# Patient Record
Sex: FEMALE | Race: WHITE | NOT HISPANIC OR LATINO | Employment: OTHER | URBAN - METROPOLITAN AREA
[De-identification: names, ages, dates, MRNs, and addresses within clinical notes are randomized per-mention and may not be internally consistent; named-entity substitution may affect disease eponyms.]

---

## 2017-01-10 ENCOUNTER — TELEPHONE (OUTPATIENT)
Dept: OTHER | Facility: MEDICAL CENTER | Age: 62
End: 2017-01-10

## 2017-01-10 NOTE — TELEPHONE ENCOUNTER
Pt calls requesting assistance in coordinating care.  Pt is to undergo a bilateral mastectomy and immediate reconstruction on 2/19/17.  Per pt no orders rcvd from Dr. Wright's office.    Call placed to Dr. Wright's office for clarification.   confirmed that orders were sent for pt to have surgery on 2/19/17.  Phoned pt with update.  Pt grateful for assistance and will schedule pre op registration appt as soon as orders are rcvd.

## 2017-01-16 DIAGNOSIS — Z01.812 PRE-OPERATIVE LABORATORY EXAMINATION: ICD-10-CM

## 2017-01-16 DIAGNOSIS — Z01.810 PRE-OPERATIVE CARDIOVASCULAR EXAMINATION: ICD-10-CM

## 2017-01-16 LAB
ANION GAP SERPL CALC-SCNC: 8 MMOL/L (ref 0–11.9)
BASOPHILS # BLD AUTO: 1 % (ref 0–1.8)
BASOPHILS # BLD: 0.05 K/UL (ref 0–0.12)
BUN SERPL-MCNC: 13 MG/DL (ref 8–22)
CALCIUM SERPL-MCNC: 9.4 MG/DL (ref 8.5–10.5)
CHLORIDE SERPL-SCNC: 106 MMOL/L (ref 96–112)
CO2 SERPL-SCNC: 23 MMOL/L (ref 20–33)
CREAT SERPL-MCNC: 0.6 MG/DL (ref 0.5–1.4)
EKG IMPRESSION: NORMAL
EOSINOPHIL # BLD AUTO: 0.09 K/UL (ref 0–0.51)
EOSINOPHIL NFR BLD: 1.8 % (ref 0–6.9)
ERYTHROCYTE [DISTWIDTH] IN BLOOD BY AUTOMATED COUNT: 55.7 FL (ref 35.9–50)
GFR SERPL CREATININE-BSD FRML MDRD: >60 ML/MIN/1.73 M 2
GLUCOSE SERPL-MCNC: 103 MG/DL (ref 65–99)
HCT VFR BLD AUTO: 37 % (ref 37–47)
HGB BLD-MCNC: 12 G/DL (ref 12–16)
IMM GRANULOCYTES # BLD AUTO: 0.01 K/UL (ref 0–0.11)
IMM GRANULOCYTES NFR BLD AUTO: 0.2 % (ref 0–0.9)
LYMPHOCYTES # BLD AUTO: 0.81 K/UL (ref 1–4.8)
LYMPHOCYTES NFR BLD: 15.9 % (ref 22–41)
MCH RBC QN AUTO: 33.9 PG (ref 27–33)
MCHC RBC AUTO-ENTMCNC: 32.4 G/DL (ref 33.6–35)
MCV RBC AUTO: 104.5 FL (ref 81.4–97.8)
MONOCYTES # BLD AUTO: 0.43 K/UL (ref 0–0.85)
MONOCYTES NFR BLD AUTO: 8.4 % (ref 0–13.4)
NEUTROPHILS # BLD AUTO: 3.7 K/UL (ref 2–7.15)
NEUTROPHILS NFR BLD: 72.7 % (ref 44–72)
NRBC # BLD AUTO: 0 K/UL
NRBC BLD AUTO-RTO: 0 /100 WBC
PLATELET # BLD AUTO: 283 K/UL (ref 164–446)
PMV BLD AUTO: 9.9 FL (ref 9–12.9)
POTASSIUM SERPL-SCNC: 4.1 MMOL/L (ref 3.6–5.5)
RBC # BLD AUTO: 3.54 M/UL (ref 4.2–5.4)
SODIUM SERPL-SCNC: 137 MMOL/L (ref 135–145)
WBC # BLD AUTO: 5.1 K/UL (ref 4.8–10.8)

## 2017-01-16 PROCEDURE — 36415 COLL VENOUS BLD VENIPUNCTURE: CPT

## 2017-01-16 PROCEDURE — 85025 COMPLETE CBC W/AUTO DIFF WBC: CPT

## 2017-01-16 PROCEDURE — 80048 BASIC METABOLIC PNL TOTAL CA: CPT

## 2017-01-19 ENCOUNTER — HOSPITAL ENCOUNTER (OUTPATIENT)
Facility: MEDICAL CENTER | Age: 62
End: 2017-01-20
Attending: PLASTIC SURGERY | Admitting: PLASTIC SURGERY
Payer: COMMERCIAL

## 2017-01-19 PROBLEM — C50.111 MALIGNANT NEOPLASM OF CENTRAL PORTION OF RIGHT FEMALE BREAST (HCC): Status: ACTIVE | Noted: 2017-01-19

## 2017-01-19 PROCEDURE — 500438 HCHG DRESSING, SPANDAGE #10 12: Performed by: PLASTIC SURGERY

## 2017-01-19 PROCEDURE — 700111 HCHG RX REV CODE 636 W/ 250 OVERRIDE (IP)

## 2017-01-19 PROCEDURE — 700102 HCHG RX REV CODE 250 W/ 637 OVERRIDE(OP): Performed by: PLASTIC SURGERY

## 2017-01-19 PROCEDURE — 500122 HCHG BOVIE, BLADE: Performed by: PLASTIC SURGERY

## 2017-01-19 PROCEDURE — 88300 SURGICAL PATH GROSS: CPT

## 2017-01-19 PROCEDURE — 96365 THER/PROPH/DIAG IV INF INIT: CPT

## 2017-01-19 PROCEDURE — A6402 STERILE GAUZE <= 16 SQ IN: HCPCS | Performed by: PLASTIC SURGERY

## 2017-01-19 PROCEDURE — 160009 HCHG ANES TIME/MIN: Performed by: PLASTIC SURGERY

## 2017-01-19 PROCEDURE — 502594 HCHG SCISSOR HANDLE, HARMONIC ACE: Performed by: PLASTIC SURGERY

## 2017-01-19 PROCEDURE — 110371 HCHG SHELL REV 272: Performed by: PLASTIC SURGERY

## 2017-01-19 PROCEDURE — A9270 NON-COVERED ITEM OR SERVICE: HCPCS | Performed by: PLASTIC SURGERY

## 2017-01-19 PROCEDURE — 88307 TISSUE EXAM BY PATHOLOGIST: CPT | Mod: 59

## 2017-01-19 PROCEDURE — 160002 HCHG RECOVERY MINUTES (STAT): Performed by: PLASTIC SURGERY

## 2017-01-19 PROCEDURE — 700101 HCHG RX REV CODE 250

## 2017-01-19 PROCEDURE — 502240 HCHG MISC OR SUPPLY RC 0272: Performed by: PLASTIC SURGERY

## 2017-01-19 PROCEDURE — 160036 HCHG PACU - EA ADDL 30 MINS PHASE I: Performed by: PLASTIC SURGERY

## 2017-01-19 PROCEDURE — L8600 IMPLANT BREAST SILICONE/EQ: HCPCS | Performed by: PLASTIC SURGERY

## 2017-01-19 PROCEDURE — 160048 HCHG OR STATISTICAL LEVEL 1-5: Performed by: PLASTIC SURGERY

## 2017-01-19 PROCEDURE — A4606 OXYGEN PROBE USED W OXIMETER: HCPCS | Performed by: PLASTIC SURGERY

## 2017-01-19 PROCEDURE — 500054 HCHG BANDAGE, ELASTIC 6: Performed by: PLASTIC SURGERY

## 2017-01-19 PROCEDURE — 500772 HCHG KIT, MAMMARY FILL FLUID TRANSFER: Performed by: PLASTIC SURGERY

## 2017-01-19 PROCEDURE — 500423 HCHG DRESSING, ABD COMBINE: Performed by: PLASTIC SURGERY

## 2017-01-19 PROCEDURE — 160029 HCHG SURGERY MINUTES - 1ST 30 MINS LEVEL 4: Performed by: PLASTIC SURGERY

## 2017-01-19 PROCEDURE — 700111 HCHG RX REV CODE 636 W/ 250 OVERRIDE (IP): Performed by: PLASTIC SURGERY

## 2017-01-19 PROCEDURE — 500389 HCHG DRAIN, RESERVOIR SUCT JP 100CC: Performed by: PLASTIC SURGERY

## 2017-01-19 PROCEDURE — 501838 HCHG SUTURE GENERAL: Performed by: PLASTIC SURGERY

## 2017-01-19 PROCEDURE — 110382 HCHG SHELL REV 271: Performed by: PLASTIC SURGERY

## 2017-01-19 PROCEDURE — 160041 HCHG SURGERY MINUTES - EA ADDL 1 MIN LEVEL 4: Performed by: PLASTIC SURGERY

## 2017-01-19 PROCEDURE — G0378 HOSPITAL OBSERVATION PER HR: HCPCS

## 2017-01-19 PROCEDURE — 500881 HCHG PACK, EXTREMITY: Performed by: PLASTIC SURGERY

## 2017-01-19 PROCEDURE — 501445 HCHG STAPLER, SKIN DISP: Performed by: PLASTIC SURGERY

## 2017-01-19 PROCEDURE — 500371 HCHG DRAIN, BLAKE 10MM: Performed by: PLASTIC SURGERY

## 2017-01-19 PROCEDURE — 700112 HCHG RX REV CODE 229: Performed by: PLASTIC SURGERY

## 2017-01-19 PROCEDURE — 88309 TISSUE EXAM BY PATHOLOGIST: CPT

## 2017-01-19 PROCEDURE — 160035 HCHG PACU - 1ST 60 MINS PHASE I: Performed by: PLASTIC SURGERY

## 2017-01-19 PROCEDURE — 500888 HCHG PACK, MINOR BASIN: Performed by: PLASTIC SURGERY

## 2017-01-19 DEVICE — EXPANDER CPX TALL HT 9300 BREAST WITH TABS 550CC: Type: IMPLANTABLE DEVICE | Site: CHEST | Status: FUNCTIONAL

## 2017-01-19 RX ORDER — AMOXICILLIN 250 MG
1 CAPSULE ORAL DAILY
Status: DISCONTINUED | OUTPATIENT
Start: 2017-01-19 | End: 2017-01-20 | Stop reason: HOSPADM

## 2017-01-19 RX ORDER — SODIUM CHLORIDE, SODIUM LACTATE, POTASSIUM CHLORIDE, CALCIUM CHLORIDE 600; 310; 30; 20 MG/100ML; MG/100ML; MG/100ML; MG/100ML
1000 INJECTION, SOLUTION INTRAVENOUS
Status: COMPLETED | OUTPATIENT
Start: 2017-01-19 | End: 2017-01-19

## 2017-01-19 RX ORDER — HYDROCODONE BITARTRATE AND ACETAMINOPHEN 5; 325 MG/1; MG/1
1-2 TABLET ORAL EVERY 6 HOURS PRN
Status: DISCONTINUED | OUTPATIENT
Start: 2017-01-19 | End: 2017-01-20 | Stop reason: HOSPADM

## 2017-01-19 RX ORDER — ONDANSETRON 2 MG/ML
4 INJECTION INTRAMUSCULAR; INTRAVENOUS EVERY 4 HOURS PRN
Status: DISCONTINUED | OUTPATIENT
Start: 2017-01-19 | End: 2017-01-20 | Stop reason: HOSPADM

## 2017-01-19 RX ORDER — DIPHENHYDRAMINE HCL 25 MG
25 TABLET ORAL NIGHTLY PRN
Status: DISCONTINUED | OUTPATIENT
Start: 2017-01-19 | End: 2017-01-20 | Stop reason: HOSPADM

## 2017-01-19 RX ORDER — MORPHINE SULFATE 4 MG/ML
2 INJECTION, SOLUTION INTRAMUSCULAR; INTRAVENOUS
Status: DISCONTINUED | OUTPATIENT
Start: 2017-01-19 | End: 2017-01-20 | Stop reason: HOSPADM

## 2017-01-19 RX ORDER — CEFAZOLIN SODIUM 1 G/3ML
INJECTION, POWDER, FOR SOLUTION INTRAMUSCULAR; INTRAVENOUS
Status: DISPENSED
Start: 2017-01-19 | End: 2017-01-19

## 2017-01-19 RX ORDER — PROMETHAZINE HYDROCHLORIDE 25 MG/1
25 SUPPOSITORY RECTAL EVERY 4 HOURS PRN
Status: DISCONTINUED | OUTPATIENT
Start: 2017-01-19 | End: 2017-01-20 | Stop reason: HOSPADM

## 2017-01-19 RX ORDER — DOCUSATE SODIUM 100 MG/1
100 CAPSULE, LIQUID FILLED ORAL 2 TIMES DAILY
Status: DISCONTINUED | OUTPATIENT
Start: 2017-01-19 | End: 2017-01-20 | Stop reason: HOSPADM

## 2017-01-19 RX ORDER — PROMETHAZINE HYDROCHLORIDE 25 MG/1
12.5 SUPPOSITORY RECTAL EVERY 4 HOURS PRN
Status: DISCONTINUED | OUTPATIENT
Start: 2017-01-19 | End: 2017-01-20 | Stop reason: HOSPADM

## 2017-01-19 RX ORDER — ROPIVACAINE HYDROCHLORIDE 5 MG/ML
INJECTION, SOLUTION EPIDURAL; INFILTRATION; PERINEURAL
Status: DISCONTINUED | OUTPATIENT
Start: 2017-01-19 | End: 2017-01-19 | Stop reason: HOSPADM

## 2017-01-19 RX ORDER — DEXTROSE, SODIUM CHLORIDE, SODIUM LACTATE, POTASSIUM CHLORIDE, AND CALCIUM CHLORIDE 5; .6; .31; .03; .02 G/100ML; G/100ML; G/100ML; G/100ML; G/100ML
INJECTION, SOLUTION INTRAVENOUS CONTINUOUS
Status: DISCONTINUED | OUTPATIENT
Start: 2017-01-19 | End: 2017-01-20 | Stop reason: HOSPADM

## 2017-01-19 RX ORDER — ZOLPIDEM TARTRATE 5 MG/1
5 TABLET ORAL NIGHTLY PRN
Status: DISCONTINUED | OUTPATIENT
Start: 2017-01-19 | End: 2017-01-20 | Stop reason: HOSPADM

## 2017-01-19 RX ORDER — GENTAMICIN SULFATE 40 MG/ML
INJECTION, SOLUTION INTRAMUSCULAR; INTRAVENOUS
Status: DISPENSED
Start: 2017-01-19 | End: 2017-01-19

## 2017-01-19 RX ORDER — ROPIVACAINE HYDROCHLORIDE 5 MG/ML
INJECTION, SOLUTION EPIDURAL; INFILTRATION; PERINEURAL
Status: DISPENSED
Start: 2017-01-19 | End: 2017-01-19

## 2017-01-19 RX ORDER — BACITRACIN 50000 [IU]/1
INJECTION, POWDER, FOR SOLUTION INTRAMUSCULAR
Status: DISPENSED
Start: 2017-01-19 | End: 2017-01-19

## 2017-01-19 RX ORDER — ACETAMINOPHEN 500 MG
1000 TABLET ORAL EVERY 6 HOURS
Status: DISCONTINUED | OUTPATIENT
Start: 2017-01-19 | End: 2017-01-20 | Stop reason: HOSPADM

## 2017-01-19 RX ADMIN — SODIUM CHLORIDE, SODIUM LACTATE, POTASSIUM CHLORIDE, CALCIUM CHLORIDE 1000 ML: 600; 310; 30; 20 INJECTION, SOLUTION INTRAVENOUS at 12:00

## 2017-01-19 RX ADMIN — DOCUSATE SODIUM 100 MG: 100 CAPSULE ORAL at 21:02

## 2017-01-19 RX ADMIN — STANDARDIZED SENNA CONCENTRATE AND DOCUSATE SODIUM 1 TABLET: 8.6; 5 TABLET, FILM COATED ORAL at 21:02

## 2017-01-19 RX ADMIN — SODIUM CHLORIDE, SODIUM LACTATE, POTASSIUM CHLORIDE, CALCIUM CHLORIDE AND DEXTROSE MONOHYDRATE: 5; 600; 310; 30; 20 INJECTION, SOLUTION INTRAVENOUS at 20:59

## 2017-01-19 RX ADMIN — ACETAMINOPHEN 1000 MG: 500 TABLET, FILM COATED ORAL at 21:02

## 2017-01-19 RX ADMIN — CEFAZOLIN SODIUM 1000 MG: 1 INJECTION, SOLUTION INTRAVENOUS at 22:36

## 2017-01-19 RX ADMIN — HYDROCODONE BITARTRATE AND ACETAMINOPHEN 1 TABLET: 5; 325 TABLET ORAL at 21:02

## 2017-01-19 ASSESSMENT — PAIN SCALES - GENERAL
PAINLEVEL_OUTOF10: 0
PAINLEVEL_OUTOF10: 1
PAINLEVEL_OUTOF10: 0

## 2017-01-19 ASSESSMENT — LIFESTYLE VARIABLES
HOW MANY TIMES IN THE PAST YEAR HAVE YOU HAD 5 OR MORE DRINKS IN A DAY: 0
TOTAL SCORE: 0
EVER FELT BAD OR GUILTY ABOUT YOUR DRINKING: NO
CONSUMPTION TOTAL: NEGATIVE
TOTAL SCORE: 0
TOTAL SCORE: 0
HAVE PEOPLE ANNOYED YOU BY CRITICIZING YOUR DRINKING: NO
ALCOHOL_USE: YES
ON A TYPICAL DAY WHEN YOU DRINK ALCOHOL HOW MANY DRINKS DO YOU HAVE: 1
AVERAGE NUMBER OF DAYS PER WEEK YOU HAVE A DRINK CONTAINING ALCOHOL: 0
HAVE YOU EVER FELT YOU SHOULD CUT DOWN ON YOUR DRINKING: NO
EVER_SMOKED: NEVER
EVER HAD A DRINK FIRST THING IN THE MORNING TO STEADY YOUR NERVES TO GET RID OF A HANGOVER: NO

## 2017-01-19 NOTE — OR SURGEON
Immediate Post-Operative Note      PreOp Diagnosis: right breast cancer    PostOp Diagnosis: same    Procedure(s) (LRB):  TISSUE EXPANDER PLACE/REMOVE (Bilateral)  FLAP GRAFT- FOR DERMAL ADIPOFASCIAL FLAPS VS. ACELLULAR DERMAL MATRIX  MASTECTOMY PROPHYLACTIC LEFT, AND RIGHT TOTAL MASTECTOMY (Left)  AXILLARY NODE DISSECTION (Right)    Surgeon(s):  ARIANNA Richardson M.D.    Anesthesiologist/Type of Anesthesia:  Anesthesiologist: Elaina Valdez M.D./* No anesthesia type entered *    Surgical Staff:  Circulator: Angela Franklin R.N.; Dayna Mo R.N.  Scrub Person: Nagi Coffey    Specimen: none    Estimated Blood Loss: minimal    Findings: see operative note    Complications: no apparent    #[id number]     1/19/2017 2:43 PM Everardo Matthews

## 2017-01-19 NOTE — IP AVS SNAPSHOT
" <p align=\"LEFT\"><IMG SRC=\"//[email or web address]/blob$/[email or web address]\" alt=\"Image\" WIDTH=\"50%\" HEIGHT=\"200\" BORDER=\"\"></p>                   Name:Carmelina Leblanc  Medical Record Number:[id number]  CSN: [id number]    YOB: 1955   Age: 61 y.o.  Sex: female  HT:1.524 m (5') WT: 84.8 kg (186 lb 15.2 oz)          Admit Date: 1/19/2017     Discharge Date:   Today's Date: 1/20/2017  Attending Doctor:  Everardo Matthews M.D.                  Allergies:  Iodine and Sulfa drugs          Follow-up Information     1. Follow up with Everardo Matthews M.D. On 1/23/2017.    Specialty:  Plastic Surgery    Contact information    [address]  [place] [address]  [phone]           Medication List      Take these Medications        Instructions    MULTI-VITAMIN GUMMIES PO    Take 2 Tabs by mouth every day.   Dose:  2 Tab         "

## 2017-01-19 NOTE — IP AVS SNAPSHOT
OneRiot Access Code: [email or web address]  Expires: 1/30/2017  8:17 AM    OneRiot  A secure, online tool to manage your health information     [employer]’s OneRiot® is a secure, online tool that connects you to your personalized health information from the privacy of your home -- day or night - making it very easy for you to manage your healthcare. Once the activation process is completed, you can even access your medical information using the OneRiot neyda, which is available for free in the Apple Neyda store or Google Play store.     OneRiot provides the following levels of access (as shown below):   My Chart Features   [facility] Primary Care Doctor [facility]  Specialists [facility]  Urgent  Care Non-[facility]  Primary Care  Doctor   Email your healthcare team securely and privately 24/7 X X X X   Manage appointments: schedule your next appointment; view details of past/upcoming appointments X      Request prescription refills. X      View recent personal medical records, including lab and immunizations X X X X   View health record, including health history, allergies, medications X X X X   Read reports about your outpatient visits, procedures, consult and ER notes X X X X   See your discharge summary, which is a recap of your hospital and/or ER visit that includes your diagnosis, lab results, and care plan. X X       How to register for OneRiot:  1. Go to  [email or web address].  2. Click on the Sign Up Now box, which takes you to the New Member Sign Up page. You will need to provide the following information:  a. Enter your OneRiot Access Code exactly as it appears at the top of this page. (You will not need to use this code after you’ve completed the sign-up process. If you do not sign up before the expiration date, you must request a new code.)   b. Enter your date of birth.   c. Enter your home email address.   d. Click Submit, and follow the next screen’s instructions.  3. Create a OneRiot ID. This will be your OneRiot  login ID and cannot be changed, so think of one that is secure and easy to remember.  4. Create a [employer] password. You can change your password at any time.  5. Enter your Password Reset Question and Answer. This can be used at a later time if you forget your password.   6. Enter your e-mail address. This allows you to receive e-mail notifications when new information is available in [employer].  7. Click Sign Up. You can now view your health information.    For assistance activating your [employer] account, call [phone]

## 2017-01-19 NOTE — IP AVS SNAPSHOT
1/20/2017          Carmelina Paredes Deana  [address] [place] [address]    Dear Carmelina:    [facility] wants to ensure your discharge home is safe and you or your loved ones have had all your questions answered regarding your care after you leave the hospital.    You may receive a telephone call within two days of your discharge.  This call is to make certain you understand your discharge instructions as well as ensure we provided you with the best care possible during your stay with us.     The call will only last approximately 3-5 minutes and will be done by a nurse.    Once again, we want to ensure your discharge home is safe and that you have a clear understanding of any next steps in your care.  If you have any questions or concerns, please do not hesitate to contact us, we are here for you.  Thank you for choosing [facility] for your healthcare needs.    Sincerely,    Ubaldo Eduardo    [facility]

## 2017-01-19 NOTE — IP AVS SNAPSHOT
After Visit Summary                                                                                                                  Name:Carmelina Leblanc  Medical Record Number:[id number]  CSN: [id number]    YOB: 1955   Age: 61 y.o.  Sex: female  HT:1.524 m (5') WT: 84.8 kg (186 lb 15.2 oz)          Admit Date: 1/19/2017     Discharge Date:   Today's Date: 1/20/2017  Attending Doctor:  Everardo Matthews M.D.                  Allergies:  Iodine and Sulfa drugs            Discharge Instructions       Discharge Instructions    Discharged to home by car with relative. Discharged via wheelchair, hospital escort: Refused.  Special equipment needed: Not Applicable    Be sure to schedule a follow-up appointment with your primary care doctor or any specialists as instructed.     Discharge Plan:   Diet Plan: Discussed  Activity Level: Discussed  Confirmed Follow up Appointment: Appointment Scheduled  Confirmed Symptoms Management: Discussed  Medication Reconciliation Updated: Yes  Influenza Vaccine Indication: Patient Refuses    I understand that a diet low in cholesterol, fat, and sodium is recommended for good health. Unless I have been given specific instructions below for another diet, I accept this instruction as my diet prescription.   Other diet: *regular  **    Special Instructions: None    · Is patient discharged on Warfarin / Coumadin?   No     · Is patient Post Blood Transfusion?  No    Depression / Suicide Risk    As you are discharged from this [facility] facility, it is important to learn how to keep safe from harming yourself.    Recognize the warning signs:  · Abrupt changes in personality, positive or negative- including increase in energy   · Giving away possessions  · Change in eating patterns- significant weight changes-  positive or negative  · Change in sleeping patterns- unable to sleep or sleeping all the time   · Unwillingness or inability to communicate  · Depression  · Unusual  sadness, discouragement and loneliness  · Talk of wanting to die  · Neglect of personal appearance   · Rebelliousness- reckless behavior  · Withdrawal from people/activities they love  · Confusion- inability to concentrate     If you or a loved one observes any of these behaviors or has concerns about self-harm, here's what you can do:  · Talk about it- your feelings and reasons for harming yourself  · Remove any means that you might use to hurt yourself (examples: pills, rope, extension cords, firearm)  · Get professional help from the community (Mental Health, Substance Abuse, psychological counseling)  · Do not be alone:Call your Safe Contact- someone whom you trust who will be there for you.  · Call your local CRISIS HOTLINE 164-1356 or 290-316-5424  · Call your local Children's Mobile Crisis Response Team [place] (444) 195-6917 or www.[employer]  · Call the toll free National Suicide Prevention Hotlines   · National Suicide Prevention Lifeline 358-044-JSRF (6303)  · [employer] Hope Line Network 800-SUICIDE (179-1654)        Follow-up Information     1. Follow up with Everardo Matthews M.D. On 1/23/2017.    Specialty:  Plastic Surgery    Contact information    [address]  [place] [id number] [phone]           Discharge Medication Instructions:    Below are the medications your physician expects you to take upon discharge:    Review all your home medications and newly ordered medications with your doctor and/or pharmacist. Follow medication instructions as directed by your doctor and/or pharmacist.    Please keep your medication list with you and share with your physician.               Medication List      CONTINUE taking these medications        Instructions    MULTI-VITAMIN GUMMIES PO    Take 2 Tabs by mouth every day.   Dose:  2 Tab               Instructions           Diet / Nutrition:    Follow any diet instructions given to you by your doctor or the dietician, including how much  salt (sodium) you are allowed each day.    If you are overweight, talk to your doctor about a weight reduction plan.    Activity:    Remain physically active following your doctor's instructions about exercise and activity.    Rest often.     Any time you become even a little tired or short of breath, SIT DOWN and rest.    Worsening Symptoms:    Report any of the following signs and symptoms to the doctor's office immediately:    *Pain of jaw, arm, or neck  *Chest pain not relieved by medication                               *Dizziness or loss of consciousness  *Difficulty breathing even when at rest   *More tired than usual                                       *Bleeding drainage or swelling of surgical site  *Swelling of feet, ankles, legs or stomach                 *Fever (>100ºF)  *Pink or blood tinged sputum  *Weight gain (3lbs/day or 5lbs /week)           *Shock from internal defibrillator (if applicable)  *Palpitations or irregular heartbeats                *Cool and/or numb extremities    Stroke Awareness    Common Risk Factors for Stroke include:    Age  Atrial Fibrillation  Carotid Artery Stenosis  Diabetes Mellitus  Excessive alcohol consumption  High blood pressure  Overweight   Physical inactivity  Smoking    Warning signs and symptoms of a stroke include:    *Sudden numbness or weakness of the face, arm or leg (especially on one side of the body).  *Sudden confusion, trouble speaking or understanding.  *Sudden trouble seeing in one or both eyes.  *Sudden trouble walking, dizziness, loss of balance or coordination.Sudden severe headache with no known cause.    It is very important to get treatment quickly when a stroke occurs. If you experience any of the above warning signs, call 911 immediately.                   Disclaimer         Quit Smoking / Tobacco Use:    I understand the use of any tobacco products increases my chance of suffering from future heart disease or stroke and could cause other  illnesses which may shorten my life. Quitting the use of tobacco products is the single most important thing I can do to improve my health. For further information on smoking / tobacco cessation call a Toll Free Quit Line at 1-393.258.1033 (*[employer] [place]) or 1-940.720.1215 (American Lung Association) or you can access the web based program at www.lungusa.org.    [place] Tobacco Users Help Line:  (229) 249-6635       Toll Free: 1-535.639.5724  Quit Tobacco Program [facility] (281)522-4415    Crisis Hotline:    [place] Crisis Hotline:  1-771-CYDOPMS or 1-500.322.6629    [place] Crisis Hotline:    1-733.771.3780 or 108-679-1624    Discharge Survey:   Thank you for choosing [facility]. We hope we did everything we could to make your hospital stay a pleasant one. You may be receiving a phone survey and we would appreciate your time and participation in answering the questions. Your input is very valuable to us in our efforts to improve our service to our patients and their families.        My signature on this form indicates that:    1. I have reviewed and understand the above information.  2. My questions regarding this information have been answered to my satisfaction.  3. I have formulated a plan with my discharge nurse to obtain my prescribed medications for home.                  Disclaimer         __________________________________                     __________       ________                       Patient Signature                                                 Date                    Time

## 2017-01-19 NOTE — OR NURSING
3373 Pt spoke with Dr. Wright regarding add-on of port removal from left side. Dr. Wright added procedure to consent and pt signed consent.

## 2017-01-20 VITALS
HEART RATE: 68 BPM | SYSTOLIC BLOOD PRESSURE: 102 MMHG | WEIGHT: 186.95 LBS | OXYGEN SATURATION: 97 % | HEIGHT: 60 IN | BODY MASS INDEX: 36.7 KG/M2 | TEMPERATURE: 97.1 F | DIASTOLIC BLOOD PRESSURE: 57 MMHG | RESPIRATION RATE: 16 BRPM

## 2017-01-20 PROCEDURE — A9270 NON-COVERED ITEM OR SERVICE: HCPCS | Performed by: PLASTIC SURGERY

## 2017-01-20 PROCEDURE — 96367 TX/PROPH/DG ADDL SEQ IV INF: CPT

## 2017-01-20 PROCEDURE — G0378 HOSPITAL OBSERVATION PER HR: HCPCS

## 2017-01-20 PROCEDURE — 700112 HCHG RX REV CODE 229: Performed by: PLASTIC SURGERY

## 2017-01-20 PROCEDURE — 700111 HCHG RX REV CODE 636 W/ 250 OVERRIDE (IP): Performed by: PLASTIC SURGERY

## 2017-01-20 PROCEDURE — 700102 HCHG RX REV CODE 250 W/ 637 OVERRIDE(OP): Performed by: PLASTIC SURGERY

## 2017-01-20 RX ADMIN — STANDARDIZED SENNA CONCENTRATE AND DOCUSATE SODIUM 1 TABLET: 8.6; 5 TABLET, FILM COATED ORAL at 08:04

## 2017-01-20 RX ADMIN — CEFAZOLIN SODIUM 1000 MG: 1 INJECTION, SOLUTION INTRAVENOUS at 08:12

## 2017-01-20 RX ADMIN — ACETAMINOPHEN 1000 MG: 500 TABLET, FILM COATED ORAL at 02:21

## 2017-01-20 RX ADMIN — ACETAMINOPHEN 1000 MG: 500 TABLET, FILM COATED ORAL at 06:19

## 2017-01-20 RX ADMIN — HYDROCODONE BITARTRATE AND ACETAMINOPHEN 1 TABLET: 5; 325 TABLET ORAL at 06:19

## 2017-01-20 RX ADMIN — DOCUSATE SODIUM 100 MG: 100 CAPSULE ORAL at 08:04

## 2017-01-20 RX ADMIN — SODIUM CHLORIDE, SODIUM LACTATE, POTASSIUM CHLORIDE, CALCIUM CHLORIDE AND DEXTROSE MONOHYDRATE: 5; 600; 310; 30; 20 INJECTION, SOLUTION INTRAVENOUS at 06:21

## 2017-01-20 ASSESSMENT — ENCOUNTER SYMPTOMS
VOMITING: 0
FEVER: 0
NAUSEA: 0
CHILLS: 0

## 2017-01-20 ASSESSMENT — LIFESTYLE VARIABLES: EVER_SMOKED: NEVER

## 2017-01-20 NOTE — PROGRESS NOTES
Assumed care of patient after report from NOC RN. Patient A & O x 4. Pleasant affect. Respirations even and non labored. Bed in lowest, locked position. Non skid socks in place. Call bell, personal items in reach. Fall precautions in place. Patient experienced nausea this morning, but it passed rapidly. Orders received to discharge patient. Patient eager to return home. Tolerated meds whole. Denies pain or discomfort. Will continue to report and observe.

## 2017-01-20 NOTE — PROGRESS NOTES
Assumed care of pt after arrival from PACU; pt orietned to room, call light, and surroundings; pt reports mild pain and discomfort, 2/10 in chest, pain meds administered per MAR; pt denies SOB, on 1L O2, sating in high 90's; pt reports N/T in finger tips and bottoms of feet from chemo treatment; pt tolerating clears at this time, denies N/V; SCDs in place; IS and education given to pt, pt pulling 1200; right arm elevated on 2 pillows; 4 RASHAUN drains in place, sanguinous drainage; no sticks or BPs on right arm; all needs met at this time; hourly rounding in place

## 2017-01-20 NOTE — DISCHARGE INSTRUCTIONS
Discharge Instructions    Discharged to home by car with relative. Discharged via wheelchair, hospital escort: Refused.  Special equipment needed: Not Applicable    Be sure to schedule a follow-up appointment with your primary care doctor or any specialists as instructed.     Discharge Plan:   Diet Plan: Discussed  Activity Level: Discussed  Confirmed Follow up Appointment: Appointment Scheduled  Confirmed Symptoms Management: Discussed  Medication Reconciliation Updated: Yes  Influenza Vaccine Indication: Patient Refuses    I understand that a diet low in cholesterol, fat, and sodium is recommended for good health. Unless I have been given specific instructions below for another diet, I accept this instruction as my diet prescription.   Other diet: *regular  **    Special Instructions: None    · Is patient discharged on Warfarin / Coumadin?   No     · Is patient Post Blood Transfusion?  No    Depression / Suicide Risk    As you are discharged from this [facility] facility, it is important to learn how to keep safe from harming yourself.    Recognize the warning signs:  · Abrupt changes in personality, positive or negative- including increase in energy   · Giving away possessions  · Change in eating patterns- significant weight changes-  positive or negative  · Change in sleeping patterns- unable to sleep or sleeping all the time   · Unwillingness or inability to communicate  · Depression  · Unusual sadness, discouragement and loneliness  · Talk of wanting to die  · Neglect of personal appearance   · Rebelliousness- reckless behavior  · Withdrawal from people/activities they love  · Confusion- inability to concentrate     If you or a loved one observes any of these behaviors or has concerns about self-harm, here's what you can do:  · Talk about it- your feelings and reasons for harming yourself  · Remove any means that you might use to hurt yourself (examples: pills, rope, extension cords, firearm)  · Get  professional help from the community (Mental Health, Substance Abuse, psychological counseling)  · Do not be alone:Call your Safe Contact- someone whom you trust who will be there for you.  · Call your local CRISIS HOTLINE 197-6458 or 418-330-6892  · Call your local Children's Mobile Crisis Response Team [place] (656) 483-9386 or www.[employer]  · Call the toll free National Suicide Prevention Hotlines   · National Suicide Prevention Lifeline 571-639-IRUN (8673)  · National Hope Line Network 800-SUICIDE (062-9030)

## 2017-01-20 NOTE — CARE PLAN
Problem: Safety  Goal: Will remain free from falls  Intervention: Assess risk factors for falls  Pt educated on importance to call for assistance, calls appropriately       Problem: Pain Management  Goal: Pain level will decrease to patient’s comfort goal  Intervention: Follow pain managment plan developed in collaboration with patient and Interdisciplinary Team  Pain meds admin per MAR with positive results

## 2017-01-20 NOTE — OP REPORT
DATE OF SERVICE:  01/19/2017    PREOPERATIVE DIAGNOSIS:  Right breast cancer.    POSTOPERATIVE DIAGNOSIS:  Right breast cancer.    PROCEDURE:  Bilateral breast reconstruction with placement of tissue expanders   and use of dermal glandular flaps 100 cm2 per breast.    SURGEON:  Everardo Matthews MD    ANESTHESIOLOGIST:  Elaina Valdez MD    ASSISTANT:  Carly Wright MD    ESTIMATED BLOOD LOSS:  Minimal.    COMPLICATIONS:  No apparent.    SPECIMENS:  None from my portion of the operation.    INDICATIONS FOR PROCEDURE:  The patient is a 61-year-old woman who was   diagnosed with a right triple negative breast cancer.  The patient did have a   large mass along with obvious axillary involvement and underwent neoadjuvant   chemotherapy.  She did have response to this.  She now presents for right   modified radical mastectomy, left prophylactic mastectomy and reconstruction.    INTRAOPERATIVE FINDINGS:  A 550 mL tissue expanders were placed bilaterally.    240 mL were placed in the left tissue expander and 180 mL in the right tissue   expander.    DESCRIPTION OF PROCEDURE:  After the operative and nonoperative options were   discussed and include was not limited to bleeding, infection, damage to   surrounding structures, need for further surgery, reaction to anesthetic   agent, scarring, breast asymmetry, contour irregularities, mastectomy, skin   flap necrosis, need for revisional surgery, wound healing difficulties, deep   venous thrombosis, pulmonary embolus, myocardial infarction, stroke,   unsatisfactory result and/or death.  Informed consent was obtained.  Patient   was identified.  In a sitting upright position, the patient's sternal notch   midline inframammary folds were marked.  The planned mastectomy incision lines   were drawn out.  A Wise pattern marking was drawn out on the breast on top of   this area.  Antibiotics given, sequential compression devices placed.    Patient brought to the operating room and  general anesthesia was induced.  Her   chest prepped and draped in the usual sterile fashion.  Please see Dr. Carly Wright's for the mastectomy portions of the operation.  I did assist with   this.  Following completion of this, starting on the left hand side, the   Bovie electrocautery was used to dissect down along the lateral border of the   pectoralis major muscle.  A plane was then developed in between the pectoralis   major and the pectoralis minor to about 2 cm off the midline superiorly up to   level of the clavicle.  Inferiorly, the muscle was then released along its   origin in the lateral medial direction.  Following this, a large dermal   adipofascial flap was created.  The patient did have a large pendulous breast.    An incision was made along the entire inframammary fold.  Curved Silverman   scissors were then used to deepithelialize the flap.  The flap was then   mobilized both medially and laterally to create a hammock effect with the   flap.  This was then sewn into the lateral chest wall with interrupted 2-0   Vicryl sutures.  The pectoralis major muscle was then sewn in interrupted   fashion to the dermal adipofascial flap with 2-0 Vicryl sutures.  The pocket   was then copiously irrigated and hemostasis was obtained.  Intercostal blocks   and the subpectoralis minor block was placed with ropivacaine.  Following   this, then two 10 flat drains were placed and secured with 3-0 nylon sutures.    Then, using a minimal touch technique and new gloves, the 550 mL tissue   expander was placed.  The dermal adipofascial flap was then approximated to   the pectoralis major muscle with interrupted 2-0 Vicryl sutures.  A 240 mL was   then placed in the expander.  The overlying skin flaps were then further   deepithelialized and were then brought together at the inverted T junction   with 3-0 Vicryl sutures.  Then, 3-0 deep dermal Monocryl sutures and running   4-0 Monocryl subcuticular stitches were used  to close the overlying skin.    We turned our attention to the contralateral side where the same procedure was   performed.  On this side, I only put 180 mL in the tissue expander.  The   patient was washed and Steri-Strips, and compressive dressings were then placed.    She was then awakened, extubated and transferred to the PACU in stable   condition.  At the end of procedure, all sponge, instrument and needle counts   were correct.       ____________________________________     MD ALEXANDRA MOLINA / RUFUS    DD:  01/19/2017 18:10:02  DT:  01/19/2017 20:22:53    D#:  [id number]  Job#:  [id number]    cc: Carly Wright MD

## 2017-01-20 NOTE — OR NURSING
1808  Received pt fromOR.  Report from Dr. Valdez.  No airway in place.  Respirations are even and unlabored.  Patient denies pain.  abd's and mesh bra in place.  No drainage noted.  Jak paws applied.     1829  Family updated in waiting room.      1850 Pt resting, denies any pain at all. Does not want a sip of water, gown changed, RASHAUN drains checked and labeled. Awaiting to call report.    1915 Pt resting,  at side, updated on POC, awaiting to give report.    1930 Report given to ALE Carroll. All questions/concerns addressed.  left for the night, contact information written on SBAR for ALE Carroll. Awaiting transport.    1947 Pt discharged, taken to floor in [facility] with transport.

## 2017-01-20 NOTE — OP REPORT
DATE OF SERVICE:  01/19/2017    PREOPERATIVE DIAGNOSES:  Right breast clinically T2 N1 Mx invasive ductal   carcinoma of the central and right retroareolar breast, status post   neoadjuvant chemotherapy.    POSTOPERATIVE DIAGNOSES:  Right breast clinically T2 N1 Mx invasive ductal   carcinoma of the central and right retroareolar breast, status post   neoadjuvant chemotherapy.    PROCEDURE:  Left prophylactic mastectomy, right total mastectomy, axillary   lymph node dissection on the right with level 1 and level 2 lymph nodes, an   immediate reconstruction was done by Dr. Matthews.    SURGEON:  Carly Wright MD    ASSISTANT:  Everardo Matthews MD and Pina Ryder [facility]    SPECIMENS:  1.  Left prophylactic mastectomy.  2.  Left total mastectomy.  3.  Axillary lymph node dissection of level 1 and 2 nodes.    ANESTHESIOLOGIST:  Elaina Valdez MD    ANESTHESIA:  General with laryngeal mask airway.    ESTIMATED BLOOD LOSS:  100 mL.     FINDINGS:  Firm mass in the right central retroareolar breast not well   circumscribed, several firm but not enlarged right axillary lymph nodes.    COMPLICATIONS:  None apparent.    INDICATIONS:  This is a 61-year-old woman who presented about 6 months ago   with a right breast mass after workup was found to be invasive ductal   carcinoma and while on imaging, this was only about 1.3 cm clinically, it was   about 5 cm and she had clinically palpable axillary lymph node.  This was also   a triple negative tumor and so she underwent neoadjuvant chemotherapy with   good response clinically.  She is now healed from her chemotherapy and we will   now proceed with left prophylactic mastectomy, right total mastectomy and   right axillary lymph node dissection.  We discussed risks, benefits, and   alternatives with risks including but not limited to bleeding, infection,   damage to surrounding structures including the thoracodorsal and long thoracic   nerves with subsequent movement  deficits, damage to the axillary vein causing   bleeding, possible residual cancer that could require further treatment such   as radiation or further chemotherapy.  The patient understood and agreed to   proceed.    DESCRIPTION OF PROCEDURE:  The patient was brought to the operating room.  She   was placed in a comfortable supine position on the operating room table with   all appropriate points padded.  General anesthesia was induced without   complication.  A timeout was held and completed confirming correct patient,   correct site, correct procedure and SCDs on and functioning, she received 2 g   of Ancef prior to incision.  Her chest wall was prepped with ChloraPrep due to   her IODINE ALLERGY and draped in the usual sterile fashion.  I then made the   incision as marked out by Dr. Matthews on the left breast to include complete   removal of the nipple areolar complex.  I then raised a skin flap superiorly   all the way up to the clavicle and in the process removed the left subclavian   port from its capsule and removed the capsule from underlying chest wall as   well.  Pressure was held here for 10 minutes after removal and there was no   evidence of bleeding.  I then continued the skin flaps medially to the lateral   border of the sternum, inferiorly to the inframammary fold, and laterally to   the latissimus dorsi and then removed the breast from the underlying   pectoralis muscle, bringing the fascia along with the specimen.  Before   complete removal, I placed a short stitch superiorly and long stitch laterally   and then completely amputated this and passed off the table to pathology.  I   irrigated the area well and confirmed excellent hemostasis.  I then turned   this breast over to Dr. Matthews for immediate reconstruction and turned my   attention to the right side.  I then similarly made an incision in the right   breast as marked by Dr. Matthews to include complete removal of the nipple   areolar complex,  I raised a flap superiorly to the clavicle, medially to the   lateral border of the sternum, inferiorly to the inframammary crease, and   laterally to the latissimus dorsi muscle and then removed the breast from the   underlying pectoralis bringing the fascia along with the specimen.  Before   complete amputation, I placed a short stitch superiorly and a long stitch   laterally and then passed off the table.  I then turned my attention to the   axilla, I entered the axillary fascia and identified the axillary contents.  I   was able to bluntly dissect these away from the chest wall medially, but due   to neoadjuvant chemotherapy, this was quite stiff and adhesed to the   surrounding tissues.  I was unable to identify the axillary vein and I   skeletonized this and removed all of the lymph tissue from this and I then   followed that down and removed the tissue from the branching vein being   careful to check for any nerve tissue each time I divided any of the fatty   tissue.  I was unable to remove this from the lateral subcutaneous tissue,   again mostly using a Harmonic scalpel each time checking for nerve response   from nerve tissue.  Once this was entirely removed from the surrounding tissue   and the axillary vein was completely skeletonized, I also removed the level 2   lymph nodes from between the pectoralis major and pectoralis minor again   using Harmonic scalpel.  At this point, all of the relevant structures were   skeletonized and I was unable to palpate any additional lymphatic tissue.  I   then irrigated the area well, confirmed excellent hemostasis and then turned   the operation over to Dr. Matthews for immediate reconstruction.  Patient   tolerated the procedure well and I was personally told that the counts were   correct after closure of the left side, but was not present for closure of the   right side.       ____________________________________     Carly Wright MD    ACMARI / RUFUS    DD:   01/19/2017 17:50:17  DT:  01/19/2017 20:12:36    D#:  [id number]  Job#:  [id number]

## 2017-01-20 NOTE — PROGRESS NOTES
Surgical Progress Note    Author: Carly Wright Date & Time created: 2017   7:07 AM     Interval Events:  Did very well overnight. Pain well controlled. Tolerating clear liquid diet.    Review of Systems   Constitutional: Negative for fever and chills.   Gastrointestinal: Negative for nausea and vomiting.     Hemodynamics:  Temp (24hrs), Av.4 °C (97.6 °F), Min:36.1 °C (96.9 °F), Max:36.9 °C (98.4 °F)  Temperature: 36.3 °C (97.4 °F)  Pulse  Av.3  Min: 78  Max: 105Heart Rate (Monitored): 88  Blood Pressure: (!) 97/51 mmHg, NIBP: 120/69 mmHg     Respiratory:    Respiration: 16, Pulse Oximetry: 93 %           Neuro:  GCS       Fluids:    Intake/Output Summary (Last 24 hours) at 17 0707  Last data filed at 17 0400   Gross per 24 hour   Intake   2660 ml   Output    925 ml   Net   1735 ml     Weight: 84.8 kg (186 lb 15.2 oz)  Current Diet Order   Procedures   • DIET ORDER     Physical Exam   Constitutional: She is oriented to person, place, and time. She appears well-developed and well-nourished. No distress.   HENT:   Head: Normocephalic and atraumatic.   Eyes: Conjunctivae are normal.   Neck: Normal range of motion.   Cardiovascular: Normal rate.    Pulmonary/Chest: Effort normal.   Abdominal: Soft.   Neurological: She is alert and oriented to person, place, and time.   Skin: Skin is warm and dry. She is not diaphoretic.   Bilateral skin flaps pink and viable. RASHAUN drains with thin dark red fluid in each.    Psychiatric: She has a normal mood and affect. Her behavior is normal.     Labs:  No results found for this or any previous visit (from the past 24 hour(s)).  Medical Decision Making, by Problem:  Active Hospital Problems    Diagnosis   • Malignant neoplasm of central portion of right female breast (CMS-HCC) [C50.111]   • Malignant neoplasm of right breast (CMS-HCC) [C50.911]     Plan:  Doing very well, encouraged to advance diet as tolerated.   Likely d/c home today.   F/u with   Cassie next Monday and me on 1/31/2017.     Quality Measures:  Labs reviewed  Rich catheter: No Rich        DVT prophylaxis - mechanical: SCDs  Ulcer prophylaxis: Yes          Discussed patient condition with RN and Patient

## 2017-01-20 NOTE — PROGRESS NOTES
Plastic Surgical Progress Note:    No issues. Patient is feeling fine. Pain controlled.    PE:  BP 97/51 mmHg  Pulse 84  Temp(Src) 36.3 °C (97.4 °F)  Resp 16  Ht 1.524 m (5')  Wt 84.8 kg (186 lb 15.2 oz)  BMI 36.51 kg/m2  SpO2 93%  Breastfeeding? No    I/O:   Intake/Output Summary (Last 24 hours) at 01/20/17 6741  Last data filed at 01/20/17 0400   Gross per 24 hour   Intake   2660 ml   Output    925 ml   Net   1735 ml     Mastectomy skin flaps are pink and viable. Incisions C/D/I. No hematoma or seroma.     Labs:  No results for input(s): WBC, RBC, HEMOGLOBIN, HEMATOCRIT, MCV, MCH, RDW, PLATELETCT, MPV, NEUTSPOLYS, LYMPHOCYTES, MONOCYTES, EOSINOPHILS, BASOPHILS, RBCMORPHOLO in the last 72 hours.  No results for input(s): SODIUM, POTASSIUM, CHLORIDE, CO2, GLUCOSE, BUN, CPKTOTAL in the last 72 hours.      A/P: POD# 1  S/P bilateral mastectomies and tissue expanders    Patient is doing well. Will discharge today. Follow-up Monday. Drain care. Restrictions and limitations reviewed.      Everardo Matthews M.D.

## 2017-01-20 NOTE — OR SURGEON
Immediate Post-Operative Note      PreOp Diagnosis: Right breast clinical T2N1 invasive ductal carcinoma of the central retroareolar breast, s/p neoadjuvant chemotherapy    PostOp Diagnosis: Right breast clinical T2N1 invasive ductal carcinoma of the central retroareolar breast, s/p neoadjuvant chemotherapy    Procedure(s) (LRB):  TISSUE EXPANDER PLACEment (Bilateral)  FLAP GRAFT- FOR DERMAL ADIPOFASCIAL FLAPS VS. ACELLULAR DERMAL MATRIX  MASTECTOMY PROPHYLACTIC LEFT, AND RIGHT TOTAL MASTECTOMY (Bilateral)  AXILLARY NODE DISSECTION (Right)    Surgeon(s):  ARIANNA Hammond M.D.    Anesthesiologist/Type of Anesthesia:  Anesthesiologist: Elaina Valdez M.D./General    Surgical Staff:  Circulator: Angela Franklin R.N.; Dayna Mo RJUDI  Relief Circulator: Lis Esquivel R.N.; Zuri Hanna R.N.  Relief Scrub: Kurt Morris  Scrub Person: Nagi Coffey    Specimen: 1. Left prophylactic mastectomy, 2. Left total mastectomy, 3. Axillary lymph node dissection of level 1 and 2 nodes    Estimated Blood Loss: 100mL    Findings: firm mass in the right central retroareolar breast, not well circumscribed; several firm but not enlarged right axillary lymph nodes    Complications: none apparent      Dictation: [id number]        1/19/2017 5:38 PM Carly Wright

## 2017-01-21 NOTE — DISCHARGE SUMMARY
REASON FOR ADMISSION:  Right breast cancer.    HOSPITAL COURSE:  Patient is a 61-year-old woman who underwent a right   modified radical mastectomy, left prophylactic mastectomy, and reconstruction   with tissue expanders.  The patient was admitted overnight for monitoring and   pain control.  The patient did well on the following day was deemed for   discharge.    DISCHARGE MEDICATIONS:  The patient can resume her home medications of   multivitamin, and then she was given prescriptions for Norco as needed for   pain control, Zofran 4 mg p.o. q. 6 hours as needed for nausea and vomiting,   and Keflex 500 mg p.o. q.i.d. while her drains are in place.    PATIENT'S ALLERGIES:  TO IODINE AND SULFA.    FOLLOWUP INSTRUCTIONS:  I will plan to see her back in clinic this coming   Monday.  Postop restrictions, limitations, and wound care were reviewed.    Signs and symptoms of infection were reviewed.  All of her questions were   answered.  She was given my contact number in case of emergencies.  Patient   will follow up with Dr. Wright as previously scheduled.    PROBLEM SUMMARY LIST:  Significant for:  1.  Right breast cancer.  2.  Obesity.       ____________________________________     MD ALEXANDRA MOLINA / RUFUS    DD:  01/20/2017 08:03:01  DT:  01/20/2017 18:39:35    D#:  [id number]  Job#:  [id number]    cc: Carly Wright MD

## 2017-02-01 NOTE — ADDENDUM NOTE
Encounter addended by: Rachael Oconnor R.N. on: 2/1/2017  2:48 PM<BR>     Documentation filed: Inpatient Document Flowsheet

## 2017-02-08 ENCOUNTER — HOSPITAL ENCOUNTER (OUTPATIENT)
Dept: LAB | Facility: MEDICAL CENTER | Age: 62
End: 2017-02-08
Attending: INTERNAL MEDICINE
Payer: COMMERCIAL

## 2017-02-08 LAB
ALBUMIN SERPL BCP-MCNC: 4 G/DL (ref 3.2–4.9)
ALBUMIN/GLOB SERPL: 1.5 G/DL
ALP SERPL-CCNC: 81 U/L (ref 30–99)
ALT SERPL-CCNC: 11 U/L (ref 2–50)
ANION GAP SERPL CALC-SCNC: 11 MMOL/L (ref 0–11.9)
AST SERPL-CCNC: 14 U/L (ref 12–45)
BASOPHILS # BLD AUTO: 0.04 K/UL (ref 0–0.12)
BASOPHILS NFR BLD AUTO: 1.2 % (ref 0–1.8)
BILIRUB SERPL-MCNC: 0.4 MG/DL (ref 0.1–1.5)
BUN SERPL-MCNC: 7 MG/DL (ref 8–22)
CALCIUM SERPL-MCNC: 9.5 MG/DL (ref 8.5–10.5)
CHLORIDE SERPL-SCNC: 107 MMOL/L (ref 96–112)
CO2 SERPL-SCNC: 22 MMOL/L (ref 20–33)
CREAT SERPL-MCNC: 0.58 MG/DL (ref 0.5–1.4)
EOSINOPHIL # BLD: 0.08 K/UL (ref 0–0.51)
EOSINOPHIL NFR BLD AUTO: 2.4 % (ref 0–6.9)
ERYTHROCYTE [DISTWIDTH] IN BLOOD BY AUTOMATED COUNT: 52.6 FL (ref 35.9–50)
GLOBULIN SER CALC-MCNC: 2.6 G/DL (ref 1.9–3.5)
GLUCOSE SERPL-MCNC: 111 MG/DL (ref 65–99)
HCT VFR BLD AUTO: 38.7 % (ref 37–47)
HGB BLD-MCNC: 12.1 G/DL (ref 12–16)
IMM GRANULOCYTES # BLD AUTO: 0.01 K/UL (ref 0–0.11)
IMM GRANULOCYTES NFR BLD AUTO: 0.3 % (ref 0–0.9)
LYMPHOCYTES # BLD: 0.55 K/UL (ref 1–4.8)
LYMPHOCYTES NFR BLD AUTO: 16.8 % (ref 22–41)
MCH RBC QN AUTO: 31.6 PG (ref 27–33)
MCHC RBC AUTO-ENTMCNC: 31.3 G/DL (ref 33.6–35)
MCV RBC AUTO: 101 FL (ref 81.4–97.8)
MONOCYTES # BLD: 0.2 K/UL (ref 0–0.85)
MONOCYTES NFR BLD AUTO: 6.1 % (ref 0–13.4)
NEUTROPHILS # BLD: 2.39 K/UL (ref 2–7.15)
NEUTROPHILS NFR BLD AUTO: 73.2 % (ref 44–72)
NRBC # BLD AUTO: 0 K/UL
NRBC BLD-RTO: 0 /100 WBC
PLATELET # BLD AUTO: 378 K/UL (ref 164–446)
PMV BLD AUTO: 9.6 FL (ref 9–12.9)
POTASSIUM SERPL-SCNC: 3.5 MMOL/L (ref 3.6–5.5)
PROT SERPL-MCNC: 6.6 G/DL (ref 6–8.2)
RBC # BLD AUTO: 3.83 M/UL (ref 4.2–5.4)
SODIUM SERPL-SCNC: 140 MMOL/L (ref 135–145)
WBC # BLD AUTO: 3.3 K/UL (ref 4.8–10.8)

## 2017-02-08 PROCEDURE — 80053 COMPREHEN METABOLIC PANEL: CPT

## 2017-02-08 PROCEDURE — 36415 COLL VENOUS BLD VENIPUNCTURE: CPT

## 2017-02-08 PROCEDURE — 85025 COMPLETE CBC W/AUTO DIFF WBC: CPT

## 2017-02-08 PROCEDURE — 86300 IMMUNOASSAY TUMOR CA 15-3: CPT

## 2017-02-09 ENCOUNTER — HOSPITAL ENCOUNTER (OUTPATIENT)
Dept: CARDIOLOGY | Facility: MEDICAL CENTER | Age: 62
End: 2017-02-09
Attending: INTERNAL MEDICINE
Payer: COMMERCIAL

## 2017-02-09 DIAGNOSIS — C50.911 MALIGNANT NEOPLASM OF RIGHT FEMALE BREAST, UNSPECIFIED SITE OF BREAST: ICD-10-CM

## 2017-02-09 LAB
LV EJECT FRACT MOD 2C 99903: 56.82
LV EJECT FRACT MOD 4C 99902: 66.04
LV EJECT FRACT MOD BP 99901: 63.68

## 2017-02-09 PROCEDURE — 93306 TTE W/DOPPLER COMPLETE: CPT | Mod: 26 | Performed by: INTERNAL MEDICINE

## 2017-02-09 PROCEDURE — 93306 TTE W/DOPPLER COMPLETE: CPT

## 2017-02-10 ENCOUNTER — HOSPITAL ENCOUNTER (OUTPATIENT)
Dept: RADIOLOGY | Facility: MEDICAL CENTER | Age: 62
End: 2017-02-10
Attending: INTERNAL MEDICINE
Payer: COMMERCIAL

## 2017-02-10 DIAGNOSIS — C50.911 MALIGNANT NEOPLASM OF RIGHT FEMALE BREAST, UNSPECIFIED SITE OF BREAST: ICD-10-CM

## 2017-02-10 LAB — CANCER AG27-29 SERPL-ACNC: 32.6 U/ML (ref 0–40)

## 2017-02-10 PROCEDURE — 700117 HCHG RX CONTRAST REV CODE 255: Performed by: INTERNAL MEDICINE

## 2017-02-10 PROCEDURE — 71260 CT THORAX DX C+: CPT

## 2017-02-10 PROCEDURE — A9503 TC99M MEDRONATE: HCPCS

## 2017-02-10 RX ADMIN — IOHEXOL 100 ML: 350 INJECTION, SOLUTION INTRAVENOUS at 13:22

## 2017-02-14 ENCOUNTER — HOSPITAL ENCOUNTER (OUTPATIENT)
Dept: RADIATION ONCOLOGY | Facility: MEDICAL CENTER | Age: 62
End: 2017-02-28
Attending: RADIOLOGY
Payer: COMMERCIAL

## 2017-02-14 PROCEDURE — 99214 OFFICE O/P EST MOD 30 MIN: CPT | Performed by: RADIOLOGY

## 2017-02-14 PROCEDURE — 99205 OFFICE O/P NEW HI 60 MIN: CPT | Performed by: RADIOLOGY

## 2017-02-14 NOTE — CONSULTS
DATE OF SERVICE:  02/14/2017    REFERRING PHYSICIAN:  Denice Adkins MD    OTHER PHYSICIANS:  Everardo Matthews MD and Carly Wright MD    Dear Denice:    I had the pleasure of seeing the patient today.  As you know, she has a   complicated history, but was recently diagnosed with a right breast cancer.    She is stage [id number] HER-2 FISH positive.  ERPR negative after neoadjuvant   therapy initially thought to be triple negative as she was HER-2 negative on   biopsy prior to chemo.    HISTORY OF PRESENT ILLNESS:  The patient originally presented with soreness in   her right breast.  She subsequently underwent a mammogram and ultrasound on   08/05.  Last mammogram had been done in 2012.  This revealed findings in the   right retroareolar region suspicious for malignancy better seen   mammographically than on ultrasound.  There was suspicious right axillary   adenopathy.  The lymph nodes were quite deep difficult for percutaneous   biopsy.  Left breast had benign findings.  Recommended 6-month followup.  She   then underwent a biopsy on 08/10.  This revealed an ER/MS negative, Ki-67 30%   positive, HER-2 IgG negative, invasive ductal carcinoma grade III with   lymphovascular invasion.  Staging workup at that time included a CT scan of   the chest, abdomen, and pelvis 8/23/2016, confirmed a soft tissue density in   the right breast with irregular margins and a large right axillary node.    There was a 6.6 mm nodule in the right lower pulmonary lobe, recommended   6-month followup.  Most likely hepatic cyst.  MRI breast 9/7/2016, revealed a   4.4x4.2x3.0 cm enhancing mass in the right retroareolar region corresponding   to known malignancy.  Right axillary adenopathy thought benign appearing   enhancing nodules in the left breast that are present on mammogram.  Recommend   6-month followup with left diagnostic mammogram.  She then underwent AC x4   and Taxol x4.  Then underwent a bilateral mastectomy and  right axillary node   dissection with bilateral reconstructions on 01/19/2017.  Left breast had   benign disease.  Right breast confirmed high grade invasive ductal carcinoma   of 3.5 cm in greatest dimension with lymphovascular invasion.  Four out of 12   nodes were positive in the right axilla, the largest 1.3 cm extranodal   extension is noted in the largest metastatic deposit.  Margins were negative.    Distance from the closest margin less than 1/10 mm from the anterior margin   around the skin on the superior slide and 2 mm from the anterior margin around   the skin on the inferior side.  All other margins were at least 5 mm in   distance.  There is no DCIS present.  She was therefore staged as a [id number]   breast cancer, ER/LA negative, HER-2 was retested and found to be positive on   FISH.  Since that time, she has undergone restaging including a repeat CT scan   confirming the presence of the lung lesion now measuring 6.3x12.4.  No other   new abnormalities seen.  Bone scan, no evidence of metastatic disease in the   bone, degenerative changes, left knee, right ankle, degenerative changes in   lumbosacral spine.  Also S-shaped curvature of the thoracolumbar spine.  She   was presented at tumor board today and her case was discussed.  Because she   has residual disease and is now found to be positive HER-2, the plan is for   her to undergo Herceptin for a year and carboplatin for 4 cycles followed by   radiation therapy to the chest wall or reconstructed breast.  She is here to   discuss radiation therapy.  Currently, she is doing well, just a little bit of   soreness in the irradiated area, but otherwise fine.    ALLERGIES:  SULFA AND IODINE.    MEDICATIONS:  Lorazepam.    PAST MEDICAL HISTORY:  She has a history of the breast cancer, tonsillectomy,   and adenoidectomy as a child, JUSTINO/BSO for prolapsed uterus.  Has had right   total hip replacement, ovarian cyst removal, left bunionectomy, dental    implant, cataract surgeries, and the bilateral mastectomies with expanders   placed.  She is , first pregnancy at age 31, menarche at 12, menopause   age 42, surgical menopause.  She was on Premarin for less than a year and had   oral contraceptives for 2 years.    SOCIAL HISTORY:  She lives with her .  She is an elementary school   teacher.  Does not drink or smoke.    FAMILY HISTORY:  Unremarkable for cancer.    REVIEW OF SYSTEMS:  Significant for nocturia, but she drinks a lot of water   during the day.  She has arthritis in the right hip.  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  The rest of the review of   systems is negative and is in the EMR.    PHYSICAL EXAMINATION:  VITAL SIGNS:  Patient has a KPS of 100, height 60, weight 177, BMI 34.568,   pulse 97, respirations 16, temperature 98, blood pressure 128/76, O2 sat 98%.  HEENT:  Normocephalic, atraumatic.  Alopecia from the chemo.  Ears, nose,   mouth, within normal limits.  Extraocular movements intact.  Sclerae are   anicteric.  No preauricular, neck, superclavicular, or axillary wesley   adenopathy.  HEART:  Regular rate.  LUNGS:  Clear.  ABDOMEN:  Soft, nontender, obese without hepatosplenomegaly.  EXTREMITIES:  Without clubbing, cyanosis, or edema.  On examination of the bilaterally reconstructed breasts:  They are well   healed.  She has no sign of any tumor recurrence.    IMPRESSION:  A 62-year-old female with _____ grade III stage 3 breast cancer,   status post neoadjuvant chemotherapy with minimal response followed by   mastectomy, found to be HER-2 positive after mastectomy.    PLAN:  For her to receive additional chemotherapy with carboplatin for 4   cycles and a year of Herceptin, followed by radiation therapy to the chest   wall.  I have discussed this in detail with the patient.  She understands that   the radiation is to  decrease the chance of local recurrence within the chest   wall and draining lymphatic area.  It may also improve her overall survival.    I have discussed the details of the radiation therapy along with the side   effects, both acute and chronic including but not exclusive to generalized   fatigue, damage to the tissues within the treatment field including local   soreness, sunburn type of reaction, scar tissue, docking is skin, thickening   of the skin, delayed healing, possible deformed implant, damage to the chest   wall, lung and possible swelling of the arm and damage to the brachial plexus.    She understands these side effects and she will be giving us a call after   she has completed her 4th cycle of chemotherapy.       ____________________________________     MD ROMAN BEJARANO / RUFUS    DD:  02/14/2017 15:17:37  DT:  02/14/2017 15:53:55    D#:  [id number]  Job#:  [id number]

## 2017-02-17 DIAGNOSIS — Z01.812 PRE-PROCEDURAL LABORATORY EXAMINATION: ICD-10-CM

## 2017-02-17 LAB
INR PPP: 0.95 (ref 0.87–1.13)
PROTHROMBIN TIME: 13 SEC (ref 12–14.6)

## 2017-02-17 PROCEDURE — 85610 PROTHROMBIN TIME: CPT

## 2017-02-17 PROCEDURE — 36415 COLL VENOUS BLD VENIPUNCTURE: CPT

## 2017-02-17 RX ORDER — LORAZEPAM 0.5 MG/1
0.5 TABLET ORAL
COMMUNITY
End: 2017-04-24

## 2017-02-21 ENCOUNTER — APPOINTMENT (OUTPATIENT)
Dept: RADIOLOGY | Facility: MEDICAL CENTER | Age: 62
End: 2017-02-21
Attending: RADIOLOGY
Payer: COMMERCIAL

## 2017-02-21 ENCOUNTER — HOSPITAL ENCOUNTER (OUTPATIENT)
Facility: MEDICAL CENTER | Age: 62
End: 2017-02-21
Attending: INTERNAL MEDICINE | Admitting: INTERNAL MEDICINE
Payer: COMMERCIAL

## 2017-02-21 ENCOUNTER — APPOINTMENT (OUTPATIENT)
Dept: RADIOLOGY | Facility: MEDICAL CENTER | Age: 62
End: 2017-02-21
Attending: INTERNAL MEDICINE
Payer: COMMERCIAL

## 2017-02-21 VITALS
WEIGHT: 176.81 LBS | TEMPERATURE: 96.4 F | HEIGHT: 60 IN | DIASTOLIC BLOOD PRESSURE: 69 MMHG | RESPIRATION RATE: 16 BRPM | HEART RATE: 84 BPM | OXYGEN SATURATION: 97 % | SYSTOLIC BLOOD PRESSURE: 120 MMHG | BODY MASS INDEX: 34.71 KG/M2

## 2017-02-21 DIAGNOSIS — C50.011 MALIGNANT NEOPLASM INVOLVING BOTH NIPPLE AND AREOLA OF RIGHT BREAST IN FEMALE (HCC): ICD-10-CM

## 2017-02-21 PROCEDURE — 71010 DX-CHEST-PORTABLE (1 VIEW): CPT

## 2017-02-21 PROCEDURE — 700111 HCHG RX REV CODE 636 W/ 250 OVERRIDE (IP)

## 2017-02-21 PROCEDURE — 160002 HCHG RECOVERY MINUTES (STAT)

## 2017-02-21 PROCEDURE — 99153 MOD SED SAME PHYS/QHP EA: CPT

## 2017-02-21 PROCEDURE — 700111 HCHG RX REV CODE 636 W/ 250 OVERRIDE (IP): Performed by: RADIOLOGY

## 2017-02-21 PROCEDURE — 88305 TISSUE EXAM BY PATHOLOGIST: CPT

## 2017-02-21 RX ORDER — OXYCODONE HYDROCHLORIDE 5 MG/1
10 TABLET ORAL
Status: DISCONTINUED | OUTPATIENT
Start: 2017-02-21 | End: 2017-02-21 | Stop reason: HOSPADM

## 2017-02-21 RX ORDER — MIDAZOLAM HYDROCHLORIDE 1 MG/ML
.5-2 INJECTION INTRAMUSCULAR; INTRAVENOUS PRN
Status: ACTIVE | OUTPATIENT
Start: 2017-02-21 | End: 2017-02-21

## 2017-02-21 RX ORDER — OXYCODONE HYDROCHLORIDE 5 MG/1
5 TABLET ORAL
Status: DISCONTINUED | OUTPATIENT
Start: 2017-02-21 | End: 2017-02-21 | Stop reason: HOSPADM

## 2017-02-21 RX ORDER — SODIUM CHLORIDE 9 MG/ML
INJECTION, SOLUTION INTRAVENOUS
Status: DISCONTINUED | OUTPATIENT
Start: 2017-02-21 | End: 2017-02-21 | Stop reason: HOSPADM

## 2017-02-21 RX ORDER — SODIUM CHLORIDE 9 MG/ML
500 INJECTION, SOLUTION INTRAVENOUS PRN
Status: DISCONTINUED | OUTPATIENT
Start: 2017-02-21 | End: 2017-02-21 | Stop reason: HOSPADM

## 2017-02-21 RX ORDER — MIDAZOLAM HYDROCHLORIDE 1 MG/ML
INJECTION INTRAMUSCULAR; INTRAVENOUS
Status: COMPLETED
Start: 2017-02-21 | End: 2017-02-21

## 2017-02-21 RX ORDER — ONDANSETRON 2 MG/ML
4 INJECTION INTRAMUSCULAR; INTRAVENOUS EVERY 8 HOURS PRN
Status: DISCONTINUED | OUTPATIENT
Start: 2017-02-21 | End: 2017-02-21 | Stop reason: HOSPADM

## 2017-02-21 RX ORDER — ONDANSETRON 2 MG/ML
4 INJECTION INTRAMUSCULAR; INTRAVENOUS PRN
Status: ACTIVE | OUTPATIENT
Start: 2017-02-21 | End: 2017-02-21

## 2017-02-21 RX ADMIN — MIDAZOLAM 1 MG: 1 INJECTION INTRAMUSCULAR; INTRAVENOUS at 08:29

## 2017-02-21 RX ADMIN — FENTANYL CITRATE 50 MCG: 50 INJECTION, SOLUTION INTRAMUSCULAR; INTRAVENOUS at 08:32

## 2017-02-21 RX ADMIN — SODIUM CHLORIDE: 9 INJECTION, SOLUTION INTRAVENOUS at 06:56

## 2017-02-21 RX ADMIN — FENTANYL CITRATE 50 MCG: 50 INJECTION, SOLUTION INTRAMUSCULAR; INTRAVENOUS at 08:30

## 2017-02-21 RX ADMIN — MIDAZOLAM HYDROCHLORIDE 1 MG: 1 INJECTION, SOLUTION INTRAMUSCULAR; INTRAVENOUS at 08:35

## 2017-02-21 RX ADMIN — FENTANYL CITRATE 50 MCG: 50 INJECTION, SOLUTION INTRAMUSCULAR; INTRAVENOUS at 08:48

## 2017-02-21 RX ADMIN — MIDAZOLAM HYDROCHLORIDE 1 MG: 1 INJECTION, SOLUTION INTRAMUSCULAR; INTRAVENOUS at 08:29

## 2017-02-21 ASSESSMENT — PAIN SCALES - GENERAL
PAINLEVEL_OUTOF10: 0

## 2017-02-21 NOTE — DISCHARGE INSTRUCTIONS
ACTIVITY: Rest and take it easy for the first 24 hours.  A responsible adult is recommended to remain with you during that time.  It is normal to feel sleepy.  We encourage you to not do anything that requires balance, judgment or coordination.    MILD FLU-LIKE SYMPTOMS ARE NORMAL. YOU MAY EXPERIENCE GENERALIZED MUSCLE ACHES, THROAT IRRITATION, HEADACHE AND/OR SOME NAUSEA.    FOR 24 HOURS DO NOT:  Drive, operate machinery or run household appliances.  Drink beer or alcoholic beverages.   Make important decisions or sign legal documents.    SPECIAL INSTRUCTIONS:   Needle Biopsy of Lung, Care After  Refer to this sheet in the next few weeks. These instructions provide you with information on caring for yourself after your procedure. Your health care provider may also give you more specific instructions. Your treatment has been planned according to current medical practices, but problems sometimes occur. Call your health care provider if you have any problems or questions after your procedure.  WHAT TO EXPECT AFTER THE PROCEDURE  · A bandage will be applied over the area where the needle was inserted. You may be asked to apply pressure to the bandage for several minutes to ensure there is minimal bleeding.  · In most cases, you can leave when your needle biopsy procedure is completed. Do not drive yourself home. Someone else should take you home.  · If you received an IV sedative or general anesthetic, you will be taken to a comfortable place to relax while the medicine wears off.  · If you have upcoming travel scheduled, talk to your health care provider about when it is safe to travel by air after the procedure.  HOME CARE INSTRUCTIONS  · Expect to take it easy for the rest of the day.  · Protect the area where you received the needle biopsy by keeping the bandage in place for as long as instructed.  · You may feel some mild pain or discomfort in the area, but this should stop in a day or two.  · Take medicines  only as directed by your health care provider.  SEEK MEDICAL CARE IF:   · You have pain at the biopsy site that worsens or is not helped by medicine.  · You have swelling or drainage at the needle biopsy site.  · You have a fever.  SEEK IMMEDIATE MEDICAL CARE IF:   · You have new or worsening shortness of breath.  · You have chest pain.  · You are coughing up blood.  · You have bleeding that does not stop with pressure or a bandage.  · You develop light-headedness or fainting.     This information is not intended to replace advice given to you by your health care provider. Make sure you discuss any questions you have with your health care provider.     Document Released: 10/14/2008 Document Revised: 01/08/2016 Document Reviewed: 05/12/2014  "[employer] Interactive Patient Education ©2016 [employer]vier Inc.      DIET: To avoid nausea, slowly advance diet as tolerated, avoiding spicy or greasy foods for the first day.  Add more substantial food to your diet according to your physician's instructions.  Babies can be fed formula or breast milk as soon as they are hungry.  INCREASE FLUIDS AND FIBER TO AVOID CONSTIPATION.    SURGICAL DRESSING/BATHING: KEEP BAND AID ON FOR 24 HOURS THEN MAY REMOVE. MAY SHOWER BUT NO IMMERSION IN WATER -TUB BATH OR SWIMMING FOR 1 WEEK.    FOLLOW-UP APPOINTMENT:  A follow-up appointment should be arranged with your doctor ( DR MENA ) in 1 WEEK; call to schedule. [phone]    You should CALL YOUR PHYSICIAN if you develop:  Fever greater than 101 degrees F.  Pain not relieved by medication, or persistent nausea or vomiting.  Excessive bleeding (blood soaking through dressing) or unexpected drainage from the wound.  Extreme redness or swelling around the incision site, drainage of pus or foul smelling drainage.  Inability to urinate or empty your bladder within 8 hours.  Problems with breathing or chest pain.    You should call 911 if you develop problems with breathing or chest pain.  If you are  unable to contact your doctor or surgical center, you should go to the nearest emergency room or urgent care center.  Physician's telephone #: [phone] - RADIOLOGIST    If any questions arise, call your doctor.  If your doctor is not available, please feel free to call the Surgical Center at [phone].  The Center is open Monday through Friday from 7AM to 7PM.  You can also call the HEALTH HOTLINE open 24 hours/day, 7 days/week and speak to a nurse at [phone], or toll free at [phone].    A registered nurse may call you a few days after your surgery to see how you are doing after your procedure.    MEDICATIONS: Resume taking daily medication.  Take prescribed pain medication with food.  If no medication is prescribed, you may take non-aspirin pain medication if needed.  PAIN MEDICATION CAN BE VERY CONSTIPATING.  Take a stool softener or laxative such as senokot, pericolace, or milk of magnesia if needed.        If your physician has prescribed pain medication that includes Acetaminophen (Tylenol), do not take additional Acetaminophen (Tylenol) while taking the prescribed medication.    Depression / Suicide Risk    As you are discharged from this [facility] facility, it is important to learn how to keep safe from harming yourself.    Recognize the warning signs:  · Abrupt changes in personality, positive or negative- including increase in energy   · Giving away possessions  · Change in eating patterns- significant weight changes-  positive or negative  · Change in sleeping patterns- unable to sleep or sleeping all the time   · Unwillingness or inability to communicate  · Depression  · Unusual sadness, discouragement and loneliness  · Talk of wanting to die  · Neglect of personal appearance   · Rebelliousness- reckless behavior  · Withdrawal from people/activities they love  · Confusion- inability to concentrate     If you or a loved one observes any of these behaviors or has concerns about  self-harm, here's what you can do:  · Talk about it- your feelings and reasons for harming yourself  · Remove any means that you might use to hurt yourself (examples: pills, rope, extension cords, firearm)  · Get professional help from the community (Mental Health, Substance Abuse, psychological counseling)  · Do not be alone:Call your Safe Contact- someone whom you trust who will be there for you.  · Call your local CRISIS HOTLINE 873-5467 or 777-324-3403  · Call your local Children's Mobile Crisis Response Team [place] (389) 577-5753 or www.[employer]  · Call the toll free National Suicide Prevention Hotlines   · National Suicide Prevention Lifeline 989-318-ZAMF (4101)  · National Hope Line Network 800-SUICIDE (859-5620)

## 2017-02-21 NOTE — OR SURGEON
Immediate Post- Operative Note        PostOp Diagnosis: RIGHT LUNG NODULE    Procedure(s):CT BX    Estimated Blood Loss: Less than 5 ml        Complications: None            2/21/2017     8:53 AM     Cristopher Diaz

## 2017-02-21 NOTE — OR NURSING
0920 Received in PPU #1, awake and alert. With band aid to posterior right lung CDI, area is soft. No complaint of pain , SOB or other discomfort.  1000 Called Radiology for the chest x-ray said she will send somebody. No changes.  1020 Called radiology again.   1034 Xray here for portable chest.  1100 Noted CXR result- negative for pneumothorax.  Given water to drink.  1205 Second chest x ray done.  1220 Result of x darwin noted - negative for pneumothorax. Prepared for discharge. Given snacks and then review discharge instructions with patient and spouse.

## 2017-02-21 NOTE — IP AVS SNAPSHOT
Home Care Instructions                                                                                                                Name:Carmelina Leblanc  Medical Record Number:[id number]  CSN: [id number]    YOB: 1955   Age: 62 y.o.  Sex: female  HT:1.524 m (5') WT: 80.2 kg (176 lb 12.9 oz)          Admit Date: 2/21/2017     Discharge Date:   Today's Date: 2/21/2017  Attending Doctor:  Denice Adkins M.D.                  Allergies:  Iodine and Sulfa drugs                Discharge Instructions         ACTIVITY: Rest and take it easy for the first 24 hours.  A responsible adult is recommended to remain with you during that time.  It is normal to feel sleepy.  We encourage you to not do anything that requires balance, judgment or coordination.    MILD FLU-LIKE SYMPTOMS ARE NORMAL. YOU MAY EXPERIENCE GENERALIZED MUSCLE ACHES, THROAT IRRITATION, HEADACHE AND/OR SOME NAUSEA.    FOR 24 HOURS DO NOT:  Drive, operate machinery or run household appliances.  Drink beer or alcoholic beverages.   Make important decisions or sign legal documents.    SPECIAL INSTRUCTIONS:   Needle Biopsy of Lung, Care After  Refer to this sheet in the next few weeks. These instructions provide you with information on caring for yourself after your procedure. Your health care provider may also give you more specific instructions. Your treatment has been planned according to current medical practices, but problems sometimes occur. Call your health care provider if you have any problems or questions after your procedure.  WHAT TO EXPECT AFTER THE PROCEDURE  · A bandage will be applied over the area where the needle was inserted. You may be asked to apply pressure to the bandage for several minutes to ensure there is minimal bleeding.  · In most cases, you can leave when your needle biopsy procedure is completed. Do not drive yourself home. Someone else should take you home.  · If you received an IV sedative or general anesthetic,  you will be taken to a comfortable place to relax while the medicine wears off.  · If you have upcoming travel scheduled, talk to your health care provider about when it is safe to travel by air after the procedure.  HOME CARE INSTRUCTIONS  · Expect to take it easy for the rest of the day.  · Protect the area where you received the needle biopsy by keeping the bandage in place for as long as instructed.  · You may feel some mild pain or discomfort in the area, but this should stop in a day or two.  · Take medicines only as directed by your health care provider.  SEEK MEDICAL CARE IF:   · You have pain at the biopsy site that worsens or is not helped by medicine.  · You have swelling or drainage at the needle biopsy site.  · You have a fever.  SEEK IMMEDIATE MEDICAL CARE IF:   · You have new or worsening shortness of breath.  · You have chest pain.  · You are coughing up blood.  · You have bleeding that does not stop with pressure or a bandage.  · You develop light-headedness or fainting.     This information is not intended to replace advice given to you by your health care provider. Make sure you discuss any questions you have with your health care provider.     Document Released: 10/14/2008 Document Revised: 01/08/2016 Document Reviewed: 05/12/2014  [employer] Interactive Patient Education ©2016 Elsevier Inc.      DIET: To avoid nausea, slowly advance diet as tolerated, avoiding spicy or greasy foods for the first day.  Add more substantial food to your diet according to your physician's instructions.  Babies can be fed formula or breast milk as soon as they are hungry.  INCREASE FLUIDS AND FIBER TO AVOID CONSTIPATION.    SURGICAL DRESSING/BATHING: KEEP BAND AID ON FOR 24 HOURS THEN MAY REMOVE. MAY SHOWER BUT NO IMMERSION IN WATER -TUB BATH OR SWIMMING FOR 1 WEEK.    FOLLOW-UP APPOINTMENT:  A follow-up appointment should be arranged with your doctor ( DR MENA ) in 1 WEEK; call to schedule. [phone]    You should  CALL YOUR PHYSICIAN if you develop:  Fever greater than 101 degrees F.  Pain not relieved by medication, or persistent nausea or vomiting.  Excessive bleeding (blood soaking through dressing) or unexpected drainage from the wound.  Extreme redness or swelling around the incision site, drainage of pus or foul smelling drainage.  Inability to urinate or empty your bladder within 8 hours.  Problems with breathing or chest pain.    You should call 911 if you develop problems with breathing or chest pain.  If you are unable to contact your doctor or surgical center, you should go to the nearest emergency room or urgent care center.  Physician's telephone #: [phone] - RADIOLOGIST    If any questions arise, call your doctor.  If your doctor is not available, please feel free to call the Surgical Center at [phone].  The Center is open Monday through Friday from 7AM to 7PM.  You can also call the [employer] HOTLINE open 24 hours/day, 7 days/week and speak to a nurse at [phone], or toll free at [phone].    A registered nurse may call you a few days after your surgery to see how you are doing after your procedure.    MEDICATIONS: Resume taking daily medication.  Take prescribed pain medication with food.  If no medication is prescribed, you may take non-aspirin pain medication if needed.  PAIN MEDICATION CAN BE VERY CONSTIPATING.  Take a stool softener or laxative such as senokot, pericolace, or milk of magnesia if needed.        If your physician has prescribed pain medication that includes Acetaminophen (Tylenol), do not take additional Acetaminophen (Tylenol) while taking the prescribed medication.    Depression / Suicide Risk    As you are discharged from this [facility] facility, it is important to learn how to keep safe from harming yourself.    Recognize the warning signs:  · Abrupt changes in personality, positive or negative- including increase in energy   · Giving away possessions  · Change in  eating patterns- significant weight changes-  positive or negative  · Change in sleeping patterns- unable to sleep or sleeping all the time   · Unwillingness or inability to communicate  · Depression  · Unusual sadness, discouragement and loneliness  · Talk of wanting to die  · Neglect of personal appearance   · Rebelliousness- reckless behavior  · Withdrawal from people/activities they love  · Confusion- inability to concentrate     If you or a loved one observes any of these behaviors or has concerns about self-harm, here's what you can do:  · Talk about it- your feelings and reasons for harming yourself  · Remove any means that you might use to hurt yourself (examples: pills, rope, extension cords, firearm)  · Get professional help from the community (Mental Health, Substance Abuse, psychological counseling)  · Do not be alone:Call your Safe Contact- someone whom you trust who will be there for you.  · Call your local CRISIS HOTLINE 260-8406 or 548-599-4543  · Call your local Children's Mobile Crisis Response Team [place] (598) 721-3452 or [employer]  · Call the toll free National Suicide Prevention Hotlines   · National Suicide Prevention Lifeline 299-646-LSPW (8961)  · National Hope Line Network 800-SUICIDE (156-0167)       Medication List      CONTINUE taking these medications        Instructions    lorazepam 0.5 MG Tabs   Commonly known as:  ATIVAN    Take 0.5 mg by mouth every 8 hours as needed for Anxiety.   Dose:  0.5 mg       MULTI-VITAMIN GUMMIES PO    Take 2 Tabs by mouth every day.   Dose:  2 Tab               Medication Information     Above and/or attached are the medications your physician expects you to take upon discharge. Review all of your home medications and newly ordered medications with your doctor and/or pharmacist. Follow medication instructions as directed by your doctor and/or pharmacist. Please keep your medication list with you and share with your physician. Update the  information when medications are discontinued, doses are changed, or new medications (including over-the-counter products) are added; and carry medication information at all times in the event of emergency situations.        Resources     Quit Smoking / Tobacco Use:    I understand the use of any tobacco products increases my chance of suffering from future heart disease or stroke and could cause other illnesses which may shorten my life. Quitting the use of tobacco products is the single most important thing I can do to improve my health. For further information on smoking / tobacco cessation call a Toll Free Quit Line at 1-935.988.8657 (*[employer]) or 1-940.800.4137 (American Lung Association) or you can access the web based program at www.lungusa.org.    [place] Tobacco Users Help Line:  (730) 198-8046       Toll Free: 1-101.899.1540  Quit Tobacco Program [facility] (091)119-1196    Crisis Hotline:    [place] Crisis Hotline:  6-673-MDWGBNA or 1-212.990.7448    [place] Crisis Hotline:    1-337.145.7007 or 980-465-4273    Discharge Survey:   Thank you for choosing [facility]. We hope we did everything we could to make your hospital stay a pleasant one. You may be receiving a survey and we would appreciate your time and participation in answering the questions. Your input is very valuable to us in our efforts to improve our service to our patients and their families.            Signatures     My signature on this form indicates that:    1. I acknowledge receipt and understanding of these Home Care Instruction.  2. My questions regarding this information have been answered to my satisfaction.  3. I have formulated a plan with my discharge nurse to obtain my prescribed medications for home.    __________________________________      __________________________________                   Patient Signature                                 Guardian/Responsible Adult  Signature      __________________________________                 __________       ________                       Nurse Signature                                               Date                 Time

## 2017-02-21 NOTE — PROGRESS NOTES
CT guided biopsy of RUL lung lesion performed by Dr Diaz via posterior approach. Patient tolerated procedure quite well and was returned to PPU in good condition. Core sample (x 4) delivered to lab in formalin.  Family updated and at bedside.

## 2017-02-21 NOTE — IP AVS SNAPSHOT
2/21/2017          Carmelina Paredes Deana  [address] [place] [address]    Dear Carmelina:    [facility] wants to ensure your discharge home is safe and you or your loved ones have had all your questions answered regarding your care after you leave the hospital.    You may receive a telephone call within two days of your discharge.  This call is to make certain you understand your discharge instructions as well as ensure we provided you with the best care possible during your stay with us.     The call will only last approximately 3-5 minutes and will be done by a nurse.    Once again, we want to ensure your discharge home is safe and that you have a clear understanding of any next steps in your care.  If you have any questions or concerns, please do not hesitate to contact us, we are here for you.  Thank you for choosing [facility] for your healthcare needs.    Sincerely,    Ubaldo Eduardo    [facility]

## 2017-02-23 ENCOUNTER — NON-PROVIDER VISIT (OUTPATIENT)
Dept: WOUND CARE | Facility: MEDICAL CENTER | Age: 62
End: 2017-02-23
Attending: SURGERY
Payer: COMMERCIAL

## 2017-02-23 PROCEDURE — 97110 THERAPEUTIC EXERCISES: CPT

## 2017-02-23 PROCEDURE — A6402 STERILE GAUZE <= 16 SQ IN: HCPCS

## 2017-02-23 PROCEDURE — 97161 PT EVAL LOW COMPLEX 20 MIN: CPT

## 2017-02-23 NOTE — CERTIFICATION
Advanced Wound Care  [facility]  [address]  [address]  [place] [address]  [phone] Fax: [phone]      Initial Evaluation  For Certification Period: 2/23/17 - 3/23/17      Referring Physician:  Dr Wright  Primary Physician:        Consulting Physicians:     Dr Nicholas,     Wound(s):  Start of Care:        Subjective:        HPI:       Pt referred to wound clinic for right UE lymphedema.  Left UE is sore to reach overhead.  Pt has lifting restrictions.   Pt expresses no concerns about lymphedema or mobility.    Surgery - January 19 2017 bilateeral mastectomy - cancer on the left 4/12 positive nodes.  Pt has expanders in place and gets her first fill 2/27/17.    Chemo prior to surgery 8 cycles aug - sept and will receive more herceptin and coboplatin through June and then radiation after chemo is completed   Pain:     o/10       Past Medical History:  Past Medical History   Diagnosis Date   • Cataract      surgery bilateral   • Dental disorder      dental implant bottom    • Heart burn    • Arthritis 2016     osteo in hips   • Cancer (CMS-HCC) 8/4/16     right breast   • Gynecological disorder    • Snoring    • Bowel habit changes      constipation     Current Medications:    Current outpatient prescriptions:   •  lorazepam (ATIVAN) 0.5 MG Tab, Take 0.5 mg by mouth every 8 hours as needed for Anxiety., Disp: , Rfl:   •  Multiple Vitamins-Minerals (MULTI-VITAMIN GUMMIES PO), Take 2 Tabs by mouth every day., Disp: , Rfl:   Allergies:   Iodine and Sulfa drugs  Past Surgical History:   Past Surgical History   Procedure Laterality Date   • Stereotactic biopsy Right 08/10/2016   • Other orthopedic surgery  6/2012     right hip arthroplasty   • Gyn surgery       hysterectomy   • Cath placement Left 9/5/2016     Procedure: CATH PLACEMENT - SUBCLAVIAN PORT - SIDE TO BE DETERMINED;  Surgeon: Carly Wright M.D.;  Location: SURGERY [facility];  Service:    • Tissue expander  place/remove Bilateral 1/19/2017     Procedure: TISSUE EXPANDER PLACEment;  Surgeon: Everardo Matthews M.D.;  Location: SURGERY SAME DAY [facility];  Service:    • Flap graft  1/19/2017     Procedure: FLAP GRAFT- FOR DERMAL ADIPOFASCIAL FLAPS ;  Surgeon: Everardo Matthews M.D.;  Location: SURGERY SAME DAY [facility];  Service:    • Mastectomy Bilateral 1/19/2017     Procedure: MASTECTOMY PROPHYLACTIC LEFT, AND RIGHT TOTAL MASTECTOMY;  Surgeon: Carly Wright M.D.;  Location: SURGERY SAME DAY [facility];  Service:    • Axillary node dissection Right 1/19/2017     Procedure: AXILLARY NODE DISSECTION;  Surgeon: Carly Wright M.D.;  Location: SURGERY SAME DAY [facility];  Service:    • Cath placement Left 1/19/2017     Procedure: Removal of left subclavian port ;  Surgeon: Carly Wright M.D.;  Location: SURGERY SAME DAY [facility];  Service:    • Other       daryn cataract     Social History:    Pt is retired teacher.  Comes with supportive .        Objective:         Wound Characteristics                                                    Location:   Initial Evaluation  Date:   skin Well hydrated, WNL for color, temp   tissue WNL   ADLs Pr able to perform but is minding restrictions to not lift                       Sensation Tingling in right axilla               Measurements: Initial Evaluation  Date: 2/23/17  R             L                39.6         39.3    24.6        24.6    23.3       23.2    16.3      15.5    17.8      17.8        Procedures:     PT eval, therex 15 for postural exercises and recommendations to maintain full ROM t/o treatment        Assessment:       etiology: breast cancer surgery     Progress:  All areas tested were WNL    Rationale for Treatment: education and therex to improve posture and reduce risks of loosing function    Patient tolerance/compliance: pt understands POC    Complicating factors: scar tissue    Need for ongoing Advanced Wound Care  services: none      Plan:      Treatment Plan and Recommendations:  Diagnosis/ICD9:     Procedures/CPT: therex, PT eval    Frequency: no further visits      Treatment Goals:     Granulation Tissue: No goals set    Decrease Necrotic Tissue to:     Wound Phase:      Decrease Size by:     Periwound:      Decrease tracts/undermining by:     Decrease Pain:          At the time of each visit a thorough assessment of the patient is completed to assure the  appropriateness of our plan of care.  The dressings or modalities may need to be adapted   from the original plan to address any significant changes in the wound environment.          Clinician Signature:_______________________________Date__________________      Physician Signature:______________________________Date:__________________

## 2017-02-27 ENCOUNTER — HOSPITAL ENCOUNTER (OUTPATIENT)
Facility: MEDICAL CENTER | Age: 62
End: 2017-02-27
Attending: INTERNAL MEDICINE
Payer: COMMERCIAL

## 2017-02-27 LAB
ALBUMIN SERPL BCP-MCNC: 4 G/DL (ref 3.2–4.9)
ALBUMIN/GLOB SERPL: 1.7 G/DL
ALP SERPL-CCNC: 80 U/L (ref 30–99)
ALT SERPL-CCNC: 12 U/L (ref 2–50)
ANION GAP SERPL CALC-SCNC: 10 MMOL/L (ref 0–11.9)
AST SERPL-CCNC: 13 U/L (ref 12–45)
BILIRUB SERPL-MCNC: 0.4 MG/DL (ref 0.1–1.5)
BUN SERPL-MCNC: 11 MG/DL (ref 8–22)
CALCIUM SERPL-MCNC: 9.7 MG/DL (ref 8.5–10.5)
CHLORIDE SERPL-SCNC: 108 MMOL/L (ref 96–112)
CO2 SERPL-SCNC: 22 MMOL/L (ref 20–33)
CREAT SERPL-MCNC: 0.66 MG/DL (ref 0.5–1.4)
GLOBULIN SER CALC-MCNC: 2.4 G/DL (ref 1.9–3.5)
GLUCOSE SERPL-MCNC: 116 MG/DL (ref 65–99)
POTASSIUM SERPL-SCNC: 4.5 MMOL/L (ref 3.6–5.5)
PROT SERPL-MCNC: 6.4 G/DL (ref 6–8.2)
SODIUM SERPL-SCNC: 140 MMOL/L (ref 135–145)

## 2017-02-27 PROCEDURE — 86300 IMMUNOASSAY TUMOR CA 15-3: CPT

## 2017-02-27 PROCEDURE — 80053 COMPREHEN METABOLIC PANEL: CPT

## 2017-03-01 LAB — CANCER AG27-29 SERPL-ACNC: 32.9 U/ML (ref 0–40)

## 2017-03-06 ENCOUNTER — TELEPHONE (OUTPATIENT)
Dept: OTHER | Facility: MEDICAL CENTER | Age: 62
End: 2017-03-06

## 2017-03-13 DIAGNOSIS — Z01.812 PRE-OPERATIVE LABORATORY EXAMINATION: ICD-10-CM

## 2017-03-13 LAB
ANION GAP SERPL CALC-SCNC: 10 MMOL/L (ref 0–11.9)
BASOPHILS # BLD AUTO: 1.5 % (ref 0–1.8)
BASOPHILS # BLD: 0.09 K/UL (ref 0–0.12)
BUN SERPL-MCNC: 12 MG/DL (ref 8–22)
CALCIUM SERPL-MCNC: 9.5 MG/DL (ref 8.5–10.5)
CHLORIDE SERPL-SCNC: 107 MMOL/L (ref 96–112)
CO2 SERPL-SCNC: 22 MMOL/L (ref 20–33)
CREAT SERPL-MCNC: 0.53 MG/DL (ref 0.5–1.4)
EOSINOPHIL # BLD AUTO: 0.08 K/UL (ref 0–0.51)
EOSINOPHIL NFR BLD: 1.4 % (ref 0–6.9)
ERYTHROCYTE [DISTWIDTH] IN BLOOD BY AUTOMATED COUNT: 50.7 FL (ref 35.9–50)
GFR SERPL CREATININE-BSD FRML MDRD: >60 ML/MIN/1.73 M 2
GLUCOSE SERPL-MCNC: 111 MG/DL (ref 65–99)
HCT VFR BLD AUTO: 36.6 % (ref 37–47)
HGB BLD-MCNC: 11.7 G/DL (ref 12–16)
IMM GRANULOCYTES # BLD AUTO: 0.03 K/UL (ref 0–0.11)
IMM GRANULOCYTES NFR BLD AUTO: 0.5 % (ref 0–0.9)
LYMPHOCYTES # BLD AUTO: 0.76 K/UL (ref 1–4.8)
LYMPHOCYTES NFR BLD: 12.9 % (ref 22–41)
MCH RBC QN AUTO: 30 PG (ref 27–33)
MCHC RBC AUTO-ENTMCNC: 32 G/DL (ref 33.6–35)
MCV RBC AUTO: 93.8 FL (ref 81.4–97.8)
MONOCYTES # BLD AUTO: 0.34 K/UL (ref 0–0.85)
MONOCYTES NFR BLD AUTO: 5.8 % (ref 0–13.4)
NEUTROPHILS # BLD AUTO: 4.6 K/UL (ref 2–7.15)
NEUTROPHILS NFR BLD: 77.9 % (ref 44–72)
NRBC # BLD AUTO: 0 K/UL
NRBC BLD AUTO-RTO: 0 /100 WBC
PLATELET # BLD AUTO: 198 K/UL (ref 164–446)
PMV BLD AUTO: 9.9 FL (ref 9–12.9)
POTASSIUM SERPL-SCNC: 3.3 MMOL/L (ref 3.6–5.5)
RBC # BLD AUTO: 3.9 M/UL (ref 4.2–5.4)
SODIUM SERPL-SCNC: 139 MMOL/L (ref 135–145)
WBC # BLD AUTO: 5.9 K/UL (ref 4.8–10.8)

## 2017-03-13 PROCEDURE — 80048 BASIC METABOLIC PNL TOTAL CA: CPT

## 2017-03-13 PROCEDURE — 85025 COMPLETE CBC W/AUTO DIFF WBC: CPT

## 2017-03-13 PROCEDURE — 36415 COLL VENOUS BLD VENIPUNCTURE: CPT

## 2017-03-17 ENCOUNTER — APPOINTMENT (OUTPATIENT)
Dept: RADIOLOGY | Facility: MEDICAL CENTER | Age: 62
End: 2017-03-17
Attending: SURGERY
Payer: COMMERCIAL

## 2017-03-17 ENCOUNTER — HOSPITAL ENCOUNTER (OUTPATIENT)
Facility: MEDICAL CENTER | Age: 62
End: 2017-03-17
Attending: SURGERY | Admitting: SURGERY
Payer: COMMERCIAL

## 2017-03-17 VITALS
TEMPERATURE: 97.2 F | HEIGHT: 60 IN | BODY MASS INDEX: 35.1 KG/M2 | HEART RATE: 76 BPM | WEIGHT: 178.79 LBS | OXYGEN SATURATION: 95 % | RESPIRATION RATE: 18 BRPM

## 2017-03-17 PROCEDURE — 160025 RECOVERY II MINUTES (STATS): Performed by: SURGERY

## 2017-03-17 PROCEDURE — 700101 HCHG RX REV CODE 250

## 2017-03-17 PROCEDURE — 501838 HCHG SUTURE GENERAL: Performed by: SURGERY

## 2017-03-17 PROCEDURE — 160009 HCHG ANES TIME/MIN: Performed by: SURGERY

## 2017-03-17 PROCEDURE — C1788 PORT, INDWELLING, IMP: HCPCS | Performed by: SURGERY

## 2017-03-17 PROCEDURE — 71010 DX-CHEST-PORTABLE (1 VIEW): CPT

## 2017-03-17 PROCEDURE — 160039 HCHG SURGERY MINUTES - EA ADDL 1 MIN LEVEL 3: Performed by: SURGERY

## 2017-03-17 PROCEDURE — 502626 HCHG SURGIFLO HEMOSTATIC MATRIX 6ML: Performed by: SURGERY

## 2017-03-17 PROCEDURE — 160048 HCHG OR STATISTICAL LEVEL 1-5: Performed by: SURGERY

## 2017-03-17 PROCEDURE — A4606 OXYGEN PROBE USED W OXIMETER: HCPCS | Performed by: SURGERY

## 2017-03-17 PROCEDURE — 110371 HCHG SHELL REV 272: Performed by: SURGERY

## 2017-03-17 PROCEDURE — 160036 HCHG PACU - EA ADDL 30 MINS PHASE I: Performed by: SURGERY

## 2017-03-17 PROCEDURE — 700111 HCHG RX REV CODE 636 W/ 250 OVERRIDE (IP)

## 2017-03-17 PROCEDURE — 160046 HCHG PACU - 1ST 60 MINS PHASE II: Performed by: SURGERY

## 2017-03-17 PROCEDURE — 500888 HCHG PACK, MINOR BASIN: Performed by: SURGERY

## 2017-03-17 PROCEDURE — 110382 HCHG SHELL REV 271: Performed by: SURGERY

## 2017-03-17 PROCEDURE — 160028 HCHG SURGERY MINUTES - 1ST 30 MINS LEVEL 3: Performed by: SURGERY

## 2017-03-17 PROCEDURE — 160047 HCHG PACU  - EA ADDL 30 MINS PHASE II: Performed by: SURGERY

## 2017-03-17 PROCEDURE — 500043 HCHG BAG-A-JET: Performed by: SURGERY

## 2017-03-17 PROCEDURE — 160002 HCHG RECOVERY MINUTES (STAT): Performed by: SURGERY

## 2017-03-17 PROCEDURE — A6402 STERILE GAUZE <= 16 SQ IN: HCPCS | Performed by: SURGERY

## 2017-03-17 PROCEDURE — 502240 HCHG MISC OR SUPPLY RC 0272: Performed by: SURGERY

## 2017-03-17 PROCEDURE — 160035 HCHG PACU - 1ST 60 MINS PHASE I: Performed by: SURGERY

## 2017-03-17 DEVICE — CATHETER POWERPORT CLEARVUE MRI 8FR ATTACHABLE CHRONOFLEX: Type: IMPLANTABLE DEVICE | Status: FUNCTIONAL

## 2017-03-17 RX ORDER — BUPIVACAINE HYDROCHLORIDE AND EPINEPHRINE 2.5; 5 MG/ML; UG/ML
INJECTION, SOLUTION EPIDURAL; INFILTRATION; INTRACAUDAL; PERINEURAL
Status: DISCONTINUED | OUTPATIENT
Start: 2017-03-17 | End: 2017-03-17 | Stop reason: HOSPADM

## 2017-03-17 RX ORDER — HYDROCODONE BITARTRATE AND ACETAMINOPHEN 5; 325 MG/1; MG/1
1-2 TABLET ORAL EVERY 4 HOURS PRN
Qty: 20 TAB | Refills: 0 | Status: SHIPPED | OUTPATIENT
Start: 2017-03-17 | End: 2017-04-24

## 2017-03-17 RX ORDER — SODIUM CHLORIDE, SODIUM LACTATE, POTASSIUM CHLORIDE, CALCIUM CHLORIDE 600; 310; 30; 20 MG/100ML; MG/100ML; MG/100ML; MG/100ML
1000 INJECTION, SOLUTION INTRAVENOUS
Status: DISCONTINUED | OUTPATIENT
Start: 2017-03-17 | End: 2017-03-17 | Stop reason: HOSPADM

## 2017-03-17 RX ORDER — LIDOCAINE HYDROCHLORIDE 10 MG/ML
INJECTION, SOLUTION INFILTRATION; PERINEURAL
Status: DISCONTINUED
Start: 2017-03-17 | End: 2017-03-17 | Stop reason: HOSPADM

## 2017-03-17 RX ORDER — ONDANSETRON 4 MG/1
4 TABLET, FILM COATED ORAL EVERY 4 HOURS PRN
Qty: 20 TAB | Refills: 1 | Status: SHIPPED | OUTPATIENT
Start: 2017-03-17 | End: 2017-03-22

## 2017-03-17 ASSESSMENT — PAIN SCALES - GENERAL
PAINLEVEL_OUTOF10: 0

## 2017-03-17 NOTE — IP AVS SNAPSHOT
3/17/2017          Carmelina Paredes Deana  [address] [place] [address]    Dear Carmelina:    [facility] wants to ensure your discharge home is safe and you or your loved ones have had all your questions answered regarding your care after you leave the hospital.    You may receive a telephone call within two days of your discharge.  This call is to make certain you understand your discharge instructions as well as ensure we provided you with the best care possible during your stay with us.     The call will only last approximately 3-5 minutes and will be done by a nurse.    Once again, we want to ensure your discharge home is safe and that you have a clear understanding of any next steps in your care.  If you have any questions or concerns, please do not hesitate to contact us, we are here for you.  Thank you for choosing [facility] for your healthcare needs.    Sincerely,    Ubaldo Eduardo    [facility]

## 2017-03-17 NOTE — IP AVS SNAPSHOT
Home Care Instructions                                                                                                                Name:Carmelina Leblanc  Medical Record Number:[id number]  CSN: [id number]    YOB: 1955   Age: 62 y.o.  Sex: female  HT:1.524 m (5') WT: 81.1 kg (178 lb 12.7 oz)          Admit Date: 3/17/2017     Discharge Date:   Today's Date: 3/17/2017  Attending Doctor:  Carly Wright M.D.                  Allergies:  Iodine and Sulfa drugs                Discharge Instructions         ACTIVITY: Rest and take it easy for the first 24 hours.  A responsible adult is recommended to remain with you during that time.  It is normal to feel sleepy.  We encourage you to not do anything that requires balance, judgment or coordination.    MILD FLU-LIKE SYMPTOMS ARE NORMAL. YOU MAY EXPERIENCE GENERALIZED MUSCLE ACHES, THROAT IRRITATION, HEADACHE AND/OR SOME NAUSEA.    FOR 24 HOURS DO NOT:  Drive, operate machinery or run household appliances.  Drink beer or alcoholic beverages.   Make important decisions or sign legal documents.    SPECIAL INSTRUCTIONS:  1. DIET: Upon discharge from the hospital you may resume your normal preoperative diet. Depending on how you are feeling and whether you have nausea or not, you may wish to stay with a bland diet for the first few days. However, you can advance this as quickly as you feel ready.     2. ACTIVITIES: After discharge from the hospital, you may resume full routine activities. However, there should be no heavy lifting (greater than 15 pounds) and no strenuous activities until after your follow-up visit. Otherwise, routine activities of daily living are acceptable.     3. DRIVING: You may drive whenever you are off pain medications and are able to perform the activities needed to drive, i.e. turning, bending, twisting, etc.     4. BATHING: You may get the wound wet at any time after leaving the hospital. You may shower, but do not submerge in a  bath for at least a week. Dressings may come off after 48 hours.     5. BOWEL FUNCTION: Constipation is common after an operation, especially with pain medications. The combination of pain medication and decreased activity level can cause constipation in otherwise normal patients. If you feel this is occurring, take a laxative (Milk of Magnesia, Ex-Lax, Senokot, etc.) until the problem has resolved.     6. PAIN MEDICATION: You will be given a prescription for pain medication at discharge. Please take these as directed. It is important to remember not to take medications on an empty stomach as this may cause nausea.     7.CALL IF YOU HAVE: (1) Fevers to more than 1010 F, (2) Unusual chest or leg pain, (3) Drainage or fluid from incision that may be foul smelling, increased tenderness or soreness at the wound or the wound edges are no longer together, redness or swelling at the incision site. Please do not hesitate to call with any other questions.     8. APPOINTMENT: Contact our office at [phone] for a follow-up appointment in 2 weeks following your procedure.     If you have any additional questions, please do not hesitate to call the office and speak to either myself or the physician on call.     Office address:   [address] [place] [address] [address]     Carly Son MD   [phone]    DIET: To avoid nausea, slowly advance diet as tolerated, avoiding spicy or greasy foods for the first day.  Add more substantial food to your diet according to your physician's instructions.  INCREASE FLUIDS AND FIBER TO AVOID CONSTIPATION.    FOLLOW-UP APPOINTMENT:  A follow-up appointment should be arranged with your doctor in 2 weeks; call to schedule.    You should CALL YOUR PHYSICIAN if you develop:  Fever greater than 101 degrees F.  Pain not relieved by medication, or persistent nausea or vomiting.  Excessive bleeding (blood soaking through dressing) or unexpected drainage from the wound.  Extreme redness or swelling around  the incision site, drainage of pus or foul smelling drainage.  Inability to urinate or empty your bladder within 8 hours.  Problems with breathing or chest pain.    You should call 911 if you develop problems with breathing or chest pain.  If you are unable to contact your doctor or surgical center, you should go to the nearest emergency room or urgent care center.  Physician's telephone #: [phone]    If any questions arise, call your doctor.  If your doctor is not available, please feel free to call the Surgical Center at [phone].  The Center is open Monday through Friday from 7AM to 7PM.  You can also call the HEALTH HOTLINE open 24 hours/day, 7 days/week and speak to a nurse at [phone], or toll free at [phone].    A registered nurse may call you a few days after your surgery to see how you are doing after your procedure.    MEDICATIONS: Resume taking daily medication.  Take prescribed pain medication with food.  If no medication is prescribed, you may take non-aspirin pain medication if needed.  PAIN MEDICATION CAN BE VERY CONSTIPATING.  Take a stool softener or laxative such as senokot, pericolace, or milk of magnesia if needed.    Prescription given for Norco and Zofran. No pain medication received in recovery room.    If your physician has prescribed pain medication that includes Acetaminophen (Tylenol), do not take additional Acetaminophen (Tylenol) while taking the prescribed medication.    Depression / Suicide Risk    As you are discharged from this [facility] facility, it is important to learn how to keep safe from harming yourself.    Recognize the warning signs:  · Abrupt changes in personality, positive or negative- including increase in energy   · Giving away possessions  · Change in eating patterns- significant weight changes-  positive or negative  · Change in sleeping patterns- unable to sleep or sleeping all the time   · Unwillingness or inability to  communicate  · Depression  · Unusual sadness, discouragement and loneliness  · Talk of wanting to die  · Neglect of personal appearance   · Rebelliousness- reckless behavior  · Withdrawal from people/activities they love  · Confusion- inability to concentrate     If you or a loved one observes any of these behaviors or has concerns about self-harm, here's what you can do:  · Talk about it- your feelings and reasons for harming yourself  · Remove any means that you might use to hurt yourself (examples: pills, rope, extension cords, firearm)  · Get professional help from the community (Mental Health, Substance Abuse, psychological counseling)  · Do not be alone:Call your Safe Contact- someone whom you trust who will be there for you.  · Call your local CRISIS HOTLINE 726-0561 or 701-246-6016  · Call your local Children's Mobile Crisis Response Team [place] (552) 144-7930 or www.[employer]  · Call the toll free National Suicide Prevention Hotlines   · National Suicide Prevention Lifeline 913-616-FXBY (9357)  · [place] Hope Line Network 800-SUICIDE (848-3387)       Medication List      START taking these medications        Instructions    hydrocodone-acetaminophen 5-325 MG Tabs per tablet   Commonly known as:  NORCO    Take 1-2 Tabs by mouth every four hours as needed.   Dose:  1-2 Tab       ondansetron 4 MG Tabs tablet   Commonly known as:  ZOFRAN    Take 1 Tab by mouth every four hours as needed for Nausea/Vomiting for up to 5 days.   Dose:  4 mg         CONTINUE taking these medications        Instructions    lorazepam 0.5 MG Tabs   Commonly known as:  ATIVAN    Take 0.5 mg by mouth 1 time daily as needed (takes during chemotherapy).   Dose:  0.5 mg               Medication Information     Above and/or attached are the medications your physician expects you to take upon discharge. Review all of your home medications and newly ordered medications with your doctor and/or pharmacist. Follow medication  instructions as directed by your doctor and/or pharmacist. Please keep your medication list with you and share with your physician. Update the information when medications are discontinued, doses are changed, or new medications (including over-the-counter products) are added; and carry medication information at all times in the event of emergency situations.        Resources     Quit Smoking / Tobacco Use:    I understand the use of any tobacco products increases my chance of suffering from future heart disease or stroke and could cause other illnesses which may shorten my life. Quitting the use of tobacco products is the single most important thing I can do to improve my health. For further information on smoking / tobacco cessation call a Toll Free Quit Line at 1-149.920.6421 (*[employer] [place]) or 1-554.451.9821 (American Lung Association) or you can access the web based program at www.lungusa.org.    [place] Tobacco Users Help Line:  (221) 720-8551       Toll Free: 1-877.328.1431  Quit Tobacco Program [facility] (884)400-2377    Crisis Hotline:    [place] Crisis Hotline:  8-724-YVZFESU or 1-517.929.6921    [place] Crisis Hotline:    1-741.844.5442 or 790-974-3795    Discharge Survey:   Thank you for choosing [facility]. We hope we did everything we could to make your hospital stay a pleasant one. You may be receiving a survey and we would appreciate your time and participation in answering the questions. Your input is very valuable to us in our efforts to improve our service to our patients and their families.            Signatures     My signature on this form indicates that:    1. I acknowledge receipt and understanding of these Home Care Instruction.  2. My questions regarding this information have been answered to my satisfaction.  3. I have formulated a plan with my discharge nurse to obtain my prescribed medications for home.    __________________________________       __________________________________                   Patient Signature                                 Guardian/Responsible Adult Signature      __________________________________                 __________       ________                       Nurse Signature                                               Date                 Time

## 2017-03-17 NOTE — DISCHARGE INSTRUCTIONS
ACTIVITY: Rest and take it easy for the first 24 hours.  A responsible adult is recommended to remain with you during that time.  It is normal to feel sleepy.  We encourage you to not do anything that requires balance, judgment or coordination.    MILD FLU-LIKE SYMPTOMS ARE NORMAL. YOU MAY EXPERIENCE GENERALIZED MUSCLE ACHES, THROAT IRRITATION, HEADACHE AND/OR SOME NAUSEA.    FOR 24 HOURS DO NOT:  Drive, operate machinery or run household appliances.  Drink beer or alcoholic beverages.   Make important decisions or sign legal documents.    SPECIAL INSTRUCTIONS:  1. DIET: Upon discharge from the hospital you may resume your normal preoperative diet. Depending on how you are feeling and whether you have nausea or not, you may wish to stay with a bland diet for the first few days. However, you can advance this as quickly as you feel ready.     2. ACTIVITIES: After discharge from the hospital, you may resume full routine activities. However, there should be no heavy lifting (greater than 15 pounds) and no strenuous activities until after your follow-up visit. Otherwise, routine activities of daily living are acceptable.     3. DRIVING: You may drive whenever you are off pain medications and are able to perform the activities needed to drive, i.e. turning, bending, twisting, etc.     4. BATHING: You may get the wound wet at any time after leaving the hospital. You may shower, but do not submerge in a bath for at least a week. Dressings may come off after 48 hours.     5. BOWEL FUNCTION: Constipation is common after an operation, especially with pain medications. The combination of pain medication and decreased activity level can cause constipation in otherwise normal patients. If you feel this is occurring, take a laxative (Milk of Magnesia, Ex-Lax, Senokot, etc.) until the problem has resolved.     6. PAIN MEDICATION: You will be given a prescription for pain medication at discharge. Please take these as directed. It  is important to remember not to take medications on an empty stomach as this may cause nausea.     7.CALL IF YOU HAVE: (1) Fevers to more than 1010 F, (2) Unusual chest or leg pain, (3) Drainage or fluid from incision that may be foul smelling, increased tenderness or soreness at the wound or the wound edges are no longer together, redness or swelling at the incision site. Please do not hesitate to call with any other questions.     8. APPOINTMENT: Contact our office at [phone] for a follow-up appointment in 2 weeks following your procedure.     If you have any additional questions, please do not hesitate to call the office and speak to either myself or the physician on call.     Office address:   [address]     Carly Son MD   [phone]    DIET: To avoid nausea, slowly advance diet as tolerated, avoiding spicy or greasy foods for the first day.  Add more substantial food to your diet according to your physician's instructions.  INCREASE FLUIDS AND FIBER TO AVOID CONSTIPATION.    FOLLOW-UP APPOINTMENT:  A follow-up appointment should be arranged with your doctor in 2 weeks; call to schedule.    You should CALL YOUR PHYSICIAN if you develop:  Fever greater than 101 degrees F.  Pain not relieved by medication, or persistent nausea or vomiting.  Excessive bleeding (blood soaking through dressing) or unexpected drainage from the wound.  Extreme redness or swelling around the incision site, drainage of pus or foul smelling drainage.  Inability to urinate or empty your bladder within 8 hours.  Problems with breathing or chest pain.    You should call 911 if you develop problems with breathing or chest pain.  If you are unable to contact your doctor or surgical center, you should go to the nearest emergency room or urgent care center.  Physician's telephone #: [phone]    If any questions arise, call your doctor.  If your doctor is not available, please feel free to call the Surgical  Center at [phone].  The Center is open Monday through Friday from 7AM to 7PM.  You can also call the HEALTH HOTLINE open 24 hours/day, 7 days/week and speak to a nurse at [phone], or toll free at [phone].    A registered nurse may call you a few days after your surgery to see how you are doing after your procedure.    MEDICATIONS: Resume taking daily medication.  Take prescribed pain medication with food.  If no medication is prescribed, you may take non-aspirin pain medication if needed.  PAIN MEDICATION CAN BE VERY CONSTIPATING.  Take a stool softener or laxative such as senokot, pericolace, or milk of magnesia if needed.    Prescription given for Norco and Zofran. No pain medication received in recovery room.    If your physician has prescribed pain medication that includes Acetaminophen (Tylenol), do not take additional Acetaminophen (Tylenol) while taking the prescribed medication.    Depression / Suicide Risk    As you are discharged from this [facility] [place] [facility] facility, it is important to learn how to keep safe from harming yourself.    Recognize the warning signs:  · Abrupt changes in personality, positive or negative- including increase in energy   · Giving away possessions  · Change in eating patterns- significant weight changes-  positive or negative  · Change in sleeping patterns- unable to sleep or sleeping all the time   · Unwillingness or inability to communicate  · Depression  · Unusual sadness, discouragement and loneliness  · Talk of wanting to die  · Neglect of personal appearance   · Rebelliousness- reckless behavior  · Withdrawal from people/activities they love  · Confusion- inability to concentrate     If you or a loved one observes any of these behaviors or has concerns about self-harm, here's what you can do:  · Talk about it- your feelings and reasons for harming yourself  · Remove any means that you might use to hurt yourself (examples: pills, rope, extension cords,  firearm)  · Get professional help from the community (Mental Health, Substance Abuse, psychological counseling)  · Do not be alone:Call your Safe Contact- someone whom you trust who will be there for you.  · Call your local CRISIS HOTLINE 498-0086 or 175-446-7322  · Call your local Children's Mobile Crisis Response Team [place] (268) 296-0997 or www."[employer]"  · Call the toll free National Suicide Prevention Hotlines   · National Suicide Prevention Lifeline 045-234-ZKFQ (8503)  · National Hope Line Network 800-SUICIDE (659-0880)

## 2017-03-17 NOTE — OR SURGEON
Immediate Post-Operative Note      PreOp Diagnosis: Right retroareolar breast cancer, right lung cancer    PostOp Diagnosis: same    Procedure(s):  CATH PLACEMENT FOR SUBCLAVIAN PORT LEFT - Wound Class: Clean    Surgeon(s):  Carly Wright M.D.    Anesthesiologist/Type of Anesthesia:  Anesthesiologist: Jairo Baird M.D./General    Surgical Staff:  Circulator: Jacky Fabian R.N.  Scrub Person: Esmer Andrade  Radiology Technician: Lena Christensen    Specimen: none    Estimated Blood Loss: 100mL    Findings: normal anatomy    Complications: none apparent    Dictation: [id number]    3/17/2017 1:29 PM Carly Wright   Post operative CXR shows good position of the catheter and no pneumothorax.

## 2017-03-18 NOTE — OP REPORT
DATE OF SERVICE:  03/17/2017    PREOPERATIVE DIAGNOSIS:  Right breast cancer, [id number], and right upper lobe   adenocarcinoma.    POSTOPERATIVE DIAGNOSIS:  Right breast cancer, [id number], and right upper lobe   adenocarcinoma.    PROCEDURE:  Left subclavian port placement.    SURGEON:  Carly Wright MD    INTEROPERATIVE CONSULT:  Jani Ibarra MD    ANESTHESIOLOGIST:  Jairo Baird MD    TYPE OF ANESTHESIA:  General with laryngeal mask airway.    INDICATIONS:  This is a 62-year-old woman who I know well from treatments of   her right breast cancer.  She initially had neoadjuvant chemotherapy which,   unfortunately, she had very little response to, and then had a bilateral   mastectomy and right axillary node dissection, at which time, her port was   removed.  Unfortunately, though her cancer was triple negative on her initial   biopsy on her mastectomy specimen, she had HER2 positivity and so will now   need Herceptin.  Both Dr. Adkins and the patient requested that her port be   replaced.  We discussed risks, benefits, and alternatives with risks   including, but not limited to bleeding, infection, damage to surrounding   structures, pneumothorax, need for thoracotomy, infection, needing to remove   and replace the port, need to place the port on the right side.  All of her   questions were answered to her satisfaction and she agreed to proceed.    DESCRIPTION OF PROCEDURE:  The patient was brought to the operating room.  She   was placed in a comfortable supine position on the operating room table with   all appropriate points padded.  General anesthesia was induced without   complication.  A timeout was held and completed confirming correct patient,   correct site, correct procedure and SCDs on and functioning.  She received 2 g   of Ancef prior to incision.  Her bilateral chest and neck were prepped with   ChloraPrep and draped in the usual sterile fashion.  Her left arm was tucked.    Her  chest wall was draped and after infiltration of 0.25% Marcaine with   epinephrine in her old port incision, I made the incision, took this down to   the muscle wall, and made a pocket large enough for the port using Bovie   electrocautery.  I then used the ultrasound to find the subclavian vein and   used the 18-gauge needle to access this.  A wire was placed and this was   confirmed to be in good position using fluoroscopy.  The needle was removed   and the dilator sheath was placed under fluoroscopic vision.  I then placed   the catheter in through the dilator sheath and used several attempts to get it   to go down into the SVC rather than cross into the other subclavian or up   into the carotid.  This was all done under fluoroscopy.  I got a position   satisfactorily and then cut the catheter to length at about 20 cm of the skin   and hooked this up to the port.  I then began to sew the port down to the   underlying chest wall muscle.  At which point, I noticed that there was some   bleeding coming from around the catheter sheath.  I then dissected down   through the muscle underneath the clavicle to find this.  I got quite deep at   this point.  I wanted to make sure the bleeding was arterial, but was quite   slow and appeared to be from a very small vessel and I saw no indication of an   injury to the subclavian artery, but at this point, I called in Dr. Ibarra  who continued the dissection through the muscle and placed a 3-0 Vicryl suture   around the muscle bundle where it appeared to be bleeding.  This did stop the   bleeding entirely, but I also placed a Surgifoam in the area of the   significant muscle dissection.  I then closed the incision in layers with the fascia and the muscle using interrupted 3-0 Vicryl.  I confirmed   that the port was anchored to the underlying muscle using 3 interrupted 2-0   Prolene.  I made sure again that the port functioned and would easily draw   back and easily flush and  flushed it with heparinized saline.  I then closed   the skin in layers using interrupted 3-0 Vicryl in the dermis and a running   subcuticular 4-0 Monocryl in the epidermis.  All counts were correct at the   end of the case x2.       ____________________________________     MD AYAD Chatterjee / RUFUS    DD:  03/17/2017 14:35:42  DT:  03/17/2017 17:23:33    D#:  [id number]  Job#:  [id number]

## 2017-03-20 ENCOUNTER — HOSPITAL ENCOUNTER (OUTPATIENT)
Facility: MEDICAL CENTER | Age: 62
End: 2017-03-20
Attending: INTERNAL MEDICINE
Payer: COMMERCIAL

## 2017-03-20 LAB
ALBUMIN SERPL BCP-MCNC: 3.8 G/DL (ref 3.2–4.9)
ALBUMIN/GLOB SERPL: 1.7 G/DL
ALP SERPL-CCNC: 91 U/L (ref 30–99)
ALT SERPL-CCNC: 13 U/L (ref 2–50)
AMBIGUOUS DTTM AMBI4: NORMAL
ANION GAP SERPL CALC-SCNC: 11 MMOL/L (ref 0–11.9)
AST SERPL-CCNC: 13 U/L (ref 12–45)
BILIRUB SERPL-MCNC: 0.5 MG/DL (ref 0.1–1.5)
BUN SERPL-MCNC: 12 MG/DL (ref 8–22)
CALCIUM SERPL-MCNC: 9 MG/DL (ref 8.5–10.5)
CHLORIDE SERPL-SCNC: 108 MMOL/L (ref 96–112)
CO2 SERPL-SCNC: 22 MMOL/L (ref 20–33)
CREAT SERPL-MCNC: 0.64 MG/DL (ref 0.5–1.4)
GLOBULIN SER CALC-MCNC: 2.3 G/DL (ref 1.9–3.5)
GLUCOSE SERPL-MCNC: 109 MG/DL (ref 65–99)
POTASSIUM SERPL-SCNC: 3.9 MMOL/L (ref 3.6–5.5)
PROT SERPL-MCNC: 6.1 G/DL (ref 6–8.2)
SODIUM SERPL-SCNC: 141 MMOL/L (ref 135–145)

## 2017-03-20 PROCEDURE — 80053 COMPREHEN METABOLIC PANEL: CPT

## 2017-04-10 ENCOUNTER — HOSPITAL ENCOUNTER (OUTPATIENT)
Facility: MEDICAL CENTER | Age: 62
End: 2017-04-10
Attending: INTERNAL MEDICINE
Payer: COMMERCIAL

## 2017-04-10 LAB
ALBUMIN SERPL BCP-MCNC: 3.7 G/DL (ref 3.2–4.9)
ALBUMIN/GLOB SERPL: 1.7 G/DL
ALP SERPL-CCNC: 94 U/L (ref 30–99)
ALT SERPL-CCNC: 17 U/L (ref 2–50)
AMBIGUOUS DTTM AMBI4: NORMAL
ANION GAP SERPL CALC-SCNC: 8 MMOL/L (ref 0–11.9)
AST SERPL-CCNC: 14 U/L (ref 12–45)
BILIRUB SERPL-MCNC: 0.4 MG/DL (ref 0.1–1.5)
BUN SERPL-MCNC: 14 MG/DL (ref 8–22)
CALCIUM SERPL-MCNC: 8.8 MG/DL (ref 8.5–10.5)
CHLORIDE SERPL-SCNC: 109 MMOL/L (ref 96–112)
CO2 SERPL-SCNC: 21 MMOL/L (ref 20–33)
CREAT SERPL-MCNC: 0.61 MG/DL (ref 0.5–1.4)
GFR SERPL CREATININE-BSD FRML MDRD: >60 ML/MIN/1.73 M 2
GLOBULIN SER CALC-MCNC: 2.2 G/DL (ref 1.9–3.5)
GLUCOSE SERPL-MCNC: 88 MG/DL (ref 65–99)
POTASSIUM SERPL-SCNC: 3.6 MMOL/L (ref 3.6–5.5)
PROT SERPL-MCNC: 5.9 G/DL (ref 6–8.2)
SODIUM SERPL-SCNC: 138 MMOL/L (ref 135–145)

## 2017-04-10 PROCEDURE — 80053 COMPREHEN METABOLIC PANEL: CPT

## 2017-04-24 DIAGNOSIS — Z01.812 PRE-OPERATIVE LABORATORY EXAMINATION: ICD-10-CM

## 2017-04-24 LAB
ABO GROUP BLD: NORMAL
ANISOCYTOSIS BLD QL SMEAR: ABNORMAL
BASOPHILS # BLD AUTO: 0.7 % (ref 0–1.8)
BASOPHILS # BLD: 0.02 K/UL (ref 0–0.12)
BLD GP AB SCN SERPL QL: NORMAL
COMMENT 1642: NORMAL
EOSINOPHIL # BLD AUTO: 0.06 K/UL (ref 0–0.51)
EOSINOPHIL NFR BLD: 2.2 % (ref 0–6.9)
ERYTHROCYTE [DISTWIDTH] IN BLOOD BY AUTOMATED COUNT: 79.3 FL (ref 35.9–50)
HCT VFR BLD AUTO: 32.4 % (ref 37–47)
HGB BLD-MCNC: 10.7 G/DL (ref 12–16)
IMM GRANULOCYTES # BLD AUTO: 0.01 K/UL (ref 0–0.11)
IMM GRANULOCYTES NFR BLD AUTO: 0.4 % (ref 0–0.9)
LYMPHOCYTES # BLD AUTO: 0.7 K/UL (ref 1–4.8)
LYMPHOCYTES NFR BLD: 25.2 % (ref 22–41)
MACROCYTES BLD QL SMEAR: ABNORMAL
MCH RBC QN AUTO: 32 PG (ref 27–33)
MCHC RBC AUTO-ENTMCNC: 33 G/DL (ref 33.6–35)
MCV RBC AUTO: 97 FL (ref 81.4–97.8)
MONOCYTES # BLD AUTO: 0.3 K/UL (ref 0–0.85)
MONOCYTES NFR BLD AUTO: 10.8 % (ref 0–13.4)
MORPHOLOGY BLD-IMP: NORMAL
NEUTROPHILS # BLD AUTO: 1.69 K/UL (ref 2–7.15)
NEUTROPHILS NFR BLD: 60.7 % (ref 44–72)
NRBC # BLD AUTO: 0 K/UL
NRBC BLD AUTO-RTO: 0 /100 WBC
OVALOCYTES BLD QL SMEAR: NORMAL
PLATELET # BLD AUTO: 270 K/UL (ref 164–446)
PLATELET BLD QL SMEAR: NORMAL
PMV BLD AUTO: 9.9 FL (ref 9–12.9)
POIKILOCYTOSIS BLD QL SMEAR: NORMAL
RBC # BLD AUTO: 3.34 M/UL (ref 4.2–5.4)
RBC BLD AUTO: PRESENT
RH BLD: NORMAL
WBC # BLD AUTO: 2.8 K/UL (ref 4.8–10.8)

## 2017-04-24 PROCEDURE — 36415 COLL VENOUS BLD VENIPUNCTURE: CPT

## 2017-04-24 PROCEDURE — 85025 COMPLETE CBC W/AUTO DIFF WBC: CPT

## 2017-04-24 PROCEDURE — 86900 BLOOD TYPING SEROLOGIC ABO: CPT

## 2017-04-24 PROCEDURE — 86901 BLOOD TYPING SEROLOGIC RH(D): CPT

## 2017-04-24 PROCEDURE — 86850 RBC ANTIBODY SCREEN: CPT

## 2017-05-01 ENCOUNTER — APPOINTMENT (OUTPATIENT)
Dept: RADIOLOGY | Facility: MEDICAL CENTER | Age: 62
DRG: 165 | End: 2017-05-01
Attending: PHYSICIAN ASSISTANT
Payer: COMMERCIAL

## 2017-05-01 ENCOUNTER — HOSPITAL ENCOUNTER (INPATIENT)
Facility: MEDICAL CENTER | Age: 62
LOS: 1 days | DRG: 165 | End: 2017-05-02
Attending: SURGERY | Admitting: SURGERY
Payer: COMMERCIAL

## 2017-05-01 PROBLEM — C34.30 MALIGNANT NEOPLASM OF LOWER LOBE, BRONCHUS, OR LUNG: Status: ACTIVE | Noted: 2017-05-01

## 2017-05-01 LAB — ABO GROUP BLD: NORMAL

## 2017-05-01 PROCEDURE — 501838 HCHG SUTURE GENERAL: Performed by: SURGERY

## 2017-05-01 PROCEDURE — 700102 HCHG RX REV CODE 250 W/ 637 OVERRIDE(OP): Performed by: PHYSICIAN ASSISTANT

## 2017-05-01 PROCEDURE — 0BBF4ZZ EXCISION OF RIGHT LOWER LUNG LOBE, PERCUTANEOUS ENDOSCOPIC APPROACH: ICD-10-PCS | Performed by: SURGERY

## 2017-05-01 PROCEDURE — A9270 NON-COVERED ITEM OR SERVICE: HCPCS | Performed by: SURGERY

## 2017-05-01 PROCEDURE — 502571 HCHG PACK, LAP CHOLE: Performed by: SURGERY

## 2017-05-01 PROCEDURE — 501570 HCHG TROCAR, SEPARATOR: Performed by: SURGERY

## 2017-05-01 PROCEDURE — 160029 HCHG SURGERY MINUTES - 1ST 30 MINS LEVEL 4: Performed by: SURGERY

## 2017-05-01 PROCEDURE — A6402 STERILE GAUZE <= 16 SQ IN: HCPCS | Performed by: SURGERY

## 2017-05-01 PROCEDURE — 700111 HCHG RX REV CODE 636 W/ 250 OVERRIDE (IP)

## 2017-05-01 PROCEDURE — 160048 HCHG OR STATISTICAL LEVEL 1-5: Performed by: SURGERY

## 2017-05-01 PROCEDURE — 770006 HCHG ROOM/CARE - MED/SURG/GYN SEMI*

## 2017-05-01 PROCEDURE — 160002 HCHG RECOVERY MINUTES (STAT): Performed by: SURGERY

## 2017-05-01 PROCEDURE — A9270 NON-COVERED ITEM OR SERVICE: HCPCS | Performed by: PHYSICIAN ASSISTANT

## 2017-05-01 PROCEDURE — 700102 HCHG RX REV CODE 250 W/ 637 OVERRIDE(OP)

## 2017-05-01 PROCEDURE — 36415 COLL VENOUS BLD VENIPUNCTURE: CPT

## 2017-05-01 PROCEDURE — A9270 NON-COVERED ITEM OR SERVICE: HCPCS

## 2017-05-01 PROCEDURE — 501583 HCHG TROCAR, THRD CAN&SEAL 5X100: Performed by: SURGERY

## 2017-05-01 PROCEDURE — 160035 HCHG PACU - 1ST 60 MINS PHASE I: Performed by: SURGERY

## 2017-05-01 PROCEDURE — 502240 HCHG MISC OR SUPPLY RC 0272: Performed by: SURGERY

## 2017-05-01 PROCEDURE — 700102 HCHG RX REV CODE 250 W/ 637 OVERRIDE(OP): Performed by: SURGERY

## 2017-05-01 PROCEDURE — 700111 HCHG RX REV CODE 636 W/ 250 OVERRIDE (IP): Performed by: PHYSICIAN ASSISTANT

## 2017-05-01 PROCEDURE — 501581 HCHG TROCAR: Performed by: SURGERY

## 2017-05-01 PROCEDURE — 500378 HCHG DRAIN, J-VAC ROUND 19FR: Performed by: SURGERY

## 2017-05-01 PROCEDURE — 160036 HCHG PACU - EA ADDL 30 MINS PHASE I: Performed by: SURGERY

## 2017-05-01 PROCEDURE — 700101 HCHG RX REV CODE 250

## 2017-05-01 PROCEDURE — 71010 DX-CHEST-PORTABLE (1 VIEW): CPT

## 2017-05-01 PROCEDURE — 88307 TISSUE EXAM BY PATHOLOGIST: CPT

## 2017-05-01 PROCEDURE — 160041 HCHG SURGERY MINUTES - EA ADDL 1 MIN LEVEL 4: Performed by: SURGERY

## 2017-05-01 PROCEDURE — 500312 HCHG CONNECTOR, BLAKE DRAIN 1-WAY: Performed by: SURGERY

## 2017-05-01 PROCEDURE — A6404 STERILE GAUZE > 48 SQ IN: HCPCS | Performed by: SURGERY

## 2017-05-01 PROCEDURE — 160009 HCHG ANES TIME/MIN: Performed by: SURGERY

## 2017-05-01 PROCEDURE — 501463 HCHG STAPLES, UNIV. ROTIC: Performed by: SURGERY

## 2017-05-01 PROCEDURE — 500385 HCHG DRAIN, PLEUROVAC ADUL: Performed by: SURGERY

## 2017-05-01 PROCEDURE — 306581 SET INFUSION,POWERLOC 20G X 1: Performed by: SURGERY

## 2017-05-01 RX ORDER — HYDRALAZINE HYDROCHLORIDE 20 MG/ML
10 INJECTION INTRAMUSCULAR; INTRAVENOUS EVERY 6 HOURS PRN
Status: DISCONTINUED | OUTPATIENT
Start: 2017-05-01 | End: 2017-05-02 | Stop reason: HOSPADM

## 2017-05-01 RX ORDER — OXYCODONE HCL 5 MG/5 ML
SOLUTION, ORAL ORAL
Status: COMPLETED
Start: 2017-05-01 | End: 2017-05-01

## 2017-05-01 RX ORDER — LORAZEPAM 2 MG/ML
.5-1 INJECTION INTRAMUSCULAR EVERY 4 HOURS PRN
Status: DISCONTINUED | OUTPATIENT
Start: 2017-05-01 | End: 2017-05-02 | Stop reason: HOSPADM

## 2017-05-01 RX ORDER — ENALAPRILAT 1.25 MG/ML
2.5 INJECTION INTRAVENOUS EVERY 6 HOURS PRN
Status: DISCONTINUED | OUTPATIENT
Start: 2017-05-01 | End: 2017-05-02 | Stop reason: HOSPADM

## 2017-05-01 RX ORDER — SODIUM CHLORIDE, SODIUM LACTATE, POTASSIUM CHLORIDE, CALCIUM CHLORIDE 600; 310; 30; 20 MG/100ML; MG/100ML; MG/100ML; MG/100ML
1000 INJECTION, SOLUTION INTRAVENOUS
Status: COMPLETED | OUTPATIENT
Start: 2017-05-01 | End: 2017-05-01

## 2017-05-01 RX ORDER — SODIUM CHLORIDE, SODIUM LACTATE, POTASSIUM CHLORIDE, CALCIUM CHLORIDE 600; 310; 30; 20 MG/100ML; MG/100ML; MG/100ML; MG/100ML
INJECTION, SOLUTION INTRAVENOUS CONTINUOUS
Status: DISCONTINUED | OUTPATIENT
Start: 2017-05-01 | End: 2017-05-02

## 2017-05-01 RX ORDER — HYDROCODONE BITARTRATE AND ACETAMINOPHEN 5; 325 MG/1; MG/1
1-2 TABLET ORAL EVERY 4 HOURS PRN
Status: DISCONTINUED | OUTPATIENT
Start: 2017-05-01 | End: 2017-05-01

## 2017-05-01 RX ORDER — BUPIVACAINE HYDROCHLORIDE AND EPINEPHRINE 5; 5 MG/ML; UG/ML
INJECTION, SOLUTION EPIDURAL; INTRACAUDAL; PERINEURAL
Status: DISCONTINUED | OUTPATIENT
Start: 2017-05-01 | End: 2017-05-01 | Stop reason: HOSPADM

## 2017-05-01 RX ORDER — OXYCODONE HYDROCHLORIDE AND ACETAMINOPHEN 5; 325 MG/1; MG/1
1-2 TABLET ORAL EVERY 4 HOURS PRN
Status: DISCONTINUED | OUTPATIENT
Start: 2017-05-01 | End: 2017-05-02 | Stop reason: HOSPADM

## 2017-05-01 RX ORDER — MORPHINE SULFATE 4 MG/ML
2 INJECTION, SOLUTION INTRAMUSCULAR; INTRAVENOUS
Status: DISCONTINUED | OUTPATIENT
Start: 2017-05-01 | End: 2017-05-02 | Stop reason: HOSPADM

## 2017-05-01 RX ORDER — MIDAZOLAM HYDROCHLORIDE 1 MG/ML
INJECTION INTRAMUSCULAR; INTRAVENOUS
Status: COMPLETED
Start: 2017-05-01 | End: 2017-05-01

## 2017-05-01 RX ORDER — FAMOTIDINE 20 MG/1
20 TABLET, FILM COATED ORAL 2 TIMES DAILY
Status: DISCONTINUED | OUTPATIENT
Start: 2017-05-01 | End: 2017-05-01

## 2017-05-01 RX ADMIN — OXYCODONE HYDROCHLORIDE 5 MG: 5 SOLUTION ORAL at 10:41

## 2017-05-01 RX ADMIN — SODIUM CHLORIDE, POTASSIUM CHLORIDE, SODIUM LACTATE AND CALCIUM CHLORIDE: 600; 310; 30; 20 INJECTION, SOLUTION INTRAVENOUS at 22:15

## 2017-05-01 RX ADMIN — MIDAZOLAM 0.5 MG: 1 INJECTION INTRAMUSCULAR; INTRAVENOUS at 10:35

## 2017-05-01 RX ADMIN — FENTANYL CITRATE 25 MCG: 50 INJECTION, SOLUTION INTRAMUSCULAR; INTRAVENOUS at 10:26

## 2017-05-01 RX ADMIN — MIDAZOLAM 0.5 MG: 1 INJECTION INTRAMUSCULAR; INTRAVENOUS at 11:06

## 2017-05-01 RX ADMIN — FENTANYL CITRATE 25 MCG: 50 INJECTION, SOLUTION INTRAMUSCULAR; INTRAVENOUS at 10:21

## 2017-05-01 RX ADMIN — SODIUM CHLORIDE, POTASSIUM CHLORIDE, SODIUM LACTATE AND CALCIUM CHLORIDE: 600; 310; 30; 20 INJECTION, SOLUTION INTRAVENOUS at 12:30

## 2017-05-01 RX ADMIN — OXYCODONE HYDROCHLORIDE 5 MG: 5 SOLUTION ORAL at 11:24

## 2017-05-01 RX ADMIN — HYDROCODONE BITARTRATE AND ACETAMINOPHEN 2 TABLET: 5; 325 TABLET ORAL at 17:05

## 2017-05-01 RX ADMIN — HYDROCODONE BITARTRATE AND ACETAMINOPHEN 1 TABLET: 5; 325 TABLET ORAL at 12:37

## 2017-05-01 RX ADMIN — MIDAZOLAM 0.5 MG: 1 INJECTION INTRAMUSCULAR; INTRAVENOUS at 10:11

## 2017-05-01 RX ADMIN — HYDROMORPHONE HYDROCHLORIDE 0.2 MG: 1 INJECTION, SOLUTION INTRAMUSCULAR; INTRAVENOUS; SUBCUTANEOUS at 11:24

## 2017-05-01 RX ADMIN — OXYCODONE HYDROCHLORIDE AND ACETAMINOPHEN 2 TABLET: 5; 325 TABLET ORAL at 19:53

## 2017-05-01 RX ADMIN — HYDROMORPHONE HYDROCHLORIDE 0.2 MG: 1 INJECTION, SOLUTION INTRAMUSCULAR; INTRAVENOUS; SUBCUTANEOUS at 10:53

## 2017-05-01 RX ADMIN — FAMOTIDINE 20 MG: 10 INJECTION INTRAVENOUS at 12:37

## 2017-05-01 RX ADMIN — SODIUM CHLORIDE, SODIUM LACTATE, POTASSIUM CHLORIDE, CALCIUM CHLORIDE 1000 ML: 600; 310; 30; 20 INJECTION, SOLUTION INTRAVENOUS at 07:55

## 2017-05-01 RX ADMIN — FENTANYL CITRATE 25 MCG: 50 INJECTION, SOLUTION INTRAMUSCULAR; INTRAVENOUS at 11:40

## 2017-05-01 RX ADMIN — FENTANYL CITRATE 25 MCG: 50 INJECTION, SOLUTION INTRAMUSCULAR; INTRAVENOUS at 10:31

## 2017-05-01 ASSESSMENT — PAIN SCALES - GENERAL
PAINLEVEL_OUTOF10: 4
PAINLEVEL_OUTOF10: 4
PAINLEVEL_OUTOF10: 7
PAINLEVEL_OUTOF10: 8
PAINLEVEL_OUTOF10: 4
PAINLEVEL_OUTOF10: 7
PAINLEVEL_OUTOF10: 0
PAINLEVEL_OUTOF10: 0
PAINLEVEL_OUTOF10: ASSUMED PAIN PRESENT
PAINLEVEL_OUTOF10: 4
PAINLEVEL_OUTOF10: 5

## 2017-05-01 ASSESSMENT — LIFESTYLE VARIABLES
ALCOHOL_USE: YES
EVER HAD A DRINK FIRST THING IN THE MORNING TO STEADY YOUR NERVES TO GET RID OF A HANGOVER: NO
EVER_SMOKED: NEVER
TOTAL SCORE: 0
CONSUMPTION TOTAL: NEGATIVE
EVER FELT BAD OR GUILTY ABOUT YOUR DRINKING: NO
AVERAGE NUMBER OF DAYS PER WEEK YOU HAVE A DRINK CONTAINING ALCOHOL: 0
EVER_SMOKED: NEVER
HOW MANY TIMES IN THE PAST YEAR HAVE YOU HAD 5 OR MORE DRINKS IN A DAY: 0
HAVE YOU EVER FELT YOU SHOULD CUT DOWN ON YOUR DRINKING: NO
ON A TYPICAL DAY WHEN YOU DRINK ALCOHOL HOW MANY DRINKS DO YOU HAVE: 0
TOTAL SCORE: 0
TOTAL SCORE: 0
HAVE PEOPLE ANNOYED YOU BY CRITICIZING YOUR DRINKING: NO

## 2017-05-01 ASSESSMENT — COPD QUESTIONNAIRES
DO YOU EVER COUGH UP ANY MUCUS OR PHLEGM?: NO/ONLY WITH OCCASIONAL COLDS OR INFECTIONS
DURING THE PAST 4 WEEKS HOW MUCH DID YOU FEEL SHORT OF BREATH: NONE/LITTLE OF THE TIME
HAVE YOU SMOKED AT LEAST 100 CIGARETTES IN YOUR ENTIRE LIFE: NO/DON'T KNOW
COPD SCREENING SCORE: 3

## 2017-05-01 NOTE — PROGRESS NOTES
Pt and family oriented to room, equipment, IS, SCDs, activity orders, diet, smoking policy, fall precautions, skin care, 5 Ps and hourly rounding. Pt and family also oriented to current plan of care. Pt verbalized understanding with no additional questions at this time. Will continue to monitor.

## 2017-05-01 NOTE — IP AVS SNAPSHOT
5/2/2017    Carmelina Lena Leblanc  [address] [facility] [address]    Dear Carmelina:    [facility] wants to ensure your discharge home is safe and you or your loved ones have had all of your questions answered regarding your care after you leave the hospital.    Below is a list of resources and contact information should you have any questions regarding your hospital stay, follow-up instructions, or active medical symptoms.    Questions or Concerns Regarding… Contact   Medical Questions Related to Your Discharge  (7 days a week, 8am-5pm) Contact a Nurse Care Coordinator   [phone]   Medical Questions Not Related to Your Discharge  (24 hours a day / 7 days a week)  Contact the Nurse Health Line   [phone]    Medications or Discharge Instructions Refer to your discharge packet   or contact your [facility] Primary Care Provider   [phone]   Follow-up Appointment(s) Schedule your appointment via [employer]   or contact Scheduling [phone]   Billing Review your statement via [employer]  or contact Billing [phone]   Medical Records Review your records via [employer]   or contact Medical Records [phone]     You may receive a telephone call within two days of discharge. This call is to make certain you understand your discharge instructions and have the opportunity to have any questions answered. You can also easily access your medical information, test results and upcoming appointments via the [employer] free online health management tool. You can learn more and sign up at [employer]. For assistance setting up your [employer] account, please call [phone].    Once again, we want to ensure your discharge home is safe and that you have a clear understanding of any next steps in your care. If you have any questions or concerns, please do not hesitate to contact us, we are here for you. Thank you for choosing [facility] for your healthcare needs.    Sincerely,    Your [facility] Healthcare Team

## 2017-05-01 NOTE — IP AVS SNAPSHOT
" <p align=\"LEFT\"><IMG SRC=\"//[email or web address]/blob$/[email or web address]\" alt=\"Image\" WIDTH=\"50%\" HEIGHT=\"200\" BORDER=\"\"></p>                   Name:Carmelina Leblanc  Medical Record Number:[id number]  CSN: [id number]    YOB: 1955   Age: 62 y.o.  Sex: female  HT:1.549 m (5' 1\") WT: 81.3 kg (179 lb 3.7 oz)          Admit Date: 5/1/2017     Discharge Date:   Today's Date: 5/2/2017  Attending Doctor:  John H Ganser, M.D.                  Allergies:  Iodine; Sulfa drugs; and Morphine          Follow-up Information     1. Follow up with John H Ganser, M.D.. Schedule an appointment as soon as possible for a visit in 2 weeks.    Specialties:  Surgery, Radiology    Contact information    [address]  [address]  [place] [address] [phone]          2. Schedule an appointment as soon as possible for a visit with REMIGIO Trevino.    Specialty:  Family Medicine    Why:  As needed    Contact information    [address]  [place] [place] [id number] [phone]           Medication List      Take these Medications        Instructions    oxycodone-acetaminophen 5-325 MG Tabs   Commonly known as:  PERCOCET    Take 1-2 Tabs by mouth every four hours as needed for Moderate Pain.   Dose:  1-2 Tab         "

## 2017-05-01 NOTE — OR NURSING
Pt has a power port that she would like to have accessed for surgery. Per Dr. Spears and Dr. Ganser, okay to access port. Port accessed per protocol by Cathi MARKHAM RN.

## 2017-05-01 NOTE — OP REPORT
DATE OF SERVICE:  05/01/2017    DATE OF PROCEDURE:  05/01/2017.    PREOPERATIVE DIAGNOSIS:  Right lower lobe lung cancer, adenocarcinoma in situ.    POSTOPERATIVE DIAGNOSIS:  Right lower lobe lung cancer, adenocarcinoma in   situ.    PROCEDURE:  Right thoracoscopy with wedge resection of right lower lobe lung   cancer.    SURGEON:  John H. Ganser, MD.    ASSISTANT:  Miguel Colindres PA-C.    ANESTHESIA:  General.    ANESTHESIOLOGIST:  Garrick Spears MD.    INDICATIONS:  Patient is a 62-year-old female being worked up for breast   cancer, was found to have a small spiculated nodule posterior in the right   lower lobe.  Needle biopsy showed adenocarcinoma in situ.  She is undergoing   chemotherapy for her breast cancer and was elected to delay treatment of the   lung as this appeared unrelated.  Risks, benefits, and alternatives to   thoracoscopic wedge resection were outlined in detail.  Questions were   answered and she wished to proceed.    DESCRIPTION OF PROCEDURE:  The patient was identified and general anesthetic   administered with a double-lumen endotracheal tube.  She was placed in the   left lateral decubitus position, and her right chest was prepped and draped   in the usual sterile fashion.  Local anesthesia of 0.5% Marcaine with   epinephrine was injected prior to making skin incision.  A small incision was   made in the mid axillary line approximately at the 8th intercostal space and a   5 mm blunt trocar and 5 mm 30-degree scope was inserted.  Anterior 5 and 12   mm trocars were placed.  After the lung was completely deflated, palpation was   carried out and a subtle soft abnormality was seen at the site of CT   abnormality.  The inferior pulmonary ligament was divided with cautery to   allow mobilization of the posterior basal segment where the lesion was   present.  Generous wedge resection was then carried out with sequential firing   of the [place] 45 powered stapler using blue loads.  This was placed  in an   endoscopic bag, withdrawn through the 12-mm trocar site, which had to be   enlarged slightly.  Palpation confirmed the mass in the specimen with grossly   clear margins.    The lung was reexpanded with no evidence of air leak.  A 19-Icelandic Guerrero drain   was passed through the mid axillary line incision, angled towards the apex   and secured to skin with silk.  The other incisions were closed with Vicryl.    Sterile dressings were applied.  The tube was attached to a pleural suction   device.  The patient returned to recovery room in stable condition.       ____________________________________     JOHN H. GANSER, MD [facility] / RUFUS    DD:  05/01/2017 10:00:38  DT:  05/01/2017 11:03:57    D#:  [id number]  Job#:  [id number]    cc: JALYN GERBER MD, RADHA ADAMS, ALFIE MENA MD

## 2017-05-01 NOTE — IP AVS SNAPSHOT
" Home Care Instructions                                                                                                                  Name:Carmelina Leblanc  Medical Record Number:[id number]  CSN: [id number]    YOB: 1955   Age: 62 y.o.  Sex: female  HT:1.549 m (5' 1\") WT: 81.3 kg (179 lb 3.7 oz)          Admit Date: 5/1/2017     Discharge Date:   Today's Date: 5/2/2017  Attending Doctor:  John H Ganser, M.D.                  Allergies:  Iodine; Sulfa drugs; and Morphine            Discharge Instructions       Chest tube discontinued, Tegaderm bandage placed  Discharge home in PM when alert, comfortable, ambulatory, and tolerating PO well  Pt counseled re: diet , activity, wound care, I.S., and home med's  May shower POD #2 over Tegaderms  Remove Tegaderms on POD #4  No baths, hot tubs, soaks for 7 days  No lifting >20 lbs for 1 wk  No driving for 4-5 days    F/U with Dr. Ganser in 2 weeks    Discharge Instructions    Discharged to home by car with relative. Discharged via wheelchair, hospital escort: Yes.  Special equipment needed: Not Applicable    Be sure to schedule a follow-up appointment with your primary care doctor or any specialists as instructed.     Discharge Plan:   Diet Plan: Discussed  Activity Level: Discussed  Confirmed Follow up Appointment: Patient to Call and Schedule Appointment  Confirmed Symptoms Management: Discussed  Medication Reconciliation Updated: Yes  Influenza Vaccine Indication: Patient Refuses    I understand that a diet low in cholesterol, fat, and sodium is recommended for good health. Unless I have been given specific instructions below for another diet, I accept this instruction as my diet prescription.   Other diet: regular diet    Special Instructions: None    · Is patient discharged on Warfarin / Coumadin?   No     · Is patient Post Blood Transfusion?  No    Depression / Suicide Risk    As you are discharged from this [facility] facility, it is important to " learn how to keep safe from harming yourself.    Recognize the warning signs:  · Abrupt changes in personality, positive or negative- including increase in energy   · Giving away possessions  · Change in eating patterns- significant weight changes-  positive or negative  · Change in sleeping patterns- unable to sleep or sleeping all the time   · Unwillingness or inability to communicate  · Depression  · Unusual sadness, discouragement and loneliness  · Talk of wanting to die  · Neglect of personal appearance   · Rebelliousness- reckless behavior  · Withdrawal from people/activities they love  · Confusion- inability to concentrate     If you or a loved one observes any of these behaviors or has concerns about self-harm, here's what you can do:  · Talk about it- your feelings and reasons for harming yourself  · Remove any means that you might use to hurt yourself (examples: pills, rope, extension cords, firearm)  · Get professional help from the community (Mental Health, Substance Abuse, psychological counseling)  · Do not be alone:Call your Safe Contact- someone whom you trust who will be there for you.  · Call your local CRISIS HOTLINE 540-7668 or 644-551-3326  · Call your local Children's Mobile Crisis Response Team [place] (299) 220-8189 or www.[employer]  · Call the toll free National Suicide Prevention Hotlines   · National Suicide Prevention Lifeline 112-494-YLWC (5323)  · National Hope Line Network 800-SUICIDE (389-3961)        Follow-up Information     1. Follow up with John H Ganser, M.D.. Schedule an appointment as soon as possible for a visit in 2 weeks.    Specialties:  Surgery, Radiology    Contact information    [address]  [address]  [place] [address] [phone]          2. Schedule an appointment as soon as possible for a visit with REMIGIO Trevino.    Specialty:  Family Medicine    Why:  As needed    Contact information    [address] [place] [address] [phone]              Discharge Medication Instructions:    Below are the medications your physician expects you to take upon discharge:    Review all your home medications and newly ordered medications with your doctor and/or pharmacist. Follow medication instructions as directed by your doctor and/or pharmacist.    Please keep your medication list with you and share with your physician.               Medication List      START taking these medications        Instructions    Morning Afternoon Evening Bedtime    oxycodone-acetaminophen 5-325 MG Tabs   Last time this was given:  2 Tabs on 5/2/2017  8:22 AM   Commonly known as:  PERCOCET        Take 1-2 Tabs by mouth every four hours as needed for Moderate Pain.   Dose:  1-2 Tab                             Where to Get Your Medications      Information about where to get these medications is not yet available     ! Ask your nurse or doctor about these medications    - oxycodone-acetaminophen 5-325 MG Tabs            Instructions           Diet / Nutrition:    Follow any diet instructions given to you by your doctor or the dietician, including how much salt (sodium) you are allowed each day.    If you are overweight, talk to your doctor about a weight reduction plan.    Activity:    Remain physically active following your doctor's instructions about exercise and activity.    Rest often.     Any time you become even a little tired or short of breath, SIT DOWN and rest.    Worsening Symptoms:    Report any of the following signs and symptoms to the doctor's office immediately:    *Pain of jaw, arm, or neck  *Chest pain not relieved by medication                               *Dizziness or loss of consciousness  *Difficulty breathing even when at rest   *More tired than usual                                       *Bleeding drainage or swelling of surgical site  *Swelling of feet, ankles, legs or stomach                 *Fever (>100ºF)  *Pink or blood tinged sputum  *Weight gain (3lbs/day  or 5lbs /week)           *Shock from internal defibrillator (if applicable)  *Palpitations or irregular heartbeats                *Cool and/or numb extremities    Stroke Awareness    Common Risk Factors for Stroke include:    Age  Atrial Fibrillation  Carotid Artery Stenosis  Diabetes Mellitus  Excessive alcohol consumption  High blood pressure  Overweight   Physical inactivity  Smoking    Warning signs and symptoms of a stroke include:    *Sudden numbness or weakness of the face, arm or leg (especially on one side of the body).  *Sudden confusion, trouble speaking or understanding.  *Sudden trouble seeing in one or both eyes.  *Sudden trouble walking, dizziness, loss of balance or coordination.Sudden severe headache with no known cause.    It is very important to get treatment quickly when a stroke occurs. If you experience any of the above warning signs, call 911 immediately.                   Disclaimer         Quit Smoking / Tobacco Use:    I understand the use of any tobacco products increases my chance of suffering from future heart disease or stroke and could cause other illnesses which may shorten my life. Quitting the use of tobacco products is the single most important thing I can do to improve my health. For further information on smoking / tobacco cessation call a Toll Free Quit Line at 1-369.200.3001 (*National Cancer [place]) or 1-970.189.1752 (American Lung Association) or you can access the web based program at www.[employer].org.    [place] Tobacco Users Help Line:  (191) 925-9825       Toll Free: 1-439.543.2048  Quit Tobacco Program [facility] (012)605-1684    Crisis Hotline:    [place] Crisis Hotline:  2-532-QGXTGHM or 1-335.993.2472    [place] Crisis Hotline:    1-898.626.8191 or 045-047-4007    Discharge Survey:   Thank you for choosing [facility]. We hope we did everything we could to make your hospital stay a pleasant one. You may be receiving a phone survey and we  would appreciate your time and participation in answering the questions. Your input is very valuable to us in our efforts to improve our service to our patients and their families.        My signature on this form indicates that:    1. I have reviewed and understand the above information.  2. My questions regarding this information have been answered to my satisfaction.  3. I have formulated a plan with my discharge nurse to obtain my prescribed medications for home.                  Disclaimer         __________________________________                     __________       ________                       Patient Signature                                                 Date                    Time

## 2017-05-01 NOTE — IP AVS SNAPSHOT
[employer] Access Code: -[email or web address]  Expires: 5/6/2017 12:01 PM    Telesocial  A secure, online tool to manage your health information     [employer] Telesocial® is a secure, online tool that connects you to your personalized health information from the privacy of your home -- day or night - making it very easy for you to manage your healthcare. Once the activation process is completed, you can even access your medical information using the Telesocial neyda, which is available for free in the Apple Neyda store or Google Play store.     Telesocial provides the following levels of access (as shown below):   My Chart Features   [facility] Primary Care Doctor [facility]  Specialists [facility]  Urgent  Care Non-[facility]  Primary Care  Doctor   Email your healthcare team securely and privately 24/7 X X X X   Manage appointments: schedule your next appointment; view details of past/upcoming appointments X      Request prescription refills. X      View recent personal medical records, including lab and immunizations X X X X   View health record, including health history, allergies, medications X X X X   Read reports about your outpatient visits, procedures, consult and ER notes X X X X   See your discharge summary, which is a recap of your hospital and/or ER visit that includes your diagnosis, lab results, and care plan. X X       How to register for Telesocial:  1. Go to  [email or web address].  2. Click on the Sign Up Now box, which takes you to the New Member Sign Up page. You will need to provide the following information:  a. Enter your Telesocial Access Code exactly as it appears at the top of this page. (You will not need to use this code after you’ve completed the sign-up process. If you do not sign up before the expiration date, you must request a new code.)   b. Enter your date of birth.   c. Enter your home email address.   d. Click Submit, and follow the next screen’s instructions.  3. Create a Telesocial ID. This will be your Telesocial  login ID and cannot be changed, so think of one that is secure and easy to remember.  4. Create a [employer] password. You can change your password at any time.  5. Enter your Password Reset Question and Answer. This can be used at a later time if you forget your password.   6. Enter your e-mail address. This allows you to receive e-mail notifications when new information is available in [employer].  7. Click Sign Up. You can now view your health information.    For assistance activating your [employer] account, call [phone]

## 2017-05-01 NOTE — OR SURGEON
Immediate Post-Operative Note      PreOp Diagnosis: Right lower lobe lung cancer, adenocarcinoma in situ    PostOp Diagnosis: Same    Procedure(s):  THORACOSCOPY, WEDGE RESECTION LOWER LOBE MASS - Wound Class: Clean with Drain    Surgeon(s):  John H Ganser, M.D.    Anesthesiologist/Type of Anesthesia:  Anesthesiologist: Garrick Spears M.D./General    Surgical Staff:  Assistant: ROMINA Farrell  Circulator: Desirae Frank R.N.  Relief Circulator: Kludeep Castorena R.N.  Scrub Person: Taylor Patten    Specimen: RLL lung cancer    Estimated Blood Loss: 0    Findings: Small soft mass, clear margin    Complications: 0        5/1/2017 9:56 AM John H Ganser

## 2017-05-02 VITALS
HEIGHT: 61 IN | HEART RATE: 82 BPM | DIASTOLIC BLOOD PRESSURE: 65 MMHG | OXYGEN SATURATION: 96 % | RESPIRATION RATE: 17 BRPM | SYSTOLIC BLOOD PRESSURE: 114 MMHG | TEMPERATURE: 97.9 F | BODY MASS INDEX: 33.84 KG/M2 | WEIGHT: 179.23 LBS

## 2017-05-02 LAB
ANION GAP SERPL CALC-SCNC: 7 MMOL/L (ref 0–11.9)
BUN SERPL-MCNC: 9 MG/DL (ref 8–22)
CALCIUM SERPL-MCNC: 9 MG/DL (ref 8.5–10.5)
CHLORIDE SERPL-SCNC: 103 MMOL/L (ref 96–112)
CO2 SERPL-SCNC: 26 MMOL/L (ref 20–33)
CREAT SERPL-MCNC: 0.62 MG/DL (ref 0.5–1.4)
ERYTHROCYTE [DISTWIDTH] IN BLOOD BY AUTOMATED COUNT: 79.5 FL (ref 35.9–50)
GFR SERPL CREATININE-BSD FRML MDRD: >60 ML/MIN/1.73 M 2
GLUCOSE SERPL-MCNC: 150 MG/DL (ref 65–99)
HCT VFR BLD AUTO: 30.1 % (ref 37–47)
HGB BLD-MCNC: 9.6 G/DL (ref 12–16)
MCH RBC QN AUTO: 31.4 PG (ref 27–33)
MCHC RBC AUTO-ENTMCNC: 31.9 G/DL (ref 33.6–35)
MCV RBC AUTO: 98.4 FL (ref 81.4–97.8)
PLATELET # BLD AUTO: 282 K/UL (ref 164–446)
PMV BLD AUTO: 9.3 FL (ref 9–12.9)
POTASSIUM SERPL-SCNC: 4.1 MMOL/L (ref 3.6–5.5)
RBC # BLD AUTO: 3.06 M/UL (ref 4.2–5.4)
SODIUM SERPL-SCNC: 136 MMOL/L (ref 135–145)
WBC # BLD AUTO: 5.9 K/UL (ref 4.8–10.8)

## 2017-05-02 PROCEDURE — 80048 BASIC METABOLIC PNL TOTAL CA: CPT

## 2017-05-02 PROCEDURE — 85027 COMPLETE CBC AUTOMATED: CPT

## 2017-05-02 PROCEDURE — 700102 HCHG RX REV CODE 250 W/ 637 OVERRIDE(OP): Performed by: SURGERY

## 2017-05-02 PROCEDURE — A9270 NON-COVERED ITEM OR SERVICE: HCPCS | Performed by: SURGERY

## 2017-05-02 PROCEDURE — 700111 HCHG RX REV CODE 636 W/ 250 OVERRIDE (IP): Performed by: PHYSICIAN ASSISTANT

## 2017-05-02 PROCEDURE — 36415 COLL VENOUS BLD VENIPUNCTURE: CPT

## 2017-05-02 RX ORDER — OXYCODONE HYDROCHLORIDE AND ACETAMINOPHEN 5; 325 MG/1; MG/1
1-2 TABLET ORAL EVERY 4 HOURS PRN
Qty: 24 TAB | Refills: 0 | Status: SHIPPED | OUTPATIENT
Start: 2017-05-02 | End: 2017-06-01

## 2017-05-02 RX ADMIN — ENOXAPARIN SODIUM 40 MG: 100 INJECTION SUBCUTANEOUS at 08:21

## 2017-05-02 RX ADMIN — OXYCODONE HYDROCHLORIDE AND ACETAMINOPHEN 2 TABLET: 5; 325 TABLET ORAL at 04:24

## 2017-05-02 RX ADMIN — OXYCODONE HYDROCHLORIDE AND ACETAMINOPHEN 2 TABLET: 5; 325 TABLET ORAL at 08:22

## 2017-05-02 RX ADMIN — OXYCODONE HYDROCHLORIDE AND ACETAMINOPHEN 2 TABLET: 5; 325 TABLET ORAL at 00:20

## 2017-05-02 ASSESSMENT — PAIN SCALES - GENERAL
PAINLEVEL_OUTOF10: 5

## 2017-05-02 ASSESSMENT — ENCOUNTER SYMPTOMS
ABDOMINAL PAIN: 0
NAUSEA: 1
SPUTUM PRODUCTION: 0
COUGH: 1
SHORTNESS OF BREATH: 0
WHEEZING: 0
FEVER: 0
CHILLS: 0
HEMOPTYSIS: 0
VOMITING: 1

## 2017-05-02 ASSESSMENT — COPD QUESTIONNAIRES
HAVE YOU SMOKED AT LEAST 100 CIGARETTES IN YOUR ENTIRE LIFE: NO/DON'T KNOW
DO YOU EVER COUGH UP ANY MUCUS OR PHLEGM?: NO/ONLY WITH OCCASIONAL COLDS OR INFECTIONS
COPD SCREENING SCORE: 3
DURING THE PAST 4 WEEKS HOW MUCH DID YOU FEEL SHORT OF BREATH: NONE/LITTLE OF THE TIME

## 2017-05-02 NOTE — CARE PLAN
"Problem: Pain Management  Goal: Pain level will decrease to patient’s comfort goal  Pt initially medicated with one norco and then two. Pt states Norco is not controlling the pain. RN attempted to medicate Pt with IV Morphine but Pt states it causes nausea and vomiting. Pt states she has taken Percocet in the past and she \"thinks\" she remembers \"it working\". Service paged.         "

## 2017-05-02 NOTE — PROGRESS NOTES
Assumed care of patient at 1900.  Bedside report completed.  A&O x4.  VSS; IVF 83 ml/hr - PowerPort in use.  Ambulating x1 assist - gait steady.  Complaints of pain - medicated per MAR.  Denies nausea/vomiting.    Chest tube to suction.  No air leak noted.  Patient denies SOB.  Encouraged use of IS and ambulation.  Last BM 5/1/17 pta; bowel sound hypoactive x4; + flatus.  Voiding without difficulty.  Call light and personal belongings within reach.  No additional needs at this time.

## 2017-05-02 NOTE — PROGRESS NOTES
Surgical Progress Note    Author: Miguel Colindres Date & Time created: 2017   8:54 AM     Interval Events:  S/p  Right thoracoscopy with wedge resection of right lower lobe lung cancer-POD#1, doing well; one episode of nausea with emesis this AM; karly po; pain controlled  Review of Systems   Constitutional: Negative for fever and chills.   Respiratory: Positive for cough. Negative for hemoptysis, sputum production, shortness of breath and wheezing.         Incisional pain   Gastrointestinal: Positive for nausea and vomiting. Negative for abdominal pain.   Skin: Negative for itching and rash.     Hemodynamics:  Temp (24hrs), Av.4 °C (97.5 °F), Min:36.1 °C (97 °F), Max:36.7 °C (98.1 °F)  Temperature: 36.2 °C (97.2 °F)  Pulse  Av.9  Min: 64  Max: 102Heart Rate (Monitored): 64  Blood Pressure: 122/70 mmHg, NIBP: 103/65 mmHg     Respiratory:    Respiration: 17, Pulse Oximetry: 95 %, O2 Daily Delivery Respiratory : Silicone Nasal Cannula  Chest Tube Group 1 (A) Right;Lateral hugo 19-Tube Status / Drainage: Patent;Small;Sanguinous, Chest Tube Group 1 (A) Right;Lateral hugo 19-Device: Suction 20 cm Water  Work Of Breathing / Effort: Moderate  RUL Breath Sounds: Diminished, RML Breath Sounds: Diminished, RLL Breath Sounds: Absent, BERE Breath Sounds: Diminished, LLL Breath Sounds: Diminished  Neuro:  GCS       Fluids:    Intake/Output Summary (Last 24 hours) at 17 0854  Last data filed at 17 0744   Gross per 24 hour   Intake   2272 ml   Output    815 ml   Net   1457 ml        Current Diet Order   Procedures   • DIET ORDER     Physical Exam   Constitutional: She is oriented to person, place, and time. No distress.   Cardiovascular: Regular rhythm.    Pulmonary/Chest: Effort normal.   Chest tube in place, ~ 100ml serosang output/24 hrs  No airleak seen  Wounds c/d/i   Abdominal: Soft.   Neurological: She is alert and oriented to person, place, and time.   Skin: Skin is warm and dry.   Psychiatric: She  has a normal mood and affect.   Nursing note and vitals reviewed.    Labs:  Recent Results (from the past 24 hour(s))   BASIC METABOLIC PANEL    Collection Time: 05/02/17  1:53 AM   Result Value Ref Range    Sodium 136 135 - 145 mmol/L    Potassium 4.1 3.6 - 5.5 mmol/L    Chloride 103 96 - 112 mmol/L    Co2 26 20 - 33 mmol/L    Glucose 150 (H) 65 - 99 mg/dL    Bun 9 8 - 22 mg/dL    Creatinine 0.62 0.50 - 1.40 mg/dL    Calcium 9.0 8.5 - 10.5 mg/dL    Anion Gap 7.0 0.0 - 11.9   CBC WITHOUT DIFFERENTIAL    Collection Time: 05/02/17  1:53 AM   Result Value Ref Range    WBC 5.9 4.8 - 10.8 K/uL    RBC 3.06 (L) 4.20 - 5.40 M/uL    Hemoglobin 9.6 (L) 12.0 - 16.0 g/dL    Hematocrit 30.1 (L) 37.0 - 47.0 %    MCV 98.4 (H) 81.4 - 97.8 fL    MCH 31.4 27.0 - 33.0 pg    MCHC 31.9 (L) 33.6 - 35.0 g/dL    RDW 79.5 (H) 35.9 - 50.0 fL    Platelet Count 282 164 - 446 K/uL    MPV 9.3 9.0 - 12.9 fL   ESTIMATED GFR    Collection Time: 05/02/17  1:53 AM   Result Value Ref Range    GFR If African American >60 >60 mL/min/1.73 m 2    GFR If Non African American >60 >60 mL/min/1.73 m 2     Medical Decision Making, by Problem:  Active Hospital Problems    Diagnosis   • Malignant neoplasm of lower lobe, bronchus, or lung [C34.30]     Plan:  Chest tube discontinued, Tegaderm bandage placed  Discharge home in PM when alert, comfortable, ambulatory, and tolerating PO well  Pt counseled re: diet , activity, wound care, I.S., and home med's  May shower POD #2 over Tegaderms  Remove Tegaderms on POD #4  No baths, hot tubs, soaks for 7 days  No lifting >20 lbs for 1 wk  No driving for 4-5 days   F/U with Dr. Ganser in 2 weeks    Quality Measures:  Medications reviewed, Labs reviewed and Radiology images reviewed  Rich catheter: No Rich      DVT Prophylaxis: Enoxaparin (Lovenox)  DVT prophylaxis - mechanical: SCDs            Discussed patient condition with Family, RN, Patient and Dr. Ganser

## 2017-05-02 NOTE — DISCHARGE INSTRUCTIONS
Chest tube discontinued, Tegaderm bandage placed  Discharge home in PM when alert, comfortable, ambulatory, and tolerating PO well  Pt counseled re: diet , activity, wound care, I.S., and home med's  May shower POD #2 over Tegaderms  Remove Tegaderms on POD #4  No baths, hot tubs, soaks for 7 days  No lifting >20 lbs for 1 wk  No driving for 4-5 days    F/U with Dr. Ganser in 2 weeks    Discharge Instructions    Discharged to home by car with relative. Discharged via wheelchair, hospital escort: Yes.  Special equipment needed: Not Applicable    Be sure to schedule a follow-up appointment with your primary care doctor or any specialists as instructed.     Discharge Plan:   Diet Plan: Discussed  Activity Level: Discussed  Confirmed Follow up Appointment: Patient to Call and Schedule Appointment  Confirmed Symptoms Management: Discussed  Medication Reconciliation Updated: Yes  Influenza Vaccine Indication: Patient Refuses    I understand that a diet low in cholesterol, fat, and sodium is recommended for good health. Unless I have been given specific instructions below for another diet, I accept this instruction as my diet prescription.   Other diet: regular diet    Special Instructions: None    · Is patient discharged on Warfarin / Coumadin?   No     · Is patient Post Blood Transfusion?  No    Depression / Suicide Risk    As you are discharged from this [facility] facility, it is important to learn how to keep safe from harming yourself.    Recognize the warning signs:  · Abrupt changes in personality, positive or negative- including increase in energy   · Giving away possessions  · Change in eating patterns- significant weight changes-  positive or negative  · Change in sleeping patterns- unable to sleep or sleeping all the time   · Unwillingness or inability to communicate  · Depression  · Unusual sadness, discouragement and loneliness  · Talk of wanting to die  · Neglect of personal appearance   · Rebelliousness-  reckless behavior  · Withdrawal from people/activities they love  · Confusion- inability to concentrate     If you or a loved one observes any of these behaviors or has concerns about self-harm, here's what you can do:  · Talk about it- your feelings and reasons for harming yourself  · Remove any means that you might use to hurt yourself (examples: pills, rope, extension cords, firearm)  · Get professional help from the community (Mental Health, Substance Abuse, psychological counseling)  · Do not be alone:Call your Safe Contact- someone whom you trust who will be there for you.  · Call your local CRISIS HOTLINE 686-0100 or 337-658-2998  · Call your local Children's Mobile Crisis Response Team [place] (459) 595-2371 or www.[employer]  · Call the toll free National Suicide Prevention Hotlines   · National Suicide Prevention Lifeline 147-095-QTTB (9307)  · National Hope Line Network 800-SUICIDE (395-2382)

## 2017-05-02 NOTE — DIETARY
Nutrition Services:    Wt Loss on Nutrition Admit Screen. Pt is currently on a regular diet and per chart pt PO 50-75% 1 meal recorded. Ht: 154.9 cm, Wt: 81.3 kg, BMI 33.87.  Per chart review pt's wt on 2/17 - 80.2 kg via stand up scale. Current wt increased from previous admit wt.     Consult RD as needed. RD will re-screen weekly.      RD available prn

## 2017-05-03 NOTE — PROGRESS NOTES
Pt's right chest tube was removed by MD. Gauze and tegaderm dressings are Clean dry and intact. Pt denies SOB. Pt weaned of oxygen and is satting at 92-99% on room air.

## 2017-05-03 NOTE — PROGRESS NOTES
Pt was complaining of 5/10 sharp pain in right chest tube site. Pt given pain medication per emar. Pt otherwise is resting in bed comfortably at this time and is being set up for breakfast. Pt denies any further needs at this time. Call light within reach.

## 2017-05-31 ENCOUNTER — HOSPITAL ENCOUNTER (OUTPATIENT)
Facility: MEDICAL CENTER | Age: 62
End: 2017-05-31
Attending: INTERNAL MEDICINE
Payer: COMMERCIAL

## 2017-05-31 LAB
25(OH)D3 SERPL-MCNC: 34 NG/ML (ref 30–100)
ALBUMIN SERPL BCP-MCNC: 4 G/DL (ref 3.2–4.9)
ALBUMIN/GLOB SERPL: 1.6 G/DL
ALP SERPL-CCNC: 101 U/L (ref 30–99)
ALT SERPL-CCNC: 14 U/L (ref 2–50)
ANION GAP SERPL CALC-SCNC: 8 MMOL/L (ref 0–11.9)
AST SERPL-CCNC: 14 U/L (ref 12–45)
BILIRUB SERPL-MCNC: 0.4 MG/DL (ref 0.1–1.5)
BUN SERPL-MCNC: 12 MG/DL (ref 8–22)
CALCIUM SERPL-MCNC: 9.6 MG/DL (ref 8.5–10.5)
CHLORIDE SERPL-SCNC: 109 MMOL/L (ref 96–112)
CO2 SERPL-SCNC: 22 MMOL/L (ref 20–33)
CREAT SERPL-MCNC: 0.7 MG/DL (ref 0.5–1.4)
GFR SERPL CREATININE-BSD FRML MDRD: >60 ML/MIN/1.73 M 2
GLOBULIN SER CALC-MCNC: 2.5 G/DL (ref 1.9–3.5)
GLUCOSE SERPL-MCNC: 91 MG/DL (ref 65–99)
POTASSIUM SERPL-SCNC: 4 MMOL/L (ref 3.6–5.5)
PROT SERPL-MCNC: 6.5 G/DL (ref 6–8.2)
SODIUM SERPL-SCNC: 139 MMOL/L (ref 135–145)

## 2017-05-31 PROCEDURE — 80053 COMPREHEN METABOLIC PANEL: CPT

## 2017-05-31 PROCEDURE — 82306 VITAMIN D 25 HYDROXY: CPT

## 2017-06-01 DIAGNOSIS — Z01.812 PRE-PROCEDURAL LABORATORY EXAMINATION: ICD-10-CM

## 2017-06-01 LAB
ERYTHROCYTE [DISTWIDTH] IN BLOOD BY AUTOMATED COUNT: 64 FL (ref 35.9–50)
HCT VFR BLD AUTO: 36.1 % (ref 37–47)
HGB BLD-MCNC: 11.6 G/DL (ref 12–16)
MCH RBC QN AUTO: 32.2 PG (ref 27–33)
MCHC RBC AUTO-ENTMCNC: 32.1 G/DL (ref 33.6–35)
MCV RBC AUTO: 100.3 FL (ref 81.4–97.8)
PLATELET # BLD AUTO: 275 K/UL (ref 164–446)
PMV BLD AUTO: 9.7 FL (ref 9–12.9)
RBC # BLD AUTO: 3.6 M/UL (ref 4.2–5.4)
WBC # BLD AUTO: 3.7 K/UL (ref 4.8–10.8)

## 2017-06-01 PROCEDURE — 85027 COMPLETE CBC AUTOMATED: CPT

## 2017-06-01 PROCEDURE — 36415 COLL VENOUS BLD VENIPUNCTURE: CPT

## 2017-06-07 ENCOUNTER — HOSPITAL ENCOUNTER (OUTPATIENT)
Dept: CARDIOLOGY | Facility: MEDICAL CENTER | Age: 62
End: 2017-06-07
Attending: INTERNAL MEDICINE
Payer: COMMERCIAL

## 2017-06-07 DIAGNOSIS — C50.911 MALIGNANT NEOPLASM OF RIGHT FEMALE BREAST, UNSPECIFIED SITE OF BREAST: ICD-10-CM

## 2017-06-07 LAB
LV EJECT FRACT  99904: 65
LV EJECT FRACT MOD 2C 99903: 64.45
LV EJECT FRACT MOD 4C 99902: 64.86
LV EJECT FRACT MOD BP 99901: 63.85

## 2017-06-07 PROCEDURE — 93306 TTE W/DOPPLER COMPLETE: CPT | Mod: 26 | Performed by: INTERNAL MEDICINE

## 2017-06-07 PROCEDURE — 93306 TTE W/DOPPLER COMPLETE: CPT

## 2017-06-08 ENCOUNTER — HOSPITAL ENCOUNTER (OUTPATIENT)
Facility: MEDICAL CENTER | Age: 62
End: 2017-06-08
Attending: PLASTIC SURGERY | Admitting: PLASTIC SURGERY
Payer: COMMERCIAL

## 2017-06-08 VITALS
RESPIRATION RATE: 16 BRPM | BODY MASS INDEX: 35.23 KG/M2 | HEIGHT: 60 IN | WEIGHT: 179.45 LBS | TEMPERATURE: 98 F | OXYGEN SATURATION: 97 % | HEART RATE: 64 BPM

## 2017-06-08 PROCEDURE — 160048 HCHG OR STATISTICAL LEVEL 1-5: Performed by: PLASTIC SURGERY

## 2017-06-08 PROCEDURE — 700111 HCHG RX REV CODE 636 W/ 250 OVERRIDE (IP)

## 2017-06-08 PROCEDURE — 160009 HCHG ANES TIME/MIN: Performed by: PLASTIC SURGERY

## 2017-06-08 PROCEDURE — 501838 HCHG SUTURE GENERAL: Performed by: PLASTIC SURGERY

## 2017-06-08 PROCEDURE — 500043 HCHG BAG-A-JET: Performed by: PLASTIC SURGERY

## 2017-06-08 PROCEDURE — 502240 HCHG MISC OR SUPPLY RC 0272: Performed by: PLASTIC SURGERY

## 2017-06-08 PROCEDURE — 160036 HCHG PACU - EA ADDL 30 MINS PHASE I: Performed by: PLASTIC SURGERY

## 2017-06-08 PROCEDURE — 160029 HCHG SURGERY MINUTES - 1ST 30 MINS LEVEL 4: Performed by: PLASTIC SURGERY

## 2017-06-08 PROCEDURE — 700102 HCHG RX REV CODE 250 W/ 637 OVERRIDE(OP)

## 2017-06-08 PROCEDURE — 160002 HCHG RECOVERY MINUTES (STAT): Performed by: PLASTIC SURGERY

## 2017-06-08 PROCEDURE — 500438 HCHG DRESSING, SPANDAGE #10 12: Performed by: PLASTIC SURGERY

## 2017-06-08 PROCEDURE — 700101 HCHG RX REV CODE 250

## 2017-06-08 PROCEDURE — A9270 NON-COVERED ITEM OR SERVICE: HCPCS

## 2017-06-08 PROCEDURE — 160035 HCHG PACU - 1ST 60 MINS PHASE I: Performed by: PLASTIC SURGERY

## 2017-06-08 PROCEDURE — 160041 HCHG SURGERY MINUTES - EA ADDL 1 MIN LEVEL 4: Performed by: PLASTIC SURGERY

## 2017-06-08 PROCEDURE — 500122 HCHG BOVIE, BLADE: Performed by: PLASTIC SURGERY

## 2017-06-08 PROCEDURE — 502000 HCHG MISC OR IMPLANTS RC 0278: Performed by: PLASTIC SURGERY

## 2017-06-08 PROCEDURE — 500423 HCHG DRESSING, ABD COMBINE: Performed by: PLASTIC SURGERY

## 2017-06-08 PROCEDURE — 501445 HCHG STAPLER, SKIN DISP: Performed by: PLASTIC SURGERY

## 2017-06-08 DEVICE — IMPLANTABLE DEVICE: Type: IMPLANTABLE DEVICE | Site: BREAST | Status: FUNCTIONAL

## 2017-06-08 RX ORDER — ONDANSETRON 2 MG/ML
4 INJECTION INTRAMUSCULAR; INTRAVENOUS EVERY 4 HOURS PRN
Status: DISCONTINUED | OUTPATIENT
Start: 2017-06-08 | End: 2017-06-08 | Stop reason: HOSPADM

## 2017-06-08 RX ORDER — SODIUM CHLORIDE, SODIUM LACTATE, POTASSIUM CHLORIDE, CALCIUM CHLORIDE 600; 310; 30; 20 MG/100ML; MG/100ML; MG/100ML; MG/100ML
1000 INJECTION, SOLUTION INTRAVENOUS
Status: COMPLETED | OUTPATIENT
Start: 2017-06-08 | End: 2017-06-08

## 2017-06-08 RX ORDER — CEFAZOLIN SODIUM 1 G/3ML
INJECTION, POWDER, FOR SOLUTION INTRAMUSCULAR; INTRAVENOUS
Status: DISCONTINUED
Start: 2017-06-08 | End: 2017-06-08 | Stop reason: HOSPADM

## 2017-06-08 RX ORDER — SODIUM CHLORIDE, SODIUM LACTATE, POTASSIUM CHLORIDE, CALCIUM CHLORIDE 600; 310; 30; 20 MG/100ML; MG/100ML; MG/100ML; MG/100ML
INJECTION, SOLUTION INTRAVENOUS
Status: DISCONTINUED | OUTPATIENT
Start: 2017-06-08 | End: 2017-06-08 | Stop reason: HOSPADM

## 2017-06-08 RX ORDER — ACETAMINOPHEN 650 MG/1
650 SUPPOSITORY RECTAL EVERY 4 HOURS PRN
Status: DISCONTINUED | OUTPATIENT
Start: 2017-06-08 | End: 2017-06-08 | Stop reason: HOSPADM

## 2017-06-08 RX ORDER — HYDROCODONE BITARTRATE AND ACETAMINOPHEN 10; 325 MG/1; MG/1
2 TABLET ORAL EVERY 4 HOURS PRN
Status: DISCONTINUED | OUTPATIENT
Start: 2017-06-08 | End: 2017-06-08 | Stop reason: HOSPADM

## 2017-06-08 RX ORDER — MORPHINE SULFATE 4 MG/ML
2.5 INJECTION, SOLUTION INTRAMUSCULAR; INTRAVENOUS
Status: DISCONTINUED | OUTPATIENT
Start: 2017-06-08 | End: 2017-06-08 | Stop reason: HOSPADM

## 2017-06-08 RX ORDER — HYDROCODONE BITARTRATE AND ACETAMINOPHEN 10; 325 MG/1; MG/1
1 TABLET ORAL EVERY 4 HOURS PRN
Status: DISCONTINUED | OUTPATIENT
Start: 2017-06-08 | End: 2017-06-08 | Stop reason: HOSPADM

## 2017-06-08 RX ORDER — LIDOCAINE HYDROCHLORIDE 10 MG/ML
INJECTION, SOLUTION INFILTRATION; PERINEURAL
Status: DISCONTINUED
Start: 2017-06-08 | End: 2017-06-08 | Stop reason: HOSPADM

## 2017-06-08 RX ORDER — CEPHALEXIN 500 MG/1
500 CAPSULE ORAL 4 TIMES DAILY
Qty: 28 CAP | Refills: 0 | Status: SHIPPED | OUTPATIENT
Start: 2017-06-08 | End: 2017-10-02

## 2017-06-08 RX ORDER — ACETAMINOPHEN 325 MG/1
325 TABLET ORAL EVERY 4 HOURS PRN
Status: DISCONTINUED | OUTPATIENT
Start: 2017-06-08 | End: 2017-06-08 | Stop reason: HOSPADM

## 2017-06-08 RX ORDER — LIDOCAINE HYDROCHLORIDE 10 MG/ML
INJECTION, SOLUTION EPIDURAL; INFILTRATION; INTRACAUDAL; PERINEURAL
Status: DISCONTINUED
Start: 2017-06-08 | End: 2017-06-08 | Stop reason: HOSPADM

## 2017-06-08 RX ORDER — EPINEPHRINE 1 MG/ML
INJECTION INTRAMUSCULAR; INTRAVENOUS; SUBCUTANEOUS
Status: DISCONTINUED
Start: 2017-06-08 | End: 2017-06-08 | Stop reason: HOSPADM

## 2017-06-08 RX ORDER — BACITRACIN 50000 [IU]/1
INJECTION, POWDER, FOR SOLUTION INTRAMUSCULAR
Status: DISCONTINUED
Start: 2017-06-08 | End: 2017-06-08 | Stop reason: HOSPADM

## 2017-06-08 RX ORDER — GENTAMICIN SULFATE 40 MG/ML
INJECTION, SOLUTION INTRAMUSCULAR; INTRAVENOUS
Status: DISCONTINUED
Start: 2017-06-08 | End: 2017-06-08 | Stop reason: HOSPADM

## 2017-06-08 RX ORDER — OXYCODONE HCL 5 MG/5 ML
SOLUTION, ORAL ORAL
Status: COMPLETED
Start: 2017-06-08 | End: 2017-06-08

## 2017-06-08 RX ADMIN — OXYCODONE HYDROCHLORIDE 10 MG: 5 SOLUTION ORAL at 09:25

## 2017-06-08 RX ADMIN — SODIUM CHLORIDE, SODIUM LACTATE, POTASSIUM CHLORIDE, CALCIUM CHLORIDE 1000 ML: 600; 310; 30; 20 INJECTION, SOLUTION INTRAVENOUS at 07:20

## 2017-06-08 RX ADMIN — FENTANYL CITRATE 25 MCG: 50 INJECTION, SOLUTION INTRAMUSCULAR; INTRAVENOUS at 09:25

## 2017-06-08 ASSESSMENT — PAIN SCALES - GENERAL
PAINLEVEL_OUTOF10: 3
PAINLEVEL_OUTOF10: 2
PAINLEVEL_OUTOF10: 3
PAINLEVEL_OUTOF10: 2

## 2017-06-08 NOTE — OP REPORT
DATE OF SERVICE:  06/08/2017    DATE OF PROCEDURE:  06/08/2017      PREOPERATIVE DIAGNOSES:    1.  History of right breast cancer.    2.  Asymmetry between reconstructed breast.      POSTOPERATIVE DIAGNOSES:    1.  History of right breast cancer.    2.  Asymmetry between reconstructed breast.      PROCEDURES:    1.  Bilateral tissue expander removal and gel implant placement.    2.  Bilateral medial capsulectomies with lateral capsulorrhaphies.    3.  Bilateral dermal adipofascial flaps for elevation and reconstruction of   inframammary fold.    4.  Bilateral fat grafting for reconstruction.      ATTENDING SURGEON:  Everardo Matthews MD      ANESTHESIOLOGIST:  Tony Neves MD      ASSISTANT:  Lindsay Stark, RANDY FA      SPECIMENS:  None.      ESTIMATED BLOOD LOSS:  Minimal.      COMPLICATIONS:  No apparent.      INDICATIONS FOR PROCEDURE:  The patient is a 62-year-old woman who was   previously diagnosed with right breast cancer.  The patient had undergone   bilateral mastectomies and reconstruction and placement of tissue expanders.    During this time, the patient was also found to have a lung cancer.  The   patient did undergo removal of her lung cancer and is recovered from that.    She is going to require radiation therapy.  We did discuss doing the second   stage breast reconstruction prior to starting radiation therapy.  The patient   now presents for the above operation.      INTRAOPERATIVE FINDINGS:  [place] MemoryGel breast implant Siltex round high   profile 650 mL implants were placed bilaterally, reference number [id number].    There is approximately 80 mL of fat grafted in each of the reconstructed   breast.      DESCRIPTION OF PROCEDURE:  After the operative and nonoperative options were   discussed and include was not limited to bleeding, infection, damage to   surrounding structures, need for further surgery, reaction to anesthetic   agent, scarring, breast asymmetry, contour irregularities, implant  failure,   implant rupture, need for revisional surgery, contour irregularities,   unsatisfactory result and/or death, informed consent was obtained.      Patient was identified in a sitting upright position, the patient's sternal   notch, midline and inframammary folds were marked.  The planned dermal   adipofascial flaps were marked out inferiorly on the reconstructed breast.    Areas for fat graft harvest were marked in the supra and infraumbilical   abdomen.  Antibiotics given, sequential compression devices placed.  Patient   brought to the operating suite where general anesthesia was induced.  Her   chest was prepped and draped in usual sterile fashion.  Starting on the right   hand side, a 15-blade was used to excise down along the old mastectomy scar.    Cautery was then used to dissect down to the underlying capsule, which was   opened up.  The tissue expander was then deflated and removed.  Pocket was   irrigated with triple antibiotic irrigation.  A partial capsulectomy was then   performed on the inner aspect of the pectoralis major muscle.  This capsule   was then discarded.  The pectoralis major muscle was then elevated medially in   order to further medialize the pocket.  Laterally, an extensive   capsulorrhaphy was performed with first horizontal mattress 2-0 Vicryl sutures   and then a running locking 2-0 PDS suture.  In this process, different sizes   were tried out and ultimately I felt that a 650 mL implant would be most   appropriate.  With the sizer in position, an incision was made along the   inframammary fold.  A 15 blade was then used to de-epithelialize and create a   dermal adipofascial flap.  Cautery was then used to elevate and mobilize the   flap.  Once the flap was then created, a 2-0 Vicryl suture was then used to   plicate down and to create a well-defined crisp inframammary fold.  Once this   was then completed, the sizer was removed.  The standing cutaneous deformities   were  excised.  The pocket was then copiously irrigated with triple antibiotic   irrigation.  Then, using Betadine on the skin, new gloves and minimal touch   technique, the implant was placed.  The deep tissue was closed with 2-0 Vicryl   sutures.  Then, all of the cutaneous incisions were closed with 3-0 deep   dermal Monocryl sutures and a running 4-0 Monocryl subcuticular stitch.      We turned our attention to the contralateral side where the same procedure was   performed where the same procedure is performed.  The same size implant was   placed.      Poke incisions were made in the bilateral lower quadrant.  Tumescent solution   was then placed in the supra and infraumbilical abdomen.  Adequate time was   allowed for hemostatic effects of epinephrine to take effect.  Fat was then   harvested from these locations.  The fat was then irrigated and the   supernatant and infranatant was removed.  Poke incisions were made medially   and superiorly on the breasts bilaterally.  Fat was then grafted using a   fanning technique.  Care was taken to ensure that equal amounts of fat were   then placed in each pass of the cannula.  The poke incision was closed with   interrupted 5-0 fast absorbing sutures.  The patient was washed.  Steri-Strips   and compressive dressings were then placed.  Patient was then awakened,   extubated and transferred to the PACU in stable condition.  At the end of   procedure, all sponge, instrument and needle counts were correct.       ____________________________________     MD ALEXANDRA MOLINA / RUFUS    DD:  06/08/2017 08:35:45  DT:  06/08/2017 09:02:56    D#:  [id number]  Job#:  [id number]

## 2017-06-08 NOTE — OR SURGEON
Immediate Post-Operative Note      PreOp Diagnosis: history of breast cancer    PostOp Diagnosis: same    Procedure(s):  TISSUE EXPANDER PLACE/REMOVE  BREAST IMPLANT  CAPSULOTOMY FOR PARTIAL CAPSULECTOMIES  BREAST RECONSTRUCTION USING LOCAL TISSUE & FAT GRAFTING    Surgeon(s):  Everardo Matthews M.D.    Anesthesiologist/Type of Anesthesia:  Anesthesiologist: Tony Neves M.D./* No anesthesia type entered *    Surgical Staff:  Circulator: Rika Pringle R.N.  Scrub Person: Layla Santillan  Private Scrub: Lindsay Stark    Specimen: none    Estimated Blood Loss: minimal    Findings: see operative note    Complications: no apparent    #[id number]    6/8/2017 7:24 AM Everardo Matthews

## 2017-06-08 NOTE — IP AVS SNAPSHOT
6/8/2017    Carmelina Lena Leblanc  [address] [address]    Dear Carmelina:    [facility] wants to ensure your discharge home is safe and you or your loved ones have had all of your questions answered regarding your care after you leave the hospital.    Below is a list of resources and contact information should you have any questions regarding your hospital stay, follow-up instructions, or active medical symptoms.    Questions or Concerns Regarding… Contact   Medical Questions Related to Your Discharge  (7 days a week, 8am-5pm) Contact a Nurse Care Coordinator   [phone]   Medical Questions Not Related to Your Discharge  (24 hours a day / 7 days a week)  Contact the Nurse Health Line   [phone]    Medications or Discharge Instructions Refer to your discharge packet   or contact your [facility] Primary Care Provider   [phone]   Follow-up Appointment(s) Schedule your appointment via [employer]   or contact Scheduling [phone]   Billing Review your statement via [employer]  or contact Billing [phone]   Medical Records Review your records via [employer]   or contact Medical Records [phone]     You may receive a telephone call within two days of discharge. This call is to make certain you understand your discharge instructions and have the opportunity to have any questions answered. You can also easily access your medical information, test results and upcoming appointments via the [employer]u free online health management tool. You can learn more and sign up at [employer]/Meizu. For assistance setting up your [employer]u account, please call [phone].    Once again, we want to ensure your discharge home is safe and that you have a clear understanding of any next steps in your care. If you have any questions or concerns, please do not hesitate to contact us, we are here for you. Thank you for choosing [facility] for your healthcare needs.    Sincerely,    Your [facility] Healthcare Team

## 2017-06-08 NOTE — DISCHARGE INSTRUCTIONS
ACTIVITY: Rest and take it easy for the first 24 hours.  A responsible adult is recommended to remain with you during that time.  It is normal to feel sleepy.  We encourage you to not do anything that requires balance, judgment or coordination.    MILD FLU-LIKE SYMPTOMS ARE NORMAL. YOU MAY EXPERIENCE GENERALIZED MUSCLE ACHES, THROAT IRRITATION, HEADACHE AND/OR SOME NAUSEA.    FOR 24 HOURS DO NOT:  Drive, operate machinery or run household appliances.  Drink beer or alcoholic beverages.   Make important decisions or sign legal documents.    SPECIAL INSTRUCTIONS: *Ice packs to chest as needed.**    DIET: To avoid nausea, slowly advance diet as tolerated, avoiding spicy or greasy foods for the first day.  Add more substantial food to your diet according to your physician's instructions.  Babies can be fed formula or breast milk as soon as they are hungry.  INCREASE FLUIDS AND FIBER TO AVOID CONSTIPATION.    SURGICAL DRESSING/BATHING: *Keep dressing dry and clean for 3 days then okay to shower**    FOLLOW-UP APPOINTMENT:  A follow-up appointment should be arranged with your doctor in *FOLLOW UP WITH DR. DOUGHERTY**; call to schedule.    You should CALL YOUR PHYSICIAN if you develop:  Fever greater than 101 degrees F.  Pain not relieved by medication, or persistent nausea or vomiting.  Excessive bleeding (blood soaking through dressing) or unexpected drainage from the wound.  Extreme redness or swelling around the incision site, drainage of pus or foul smelling drainage.  Inability to urinate or empty your bladder within 8 hours.  Problems with breathing or chest pain.    You should call 911 if you develop problems with breathing or chest pain.  If you are unable to contact your doctor or surgical center, you should go to the nearest emergency room or urgent care center.  Physician's telephone #: *DR. DOUGHERTY [phone]**    If any questions arise, call your doctor.  If your doctor is not available, please feel free to call  the Surgical Center at [phone].  The Center is open Monday through Friday from 7AM to 7PM.  You can also call the HEALTH HOTLINE open 24 hours/day, 7 days/week and speak to a nurse at [phone], or toll free at [phone].    A registered nurse may call you a few days after your surgery to see how you are doing after your procedure.    MEDICATIONS: Resume taking daily medication.  Take prescribed pain medication with food.  If no medication is prescribed, you may take non-aspirin pain medication if needed.  PAIN MEDICATION CAN BE VERY CONSTIPATING.  Take a stool softener or laxative such as senokot, pericolace, or milk of magnesia if needed.    Prescription given for *Patient has zofran and pain pills already  PRESCRIPTION FOR KEFLEX GIVEN**.  Last pain medication given at *____9:25 AM________________**.    If your physician has prescribed pain medication that includes Acetaminophen (Tylenol), do not take additional Acetaminophen (Tylenol) while taking the prescribed medication.    Depression / Suicide Risk    As you are discharged from this [facility] facility, it is important to learn how to keep safe from harming yourself.    Recognize the warning signs:  · Abrupt changes in personality, positive or negative- including increase in energy   · Giving away possessions  · Change in eating patterns- significant weight changes-  positive or negative  · Change in sleeping patterns- unable to sleep or sleeping all the time   · Unwillingness or inability to communicate  · Depression  · Unusual sadness, discouragement and loneliness  · Talk of wanting to die  · Neglect of personal appearance   · Rebelliousness- reckless behavior  · Withdrawal from people/activities they love  · Confusion- inability to concentrate     If you or a loved one observes any of these behaviors or has concerns about self-harm, here's what you can do:  · Talk about it- your feelings and reasons for harming yourself  · Remove any  means that you might use to hurt yourself (examples: pills, rope, extension cords, firearm)  · Get professional help from the community (Mental Health, Substance Abuse, psychological counseling)  · Do not be alone:Call your Safe Contact- someone whom you trust who will be there for you.  · Call your local CRISIS HOTLINE 995-6538 or 616-652-0049  · Call your local Children's Mobile Crisis Response Team [place] (169) 586-1550 or www.[employer]  · Call the toll free National Suicide Prevention Hotlines   · National Suicide Prevention Lifeline 500-006-CUGE (9770)  · National Hope Line Network 800-SUICIDE (165-9030)

## 2017-06-08 NOTE — OR NURSING
0840  RECEIVED PATIENT FROM OR.  REPORT FROM DR. GUEVARA.  LMA IN PLACE.  RESPIRATIONS ARE EVEN AND UNLABORED.  ABDS TO RIGHT AND LEFT BREAST ARE CDI.  MESH BRA IN PLACE, CDI.  STERI  STRIP TO LLQ IS CDI.    0849  LMA DC'D WITHOUT DIFFICULTY.    0856  PATIENT AWAKE.  DENIES PAIN.  ICE PACK TO CHEST.    0908   LIBBY TO BEDSIDE.      0925  MEDICATED WITH IV AND PO PAIN MEDICATION FOR C/O PAIN 3-4.    1102  DRINKING GINGER ALE.  REPORTS PAIN 2.      1151  UP TO THE BATHROOM.    1218  DISCHARGED.  DISCHARGE INSTRUCTIONS GIVEN TO PATIENT AND HER .  A VERBAL UNDERSTANDING OF ALL INSTRUCTIONS WAS STATED.  PATIENT TAKING PO, VOIDING AND AMBULATING WITHOUT DIFFICULTY.  DRESSINGS TO CHEST AND LLQ REMAIN CDI.  PATIENT STATES SHE IS READY TO GO HOME.

## 2017-06-21 ENCOUNTER — HOSPITAL ENCOUNTER (OUTPATIENT)
Dept: RADIATION ONCOLOGY | Facility: MEDICAL CENTER | Age: 62
End: 2017-06-30
Attending: RADIOLOGY
Payer: COMMERCIAL

## 2017-06-21 ENCOUNTER — HOSPITAL ENCOUNTER (OUTPATIENT)
Dept: RADIATION ONCOLOGY | Facility: MEDICAL CENTER | Age: 62
End: 2017-06-21

## 2017-06-21 ENCOUNTER — HOSPITAL ENCOUNTER (OUTPATIENT)
Facility: MEDICAL CENTER | Age: 62
End: 2017-06-21
Attending: INTERNAL MEDICINE
Payer: COMMERCIAL

## 2017-06-21 LAB
ALBUMIN SERPL BCP-MCNC: 3.3 G/DL (ref 3.2–4.9)
ALBUMIN/GLOB SERPL: 1.3 G/DL
ALP SERPL-CCNC: 88 U/L (ref 30–99)
ALT SERPL-CCNC: 13 U/L (ref 2–50)
ANION GAP SERPL CALC-SCNC: 8 MMOL/L (ref 0–11.9)
AST SERPL-CCNC: 13 U/L (ref 12–45)
BILIRUB SERPL-MCNC: 0.4 MG/DL (ref 0.1–1.5)
BUN SERPL-MCNC: 11 MG/DL (ref 8–22)
CALCIUM SERPL-MCNC: 8.9 MG/DL (ref 8.5–10.5)
CHLORIDE SERPL-SCNC: 111 MMOL/L (ref 96–112)
CO2 SERPL-SCNC: 21 MMOL/L (ref 20–33)
CREAT SERPL-MCNC: 0.68 MG/DL (ref 0.5–1.4)
GFR SERPL CREATININE-BSD FRML MDRD: >60 ML/MIN/1.73 M 2
GLOBULIN SER CALC-MCNC: 2.6 G/DL (ref 1.9–3.5)
GLUCOSE SERPL-MCNC: 102 MG/DL (ref 65–99)
POTASSIUM SERPL-SCNC: 3.9 MMOL/L (ref 3.6–5.5)
PROT SERPL-MCNC: 5.9 G/DL (ref 6–8.2)
SODIUM SERPL-SCNC: 140 MMOL/L (ref 135–145)

## 2017-06-21 PROCEDURE — 77334 RADIATION TREATMENT AID(S): CPT | Mod: 26 | Performed by: RADIOLOGY

## 2017-06-21 PROCEDURE — 80053 COMPREHEN METABOLIC PANEL: CPT

## 2017-06-21 PROCEDURE — 77334 RADIATION TREATMENT AID(S): CPT | Performed by: RADIOLOGY

## 2017-06-21 PROCEDURE — 77263 THER RADIOLOGY TX PLNG CPLX: CPT | Performed by: RADIOLOGY

## 2017-06-21 PROCEDURE — 77290 THER RAD SIMULAJ FIELD CPLX: CPT | Mod: 26 | Performed by: RADIOLOGY

## 2017-06-21 PROCEDURE — 77290 THER RAD SIMULAJ FIELD CPLX: CPT | Performed by: RADIOLOGY

## 2017-06-26 PROCEDURE — 77295 3-D RADIOTHERAPY PLAN: CPT | Mod: 26 | Performed by: RADIOLOGY

## 2017-06-26 PROCEDURE — 77300 RADIATION THERAPY DOSE PLAN: CPT | Mod: 26 | Performed by: RADIOLOGY

## 2017-06-26 PROCEDURE — 77300 RADIATION THERAPY DOSE PLAN: CPT | Performed by: RADIOLOGY

## 2017-06-26 PROCEDURE — 77334 RADIATION TREATMENT AID(S): CPT | Mod: 26 | Performed by: RADIOLOGY

## 2017-06-26 PROCEDURE — 77334 RADIATION TREATMENT AID(S): CPT | Performed by: RADIOLOGY

## 2017-06-26 PROCEDURE — 77295 3-D RADIOTHERAPY PLAN: CPT | Performed by: RADIOLOGY

## 2017-06-28 ENCOUNTER — HOSPITAL ENCOUNTER (OUTPATIENT)
Dept: RADIATION ONCOLOGY | Facility: MEDICAL CENTER | Age: 62
End: 2017-06-28

## 2017-06-28 PROCEDURE — 77280 THER RAD SIMULAJ FIELD SMPL: CPT | Performed by: RADIOLOGY

## 2017-06-28 PROCEDURE — 77412 RADIATION TX DELIVERY LVL 3: CPT | Performed by: RADIOLOGY

## 2017-06-28 PROCEDURE — 77280 THER RAD SIMULAJ FIELD SMPL: CPT | Mod: 26 | Performed by: RADIOLOGY

## 2017-06-29 PROCEDURE — 77014 PR CT GUIDANCE PLACEMENT RAD THERAPY FIELDS: CPT | Mod: 26 | Performed by: RADIOLOGY

## 2017-06-29 PROCEDURE — 77412 RADIATION TX DELIVERY LVL 3: CPT | Performed by: RADIOLOGY

## 2017-06-29 PROCEDURE — 77387 GUIDANCE FOR RADJ TX DLVR: CPT | Performed by: RADIOLOGY

## 2017-06-29 PROCEDURE — 77417 THER RADIOLOGY PORT IMAGE(S): CPT | Performed by: RADIOLOGY

## 2017-06-30 PROCEDURE — 77014 PR CT GUIDANCE PLACEMENT RAD THERAPY FIELDS: CPT | Mod: 26 | Performed by: RADIOLOGY

## 2017-06-30 PROCEDURE — 77412 RADIATION TX DELIVERY LVL 3: CPT | Performed by: RADIOLOGY

## 2017-06-30 PROCEDURE — 77336 RADIATION PHYSICS CONSULT: CPT | Performed by: RADIOLOGY

## 2017-06-30 PROCEDURE — 77387 GUIDANCE FOR RADJ TX DLVR: CPT | Performed by: RADIOLOGY

## 2017-07-03 ENCOUNTER — HOSPITAL ENCOUNTER (OUTPATIENT)
Dept: RADIATION ONCOLOGY | Facility: MEDICAL CENTER | Age: 62
End: 2017-07-31
Attending: RADIOLOGY
Payer: COMMERCIAL

## 2017-07-03 PROCEDURE — 77412 RADIATION TX DELIVERY LVL 3: CPT | Performed by: RADIOLOGY

## 2017-07-05 PROCEDURE — 77427 RADIATION TX MANAGEMENT X5: CPT | Performed by: RADIOLOGY

## 2017-07-05 PROCEDURE — 77412 RADIATION TX DELIVERY LVL 3: CPT | Performed by: RADIOLOGY

## 2017-07-06 PROCEDURE — 77412 RADIATION TX DELIVERY LVL 3: CPT | Performed by: RADIOLOGY

## 2017-07-07 PROCEDURE — 77412 RADIATION TX DELIVERY LVL 3: CPT | Performed by: RADIOLOGY

## 2017-07-10 PROCEDURE — 77336 RADIATION PHYSICS CONSULT: CPT | Performed by: RADIOLOGY

## 2017-07-10 PROCEDURE — 77412 RADIATION TX DELIVERY LVL 3: CPT | Performed by: RADIOLOGY

## 2017-07-10 PROCEDURE — 77417 THER RADIOLOGY PORT IMAGE(S): CPT | Performed by: RADIOLOGY

## 2017-07-10 PROCEDURE — 77014 PR CT GUIDANCE PLACEMENT RAD THERAPY FIELDS: CPT | Mod: 26 | Performed by: RADIOLOGY

## 2017-07-11 PROCEDURE — 77412 RADIATION TX DELIVERY LVL 3: CPT | Performed by: RADIOLOGY

## 2017-07-12 PROCEDURE — 77427 RADIATION TX MANAGEMENT X5: CPT | Performed by: RADIOLOGY

## 2017-07-12 PROCEDURE — 77412 RADIATION TX DELIVERY LVL 3: CPT | Performed by: RADIOLOGY

## 2017-07-13 PROCEDURE — 77412 RADIATION TX DELIVERY LVL 3: CPT | Performed by: RADIOLOGY

## 2017-07-14 PROCEDURE — 77412 RADIATION TX DELIVERY LVL 3: CPT | Performed by: RADIOLOGY

## 2017-07-17 PROCEDURE — 77412 RADIATION TX DELIVERY LVL 3: CPT | Performed by: RADIOLOGY

## 2017-07-17 PROCEDURE — 77336 RADIATION PHYSICS CONSULT: CPT | Performed by: RADIOLOGY

## 2017-07-17 PROCEDURE — 77417 THER RADIOLOGY PORT IMAGE(S): CPT | Performed by: RADIOLOGY

## 2017-07-18 PROCEDURE — 77412 RADIATION TX DELIVERY LVL 3: CPT | Performed by: RADIOLOGY

## 2017-07-19 PROCEDURE — 77427 RADIATION TX MANAGEMENT X5: CPT | Performed by: RADIOLOGY

## 2017-07-19 PROCEDURE — 77412 RADIATION TX DELIVERY LVL 3: CPT | Performed by: RADIOLOGY

## 2017-07-20 PROCEDURE — 77412 RADIATION TX DELIVERY LVL 3: CPT | Performed by: RADIOLOGY

## 2017-07-21 PROCEDURE — 77412 RADIATION TX DELIVERY LVL 3: CPT | Performed by: RADIOLOGY

## 2017-07-24 PROCEDURE — 77412 RADIATION TX DELIVERY LVL 3: CPT | Performed by: RADIOLOGY

## 2017-07-24 PROCEDURE — 77336 RADIATION PHYSICS CONSULT: CPT | Performed by: RADIOLOGY

## 2017-07-24 PROCEDURE — 77417 THER RADIOLOGY PORT IMAGE(S): CPT | Performed by: RADIOLOGY

## 2017-07-25 PROCEDURE — 77412 RADIATION TX DELIVERY LVL 3: CPT | Performed by: RADIOLOGY

## 2017-07-26 PROCEDURE — 77427 RADIATION TX MANAGEMENT X5: CPT | Performed by: RADIOLOGY

## 2017-07-26 PROCEDURE — 77412 RADIATION TX DELIVERY LVL 3: CPT | Performed by: RADIOLOGY

## 2017-07-27 PROCEDURE — 77412 RADIATION TX DELIVERY LVL 3: CPT | Performed by: RADIOLOGY

## 2017-07-28 PROCEDURE — 77412 RADIATION TX DELIVERY LVL 3: CPT | Performed by: RADIOLOGY

## 2017-07-31 PROCEDURE — 77336 RADIATION PHYSICS CONSULT: CPT | Performed by: RADIOLOGY

## 2017-07-31 PROCEDURE — 77412 RADIATION TX DELIVERY LVL 3: CPT | Performed by: RADIOLOGY

## 2017-07-31 PROCEDURE — 77417 THER RADIOLOGY PORT IMAGE(S): CPT | Performed by: RADIOLOGY

## 2017-08-01 ENCOUNTER — HOSPITAL ENCOUNTER (OUTPATIENT)
Dept: RADIATION ONCOLOGY | Facility: MEDICAL CENTER | Age: 62
End: 2017-08-31
Attending: RADIOLOGY
Payer: COMMERCIAL

## 2017-08-01 PROCEDURE — 77412 RADIATION TX DELIVERY LVL 3: CPT | Performed by: RADIOLOGY

## 2017-08-02 ENCOUNTER — HOSPITAL ENCOUNTER (OUTPATIENT)
Facility: MEDICAL CENTER | Age: 62
End: 2017-08-02
Attending: INTERNAL MEDICINE
Payer: COMMERCIAL

## 2017-08-02 LAB
ALBUMIN SERPL BCP-MCNC: 3.7 G/DL (ref 3.2–4.9)
ALBUMIN/GLOB SERPL: 1.5 G/DL
ALP SERPL-CCNC: 90 U/L (ref 30–99)
ALT SERPL-CCNC: 13 U/L (ref 2–50)
ANION GAP SERPL CALC-SCNC: 9 MMOL/L (ref 0–11.9)
AST SERPL-CCNC: 13 U/L (ref 12–45)
BILIRUB SERPL-MCNC: 0.4 MG/DL (ref 0.1–1.5)
BUN SERPL-MCNC: 17 MG/DL (ref 8–22)
CALCIUM SERPL-MCNC: 9 MG/DL (ref 8.5–10.5)
CHLORIDE SERPL-SCNC: 111 MMOL/L (ref 96–112)
CO2 SERPL-SCNC: 22 MMOL/L (ref 20–33)
CREAT SERPL-MCNC: 0.61 MG/DL (ref 0.5–1.4)
GFR SERPL CREATININE-BSD FRML MDRD: >60 ML/MIN/1.73 M 2
GLOBULIN SER CALC-MCNC: 2.5 G/DL (ref 1.9–3.5)
GLUCOSE SERPL-MCNC: 95 MG/DL (ref 65–99)
POTASSIUM SERPL-SCNC: 3.8 MMOL/L (ref 3.6–5.5)
PROT SERPL-MCNC: 6.2 G/DL (ref 6–8.2)
SODIUM SERPL-SCNC: 142 MMOL/L (ref 135–145)

## 2017-08-02 PROCEDURE — 77427 RADIATION TX MANAGEMENT X5: CPT | Performed by: RADIOLOGY

## 2017-08-02 PROCEDURE — 80053 COMPREHEN METABOLIC PANEL: CPT

## 2017-08-02 PROCEDURE — 77307 TELETHX ISODOSE PLAN CPLX: CPT | Performed by: RADIOLOGY

## 2017-08-02 PROCEDURE — 77307 TELETHX ISODOSE PLAN CPLX: CPT | Mod: 26 | Performed by: RADIOLOGY

## 2017-08-02 PROCEDURE — 77334 RADIATION TREATMENT AID(S): CPT | Performed by: RADIOLOGY

## 2017-08-02 PROCEDURE — 77334 RADIATION TREATMENT AID(S): CPT | Mod: 26 | Performed by: RADIOLOGY

## 2017-08-02 PROCEDURE — 77412 RADIATION TX DELIVERY LVL 3: CPT | Performed by: RADIOLOGY

## 2017-08-03 PROCEDURE — 77412 RADIATION TX DELIVERY LVL 3: CPT | Performed by: RADIOLOGY

## 2017-08-04 PROCEDURE — 77412 RADIATION TX DELIVERY LVL 3: CPT | Performed by: RADIOLOGY

## 2017-08-07 PROCEDURE — 77412 RADIATION TX DELIVERY LVL 3: CPT | Performed by: RADIOLOGY

## 2017-08-07 PROCEDURE — 77336 RADIATION PHYSICS CONSULT: CPT | Performed by: RADIOLOGY

## 2017-08-08 ENCOUNTER — HOSPITAL ENCOUNTER (OUTPATIENT)
Dept: RADIATION ONCOLOGY | Facility: MEDICAL CENTER | Age: 62
End: 2017-08-08

## 2017-08-08 PROCEDURE — 77280 THER RAD SIMULAJ FIELD SMPL: CPT | Performed by: RADIOLOGY

## 2017-08-08 PROCEDURE — 77331 SPECIAL RADIATION DOSIMETRY: CPT | Performed by: RADIOLOGY

## 2017-08-08 PROCEDURE — 77280 THER RAD SIMULAJ FIELD SMPL: CPT | Mod: 26 | Performed by: RADIOLOGY

## 2017-08-08 PROCEDURE — 77331 SPECIAL RADIATION DOSIMETRY: CPT | Mod: 26 | Performed by: RADIOLOGY

## 2017-08-08 PROCEDURE — 77412 RADIATION TX DELIVERY LVL 3: CPT | Performed by: RADIOLOGY

## 2017-08-09 PROCEDURE — 77427 RADIATION TX MANAGEMENT X5: CPT | Performed by: RADIOLOGY

## 2017-08-09 PROCEDURE — 77014 PR CT GUIDANCE PLACEMENT RAD THERAPY FIELDS: CPT | Mod: 26 | Performed by: RADIOLOGY

## 2017-08-09 PROCEDURE — 77412 RADIATION TX DELIVERY LVL 3: CPT | Performed by: RADIOLOGY

## 2017-08-09 PROCEDURE — 77387 GUIDANCE FOR RADJ TX DLVR: CPT | Performed by: RADIOLOGY

## 2017-08-10 PROCEDURE — 77387 GUIDANCE FOR RADJ TX DLVR: CPT | Performed by: RADIOLOGY

## 2017-08-10 PROCEDURE — 77412 RADIATION TX DELIVERY LVL 3: CPT | Performed by: RADIOLOGY

## 2017-08-10 PROCEDURE — 77014 PR CT GUIDANCE PLACEMENT RAD THERAPY FIELDS: CPT | Mod: 26 | Performed by: RADIOLOGY

## 2017-08-11 PROCEDURE — 77387 GUIDANCE FOR RADJ TX DLVR: CPT | Performed by: RADIOLOGY

## 2017-08-11 PROCEDURE — 77014 PR CT GUIDANCE PLACEMENT RAD THERAPY FIELDS: CPT | Mod: 26 | Performed by: RADIOLOGY

## 2017-08-11 PROCEDURE — 77412 RADIATION TX DELIVERY LVL 3: CPT | Performed by: RADIOLOGY

## 2017-08-14 PROCEDURE — 77014 PR CT GUIDANCE PLACEMENT RAD THERAPY FIELDS: CPT | Mod: 26 | Performed by: RADIOLOGY

## 2017-08-14 PROCEDURE — 77427 RADIATION TX MANAGEMENT X5: CPT | Performed by: RADIOLOGY

## 2017-08-14 PROCEDURE — 77336 RADIATION PHYSICS CONSULT: CPT | Performed by: RADIOLOGY

## 2017-08-14 PROCEDURE — 77387 GUIDANCE FOR RADJ TX DLVR: CPT | Performed by: RADIOLOGY

## 2017-08-14 PROCEDURE — 77412 RADIATION TX DELIVERY LVL 3: CPT | Performed by: RADIOLOGY

## 2017-08-21 ENCOUNTER — HOSPITAL ENCOUNTER (OUTPATIENT)
Dept: RADIOLOGY | Facility: MEDICAL CENTER | Age: 62
End: 2017-08-21
Attending: INTERNAL MEDICINE
Payer: COMMERCIAL

## 2017-08-21 DIAGNOSIS — C50.911 MALIGNANT NEOPLASM OF RIGHT FEMALE BREAST, UNSPECIFIED SITE OF BREAST: ICD-10-CM

## 2017-08-21 PROCEDURE — 700117 HCHG RX CONTRAST REV CODE 255: Performed by: INTERNAL MEDICINE

## 2017-08-21 PROCEDURE — 71260 CT THORAX DX C+: CPT

## 2017-08-21 PROCEDURE — A9503 TC99M MEDRONATE: HCPCS

## 2017-08-21 RX ADMIN — IOHEXOL 100 ML: 350 INJECTION, SOLUTION INTRAVENOUS at 09:29

## 2017-08-21 RX ADMIN — IOHEXOL 50 ML: 240 INJECTION, SOLUTION INTRATHECAL; INTRAVASCULAR; INTRAVENOUS; ORAL at 09:29

## 2017-08-21 NOTE — PROGRESS NOTES
Patient had CT scan and bone scan.  Port was accessed in CT area.  Port flushed  In nuclear medicine department with 0.9 normal saline 20 ml (2- 10 ml syringes) followed by Heparin 5 ml 100 units/ml.  Port then de-accessed. Patient tolerated well.

## 2017-08-30 ENCOUNTER — HOSPITAL ENCOUNTER (OUTPATIENT)
Dept: CARDIOLOGY | Facility: MEDICAL CENTER | Age: 62
End: 2017-08-30
Attending: INTERNAL MEDICINE
Payer: COMMERCIAL

## 2017-08-30 DIAGNOSIS — C50.911 MALIGNANT NEOPLASM OF RIGHT FEMALE BREAST, UNSPECIFIED SITE OF BREAST: ICD-10-CM

## 2017-08-30 LAB
LV EJECT FRACT  99904: 65
LV EJECT FRACT MOD 2C 99903: 77.71
LV EJECT FRACT MOD 4C 99902: 71.59
LV EJECT FRACT MOD BP 99901: 71.57

## 2017-08-30 PROCEDURE — 93306 TTE W/DOPPLER COMPLETE: CPT | Mod: 26 | Performed by: INTERNAL MEDICINE

## 2017-08-30 PROCEDURE — 93306 TTE W/DOPPLER COMPLETE: CPT

## 2017-09-13 ENCOUNTER — HOSPITAL ENCOUNTER (OUTPATIENT)
Facility: MEDICAL CENTER | Age: 62
End: 2017-09-13
Attending: INTERNAL MEDICINE
Payer: COMMERCIAL

## 2017-09-13 LAB
ALBUMIN SERPL BCP-MCNC: 3.6 G/DL (ref 3.2–4.9)
ALBUMIN/GLOB SERPL: 1.5 G/DL
ALP SERPL-CCNC: 83 U/L (ref 30–99)
ALT SERPL-CCNC: 17 U/L (ref 2–50)
ANION GAP SERPL CALC-SCNC: 10 MMOL/L (ref 0–11.9)
AST SERPL-CCNC: 17 U/L (ref 12–45)
BILIRUB SERPL-MCNC: 0.5 MG/DL (ref 0.1–1.5)
BUN SERPL-MCNC: 16 MG/DL (ref 8–22)
CALCIUM SERPL-MCNC: 9 MG/DL (ref 8.5–10.5)
CHLORIDE SERPL-SCNC: 110 MMOL/L (ref 96–112)
CO2 SERPL-SCNC: 21 MMOL/L (ref 20–33)
CREAT SERPL-MCNC: 0.67 MG/DL (ref 0.5–1.4)
GFR SERPL CREATININE-BSD FRML MDRD: >60 ML/MIN/1.73 M 2
GLOBULIN SER CALC-MCNC: 2.4 G/DL (ref 1.9–3.5)
GLUCOSE SERPL-MCNC: 94 MG/DL (ref 65–99)
POTASSIUM SERPL-SCNC: 3.9 MMOL/L (ref 3.6–5.5)
PROT SERPL-MCNC: 6 G/DL (ref 6–8.2)
SODIUM SERPL-SCNC: 141 MMOL/L (ref 135–145)

## 2017-09-13 PROCEDURE — 80053 COMPREHEN METABOLIC PANEL: CPT

## 2017-10-02 ENCOUNTER — PATIENT OUTREACH (OUTPATIENT)
Dept: OTHER | Facility: MEDICAL CENTER | Age: 62
End: 2017-10-02

## 2017-10-02 ENCOUNTER — HOSPITAL ENCOUNTER (OUTPATIENT)
Dept: RADIATION ONCOLOGY | Facility: MEDICAL CENTER | Age: 62
End: 2017-10-31
Attending: RADIOLOGY
Payer: COMMERCIAL

## 2017-10-02 VITALS
WEIGHT: 187 LBS | DIASTOLIC BLOOD PRESSURE: 64 MMHG | RESPIRATION RATE: 18 BRPM | TEMPERATURE: 97 F | HEART RATE: 83 BPM | BODY MASS INDEX: 36.52 KG/M2 | SYSTOLIC BLOOD PRESSURE: 112 MMHG

## 2017-10-02 PROCEDURE — 99212 OFFICE O/P EST SF 10 MIN: CPT | Performed by: RADIOLOGY

## 2017-10-02 RX ORDER — OMEPRAZOLE 20 MG/1
20 CAPSULE, DELAYED RELEASE ORAL DAILY
COMMUNITY
End: 2020-04-19

## 2017-10-02 ASSESSMENT — PAIN SCALES - GENERAL: PAINLEVEL: NO PAIN

## 2017-10-02 NOTE — PROGRESS NOTES
Met with patient to review breast cancer and lung cancer treatment summary and survivorship care plan.  Pt diagnosed with ER/TX - HER2 + breast cancer in August 2016.  She underwent neoadjuvant chemotherapy, bilateral mastectomy followed by radiation.  She is due to complete Herceptin in February of 2018.  In February of 2017 she was diagnosed with non-small cell lung cancer stage 0.  She underwent a right lower lobe wedge resection with Dr. Ganser in May 2017.  She will be followed for surveillance by Dr. Adkins and Dr. Wright.     Late or long term effects noted at this visit:   NCCN Survivorship Assessment:     Cardiac: ECHO as indicated.     Anxiety/depression: Pt completes Herceptin in February and expresses fear of recurrence once treatment ends.  Discussed resources such as support group and guided imagery to help her manage some of her stress.  Changes in memory or concentration: Reports some impairment in memory.  She states she started doing puzzles which has both helped her focus and her fine motor skills.  Fatigue: Pt states fatigue is improving but still notes that fatigue does increase with extended activity.  Pain: Reports pain in her L shoulder.  She states her peripheral neuropathy is improving.  Physical function: Pt recently released for exercise.  She will resume her previous swimming routine.  She walks daily.  She does have slight limitations in her ROM in her L arm for which she will be referred to a specialist for examination.  She states her shoulder 'pops' with movement.  Her numbness in her feet has greatly improved.  During tx she was unable to drive due to numbness in her feet.  She does still shuffle her feet but denies any falls.    Sexual function: denies  Fertility: n/a  Sleep disorder: denies  Financial barriers: Questions re: OOP expenses as she has not received many bills for her tx. (FRA referral placed)  Weight changes: Reports some weight gain.  Plans to increase her  activity level.  Eats primarilly fresh fruits and vegetables.    General ASCO breast cancer and lung cancer follow up guidelines reviewed with patient. Patient referred to treating physician for individualized follow up schedule and recommendations / questions.      Patient referred to treating physician for clarification / questions regarding the Survivorship Care Plan.    Copy faxed to PCP / Scanned to [employer].

## 2017-10-02 NOTE — NON-PROVIDER
Patient was seen today in clinic with Dr. Nicholas for follow up.  Vitals signs and weight were obtained and pain assessment was completed.  Allergies and medications were reviewed with the patient.  Toxicities of treatment assessed.     Vitals/Pain:  Vitals:    10/02/17 0956   BP: 112/64   Pulse: 83   Resp: 18   Temp: 36.1 °C (97 °F)   Weight: 84.8 kg (187 lb)   Pain Score: No pain        Allergies:   Iodine; Sulfa drugs; and Morphine    Current Medications:    Current Outpatient Prescriptions:   •  omeprazole (PRILOSEC) 20 MG delayed-release capsule, Take 20 mg by mouth every day., Disp: , Rfl:   •  Multiple Vitamins-Minerals (MULTIVITAMIN GUMMIES ADULT PO), Take  by mouth., Disp: , Rfl:   •  Trastuzumab (HERCEPTIN IV), by Intravenous route., Disp: , Rfl:       PCP:  Hakeem Gutierrez R.N.   10/2/2017  10:05 AM

## 2017-10-02 NOTE — PROGRESS NOTES
RADIATION ONCOLOGY FOLLOW-UP    DATE OF SERVICE: 10/2/2017    IDENTIFICATION:   A 62 y.o. female with triple negative T2 N1 right breast cancer status post A/C Taxol, bilateral mastectomies and lymph node dissection on the right 1/19/2017 with residual 3.5 cm of invasive ductal carcinoma grade 3, note positive disease with the largest metastatic deposit being 1.2 cm with extranodal extension present, lymph vascular invasion present and extensive. Status post comprehensive radiation therapy to the right chest wall reconstructed breast and draining lymphatics complete 8/14/2017. Prior to receiving the radiation therapy she also had adjuvant carboplatinum along with Herceptin which she will be complete with Herceptin in February..      HISTORY OF PRESENT ILLNESS:   Patient is doing well she just has completed her Herceptin she had scans on 8/21/2017. The bone scan was negative the CT scan showed scarring where the right lower pulmonary lobe nodule was resected no new pulmonary nodules. There is a right adrenal gland nodule that is unchanged and cystic-appearing lesions in the liver again are unchanged. Her energies pretty good she hasn't started golfing her swimming yet just wanted to check with me. She denies any pain fever chills skin changes or lymphedema. She denies cough or shortness of breath.    CURRENT MEDICATIONS:  Current Outpatient Prescriptions   Medication Sig Dispense Refill   • omeprazole (PRILOSEC) 20 MG delayed-release capsule Take 20 mg by mouth every day.     • Multiple Vitamins-Minerals (MULTIVITAMIN GUMMIES ADULT PO) Take  by mouth.     • Trastuzumab (HERCEPTIN IV) by Intravenous route.       No current facility-administered medications for this encounter.        ALLERGIES:  Iodine; Sulfa drugs; and Morphine    FAMILY HISTORY:    No family history on file.[unfilled]        SOCIAL HISTORY:     reports that she has never smoked. She has never used smokeless tobacco. She reports that she does not  drink alcohol or use drugs.    REVIEW OF SYSTEMS:   Review of systems: Please see that mentioned in the HPI      PHYSICAL EXAM:    Vitals:    10/02/17 0956   BP: 112/64   Pulse: 83   Resp: 18   Temp: 36.1 °C (97 °F)   Weight: 84.8 kg (187 lb)   Pain Score: No pain        GENERAL:Well-appearing alert and oriented ×3 in no apparent distress  Breasts: The reconstructed breasts are symmetrical the right breast has more dry skin and skin thickness consistent with radiation change. There is slightly also more retraction. There is also more hyperpigmentation. No nodularity or masses appreciated in either chest wall reconstruction or the axillas.  HEENT:  Pupils are equal, round, and reactive to light.  Extraocular muscles   are intact. Sclerae nonicteric.  Conjunctivae pink.  Oral cavity, tongue   protrudes midline.   NECK:  Supple without evidence of thyromegaly.  NODES:  No peripheral adenopathy of the neck, supraclavicular fossa or axillae   bilaterally.  LUNGS:  Clear to ascultation  HEART:  Regular rate and rhythm.  No murmur appreciated  ABDOMEN:  Soft. No evidence of hepatosplenomegaly.    EXTREMITIES:  Without Edema.  NEUROLOGIC:  Cranial nerves II through XII were intact. Normal stance and gait. Motor and sensory grossly within normal limits.    ECOG PERFORMANCE STATUS:  0= Fully active, able to carry on all pre-disease performance without restriction.    LABORATORY DATA:   Lab Results   Component Value Date/Time    SODIUM 141 09/13/2017 08:45 AM    POTASSIUM 3.9 09/13/2017 08:45 AM    CHLORIDE 110 09/13/2017 08:45 AM    CO2 21 09/13/2017 08:45 AM    GLUCOSE 94 09/13/2017 08:45 AM    BUN 16 09/13/2017 08:45 AM    CREATININE 0.67 09/13/2017 08:45 AM     Lab Results   Component Value Date/Time    ALKPHOSPHAT 83 09/13/2017 08:45 AM    ASTSGOT 17 09/13/2017 08:45 AM    ALTSGPT 17 09/13/2017 08:45 AM    TBILIRUBIN 0.5 09/13/2017 08:45 AM      Lab Results   Component Value Date/Time    WBC 3.7 (L) 06/01/2017 02:40 PM     RBC 3.60 (L) 06/01/2017 02:40 PM    HEMOGLOBIN 11.6 (L) 06/01/2017 02:40 PM    HEMATOCRIT 36.1 (L) 06/01/2017 02:40 PM    .3 (H) 06/01/2017 02:40 PM    MCH 32.2 06/01/2017 02:40 PM    MCHC 32.1 (L) 06/01/2017 02:40 PM    MPV 9.7 06/01/2017 02:40 PM    NEUTSPOLYS 60.70 04/24/2017 12:18 PM    LYMPHOCYTES 25.20 04/24/2017 12:18 PM    MONOCYTES 10.80 04/24/2017 12:18 PM    EOSINOPHILS 2.20 04/24/2017 12:18 PM    BASOPHILS 0.70 04/24/2017 12:18 PM    ANISOCYTOSIS 1+ 04/24/2017 12:18 PM          IMPRESSION:    A 61 yo. withT2 N1 grade 3 triple negative breast cancer status post neoadjuvant chemotherapy, bilateral mastectomies with extensive residual disease in the right breast and axilla with extranodal extension and lymph vascular invasion. Then further chemotherapy followed by comprehensive radiation therapy to the right chest wall complete 8/14/2017.    RECOMMENDATIONS:   Patient is follow closely by Dr. Adkins and continuing with Herceptin until February because of this woman have her continue her follow-up with Dr. Adkins and I will see her as an as-needed basis    30 minutes was spent face-to-face with patient in the office and more than half of that time was spent counseling patient or coordinating care as described above.      Thank you for the opportunity to participate in her care.  If any questions or comments, please do not hesitate in calling.      Please note that this dictation was created using voice recognition software. I have made every reasonable attempt to correct obvious errors, but I expect that there are errors of grammar and possibly content that I did not discover before finalizing the note.

## 2017-10-18 ENCOUNTER — PATIENT OUTREACH (OUTPATIENT)
Dept: OTHER | Facility: MEDICAL CENTER | Age: 62
End: 2017-10-18

## 2017-10-18 NOTE — PROGRESS NOTES
On October 18, 2017,  Sivan Watkins attempted telephone contact with patient for follow up.  JAROCHO Watkins left voicemail message asking pt. to contact JAROCHO Watkins back.

## 2017-11-06 ENCOUNTER — HOSPITAL ENCOUNTER (OUTPATIENT)
Dept: CARDIOLOGY | Facility: MEDICAL CENTER | Age: 62
End: 2017-11-06
Attending: INTERNAL MEDICINE
Payer: COMMERCIAL

## 2017-11-06 DIAGNOSIS — C50.911 MALIGNANT NEOPLASM OF RIGHT FEMALE BREAST, UNSPECIFIED ESTROGEN RECEPTOR STATUS, UNSPECIFIED SITE OF BREAST (HCC): ICD-10-CM

## 2017-11-06 LAB
LV EJECT FRACT  99904: 65
LV EJECT FRACT MOD 2C 99903: 59.58
LV EJECT FRACT MOD 4C 99902: 68.08
LV EJECT FRACT MOD BP 99901: 64.96

## 2017-11-06 PROCEDURE — 93306 TTE W/DOPPLER COMPLETE: CPT | Mod: 26 | Performed by: INTERNAL MEDICINE

## 2017-11-06 PROCEDURE — 93306 TTE W/DOPPLER COMPLETE: CPT

## 2017-11-15 ENCOUNTER — HOSPITAL ENCOUNTER (OUTPATIENT)
Facility: MEDICAL CENTER | Age: 62
End: 2017-11-15
Attending: INTERNAL MEDICINE
Payer: COMMERCIAL

## 2017-11-15 LAB
ALBUMIN SERPL BCP-MCNC: 3.1 G/DL (ref 3.2–4.9)
ALBUMIN/GLOB SERPL: 1.1 G/DL
ALP SERPL-CCNC: 78 U/L (ref 30–99)
ALT SERPL-CCNC: 16 U/L (ref 2–50)
AMBIGUOUS DTTM AMBI4: NORMAL
ANION GAP SERPL CALC-SCNC: 7 MMOL/L (ref 0–11.9)
AST SERPL-CCNC: 15 U/L (ref 12–45)
BILIRUB SERPL-MCNC: 0.3 MG/DL (ref 0.1–1.5)
BUN SERPL-MCNC: 15 MG/DL (ref 8–22)
CALCIUM SERPL-MCNC: 9.1 MG/DL (ref 8.5–10.5)
CHLORIDE SERPL-SCNC: 109 MMOL/L (ref 96–112)
CO2 SERPL-SCNC: 22 MMOL/L (ref 20–33)
CREAT SERPL-MCNC: 0.64 MG/DL (ref 0.5–1.4)
GFR SERPL CREATININE-BSD FRML MDRD: >60 ML/MIN/1.73 M 2
GLOBULIN SER CALC-MCNC: 2.9 G/DL (ref 1.9–3.5)
GLUCOSE SERPL-MCNC: 90 MG/DL (ref 65–99)
POTASSIUM SERPL-SCNC: 4.2 MMOL/L (ref 3.6–5.5)
PROT SERPL-MCNC: 6 G/DL (ref 6–8.2)
SODIUM SERPL-SCNC: 138 MMOL/L (ref 135–145)

## 2017-11-15 PROCEDURE — 80053 COMPREHEN METABOLIC PANEL: CPT

## 2018-01-19 ENCOUNTER — HOSPITAL ENCOUNTER (OUTPATIENT)
Facility: MEDICAL CENTER | Age: 63
End: 2018-01-19
Attending: NURSE PRACTITIONER
Payer: COMMERCIAL

## 2018-01-19 LAB
ALBUMIN SERPL BCP-MCNC: 3.4 G/DL (ref 3.2–4.9)
ALBUMIN/GLOB SERPL: 1.2 G/DL
ALP SERPL-CCNC: 84 U/L (ref 30–99)
ALT SERPL-CCNC: 15 U/L (ref 2–50)
ANION GAP SERPL CALC-SCNC: 9 MMOL/L (ref 0–11.9)
AST SERPL-CCNC: 17 U/L (ref 12–45)
BILIRUB SERPL-MCNC: 0.4 MG/DL (ref 0.1–1.5)
BUN SERPL-MCNC: 18 MG/DL (ref 8–22)
CALCIUM SERPL-MCNC: 8.9 MG/DL (ref 8.5–10.5)
CHLORIDE SERPL-SCNC: 110 MMOL/L (ref 96–112)
CO2 SERPL-SCNC: 22 MMOL/L (ref 20–33)
CREAT SERPL-MCNC: 0.81 MG/DL (ref 0.5–1.4)
GLOBULIN SER CALC-MCNC: 2.8 G/DL (ref 1.9–3.5)
GLUCOSE SERPL-MCNC: 97 MG/DL (ref 65–99)
POTASSIUM SERPL-SCNC: 4 MMOL/L (ref 3.6–5.5)
PROT SERPL-MCNC: 6.2 G/DL (ref 6–8.2)
SODIUM SERPL-SCNC: 141 MMOL/L (ref 135–145)
T4 FREE SERPL-MCNC: 1.12 NG/DL (ref 0.53–1.43)
TSH SERPL DL<=0.005 MIU/L-ACNC: 1.62 UIU/ML (ref 0.38–5.33)

## 2018-01-19 PROCEDURE — 80053 COMPREHEN METABOLIC PANEL: CPT

## 2018-01-19 PROCEDURE — 84443 ASSAY THYROID STIM HORMONE: CPT

## 2018-01-19 PROCEDURE — 84439 ASSAY OF FREE THYROXINE: CPT

## 2018-02-06 ENCOUNTER — OFFICE VISIT (OUTPATIENT)
Dept: URGENT CARE | Facility: CLINIC | Age: 63
End: 2018-02-06
Payer: COMMERCIAL

## 2018-02-06 VITALS
SYSTOLIC BLOOD PRESSURE: 116 MMHG | WEIGHT: 192 LBS | BODY MASS INDEX: 36.25 KG/M2 | OXYGEN SATURATION: 98 % | HEIGHT: 61 IN | HEART RATE: 112 BPM | DIASTOLIC BLOOD PRESSURE: 70 MMHG | TEMPERATURE: 98.6 F | RESPIRATION RATE: 16 BRPM

## 2018-02-06 DIAGNOSIS — J02.0 STREP PHARYNGITIS: ICD-10-CM

## 2018-02-06 LAB
INT CON NEG: NEGATIVE
INT CON POS: POSITIVE
S PYO AG THROAT QL: NORMAL

## 2018-02-06 PROCEDURE — 99214 OFFICE O/P EST MOD 30 MIN: CPT | Performed by: PHYSICIAN ASSISTANT

## 2018-02-06 PROCEDURE — 87880 STREP A ASSAY W/OPTIC: CPT | Performed by: PHYSICIAN ASSISTANT

## 2018-02-06 RX ORDER — AMOXICILLIN 500 MG/1
500 CAPSULE ORAL 2 TIMES DAILY
Qty: 20 CAP | Refills: 0 | Status: SHIPPED | OUTPATIENT
Start: 2018-02-06 | End: 2018-11-28

## 2018-02-06 ASSESSMENT — ENCOUNTER SYMPTOMS
SHORTNESS OF BREATH: 0
FEVER: 1
COUGH: 0
PALPITATIONS: 0
SENSORY CHANGE: 0
VOMITING: 0
TROUBLE SWALLOWING: 0
SORE THROAT: 1
HEADACHES: 0
MYALGIAS: 1
WHEEZING: 0
NECK PAIN: 0
FOCAL WEAKNESS: 0
SWOLLEN GLANDS: 1
ABDOMINAL PAIN: 0
DIARRHEA: 0
NAUSEA: 0
CHILLS: 1
TINGLING: 0
SPUTUM PRODUCTION: 0

## 2018-02-06 NOTE — PROGRESS NOTES
"Subjective:      Carmelina Leblanc is a 62 y.o. female who presents with Pharyngitis (swollen glands, daughter in law had strep, fever)            Pharyngitis    This is a new problem. The current episode started yesterday. The problem has been unchanged. Neither side of throat is experiencing more pain than the other. The maximum temperature recorded prior to her arrival was 102 - 102.9 F. The fever has been present for 1 to 2 days. Associated symptoms include swollen glands. Pertinent negatives include no abdominal pain, congestion, coughing, diarrhea, ear discharge, ear pain, headaches, neck pain, shortness of breath, trouble swallowing or vomiting. She has had exposure to strep. She has tried gargles for the symptoms. The treatment provided mild relief.     Past Medical History:   Diagnosis Date   • Anemia 6-1-17    \"D/T Chemo\"   • Anesthesia 6-1-17    PONV   • Cancer (CMS-[facility]) 8/4/16    Right Breast Treated With Chemo   • Cancer (CMS-HCC) 2016    Lung CA   • Cataract     IOL OU   • Dental disorder     dental implant bottom    • Osteoarthritis 6-1-17    \"Right Ankle, Left Ankle & Lower Back\"   • Port catheter in place 6-1-17    Left Side   • Snoring        Past Surgical History:   Procedure Laterality Date   • TISSUE EXPANDER PLACE/REMOVE Bilateral 6/8/2017    Procedure: TISSUE EXPANDER PLACE/REMOVE;  Surgeon: Everardo Matthews M.D.;  Location: SURGERY SAME DAY [facility];  Service:    • BREAST IMPLANT REVISION Bilateral 6/8/2017    Procedure: BREAST IMPLANT;  Surgeon: Everardo Matthews M.D.;  Location: SURGERY SAME DAY [facility];  Service:    • CAPSULOTOMY Bilateral 6/8/2017    Procedure: CAPSULOTOMY FOR PARTIAL CAPSULECTOMIES;  Surgeon: Everardo Matthews M.D.;  Location: SURGERY SAME DAY [facility];  Service:    • BREAST RECONSTRUCTION Bilateral 6/8/2017    Procedure: BREAST RECONSTRUCTION USING LOCAL TISSUE & FAT GRAFTING;  Surgeon: Everardo Matthews M.D.;  Location: SURGERY SAME DAY [facility];  " Service:    • THORACOSCOPY Right 5/1/2017    Procedure: THORACOSCOPY, WEDGE RESECTION LOWER LOBE MASS;  Surgeon: John H Ganser, M.D.;  Location: SURGERY [facility];  Service:    • CATH PLACEMENT Left 3/17/2017    Procedure: CATH PLACEMENT FOR SUBCLAVIAN PORT LEFT;  Surgeon: Carly Wright M.D.;  Location: SURGERY [facility];  Service:    • LUNG NEEDLE BIOPSY  02-   • TISSUE EXPANDER PLACE/REMOVE Bilateral 1/19/2017    Procedure: TISSUE EXPANDER PLACEment;  Surgeon: Everardo Matthews M.D.;  Location: SURGERY SAME DAY [facility];  Service:    • FLAP GRAFT  1/19/2017    Procedure: FLAP GRAFT- FOR DERMAL ADIPOFASCIAL FLAPS ;  Surgeon: Everardo Matthews M.D.;  Location: SURGERY SAME DAY [facility];  Service:    • MASTECTOMY Bilateral 1/19/2017    Procedure: MASTECTOMY PROPHYLACTIC LEFT, AND RIGHT TOTAL MASTECTOMY;  Surgeon: Carly Wright M.D.;  Location: SURGERY SAME DAY [facility];  Service:    • AXILLARY NODE DISSECTION Right 1/19/2017    Procedure: AXILLARY NODE DISSECTION;  Surgeon: Carly Wright M.D.;  Location: SURGERY SAME DAY [facility];  Service:    • CATH PLACEMENT Left 1/19/2017    Procedure: Removal of left subclavian port ;  Surgeon: Carly Wright M.D.;  Location: SURGERY SAME DAY [facility];  Service:    • CATH PLACEMENT Left 9/5/2016    Procedure: CATH PLACEMENT - SUBCLAVIAN PORT - SIDE TO BE DETERMINED;  Surgeon: Carly Wright M.D.;  Location: SURGERY [facility];  Service:    • STEREOTACTIC BIOPSY Right 08/10/2016   • HIP ARTHROPLASTY TOTAL Right 2014   • OTHER ORTHOPEDIC SURGERY  6/2012    right hip arthroplasty   • CATARACT PHACO WITH IOL Bilateral    • GYN SURGERY  Approx 2013    Hysterectomy       History reviewed. No pertinent family history.    Allergies   Allergen Reactions   • Iodine Diarrhea and Nausea     RXN=8/2016 Possible allergy Diarrhea after CT scan   • Sulfa Drugs Unspecified      RXN=As a child unsure of reaction   •  "Morphine      Nausea and vomiting         Medications, Allergies, and current problem list reviewed today in Epic      Review of Systems   Constitutional: Positive for chills, fever and malaise/fatigue.   HENT: Positive for sore throat. Negative for congestion, ear discharge, ear pain and trouble swallowing.    Respiratory: Negative for cough, sputum production, shortness of breath and wheezing.    Cardiovascular: Negative for chest pain, palpitations and leg swelling.   Gastrointestinal: Negative for abdominal pain, diarrhea, nausea and vomiting.   Musculoskeletal: Positive for myalgias. Negative for neck pain.   Neurological: Negative for tingling, sensory change, focal weakness and headaches.     All other systems reviewed and are negative.        Objective:     /70   Pulse (!) 112   Temp 37 °C (98.6 °F)   Resp 16   Ht 1.549 m (5' 1\")   Wt 87.1 kg (192 lb)   SpO2 98%   Breastfeeding? No   BMI 36.28 kg/m²      Physical Exam   Constitutional: She is oriented to person, place, and time. She appears well-developed and well-nourished. No distress.   HENT:   Head: Normocephalic and atraumatic.   Right Ear: Tympanic membrane, external ear and ear canal normal.   Left Ear: Tympanic membrane, external ear and ear canal normal.   Nose: Nose normal.   Mouth/Throat: Uvula is midline and mucous membranes are normal. Posterior oropharyngeal erythema present. No oropharyngeal exudate. No tonsillar exudate.   Neck: Neck supple.   Cardiovascular: Normal rate, regular rhythm and normal heart sounds.  Exam reveals no gallop and no friction rub.    No murmur heard.  Pulmonary/Chest: Effort normal and breath sounds normal. No respiratory distress. She has no decreased breath sounds. She has no wheezes. She has no rhonchi. She has no rales.   Lymphadenopathy:     She has cervical adenopathy (mild anterior cervical adenopthy bilaterally).   Neurological: She is alert and oriented to person, place, and time. No cranial " nerve deficit.   Skin: Skin is warm and dry. No rash noted.   Psychiatric: She has a normal mood and affect. Her behavior is normal. Judgment and thought content normal.               Assessment/Plan:     1. Strep pharyngitis  POCT Rapid Strep A- positive     amoxicillin (AMOXIL) 500 MG Cap         Current Outpatient Prescriptions:   •  amoxicillin (AMOXIL) 500 MG Cap, Take 1 Cap by mouth 2 times a day., Disp: 20 Cap, Rfl: 0   Differential diagnoses, Supportive care, and indications for immediate follow-up discussed with patient.   Instructed to return to clinic or nearest emergency department for any change in condition, further concerns, or worsening of symptoms.    The patient demonstrated a good understanding and agreed with the treatment plan.    Moni Doe P.A.-C.    - POCT Rapid Strep A

## 2018-02-08 ENCOUNTER — HOSPITAL ENCOUNTER (OUTPATIENT)
Dept: CARDIOLOGY | Facility: MEDICAL CENTER | Age: 63
End: 2018-02-08
Attending: INTERNAL MEDICINE
Payer: COMMERCIAL

## 2018-02-08 DIAGNOSIS — C50.011 MALIGNANT NEOPLASM OF NIPPLE OF RIGHT BREAST IN FEMALE, UNSPECIFIED ESTROGEN RECEPTOR STATUS (HCC): ICD-10-CM

## 2018-02-08 LAB
LV EJECT FRACT  99904: 55
LV EJECT FRACT MOD 2C 99903: 53.11
LV EJECT FRACT MOD 4C 99902: 59.12
LV EJECT FRACT MOD BP 99901: 54.55

## 2018-02-08 PROCEDURE — 93306 TTE W/DOPPLER COMPLETE: CPT

## 2018-02-08 PROCEDURE — 93306 TTE W/DOPPLER COMPLETE: CPT | Mod: 26 | Performed by: INTERNAL MEDICINE

## 2018-03-13 ENCOUNTER — HOSPITAL ENCOUNTER (OUTPATIENT)
Dept: RADIOLOGY | Facility: MEDICAL CENTER | Age: 63
End: 2018-03-13
Attending: INTERNAL MEDICINE
Payer: COMMERCIAL

## 2018-03-13 VITALS — WEIGHT: 192 LBS | HEIGHT: 61 IN | BODY MASS INDEX: 36.25 KG/M2

## 2018-03-13 DIAGNOSIS — C50.511 MALIGNANT NEOPLASM OF LOWER-OUTER QUADRANT OF RIGHT FEMALE BREAST, UNSPECIFIED ESTROGEN RECEPTOR STATUS (HCC): ICD-10-CM

## 2018-03-13 PROCEDURE — 700117 HCHG RX CONTRAST REV CODE 255: Performed by: INTERNAL MEDICINE

## 2018-03-13 PROCEDURE — 700111 HCHG RX REV CODE 636 W/ 250 OVERRIDE (IP): Performed by: RADIOLOGY

## 2018-03-13 PROCEDURE — 71260 CT THORAX DX C+: CPT

## 2018-03-13 PROCEDURE — A9503 TC99M MEDRONATE: HCPCS

## 2018-03-13 RX ADMIN — HEPARIN 500 UNITS: 100 SYRINGE at 11:24

## 2018-03-13 RX ADMIN — IOHEXOL 100 ML: 350 INJECTION, SOLUTION INTRAVENOUS at 11:17

## 2018-03-13 RX ADMIN — IOHEXOL 50 ML: 240 INJECTION, SOLUTION INTRATHECAL; INTRAVASCULAR; INTRAVENOUS; ORAL at 11:18

## 2018-03-13 NOTE — PROGRESS NOTES
Port located left chest. Site clean, dry, non-tender. Port accessed prior to arrival in CT. Dressing intact, flushes easily with good blood return. After CT-Scan, port flushed with N.S. and heparin and de-accessed. No bleeding noted, band-aid applied

## 2018-03-23 ENCOUNTER — TELEPHONE (OUTPATIENT)
Dept: RADIOLOGY | Facility: MEDICAL CENTER | Age: 63
End: 2018-03-23

## 2018-03-26 ENCOUNTER — HOSPITAL ENCOUNTER (OUTPATIENT)
Dept: RADIOLOGY | Facility: MEDICAL CENTER | Age: 63
End: 2018-03-26
Attending: INTERNAL MEDICINE
Payer: COMMERCIAL

## 2018-03-26 DIAGNOSIS — C50.911 MALIGNANT NEOPLASM OF RIGHT FEMALE BREAST, UNSPECIFIED ESTROGEN RECEPTOR STATUS, UNSPECIFIED SITE OF BREAST (HCC): ICD-10-CM

## 2018-03-26 PROCEDURE — 76642 ULTRASOUND BREAST LIMITED: CPT | Mod: LT

## 2018-10-17 ENCOUNTER — HOSPITAL ENCOUNTER (OUTPATIENT)
Dept: LAB | Facility: MEDICAL CENTER | Age: 63
End: 2018-10-17
Attending: FAMILY MEDICINE
Payer: COMMERCIAL

## 2018-10-17 ENCOUNTER — APPOINTMENT (OUTPATIENT)
Dept: LAB | Facility: MEDICAL CENTER | Age: 63
End: 2018-10-17

## 2018-10-17 ENCOUNTER — HOSPITAL ENCOUNTER (OUTPATIENT)
Dept: LAB | Facility: MEDICAL CENTER | Age: 63
End: 2018-10-17
Payer: COMMERCIAL

## 2018-10-17 LAB
25(OH)D3 SERPL-MCNC: 37 NG/ML (ref 30–100)
ALBUMIN SERPL BCP-MCNC: 3.9 G/DL (ref 3.2–4.9)
ALBUMIN/GLOB SERPL: 1.3 G/DL
ALP SERPL-CCNC: 80 U/L (ref 30–99)
ALT SERPL-CCNC: 15 U/L (ref 2–50)
ANION GAP SERPL CALC-SCNC: 10 MMOL/L (ref 0–11.9)
APPEARANCE UR: CLEAR
AST SERPL-CCNC: 15 U/L (ref 12–45)
BACTERIA #/AREA URNS HPF: NEGATIVE /HPF
BASOPHILS # BLD AUTO: 1.4 % (ref 0–1.8)
BASOPHILS # BLD: 0.05 K/UL (ref 0–0.12)
BDY FAT % MEASURED: 46.9 %
BILIRUB SERPL-MCNC: 0.6 MG/DL (ref 0.1–1.5)
BILIRUB UR QL STRIP.AUTO: NEGATIVE
BP DIAS: 76 MMHG
BP SYS: 124 MMHG
BUN SERPL-MCNC: 12 MG/DL (ref 8–22)
CALCIUM SERPL-MCNC: 9.1 MG/DL (ref 8.5–10.5)
CHLORIDE SERPL-SCNC: 107 MMOL/L (ref 96–112)
CHOLEST SERPL-MCNC: 190 MG/DL (ref 100–199)
CHOLEST SERPL-MCNC: 194 MG/DL (ref 100–199)
CO2 SERPL-SCNC: 24 MMOL/L (ref 20–33)
COLOR UR: YELLOW
CREAT SERPL-MCNC: 0.76 MG/DL (ref 0.5–1.4)
DIABETES HTDIA: NO
EOSINOPHIL # BLD AUTO: 0.07 K/UL (ref 0–0.51)
EOSINOPHIL NFR BLD: 2 % (ref 0–6.9)
EPI CELLS #/AREA URNS HPF: NORMAL /HPF
ERYTHROCYTE [DISTWIDTH] IN BLOOD BY AUTOMATED COUNT: 54.6 FL (ref 35.9–50)
EST. AVERAGE GLUCOSE BLD GHB EST-MCNC: 131 MG/DL
EVENT NAME HTEVT: NORMAL
FASTING STATUS PATIENT QL REPORTED: NORMAL
GLOBULIN SER CALC-MCNC: 2.9 G/DL (ref 1.9–3.5)
GLUCOSE SERPL-MCNC: 96 MG/DL (ref 65–99)
GLUCOSE SERPL-MCNC: 98 MG/DL (ref 65–99)
GLUCOSE UR STRIP.AUTO-MCNC: NEGATIVE MG/DL
HBA1C MFR BLD: 6.2 % (ref 0–5.6)
HCT VFR BLD AUTO: 41.8 % (ref 37–47)
HDLC SERPL-MCNC: 62 MG/DL
HDLC SERPL-MCNC: 63 MG/DL
HGB BLD-MCNC: 13.5 G/DL (ref 12–16)
HYPERTENSION HTHYP: NO
IMM GRANULOCYTES # BLD AUTO: 0 K/UL (ref 0–0.11)
IMM GRANULOCYTES NFR BLD AUTO: 0 % (ref 0–0.9)
KETONES UR STRIP.AUTO-MCNC: NEGATIVE MG/DL
LDLC SERPL CALC-MCNC: 108 MG/DL
LDLC SERPL CALC-MCNC: 110 MG/DL
LEUKOCYTE ESTERASE UR QL STRIP.AUTO: ABNORMAL
LYMPHOCYTES # BLD AUTO: 0.76 K/UL (ref 1–4.8)
LYMPHOCYTES NFR BLD: 22 % (ref 22–41)
MCH RBC QN AUTO: 31.8 PG (ref 27–33)
MCHC RBC AUTO-ENTMCNC: 32.3 G/DL (ref 33.6–35)
MCV RBC AUTO: 98.6 FL (ref 81.4–97.8)
MICRO URNS: ABNORMAL
MONOCYTES # BLD AUTO: 0.38 K/UL (ref 0–0.85)
MONOCYTES NFR BLD AUTO: 11 % (ref 0–13.4)
NEUTROPHILS # BLD AUTO: 2.2 K/UL (ref 2–7.15)
NEUTROPHILS NFR BLD: 63.6 % (ref 44–72)
NITRITE UR QL STRIP.AUTO: NEGATIVE
NRBC # BLD AUTO: 0 K/UL
NRBC BLD-RTO: 0 /100 WBC
PH UR STRIP.AUTO: 6.5 [PH]
PLATELET # BLD AUTO: 287 K/UL (ref 164–446)
PMV BLD AUTO: 9.5 FL (ref 9–12.9)
POTASSIUM SERPL-SCNC: 4.9 MMOL/L (ref 3.6–5.5)
PROT SERPL-MCNC: 6.8 G/DL (ref 6–8.2)
PROT UR QL STRIP: NEGATIVE MG/DL
RBC # BLD AUTO: 4.24 M/UL (ref 4.2–5.4)
RBC # URNS HPF: NORMAL /HPF
RBC UR QL AUTO: NEGATIVE
SCREENING LOC CITY HTCIT: NORMAL
SCREENING LOC STATE HTSTA: NORMAL
SCREENING LOCATION HTLOC: NORMAL
SODIUM SERPL-SCNC: 141 MMOL/L (ref 135–145)
SP GR UR STRIP.AUTO: 1.02
SUBSCRIBER ID HTSID: NORMAL
TRIGL SERPL-MCNC: 102 MG/DL (ref 0–149)
TRIGL SERPL-MCNC: 104 MG/DL (ref 0–149)
TSH SERPL DL<=0.005 MIU/L-ACNC: 1.96 UIU/ML (ref 0.38–5.33)
URATE SERPL-MCNC: 5.9 MG/DL (ref 1.9–8.2)
UROBILINOGEN UR STRIP.AUTO-MCNC: 0.2 MG/DL
WBC # BLD AUTO: 3.5 K/UL (ref 4.8–10.8)
WBC #/AREA URNS HPF: NORMAL /HPF

## 2018-10-17 PROCEDURE — 83036 HEMOGLOBIN GLYCOSYLATED A1C: CPT

## 2018-10-17 PROCEDURE — 85025 COMPLETE CBC W/AUTO DIFF WBC: CPT

## 2018-10-17 PROCEDURE — 36415 COLL VENOUS BLD VENIPUNCTURE: CPT

## 2018-10-17 PROCEDURE — 82306 VITAMIN D 25 HYDROXY: CPT

## 2018-10-17 PROCEDURE — 84443 ASSAY THYROID STIM HORMONE: CPT

## 2018-10-17 PROCEDURE — 81001 URINALYSIS AUTO W/SCOPE: CPT

## 2018-10-17 PROCEDURE — 80061 LIPID PANEL: CPT

## 2018-10-17 PROCEDURE — 84550 ASSAY OF BLOOD/URIC ACID: CPT

## 2018-10-17 PROCEDURE — 80053 COMPREHEN METABOLIC PANEL: CPT

## 2018-10-29 ENCOUNTER — IMMUNIZATION (OUTPATIENT)
Dept: SOCIAL WORK | Facility: CLINIC | Age: 63
End: 2018-10-29
Payer: COMMERCIAL

## 2018-10-29 DIAGNOSIS — Z23 NEED FOR VACCINATION: ICD-10-CM

## 2018-10-29 PROCEDURE — 90686 IIV4 VACC NO PRSV 0.5 ML IM: CPT | Performed by: REGISTERED NURSE

## 2018-10-29 PROCEDURE — 90471 IMMUNIZATION ADMIN: CPT | Performed by: REGISTERED NURSE

## 2018-11-28 ENCOUNTER — OFFICE VISIT (OUTPATIENT)
Dept: URGENT CARE | Facility: CLINIC | Age: 63
End: 2018-11-28
Payer: COMMERCIAL

## 2018-11-28 VITALS
TEMPERATURE: 97.6 F | BODY MASS INDEX: 37.38 KG/M2 | WEIGHT: 198 LBS | DIASTOLIC BLOOD PRESSURE: 80 MMHG | SYSTOLIC BLOOD PRESSURE: 122 MMHG | HEIGHT: 61 IN | OXYGEN SATURATION: 95 % | HEART RATE: 100 BPM

## 2018-11-28 DIAGNOSIS — R05.9 COUGH: ICD-10-CM

## 2018-11-28 DIAGNOSIS — J01.10 ACUTE NON-RECURRENT FRONTAL SINUSITIS: Primary | ICD-10-CM

## 2018-11-28 PROCEDURE — 99214 OFFICE O/P EST MOD 30 MIN: CPT | Performed by: PHYSICIAN ASSISTANT

## 2018-11-28 RX ORDER — BENZONATATE 100 MG/1
100 CAPSULE ORAL 3 TIMES DAILY PRN
Qty: 15 CAP | Refills: 0 | Status: SHIPPED | OUTPATIENT
Start: 2018-11-28 | End: 2018-12-03

## 2018-11-28 RX ORDER — AMOXICILLIN AND CLAVULANATE POTASSIUM 875; 125 MG/1; MG/1
1 TABLET, FILM COATED ORAL 2 TIMES DAILY
Qty: 10 TAB | Refills: 0 | Status: SHIPPED | OUTPATIENT
Start: 2018-11-28 | End: 2018-12-03

## 2018-11-28 RX ORDER — FLUTICASONE PROPIONATE 50 MCG
2 SPRAY, SUSPENSION (ML) NASAL DAILY
Qty: 1 BOTTLE | Refills: 0 | Status: SHIPPED | OUTPATIENT
Start: 2018-11-28 | End: 2018-12-05

## 2018-11-28 RX ORDER — METFORMIN HYDROCHLORIDE 500 MG/1
500 TABLET, EXTENDED RELEASE ORAL DAILY
COMMUNITY
End: 2020-04-19

## 2018-11-28 ASSESSMENT — ENCOUNTER SYMPTOMS
SINUS PAIN: 1
WHEEZING: 0
SPUTUM PRODUCTION: 0
ABDOMINAL PAIN: 0
SORE THROAT: 1
MYALGIAS: 1
COUGH: 1
CHILLS: 0
NAUSEA: 0
DIARRHEA: 0
RHINORRHEA: 1
SHORTNESS OF BREATH: 1
FEVER: 0
VOMITING: 0
CONSTIPATION: 0

## 2018-11-28 ASSESSMENT — PATIENT HEALTH QUESTIONNAIRE - PHQ9: CLINICAL INTERPRETATION OF PHQ2 SCORE: 0

## 2018-11-28 NOTE — PATIENT INSTRUCTIONS
Flonase every morning x 7 days  Take full course of augmentin x 7 days  Increase fluids     Probiotic or yogurt       Sinusitis, Adult  Sinusitis is soreness and inflammation of your sinuses. Sinuses are hollow spaces in the bones around your face. Your sinuses are located:  · Around your eyes.  · In the middle of your forehead.  · Behind your nose.  · In your cheekbones.  Your sinuses and nasal passages are lined with a stringy fluid (mucus). Mucus normally drains out of your sinuses. When your nasal tissues become inflamed or swollen, the mucus can become trapped or blocked so air cannot flow through your sinuses. This allows bacteria, viruses, and funguses to grow, which leads to infection.  Sinusitis can develop quickly and last for 7?10 days (acute) or for more than 12 weeks (chronic). Sinusitis often develops after a cold.  What are the causes?  This condition is caused by anything that creates swelling in the sinuses or stops mucus from draining, including:  · Allergies.  · Asthma.  · Bacterial or viral infection.  · Abnormally shaped bones between the nasal passages.  · Nasal growths that contain mucus (nasal polyps).  · Narrow sinus openings.  · Pollutants, such as chemicals or irritants in the air.  · A foreign object stuck in the nose.  · A fungal infection. This is rare.  What increases the risk?  The following factors may make you more likely to develop this condition:  · Having allergies or asthma.  · Having had a recent cold or respiratory tract infection.  · Having structural deformities or blockages in your nose or sinuses.  · Having a weak immune system.  · Doing a lot of swimming or diving.  · Overusing nasal sprays.  · Smoking.  What are the signs or symptoms?  The main symptoms of this condition are pain and a feeling of pressure around the affected sinuses. Other symptoms include:  · Upper toothache.  · Earache.  · Headache.  · Bad breath.  · Decreased sense of smell and taste.  · A cough that  may get worse at night.  · Fatigue.  · Fever.  · Thick drainage from your nose. The drainage is often green and it may contain pus (purulent).  · Stuffy nose or congestion.  · Postnasal drip. This is when extra mucus collects in the throat or back of the nose.  · Swelling and warmth over the affected sinuses.  · Sore throat.  · Sensitivity to light.  How is this diagnosed?  This condition is diagnosed based on symptoms, a medical history, and a physical exam. To find out if your condition is acute or chronic, your health care provider may:  · Look in your nose for signs of nasal polyps.  · Tap over the affected sinus to check for signs of infection.  · View the inside of your sinuses using an imaging device that has a light attached (endoscope).  If your health care provider suspects that you have chronic sinusitis, you may also:  · Be tested for allergies.  · Have a sample of mucus taken from your nose (nasal culture) and checked for bacteria.  · Have a mucus sample examined to see if your sinusitis is related to an allergy.  If your sinusitis does not respond to treatment and it lasts longer than 8 weeks, you may have an MRI or CT scan to check your sinuses. These scans also help to determine how severe your infection is.  In rare cases, a bone biopsy may be done to rule out more serious types of fungal sinus disease.  How is this treated?  Treatment for sinusitis depends on the cause and whether your condition is chronic or acute. If a virus is causing your sinusitis, your symptoms will go away on their own within 10 days. You may be given medicines to relieve your symptoms, including:  · Topical nasal decongestants. They shrink swollen nasal passages and let mucus drain from your sinuses.  · Antihistamines. These drugs block inflammation that is triggered by allergies. This can help to ease swelling in your nose and sinuses.  · Topical nasal corticosteroids. These are nasal sprays that ease inflammation and  swelling in your nose and sinuses.  · Nasal saline washes. These rinses can help to get rid of thick mucus in your nose.  If your condition is caused by bacteria, you will be given an antibiotic medicine. If your condition is caused by a fungus, you will be given an antifungal medicine.  Surgery may be needed to correct underlying conditions, such as narrow nasal passages. Surgery may also be needed to remove polyps.  Follow these instructions at home:  Medicines  · Take, use, or apply over-the-counter and prescription medicines only as told by your health care provider. These may include nasal sprays.  · If you were prescribed an antibiotic medicine, take it as told by your health care provider. Do not stop taking the antibiotic even if you start to feel better.  Hydrate and Humidify  · Drink enough water to keep your urine clear or pale yellow. Staying hydrated will help to thin your mucus.  · Use a cool mist humidifier to keep the humidity level in your home above 50%.  · Inhale steam for 10-15 minutes, 3-4 times a day or as told by your health care provider. You can do this in the bathroom while a hot shower is running.  · Limit your exposure to cool or dry air.  Rest  · Rest as much as possible.  · Sleep with your head raised (elevated).  · Make sure to get enough sleep each night.  General instructions  · Apply a warm, moist washcloth to your face 3-4 times a day or as told by your health care provider. This will help with discomfort.  · Wash your hands often with soap and water to reduce your exposure to viruses and other germs. If soap and water are not available, use hand .  · Do not smoke. Avoid being around people who are smoking (secondhand smoke).  · Keep all follow-up visits as told by your health care provider. This is important.  Contact a health care provider if:  · You have a fever.  · Your symptoms get worse.  · Your symptoms do not improve within 10 days.  Get help right away if:  · You  have a severe headache.  · You have persistent vomiting.  · You have pain or swelling around your face or eyes.  · You have vision problems.  · You develop confusion.  · Your neck is stiff.  · You have trouble breathing.  This information is not intended to replace advice given to you by your health care provider. Make sure you discuss any questions you have with your health care provider.  Document Released: 12/18/2006 Document Revised: 08/13/2017 Document Reviewed: 10/12/2016  [employer] Interactive Patient Education © 2017 [employer]

## 2018-11-28 NOTE — PROGRESS NOTES
Subjective:   Carmelina Leblanc is a 63 y.o. female who presents for Sinus Problem and Cough        Cough   This is a new problem. Episode onset: 4 days. The problem has been gradually worsening. The problem occurs constantly. The cough is non-productive. Associated symptoms include ear congestion, ear pain, myalgias, nasal congestion, rhinorrhea, a sore throat and shortness of breath. Pertinent negatives include no chills, fever or wheezing. Treatments tried: OTC cold med. Her past medical history is significant for bronchitis. There is no history of asthma, environmental allergies or pneumonia. partical pneumonectomy lung ca     Decrease WBC after CA treatment - WBC 3.5 on 10/17     Review of Systems   Constitutional: Negative for chills, fever and malaise/fatigue.   HENT: Positive for congestion, ear pain, rhinorrhea, sinus pain and sore throat. Negative for ear discharge.    Respiratory: Positive for cough and shortness of breath. Negative for sputum production and wheezing.    Gastrointestinal: Negative for abdominal pain, constipation, diarrhea, nausea and vomiting.   Musculoskeletal: Positive for myalgias.   Endo/Heme/Allergies: Negative for environmental allergies.   All other systems reviewed and are negative.      PMH:  has a past medical history of Anemia (6-1-17); Anesthesia (6-1-17); Cancer ([facility]) (8/4/16); Cancer ([facility]) (2016); Cataract; Dental disorder; Osteoarthritis (6-1-17); Port catheter in place (6-1-17); and Snoring.  MEDS:   Current Outpatient Prescriptions:   •  metFORMIN ER (GLUCOPHAGE XR) 500 MG TABLET SR 24 HR, Take 500 mg by mouth every day., Disp: , Rfl:   •  amoxicillin-clavulanate (AUGMENTIN) 875-125 MG Tab, Take 1 Tab by mouth 2 times a day for 5 days., Disp: 10 Tab, Rfl: 0  •  fluticasone (FLONASE) 50 MCG/ACT nasal spray, Spray 2 Sprays in nose every day for 7 days., Disp: 1 Bottle, Rfl: 0  •  benzonatate (TESSALON) 100 MG Cap, Take 1 Cap by mouth 3 times a day as needed for Cough  for up to 5 days., Disp: 15 Cap, Rfl: 0  •  omeprazole (PRILOSEC) 20 MG delayed-release capsule, Take 20 mg by mouth every day., Disp: , Rfl:   •  Multiple Vitamins-Minerals (MULTIVITAMIN GUMMIES ADULT PO), Take  by mouth., Disp: , Rfl:   •  Trastuzumab (HERCEPTIN IV), by Intravenous route., Disp: , Rfl:   ALLERGIES:   Allergies   Allergen Reactions   • Sulfa Drugs Unspecified      RXN=As a child unsure of reaction   • Morphine      Nausea and vomiting   • Iodine Diarrhea and Nausea     Pt states reaction as a child to iodine not injected iodine     SURGHX:   Past Surgical History:   Procedure Laterality Date   • TISSUE EXPANDER PLACE/REMOVE Bilateral 6/8/2017    Procedure: TISSUE EXPANDER PLACE/REMOVE;  Surgeon: Everardo Matthews M.D.;  Location: SURGERY SAME DAY [facility];  Service:    • BREAST IMPLANT REVISION Bilateral 6/8/2017    Procedure: BREAST IMPLANT;  Surgeon: Everardo Matthews M.D.;  Location: SURGERY SAME DAY [facility];  Service:    • CAPSULOTOMY Bilateral 6/8/2017    Procedure: CAPSULOTOMY FOR PARTIAL CAPSULECTOMIES;  Surgeon: Everardo Matthews M.D.;  Location: SURGERY SAME DAY [facility];  Service:    • BREAST RECONSTRUCTION Bilateral 6/8/2017    Procedure: BREAST RECONSTRUCTION USING LOCAL TISSUE & FAT GRAFTING;  Surgeon: Everardo Matthews M.D.;  Location: SURGERY SAME DAY [facility];  Service:    • THORACOSCOPY Right 5/1/2017    Procedure: THORACOSCOPY, WEDGE RESECTION LOWER LOBE MASS;  Surgeon: John H Ganser, M.D.;  Location: SURGERY [facility];  Service:    • CATH PLACEMENT Left 3/17/2017    Procedure: CATH PLACEMENT FOR SUBCLAVIAN PORT LEFT;  Surgeon: Carly Wright M.D.;  Location: SURGERY [facility];  Service:    • LUNG NEEDLE BIOPSY  02-   • TISSUE EXPANDER PLACE/REMOVE Bilateral 1/19/2017    Procedure: TISSUE EXPANDER PLACEment;  Surgeon: Everardo Matthews M.D.;  Location: SURGERY SAME DAY [facility];  Service:    • FLAP GRAFT  1/19/2017    Procedure: FLAP  "GRAFT- FOR DERMAL ADIPOFASCIAL FLAPS ;  Surgeon: Everardo Mtathews M.D.;  Location: SURGERY SAME DAY [facility];  Service:    • MASTECTOMY Bilateral 1/19/2017    Procedure: MASTECTOMY PROPHYLACTIC LEFT, AND RIGHT TOTAL MASTECTOMY;  Surgeon: Carly Wright M.D.;  Location: SURGERY SAME DAY [facility];  Service:    • AXILLARY NODE DISSECTION Right 1/19/2017    Procedure: AXILLARY NODE DISSECTION;  Surgeon: Carly Wright M.D.;  Location: SURGERY SAME DAY [facility];  Service:    • CATH PLACEMENT Left 1/19/2017    Procedure: Removal of left subclavian port ;  Surgeon: Carly Wright M.D.;  Location: SURGERY SAME DAY [facility];  Service:    • CATH PLACEMENT Left 9/5/2016    Procedure: CATH PLACEMENT - SUBCLAVIAN PORT - SIDE TO BE DETERMINED;  Surgeon: Carly Wright M.D.;  Location: SURGERY [facility];  Service:    • STEREOTACTIC BIOPSY Right 08/10/2016   • HIP ARTHROPLASTY TOTAL Right 2014   • OTHER ORTHOPEDIC SURGERY  6/2012    right hip arthroplasty   • CATARACT PHACO WITH IOL Bilateral    • GYN SURGERY  Approx 2013    Hysterectomy     SOCHX:  reports that she has never smoked. She has never used smokeless tobacco. She reports that she does not drink alcohol or use drugs.  History reviewed. No pertinent family history.     Objective:   /80 (BP Location: Left arm, Patient Position: Sitting, BP Cuff Size: Adult)   Pulse 100   Temp 36.4 °C (97.6 °F) (Temporal)   Ht 1.549 m (5' 1\")   Wt 89.8 kg (198 lb)   SpO2 95%   BMI 37.41 kg/m²     Physical Exam   Constitutional: She is oriented to person, place, and time. She appears well-developed and well-nourished. No distress.   HENT:   Head: Normocephalic and atraumatic.   Nose: Mucosal edema, rhinorrhea and sinus tenderness present. Right sinus exhibits maxillary sinus tenderness. Left sinus exhibits maxillary sinus tenderness.   Mouth/Throat: Posterior oropharyngeal edema and posterior oropharyngeal erythema present.   Eyes: " Pupils are equal, round, and reactive to light. Conjunctivae are normal.   Neck: Normal range of motion. Neck supple.   Cardiovascular: Normal rate and regular rhythm.    Pulmonary/Chest: Effort normal and breath sounds normal. No respiratory distress. She has no wheezes. She has no rales.   Lymphadenopathy:     She has cervical adenopathy.   Neurological: She is alert and oriented to person, place, and time.   Skin: Skin is warm and dry.   Psychiatric: She has a normal mood and affect. Her behavior is normal.   Vitals reviewed.        Assessment/Plan:     1. Acute non-recurrent frontal sinusitis  amoxicillin-clavulanate (AUGMENTIN) 875-125 MG Tab    fluticasone (FLONASE) 50 MCG/ACT nasal spray   2. Cough  benzonatate (TESSALON) 100 MG Cap     Patient directed to take full course of abx. Supportive care reviewed including: warm salt water gargles, increase fluids, probiotic/yogurt. Sinusitis educational handout given to patient. Infection control and and hand hygiene reviewed. If sx worsen or persist return to clinic for reevaluation.    Follow up as schedule with pcp in 2 weeks. Red flags and STRICT emergency room precautions discussed.  Patient appears understanding of information.

## 2018-12-15 ENCOUNTER — OFFICE VISIT (OUTPATIENT)
Dept: URGENT CARE | Facility: CLINIC | Age: 63
End: 2018-12-15
Payer: COMMERCIAL

## 2018-12-15 ENCOUNTER — HOSPITAL ENCOUNTER (OUTPATIENT)
Facility: MEDICAL CENTER | Age: 63
End: 2018-12-15
Attending: PHYSICIAN ASSISTANT
Payer: COMMERCIAL

## 2018-12-15 VITALS
WEIGHT: 198 LBS | SYSTOLIC BLOOD PRESSURE: 122 MMHG | HEART RATE: 106 BPM | DIASTOLIC BLOOD PRESSURE: 88 MMHG | TEMPERATURE: 97.2 F | OXYGEN SATURATION: 97 % | HEIGHT: 61 IN | BODY MASS INDEX: 37.38 KG/M2

## 2018-12-15 DIAGNOSIS — N30.00 ACUTE CYSTITIS WITHOUT HEMATURIA: ICD-10-CM

## 2018-12-15 LAB
APPEARANCE UR: NORMAL
BILIRUB UR STRIP-MCNC: NORMAL MG/DL
COLOR UR AUTO: NORMAL
GLUCOSE UR STRIP.AUTO-MCNC: 250 MG/DL
KETONES UR STRIP.AUTO-MCNC: 15 MG/DL
LEUKOCYTE ESTERASE UR QL STRIP.AUTO: NORMAL
NITRITE UR QL STRIP.AUTO: NORMAL
PH UR STRIP.AUTO: 5 [PH] (ref 5–8)
PROT UR QL STRIP: 300 MG/DL
RBC UR QL AUTO: NORMAL
SP GR UR STRIP.AUTO: 1
UROBILINOGEN UR STRIP-MCNC: 8 MG/DL

## 2018-12-15 PROCEDURE — 87077 CULTURE AEROBIC IDENTIFY: CPT

## 2018-12-15 PROCEDURE — 99214 OFFICE O/P EST MOD 30 MIN: CPT | Performed by: PHYSICIAN ASSISTANT

## 2018-12-15 PROCEDURE — 81002 URINALYSIS NONAUTO W/O SCOPE: CPT | Performed by: PHYSICIAN ASSISTANT

## 2018-12-15 PROCEDURE — 87086 URINE CULTURE/COLONY COUNT: CPT

## 2018-12-15 PROCEDURE — 87186 SC STD MICRODIL/AGAR DIL: CPT

## 2018-12-15 RX ORDER — CIPROFLOXACIN 500 MG/1
500 TABLET, FILM COATED ORAL EVERY 12 HOURS
Qty: 14 TAB | Refills: 0 | Status: SHIPPED | OUTPATIENT
Start: 2018-12-15 | End: 2018-12-22

## 2018-12-15 ASSESSMENT — ENCOUNTER SYMPTOMS
CHILLS: 0
FLANK PAIN: 1
BACK PAIN: 0
PALPITATIONS: 0
COUGH: 0
SHORTNESS OF BREATH: 0
FEVER: 0

## 2018-12-15 NOTE — PROGRESS NOTES
Subjective:      Carmelina Leblanc is a 63 y.o. female who presents with Urinary Pain (x12/12 )            Dysuria    This is a new problem. The current episode started in the past 7 days. The problem has been gradually worsening. The pain is moderate. Associated symptoms include flank pain, frequency, hesitancy and urgency. Pertinent negatives include no chills or hematuria. She has tried nothing for the symptoms. The treatment provided no relief.       Review of Systems   Constitutional: Negative for chills and fever.   Respiratory: Negative for cough and shortness of breath.    Cardiovascular: Negative for chest pain and palpitations.   Genitourinary: Positive for dysuria, flank pain, frequency, hesitancy and urgency. Negative for hematuria.   Musculoskeletal: Negative for back pain.   All other systems reviewed and are negative.    PMH:  has a past medical history of Anemia (6-1-17); Anesthesia (6-1-17); Cancer (HCC) (8/4/16); Cancer (HCC) (2016); Cataract; Dental disorder; Osteoarthritis (6-1-17); Port catheter in place (6-1-17); and Snoring.  MEDS:   Current Outpatient Prescriptions:   •  ciprofloxacin (CIPRO) 500 MG Tab, Take 1 Tab by mouth every 12 hours for 7 days., Disp: 14 Tab, Rfl: 0  •  metFORMIN ER (GLUCOPHAGE XR) 500 MG TABLET SR 24 HR, Take 500 mg by mouth every day., Disp: , Rfl:   •  omeprazole (PRILOSEC) 20 MG delayed-release capsule, Take 20 mg by mouth every day., Disp: , Rfl:   •  Multiple Vitamins-Minerals (MULTIVITAMIN GUMMIES ADULT PO), Take  by mouth., Disp: , Rfl:   •  Trastuzumab (HERCEPTIN IV), by Intravenous route., Disp: , Rfl:   ALLERGIES:   Allergies   Allergen Reactions   • Sulfa Drugs Unspecified      RXN=As a child unsure of reaction   • Morphine      Nausea and vomiting   • Iodine Diarrhea and Nausea     Pt states reaction as a child to iodine not injected iodine     SURGHX:   Past Surgical History:   Procedure Laterality Date   • TISSUE EXPANDER PLACE/REMOVE Bilateral  6/8/2017    Procedure: TISSUE EXPANDER PLACE/REMOVE;  Surgeon: Everardo Matthews M.D.;  Location: SURGERY SAME DAY [facility];  Service:    • BREAST IMPLANT REVISION Bilateral 6/8/2017    Procedure: BREAST IMPLANT;  Surgeon: Everardo Matthews M.D.;  Location: SURGERY SAME DAY [facility];  Service:    • CAPSULOTOMY Bilateral 6/8/2017    Procedure: CAPSULOTOMY FOR PARTIAL CAPSULECTOMIES;  Surgeon: Everardo Matthews M.D.;  Location: SURGERY SAME DAY [facility];  Service:    • BREAST RECONSTRUCTION Bilateral 6/8/2017    Procedure: BREAST RECONSTRUCTION USING LOCAL TISSUE & FAT GRAFTING;  Surgeon: Everardo Matthews M.D.;  Location: SURGERY SAME DAY [facility];  Service:    • THORACOSCOPY Right 5/1/2017    Procedure: THORACOSCOPY, WEDGE RESECTION LOWER LOBE MASS;  Surgeon: John H Ganser, M.D.;  Location: [facility];  Service:    • CATH PLACEMENT Left 3/17/2017    Procedure: CATH PLACEMENT FOR SUBCLAVIAN PORT LEFT;  Surgeon: Carly Wright M.D.;  Location: [facility];  Service:    • LUNG NEEDLE BIOPSY  02-   • TISSUE EXPANDER PLACE/REMOVE Bilateral 1/19/2017    Procedure: TISSUE EXPANDER PLACEment;  Surgeon: Everardo Matthews M.D.;  Location: SURGERY SAME DAY [facility];  Service:    • FLAP GRAFT  1/19/2017    Procedure: FLAP GRAFT- FOR DERMAL ADIPOFASCIAL FLAPS ;  Surgeon: Everardo Matthews M.D.;  Location: SURGERY SAME DAY [facility];  Service:    • MASTECTOMY Bilateral 1/19/2017    Procedure: MASTECTOMY PROPHYLACTIC LEFT, AND RIGHT TOTAL MASTECTOMY;  Surgeon: Carly Wright M.D.;  Location: SURGERY SAME DAY [facility];  Service:    • AXILLARY NODE DISSECTION Right 1/19/2017    Procedure: AXILLARY NODE DISSECTION;  Surgeon: Carly Wright M.D.;  Location: SURGERY SAME DAY [facility];  Service:    • CATH PLACEMENT Left 1/19/2017    Procedure: Removal of left subclavian port ;  Surgeon: Carly Wright M.D.;  Location: SURGERY SAME DAY [facility];   "Service:    • CATH PLACEMENT Left 9/5/2016    Procedure: CATH PLACEMENT - SUBCLAVIAN PORT - SIDE TO BE DETERMINED;  Surgeon: Carly Wright M.D.;  Location: SURGERY [facility];  Service:    • STEREOTACTIC BIOPSY Right 08/10/2016   • HIP ARTHROPLASTY TOTAL Right 2014   • OTHER ORTHOPEDIC SURGERY  6/2012    right hip arthroplasty   • CATARACT PHACO WITH IOL Bilateral    • GYN SURGERY  Approx 2013    Hysterectomy     SOCHX:  reports that she has never smoked. She has never used smokeless tobacco. She reports that she does not drink alcohol or use drugs.  FH: Family history was reviewed, no pertinent findings to report  Medications, Allergies, and current problem list reviewed today in Epic     Objective:     /88   Pulse (!) 106   Temp 36.2 °C (97.2 °F)   Ht 1.549 m (5' 1\")   Wt 89.8 kg (198 lb)   SpO2 97%   BMI 37.41 kg/m²      Physical Exam   Constitutional: She is oriented to person, place, and time. She appears well-developed and well-nourished.   HENT:   Head: Normocephalic and atraumatic.   Right Ear: External ear normal.   Left Ear: External ear normal.   Nose: Nose normal.   Mouth/Throat: Oropharynx is clear and moist.   Neck: Normal range of motion. Neck supple.   Cardiovascular: Normal rate, regular rhythm and normal heart sounds.    Pulmonary/Chest: Effort normal and breath sounds normal.   Abdominal: Soft.   Musculoskeletal: She exhibits no tenderness.   No CVA tenderness present.   Neurological: She is alert and oriented to person, place, and time.   Skin: Skin is warm and dry.   Psychiatric: She has a normal mood and affect. Her behavior is normal. Judgment and thought content normal.   Vitals reviewed.              Assessment/Plan:     1. Acute cystitis without hematuria    - POCT Urinalysis  - Urine Culture; Future  - ciprofloxacin (CIPRO) 500 MG Tab; Take 1 Tab by mouth every 12 hours for 7 days.  Dispense: 14 Tab; Refill: 0    Differential diagnosis, natural history, supportive " care discussed. Follow-up with primary care provider within 7-10 days, emergency room precautions discussed.  Patient and/or family appears understanding of information.  Handout and review of patients diagnosis and treatment was discussed extensively.

## 2018-12-17 LAB
BACTERIA UR CULT: ABNORMAL
BACTERIA UR CULT: ABNORMAL
SIGNIFICANT IND 70042: ABNORMAL
SITE SITE: ABNORMAL
SOURCE SOURCE: ABNORMAL

## 2019-03-26 ENCOUNTER — OFFICE VISIT (OUTPATIENT)
Dept: URGENT CARE | Facility: CLINIC | Age: 64
End: 2019-03-26
Payer: COMMERCIAL

## 2019-03-26 VITALS
TEMPERATURE: 97.8 F | WEIGHT: 190 LBS | RESPIRATION RATE: 16 BRPM | HEART RATE: 85 BPM | DIASTOLIC BLOOD PRESSURE: 68 MMHG | BODY MASS INDEX: 35.87 KG/M2 | OXYGEN SATURATION: 98 % | SYSTOLIC BLOOD PRESSURE: 122 MMHG | HEIGHT: 61 IN

## 2019-03-26 DIAGNOSIS — J01.00 ACUTE NON-RECURRENT MAXILLARY SINUSITIS: ICD-10-CM

## 2019-03-26 DIAGNOSIS — H10.33 ACUTE BACTERIAL CONJUNCTIVITIS OF BOTH EYES: ICD-10-CM

## 2019-03-26 PROCEDURE — 99214 OFFICE O/P EST MOD 30 MIN: CPT | Performed by: PHYSICIAN ASSISTANT

## 2019-03-26 RX ORDER — AMOXICILLIN AND CLAVULANATE POTASSIUM 875; 125 MG/1; MG/1
1 TABLET, FILM COATED ORAL 2 TIMES DAILY
Qty: 14 TAB | Refills: 0 | Status: SHIPPED | OUTPATIENT
Start: 2019-03-26 | End: 2019-04-02

## 2019-03-26 RX ORDER — MOXIFLOXACIN 5 MG/ML
1 SOLUTION/ DROPS OPHTHALMIC 3 TIMES DAILY
Qty: 1 BOTTLE | Refills: 0 | Status: SHIPPED | OUTPATIENT
Start: 2019-03-26 | End: 2019-04-02

## 2019-03-26 ASSESSMENT — ENCOUNTER SYMPTOMS
HEADACHES: 0
DOUBLE VISION: 0
EYE PAIN: 1
EYE REDNESS: 1
CHILLS: 0
EYE DISCHARGE: 1
SHORTNESS OF BREATH: 0
BLURRED VISION: 0
FEVER: 0
PALPITATIONS: 0
SORE THROAT: 0
SINUS PAIN: 1
COUGH: 0
PHOTOPHOBIA: 0

## 2019-03-26 NOTE — PROGRESS NOTES
Subjective:      Carmelina Leblanc is a 64 y.o. female who presents with Redness Or Discharge From Eye (sinus problem, itchy, hurting and red eyes)            Eye Drainage   This is a new problem. The current episode started in the past 7 days. The problem has been unchanged. Associated symptoms include congestion. Pertinent negatives include no chest pain, chills, coughing, fever, headaches, rash or sore throat. Associated symptoms comments: Sinus pain. Nothing aggravates the symptoms. She has tried nothing for the symptoms.       Review of Systems   Constitutional: Negative for chills, fever and malaise/fatigue.   HENT: Positive for congestion and sinus pain. Negative for ear pain and sore throat.    Eyes: Positive for pain, discharge and redness. Negative for blurred vision, double vision and photophobia.   Respiratory: Negative for cough and shortness of breath.    Cardiovascular: Negative for chest pain and palpitations.   Skin: Negative for rash.   Neurological: Negative for headaches.   All other systems reviewed and are negative.    PMH:  has a past medical history of Anemia (6-1-17); Anesthesia (6-1-17); Cancer ([facility]) (8/4/16); Cancer (HCC) (2016); Cataract; Dental disorder; Osteoarthritis (6-1-17); Port catheter in place (6-1-17); and Snoring.  MEDS:   Current Outpatient Prescriptions:   •  moxifloxacin (VIGAMOX) 0.5 % Solution, Place 1 Drop in both eyes 3 times a day for 7 days., Disp: 1 Bottle, Rfl: 0  •  amoxicillin-clavulanate (AUGMENTIN) 875-125 MG Tab, Take 1 Tab by mouth 2 times a day for 7 days., Disp: 14 Tab, Rfl: 0  •  metFORMIN ER (GLUCOPHAGE XR) 500 MG TABLET SR 24 HR, Take 500 mg by mouth every day., Disp: , Rfl:   •  omeprazole (PRILOSEC) 20 MG delayed-release capsule, Take 20 mg by mouth every day., Disp: , Rfl:   •  Multiple Vitamins-Minerals (MULTIVITAMIN GUMMIES ADULT PO), Take  by mouth., Disp: , Rfl:   •  Trastuzumab (HERCEPTIN IV), by Intravenous route., Disp: , Rfl:   ALLERGIES:    Allergies   Allergen Reactions   • Sulfa Drugs Unspecified      RXN=As a child unsure of reaction   • Morphine      Nausea and vomiting   • Iodine Diarrhea and Nausea     Pt states reaction as a child to iodine not injected iodine     SURGHX:   Past Surgical History:   Procedure Laterality Date   • TISSUE EXPANDER PLACE/REMOVE Bilateral 6/8/2017    Procedure: TISSUE EXPANDER PLACE/REMOVE;  Surgeon: Everardo Matthews M.D.;  Location: SURGERY SAME DAY [facility];  Service:    • BREAST IMPLANT REVISION Bilateral 6/8/2017    Procedure: BREAST IMPLANT;  Surgeon: Everardo Matthews M.D.;  Location: SURGERY SAME DAY [facility];  Service:    • CAPSULOTOMY Bilateral 6/8/2017    Procedure: CAPSULOTOMY FOR PARTIAL CAPSULECTOMIES;  Surgeon: Everardo Matthews M.D.;  Location: SURGERY SAME DAY [facility];  Service:    • BREAST RECONSTRUCTION Bilateral 6/8/2017    Procedure: BREAST RECONSTRUCTION USING LOCAL TISSUE & FAT GRAFTING;  Surgeon: Everardo Matthews M.D.;  Location: SURGERY SAME DAY [facility];  Service:    • THORACOSCOPY Right 5/1/2017    Procedure: THORACOSCOPY, WEDGE RESECTION LOWER LOBE MASS;  Surgeon: John H Ganser, M.D.;  Location: SURGERY [facility];  Service:    • CATH PLACEMENT Left 3/17/2017    Procedure: CATH PLACEMENT FOR SUBCLAVIAN PORT LEFT;  Surgeon: Carly Wright M.D.;  Location: [facility];  Service:    • LUNG NEEDLE BIOPSY  02-   • TISSUE EXPANDER PLACE/REMOVE Bilateral 1/19/2017    Procedure: TISSUE EXPANDER PLACEment;  Surgeon: Everardo Matthews M.D.;  Location: SURGERY SAME DAY [facility];  Service:    • FLAP GRAFT  1/19/2017    Procedure: FLAP GRAFT- FOR DERMAL ADIPOFASCIAL FLAPS ;  Surgeon: Everardo Matthews M.D.;  Location: SURGERY SAME DAY [facility];  Service:    • MASTECTOMY Bilateral 1/19/2017    Procedure: MASTECTOMY PROPHYLACTIC LEFT, AND RIGHT TOTAL MASTECTOMY;  Surgeon: Carly Wright M.D.;  Location: SURGERY SAME DAY [facility];   "Service:    • AXILLARY NODE DISSECTION Right 1/19/2017    Procedure: AXILLARY NODE DISSECTION;  Surgeon: Carly Wright M.D.;  Location: SURGERY SAME DAY [facility];  Service:    • CATH PLACEMENT Left 1/19/2017    Procedure: Removal of left subclavian port ;  Surgeon: Carly Wright M.D.;  Location: SURGERY SAME DAY [facility];  Service:    • CATH PLACEMENT Left 9/5/2016    Procedure: CATH PLACEMENT - SUBCLAVIAN PORT - SIDE TO BE DETERMINED;  Surgeon: Carly Wright M.D.;  Location: SURGERY [facility];  Service:    • STEREOTACTIC BIOPSY Right 08/10/2016   • HIP ARTHROPLASTY TOTAL Right 2014   • OTHER ORTHOPEDIC SURGERY  6/2012    right hip arthroplasty   • CATARACT PHACO WITH IOL Bilateral    • GYN SURGERY  Approx 2013    Hysterectomy     SOCHX:  reports that she has never smoked. She has never used smokeless tobacco. She reports that she does not drink alcohol or use drugs.  FH: Family history was reviewed, no pertinent findings to report  Medications, Allergies, and current problem list reviewed today in Epic       Objective:     /68 (BP Location: Left arm, Patient Position: Sitting, BP Cuff Size: Child)   Pulse 85   Temp 36.6 °C (97.8 °F) (Temporal)   Resp 16   Ht 1.549 m (5' 1\")   Wt 86.2 kg (190 lb)   SpO2 98%   BMI 35.90 kg/m²      Physical Exam   Constitutional: She is oriented to person, place, and time. She appears well-developed and well-nourished.  Non-toxic appearance. She does not have a sickly appearance. She does not appear ill. No distress.   HENT:   Head: Normocephalic and atraumatic.   Right Ear: Hearing, tympanic membrane, external ear and ear canal normal.   Left Ear: Hearing, tympanic membrane, external ear and ear canal normal.   Nose: Mucosal edema and rhinorrhea present. No sinus tenderness. No epistaxis. Right sinus exhibits maxillary sinus tenderness. Left sinus exhibits maxillary sinus tenderness.   Mouth/Throat: Uvula is midline, oropharynx is clear " and moist and mucous membranes are normal.   Eyes: EOM are normal. Right conjunctiva is injected. Left conjunctiva is injected.   Neck: Normal range of motion. Neck supple.   Cardiovascular: Normal rate, regular rhythm, normal heart sounds and intact distal pulses.    Pulmonary/Chest: Effort normal and breath sounds normal.   Neurological: She is alert and oriented to person, place, and time.   Skin: Skin is warm and dry.   Psychiatric: She has a normal mood and affect. Her behavior is normal. Judgment and thought content normal.   Vitals reviewed.              Assessment/Plan:     1. Acute bacterial conjunctivitis of both eyes    - moxifloxacin (VIGAMOX) 0.5 % Solution; Place 1 Drop in both eyes 3 times a day for 7 days.  Dispense: 1 Bottle; Refill: 0    2. Acute non-recurrent maxillary sinusitis    - amoxicillin-clavulanate (AUGMENTIN) 875-125 MG Tab; Take 1 Tab by mouth 2 times a day for 7 days.  Dispense: 14 Tab; Refill: 0    Differential diagnosis, natural history, supportive care discussed. Follow-up with primary care provider within 7-10 days, emergency room precautions discussed.  Patient and/or family appears understanding of information.  Handout and review of patients diagnosis and treatment was discussed extensively.

## 2019-04-29 ENCOUNTER — HOSPITAL ENCOUNTER (OUTPATIENT)
Dept: LAB | Facility: MEDICAL CENTER | Age: 64
End: 2019-04-29
Attending: NURSE PRACTITIONER
Payer: COMMERCIAL

## 2019-04-29 PROCEDURE — 36415 COLL VENOUS BLD VENIPUNCTURE: CPT

## 2019-04-29 PROCEDURE — 80061 LIPID PANEL: CPT

## 2019-04-29 PROCEDURE — 80053 COMPREHEN METABOLIC PANEL: CPT

## 2019-04-29 PROCEDURE — 83036 HEMOGLOBIN GLYCOSYLATED A1C: CPT

## 2019-04-30 LAB
ALBUMIN SERPL BCP-MCNC: 3.8 G/DL (ref 3.2–4.9)
ALBUMIN/GLOB SERPL: 1.5 G/DL
ALP SERPL-CCNC: 78 U/L (ref 30–99)
ALT SERPL-CCNC: 16 U/L (ref 2–50)
ANION GAP SERPL CALC-SCNC: 8 MMOL/L (ref 0–11.9)
AST SERPL-CCNC: 14 U/L (ref 12–45)
BILIRUB SERPL-MCNC: 0.6 MG/DL (ref 0.1–1.5)
BUN SERPL-MCNC: 16 MG/DL (ref 8–22)
CALCIUM SERPL-MCNC: 9.5 MG/DL (ref 8.5–10.5)
CHLORIDE SERPL-SCNC: 107 MMOL/L (ref 96–112)
CHOLEST SERPL-MCNC: 195 MG/DL (ref 100–199)
CO2 SERPL-SCNC: 25 MMOL/L (ref 20–33)
CREAT SERPL-MCNC: 0.71 MG/DL (ref 0.5–1.4)
EST. AVERAGE GLUCOSE BLD GHB EST-MCNC: 128 MG/DL
FASTING STATUS PATIENT QL REPORTED: NORMAL
GLOBULIN SER CALC-MCNC: 2.5 G/DL (ref 1.9–3.5)
GLUCOSE SERPL-MCNC: 88 MG/DL (ref 65–99)
HBA1C MFR BLD: 6.1 % (ref 0–5.6)
HDLC SERPL-MCNC: 56 MG/DL
LDLC SERPL CALC-MCNC: 118 MG/DL
POTASSIUM SERPL-SCNC: 4.8 MMOL/L (ref 3.6–5.5)
PROT SERPL-MCNC: 6.3 G/DL (ref 6–8.2)
SODIUM SERPL-SCNC: 140 MMOL/L (ref 135–145)
TRIGL SERPL-MCNC: 103 MG/DL (ref 0–149)

## 2019-05-20 ENCOUNTER — HOSPITAL ENCOUNTER (OUTPATIENT)
Dept: LAB | Facility: MEDICAL CENTER | Age: 64
End: 2019-05-20
Attending: NURSE PRACTITIONER
Payer: COMMERCIAL

## 2019-05-20 PROCEDURE — 36415 COLL VENOUS BLD VENIPUNCTURE: CPT

## 2019-05-20 PROCEDURE — 85025 COMPLETE CBC W/AUTO DIFF WBC: CPT

## 2019-05-21 LAB
BASOPHILS # BLD AUTO: 1 % (ref 0–1.8)
BASOPHILS # BLD: 0.04 K/UL (ref 0–0.12)
EOSINOPHIL # BLD AUTO: 0.11 K/UL (ref 0–0.51)
EOSINOPHIL NFR BLD: 2.7 % (ref 0–6.9)
ERYTHROCYTE [DISTWIDTH] IN BLOOD BY AUTOMATED COUNT: 53.7 FL (ref 35.9–50)
HCT VFR BLD AUTO: 42.7 % (ref 37–47)
HGB BLD-MCNC: 14 G/DL (ref 12–16)
IMM GRANULOCYTES # BLD AUTO: 0.01 K/UL (ref 0–0.11)
IMM GRANULOCYTES NFR BLD AUTO: 0.2 % (ref 0–0.9)
LYMPHOCYTES # BLD AUTO: 0.94 K/UL (ref 1–4.8)
LYMPHOCYTES NFR BLD: 22.9 % (ref 22–41)
MCH RBC QN AUTO: 32.3 PG (ref 27–33)
MCHC RBC AUTO-ENTMCNC: 32.8 G/DL (ref 33.6–35)
MCV RBC AUTO: 98.4 FL (ref 81.4–97.8)
MONOCYTES # BLD AUTO: 0.58 K/UL (ref 0–0.85)
MONOCYTES NFR BLD AUTO: 14.1 % (ref 0–13.4)
NEUTROPHILS # BLD AUTO: 2.42 K/UL (ref 2–7.15)
NEUTROPHILS NFR BLD: 59.1 % (ref 44–72)
NRBC # BLD AUTO: 0 K/UL
NRBC BLD-RTO: 0 /100 WBC
PLATELET # BLD AUTO: 271 K/UL (ref 164–446)
PMV BLD AUTO: 10.4 FL (ref 9–12.9)
RBC # BLD AUTO: 4.34 M/UL (ref 4.2–5.4)
WBC # BLD AUTO: 4.1 K/UL (ref 4.8–10.8)

## 2019-06-25 ENCOUNTER — HOSPITAL ENCOUNTER (OUTPATIENT)
Dept: RADIOLOGY | Facility: MEDICAL CENTER | Age: 64
End: 2019-06-25
Attending: INTERNAL MEDICINE
Payer: COMMERCIAL

## 2019-06-25 DIAGNOSIS — C50.911 MALIGNANT NEOPLASM OF RIGHT FEMALE BREAST, UNSPECIFIED ESTROGEN RECEPTOR STATUS, UNSPECIFIED SITE OF BREAST (HCC): ICD-10-CM

## 2019-06-25 PROCEDURE — 700117 HCHG RX CONTRAST REV CODE 255: Performed by: INTERNAL MEDICINE

## 2019-06-25 PROCEDURE — 71260 CT THORAX DX C+: CPT

## 2019-06-25 RX ADMIN — IOHEXOL 75 ML: 350 INJECTION, SOLUTION INTRAVENOUS at 15:24

## 2019-10-21 ENCOUNTER — HOSPITAL ENCOUNTER (OUTPATIENT)
Dept: LAB | Facility: MEDICAL CENTER | Age: 64
End: 2019-10-21
Attending: NURSE PRACTITIONER
Payer: COMMERCIAL

## 2019-10-21 LAB
25(OH)D3 SERPL-MCNC: 38 NG/ML (ref 30–100)
ALBUMIN SERPL BCP-MCNC: 4 G/DL (ref 3.2–4.9)
ALBUMIN/GLOB SERPL: 1.5 G/DL
ALP SERPL-CCNC: 72 U/L (ref 30–99)
ALT SERPL-CCNC: 15 U/L (ref 2–50)
ANION GAP SERPL CALC-SCNC: 8 MMOL/L (ref 0–11.9)
AST SERPL-CCNC: 13 U/L (ref 12–45)
BASOPHILS # BLD AUTO: 1.3 % (ref 0–1.8)
BASOPHILS # BLD: 0.04 K/UL (ref 0–0.12)
BILIRUB SERPL-MCNC: 0.6 MG/DL (ref 0.1–1.5)
BUN SERPL-MCNC: 12 MG/DL (ref 8–22)
CALCIUM SERPL-MCNC: 9.1 MG/DL (ref 8.5–10.5)
CHLORIDE SERPL-SCNC: 109 MMOL/L (ref 96–112)
CHOLEST SERPL-MCNC: 166 MG/DL (ref 100–199)
CO2 SERPL-SCNC: 24 MMOL/L (ref 20–33)
CREAT SERPL-MCNC: 0.66 MG/DL (ref 0.5–1.4)
EOSINOPHIL # BLD AUTO: 0.13 K/UL (ref 0–0.51)
EOSINOPHIL NFR BLD: 4.1 % (ref 0–6.9)
ERYTHROCYTE [DISTWIDTH] IN BLOOD BY AUTOMATED COUNT: 52.7 FL (ref 35.9–50)
EST. AVERAGE GLUCOSE BLD GHB EST-MCNC: 114 MG/DL
GLOBULIN SER CALC-MCNC: 2.6 G/DL (ref 1.9–3.5)
GLUCOSE SERPL-MCNC: 88 MG/DL (ref 65–99)
HBA1C MFR BLD: 5.6 % (ref 0–5.6)
HCT VFR BLD AUTO: 40.7 % (ref 37–47)
HDLC SERPL-MCNC: 53 MG/DL
HGB BLD-MCNC: 13.4 G/DL (ref 12–16)
IMM GRANULOCYTES # BLD AUTO: 0.01 K/UL (ref 0–0.11)
IMM GRANULOCYTES NFR BLD AUTO: 0.3 % (ref 0–0.9)
LDLC SERPL CALC-MCNC: 92 MG/DL
LYMPHOCYTES # BLD AUTO: 0.81 K/UL (ref 1–4.8)
LYMPHOCYTES NFR BLD: 25.6 % (ref 22–41)
MCH RBC QN AUTO: 31.9 PG (ref 27–33)
MCHC RBC AUTO-ENTMCNC: 32.9 G/DL (ref 33.6–35)
MCV RBC AUTO: 96.9 FL (ref 81.4–97.8)
MONOCYTES # BLD AUTO: 0.38 K/UL (ref 0–0.85)
MONOCYTES NFR BLD AUTO: 12 % (ref 0–13.4)
NEUTROPHILS # BLD AUTO: 1.8 K/UL (ref 2–7.15)
NEUTROPHILS NFR BLD: 56.7 % (ref 44–72)
NRBC # BLD AUTO: 0 K/UL
NRBC BLD-RTO: 0 /100 WBC
PLATELET # BLD AUTO: 253 K/UL (ref 164–446)
PMV BLD AUTO: 9.7 FL (ref 9–12.9)
POTASSIUM SERPL-SCNC: 4.5 MMOL/L (ref 3.6–5.5)
PROT SERPL-MCNC: 6.6 G/DL (ref 6–8.2)
RBC # BLD AUTO: 4.2 M/UL (ref 4.2–5.4)
SODIUM SERPL-SCNC: 141 MMOL/L (ref 135–145)
TRIGL SERPL-MCNC: 107 MG/DL (ref 0–149)
TSH SERPL DL<=0.005 MIU/L-ACNC: 2 UIU/ML (ref 0.38–5.33)
WBC # BLD AUTO: 3.2 K/UL (ref 4.8–10.8)

## 2019-10-21 PROCEDURE — 36415 COLL VENOUS BLD VENIPUNCTURE: CPT

## 2019-10-21 PROCEDURE — 80061 LIPID PANEL: CPT

## 2019-10-21 PROCEDURE — 85025 COMPLETE CBC W/AUTO DIFF WBC: CPT

## 2019-10-21 PROCEDURE — 80053 COMPREHEN METABOLIC PANEL: CPT

## 2019-10-21 PROCEDURE — 82306 VITAMIN D 25 HYDROXY: CPT

## 2019-10-21 PROCEDURE — 83036 HEMOGLOBIN GLYCOSYLATED A1C: CPT

## 2019-10-21 PROCEDURE — 84443 ASSAY THYROID STIM HORMONE: CPT

## 2019-11-24 ENCOUNTER — OFFICE VISIT (OUTPATIENT)
Dept: URGENT CARE | Facility: MEDICAL CENTER | Age: 64
End: 2019-11-24
Payer: COMMERCIAL

## 2019-11-24 VITALS
DIASTOLIC BLOOD PRESSURE: 78 MMHG | SYSTOLIC BLOOD PRESSURE: 120 MMHG | TEMPERATURE: 97.7 F | BODY MASS INDEX: 38.28 KG/M2 | HEART RATE: 95 BPM | OXYGEN SATURATION: 97 % | WEIGHT: 195 LBS | HEIGHT: 60 IN | RESPIRATION RATE: 15 BRPM

## 2019-11-24 DIAGNOSIS — J22 LOWER RESPIRATORY INFECTION (E.G., BRONCHITIS, PNEUMONIA, PNEUMONITIS, PULMONITIS): ICD-10-CM

## 2019-11-24 DIAGNOSIS — R05.9 COUGH: ICD-10-CM

## 2019-11-24 PROCEDURE — 99214 OFFICE O/P EST MOD 30 MIN: CPT | Performed by: PHYSICIAN ASSISTANT

## 2019-11-24 RX ORDER — DOXYCYCLINE HYCLATE 100 MG
100 TABLET ORAL 2 TIMES DAILY
Qty: 14 TAB | Refills: 0 | Status: SHIPPED | OUTPATIENT
Start: 2019-11-24 | End: 2019-12-01

## 2019-11-24 RX ORDER — ALBUTEROL SULFATE 90 UG/1
2 AEROSOL, METERED RESPIRATORY (INHALATION) EVERY 6 HOURS PRN
Qty: 8.5 G | Refills: 0 | Status: SHIPPED | OUTPATIENT
Start: 2019-11-24 | End: 2020-04-19

## 2019-11-24 RX ORDER — BENZONATATE 100 MG/1
200 CAPSULE ORAL 3 TIMES DAILY PRN
Qty: 30 CAP | Refills: 0 | Status: SHIPPED | OUTPATIENT
Start: 2019-11-24 | End: 2020-04-19

## 2019-11-24 NOTE — PROGRESS NOTES
Subjective:      Carmelina Leblanc is a 64 y.o. female who presents with URI (cough, chest congestion, sob, x5days)            Patient is a pleasant 64-year-old female who presents to urgent care with cough, congestion and wheezing for the last 5 to 6 days.  She does report a history of lower respiratory infection and bronchitis of which she is concerned about upcoming holidays.  She is been taking over-the-counter medications with minimal relief his symptoms.  She also is requesting Tessalon as this is provide great relief in the past with cough.    URI    This is a new problem. The current episode started in the past 7 days. The problem has been gradually worsening. There has been no fever. Associated symptoms include congestion, coughing, neck pain, a plugged ear sensation, rhinorrhea, sinus pain, a sore throat and wheezing. Pertinent negatives include no diarrhea, dysuria, ear pain, headaches, rash, swollen glands or vomiting. Treatments tried: Over-the-counter medications. The treatment provided mild relief.     Also of note patient reports that her white blood cell count remains low.  This was checked 1 month ago which was 3.2.  She does have history of breast cancer along with left cancer, Of which she is closely monitored.    Review of Systems   Constitutional: Positive for malaise/fatigue. Negative for chills and fever.   HENT: Positive for congestion, rhinorrhea, sinus pain and sore throat. Negative for ear discharge and ear pain.    Eyes: Negative for discharge and redness.   Respiratory: Positive for cough, sputum production and wheezing. Negative for shortness of breath.    Cardiovascular: Negative for leg swelling.   Gastrointestinal: Negative for diarrhea and vomiting.   Genitourinary: Negative for dysuria.   Musculoskeletal: Positive for neck pain. Negative for myalgias.   Skin: Negative for itching and rash.   Neurological: Negative for dizziness and headaches.   All other systems reviewed and  are negative.         Objective:     /78   Pulse 95   Temp 36.5 °C (97.7 °F) (Temporal)   Resp 15   Ht 1.524 m (5')   Wt 88.5 kg (195 lb)   SpO2 97%   BMI 38.08 kg/m²    PMH:  has a past medical history of Anemia (6-1-17), Anesthesia (6-1-17), Cancer (HCC) (8/4/16), Cancer (HCC) (2016), Cataract, Dental disorder, Osteoarthritis (6-1-17), Port catheter in place (6-1-17), and Snoring.  MEDS: Reviewed .   ALLERGIES:   Allergies   Allergen Reactions   • Sulfa Drugs Unspecified      RXN=As a child unsure of reaction   • Morphine      Nausea and vomiting   • Iodine Diarrhea and Nausea     Pt states reaction as a child to iodine not injected iodine     SURGHX:   Past Surgical History:   Procedure Laterality Date   • TISSUE EXPANDER PLACE/REMOVE Bilateral 6/8/2017    Procedure: TISSUE EXPANDER PLACE/REMOVE;  Surgeon: Everardo Matthews M.D.;  Location: SURGERY SAME DAY [facility];  Service:    • BREAST IMPLANT REVISION Bilateral 6/8/2017    Procedure: BREAST IMPLANT;  Surgeon: Everardo Matthews M.D.;  Location: SURGERY SAME DAY [facility];  Service:    • CAPSULOTOMY Bilateral 6/8/2017    Procedure: CAPSULOTOMY FOR PARTIAL CAPSULECTOMIES;  Surgeon: Everardo Matthews M.D.;  Location: SURGERY SAME DAY [facility];  Service:    • BREAST RECONSTRUCTION Bilateral 6/8/2017    Procedure: BREAST RECONSTRUCTION USING LOCAL TISSUE & FAT GRAFTING;  Surgeon: Everardo Matthews M.D.;  Location: SURGERY SAME DAY [facility];  Service:    • THORACOSCOPY Right 5/1/2017    Procedure: THORACOSCOPY, WEDGE RESECTION LOWER LOBE MASS;  Surgeon: John H Ganser, M.D.;  Location: SURGERY [facility];  Service:    • CATH PLACEMENT Left 3/17/2017    Procedure: CATH PLACEMENT FOR SUBCLAVIAN PORT LEFT;  Surgeon: Carly Wright M.D.;  Location: SURGERY [facility];  Service:    • LUNG NEEDLE BIOPSY  02-   • TISSUE EXPANDER PLACE/REMOVE Bilateral 1/19/2017    Procedure: TISSUE EXPANDER PLACEment;  Surgeon: Everardo BARKER  ARIANNA Matthews;  Location: SURGERY SAME DAY [facility];  Service:    • FLAP GRAFT  1/19/2017    Procedure: FLAP GRAFT- FOR DERMAL ADIPOFASCIAL FLAPS ;  Surgeon: Everardo Matthews M.D.;  Location: SURGERY SAME DAY [facility];  Service:    • MASTECTOMY Bilateral 1/19/2017    Procedure: MASTECTOMY PROPHYLACTIC LEFT, AND RIGHT TOTAL MASTECTOMY;  Surgeon: Calry Wright M.D.;  Location: SURGERY SAME DAY [facility];  Service:    • AXILLARY NODE DISSECTION Right 1/19/2017    Procedure: AXILLARY NODE DISSECTION;  Surgeon: Carly Wright M.D.;  Location: SURGERY SAME DAY [facility];  Service:    • CATH PLACEMENT Left 1/19/2017    Procedure: Removal of left subclavian port ;  Surgeon: Carly Wright M.D.;  Location: SURGERY SAME DAY [facility];  Service:    • CATH PLACEMENT Left 9/5/2016    Procedure: CATH PLACEMENT - SUBCLAVIAN PORT - SIDE TO BE DETERMINED;  Surgeon: Carly Wright M.D.;  Location: SURGERY [facility];  Service:    • STEREOTACTIC BIOPSY Right 08/10/2016   • HIP ARTHROPLASTY TOTAL Right 2014   • OTHER ORTHOPEDIC SURGERY  6/2012    right hip arthroplasty   • CATARACT PHACO WITH IOL Bilateral    • GYN SURGERY  Approx 2013    Hysterectomy     SOCHX:  reports that she has never smoked. She has never used smokeless tobacco. She reports that she does not drink alcohol or use drugs.  FH: Family history was reviewed, no pertinent findings to report      Physical Exam  Vitals signs reviewed.   Constitutional:       Appearance: Normal appearance. She is well-developed.   HENT:      Head: Normocephalic and atraumatic.      Ears:      Comments: Bilateral clear middle ear effusions.     Nose:      Comments: Rhinorrhea noted.     Mouth/Throat:      Comments: Posterior oropharynx is erythematous, positive postnasal drainage.  No evidence of exudate.  Eyes:      Conjunctiva/sclera: Conjunctivae normal.      Pupils: Pupils are equal, round, and reactive to light.   Neck:       Musculoskeletal: Normal range of motion and neck supple.   Cardiovascular:      Rate and Rhythm: Normal rate and regular rhythm.      Heart sounds: No murmur.   Pulmonary:      Effort: Pulmonary effort is normal. No respiratory distress.      Breath sounds: Wheezing present.      Comments: Diminished right lower lung field.     Musculoskeletal: Normal range of motion.      Right lower leg: No edema.      Left lower leg: No edema.   Lymphadenopathy:      Cervical: No cervical adenopathy.   Skin:     General: Skin is warm.      Findings: No rash.   Neurological:      Mental Status: She is alert and oriented to person, place, and time.   Psychiatric:         Mood and Affect: Mood normal.         Behavior: Behavior normal.         Thought Content: Thought content normal.                 Assessment/Plan:       1. Lower respiratory infection (e.g., bronchitis, pneumonia, pneumonitis, pulmonitis)  - doxycycline (VIBRAMYCIN) 100 MG Tab; Take 1 Tab by mouth 2 times a day for 7 days.  Dispense: 14 Tab; Refill: 0  - albuterol 108 (90 Base) MCG/ACT Aero Soln inhalation aerosol; Inhale 2 Puffs by mouth every 6 hours as needed for Shortness of Breath.  Dispense: 8.5 g; Refill: 0  - benzonatate (TESSALON) 100 MG Cap; Take 2 Caps by mouth 3 times a day as needed for Cough.  Dispense: 30 Cap; Refill: 0    2. Cough   Encouraged OTC supportive meds PRN. Humidification, increase fluids, avoid night time dairy.   Of note patient also has history of partial lobectomy on the right.  Finally due to patient's history of immunocompromise will cover the patient with the above.  Discussed side effects of medication.    Patient given precautionary s/sx that mandate immediate follow up and evaluation in the ED. Advised of risks of not doing so.    DDX, Supportive care, and indications for immediate follow-up discussed with patient.    Instructed to return to clinic or nearest emergency department if we are not available for any change in  condition, further concerns, or worsening of symptoms.    The patient demonstrated a good understanding and agreed with the treatment plan.  Please note that this dictation was created using voice recognition software. I have made every reasonable attempt to correct obvious errors, but I expect that there are errors of grammar and possibly content that I did not discover before finalizing the note.

## 2019-11-25 ENCOUNTER — HOSPITAL ENCOUNTER (OUTPATIENT)
Dept: RADIOLOGY | Facility: MEDICAL CENTER | Age: 64
End: 2019-11-25
Attending: NURSE PRACTITIONER
Payer: COMMERCIAL

## 2019-11-25 DIAGNOSIS — E04.1 NONTOXIC SINGLE THYROID NODULE: ICD-10-CM

## 2019-11-25 PROCEDURE — 76536 US EXAM OF HEAD AND NECK: CPT

## 2019-11-27 ASSESSMENT — ENCOUNTER SYMPTOMS
SORE THROAT: 1
WHEEZING: 1
DIZZINESS: 0
DIARRHEA: 0
EYE DISCHARGE: 0
VOMITING: 0
SPUTUM PRODUCTION: 1
RHINORRHEA: 1
MYALGIAS: 0
EYE REDNESS: 0
HEADACHES: 0
NECK PAIN: 1
COUGH: 1
SWOLLEN GLANDS: 0
SHORTNESS OF BREATH: 0
SINUS PAIN: 1
FEVER: 0
CHILLS: 0

## 2020-04-19 ENCOUNTER — APPOINTMENT (OUTPATIENT)
Dept: RADIOLOGY | Facility: MEDICAL CENTER | Age: 65
DRG: 482 | End: 2020-04-19
Attending: EMERGENCY MEDICINE
Payer: COMMERCIAL

## 2020-04-19 ENCOUNTER — HOSPITAL ENCOUNTER (INPATIENT)
Facility: MEDICAL CENTER | Age: 65
LOS: 5 days | DRG: 482 | End: 2020-04-24
Attending: EMERGENCY MEDICINE | Admitting: INTERNAL MEDICINE
Payer: COMMERCIAL

## 2020-04-19 ENCOUNTER — APPOINTMENT (OUTPATIENT)
Dept: RADIOLOGY | Facility: MEDICAL CENTER | Age: 65
DRG: 482 | End: 2020-04-19
Attending: INTERNAL MEDICINE
Payer: COMMERCIAL

## 2020-04-19 DIAGNOSIS — S72.8X1A OTHER CLOSED FRACTURE OF RIGHT FEMUR, UNSPECIFIED PORTION OF FEMUR, INITIAL ENCOUNTER (HCC): ICD-10-CM

## 2020-04-19 DIAGNOSIS — S72.8X1D OTHER CLOSED FRACTURE OF RIGHT FEMUR WITH ROUTINE HEALING, UNSPECIFIED PORTION OF FEMUR, SUBSEQUENT ENCOUNTER: ICD-10-CM

## 2020-04-19 LAB
ANION GAP SERPL CALC-SCNC: 13 MMOL/L (ref 7–16)
APTT PPP: 21.1 SEC (ref 24.7–36)
BASOPHILS # BLD AUTO: 0.5 % (ref 0–1.8)
BASOPHILS # BLD: 0.04 K/UL (ref 0–0.12)
BUN SERPL-MCNC: 16 MG/DL (ref 8–22)
CALCIUM SERPL-MCNC: 9 MG/DL (ref 8.5–10.5)
CHLORIDE SERPL-SCNC: 105 MMOL/L (ref 96–112)
CO2 SERPL-SCNC: 21 MMOL/L (ref 20–33)
CREAT SERPL-MCNC: 0.7 MG/DL (ref 0.5–1.4)
EKG IMPRESSION: NORMAL
EOSINOPHIL # BLD AUTO: 0.13 K/UL (ref 0–0.51)
EOSINOPHIL NFR BLD: 1.7 % (ref 0–6.9)
ERYTHROCYTE [DISTWIDTH] IN BLOOD BY AUTOMATED COUNT: 50.5 FL (ref 35.9–50)
GLUCOSE SERPL-MCNC: 154 MG/DL (ref 65–99)
HCT VFR BLD AUTO: 38.7 % (ref 37–47)
HGB BLD-MCNC: 12.9 G/DL (ref 12–16)
IMM GRANULOCYTES # BLD AUTO: 0.03 K/UL (ref 0–0.11)
IMM GRANULOCYTES NFR BLD AUTO: 0.4 % (ref 0–0.9)
INR PPP: 0.96 (ref 0.87–1.13)
LYMPHOCYTES # BLD AUTO: 1.03 K/UL (ref 1–4.8)
LYMPHOCYTES NFR BLD: 13.5 % (ref 22–41)
MCH RBC QN AUTO: 31.7 PG (ref 27–33)
MCHC RBC AUTO-ENTMCNC: 33.3 G/DL (ref 33.6–35)
MCV RBC AUTO: 95.1 FL (ref 81.4–97.8)
MONOCYTES # BLD AUTO: 0.56 K/UL (ref 0–0.85)
MONOCYTES NFR BLD AUTO: 7.3 % (ref 0–13.4)
NEUTROPHILS # BLD AUTO: 5.83 K/UL (ref 2–7.15)
NEUTROPHILS NFR BLD: 76.6 % (ref 44–72)
NRBC # BLD AUTO: 0 K/UL
NRBC BLD-RTO: 0 /100 WBC
PLATELET # BLD AUTO: 262 K/UL (ref 164–446)
PMV BLD AUTO: 9.5 FL (ref 9–12.9)
POTASSIUM SERPL-SCNC: 3.7 MMOL/L (ref 3.6–5.5)
PROTHROMBIN TIME: 13 SEC (ref 12–14.6)
RBC # BLD AUTO: 4.07 M/UL (ref 4.2–5.4)
SODIUM SERPL-SCNC: 139 MMOL/L (ref 135–145)
WBC # BLD AUTO: 7.6 K/UL (ref 4.8–10.8)

## 2020-04-19 PROCEDURE — 700111 HCHG RX REV CODE 636 W/ 250 OVERRIDE (IP): Performed by: EMERGENCY MEDICINE

## 2020-04-19 PROCEDURE — 85730 THROMBOPLASTIN TIME PARTIAL: CPT

## 2020-04-19 PROCEDURE — 770006 HCHG ROOM/CARE - MED/SURG/GYN SEMI*

## 2020-04-19 PROCEDURE — 93005 ELECTROCARDIOGRAM TRACING: CPT | Performed by: EMERGENCY MEDICINE

## 2020-04-19 PROCEDURE — 80048 BASIC METABOLIC PNL TOTAL CA: CPT

## 2020-04-19 PROCEDURE — 85610 PROTHROMBIN TIME: CPT

## 2020-04-19 PROCEDURE — 71045 X-RAY EXAM CHEST 1 VIEW: CPT

## 2020-04-19 PROCEDURE — 96374 THER/PROPH/DIAG INJ IV PUSH: CPT

## 2020-04-19 PROCEDURE — 73700 CT LOWER EXTREMITY W/O DYE: CPT | Mod: RT

## 2020-04-19 PROCEDURE — 73501 X-RAY EXAM HIP UNI 1 VIEW: CPT | Mod: RT

## 2020-04-19 PROCEDURE — 99285 EMERGENCY DEPT VISIT HI MDM: CPT

## 2020-04-19 PROCEDURE — 85025 COMPLETE CBC W/AUTO DIFF WBC: CPT

## 2020-04-19 PROCEDURE — 700102 HCHG RX REV CODE 250 W/ 637 OVERRIDE(OP): Performed by: INTERNAL MEDICINE

## 2020-04-19 PROCEDURE — 99223 1ST HOSP IP/OBS HIGH 75: CPT | Performed by: INTERNAL MEDICINE

## 2020-04-19 PROCEDURE — A9270 NON-COVERED ITEM OR SERVICE: HCPCS | Performed by: INTERNAL MEDICINE

## 2020-04-19 PROCEDURE — 27510 TREATMENT OF THIGH FRACTURE: CPT

## 2020-04-19 PROCEDURE — 82962 GLUCOSE BLOOD TEST: CPT

## 2020-04-19 PROCEDURE — 73560 X-RAY EXAM OF KNEE 1 OR 2: CPT | Mod: RT

## 2020-04-19 PROCEDURE — 306637 HCHG MISC ORTHO ITEM RC 0274

## 2020-04-19 PROCEDURE — 96375 TX/PRO/DX INJ NEW DRUG ADDON: CPT

## 2020-04-19 PROCEDURE — 700101 HCHG RX REV CODE 250: Performed by: INTERNAL MEDICINE

## 2020-04-19 RX ORDER — HYDROMORPHONE HYDROCHLORIDE 1 MG/ML
0.5 INJECTION, SOLUTION INTRAMUSCULAR; INTRAVENOUS; SUBCUTANEOUS
Status: DISCONTINUED | OUTPATIENT
Start: 2020-04-19 | End: 2020-04-24

## 2020-04-19 RX ORDER — OMEPRAZOLE 20 MG/1
20 CAPSULE, DELAYED RELEASE ORAL DAILY
Status: DISCONTINUED | OUTPATIENT
Start: 2020-04-20 | End: 2020-04-19

## 2020-04-19 RX ORDER — SODIUM CHLORIDE AND POTASSIUM CHLORIDE 150; 900 MG/100ML; MG/100ML
INJECTION, SOLUTION INTRAVENOUS CONTINUOUS
Status: DISCONTINUED | OUTPATIENT
Start: 2020-04-19 | End: 2020-04-24

## 2020-04-19 RX ORDER — PROPOFOL 10 MG/ML
INJECTION, EMULSION INTRAVENOUS
Status: COMPLETED | OUTPATIENT
Start: 2020-04-19 | End: 2020-04-19

## 2020-04-19 RX ORDER — AMOXICILLIN 250 MG
2 CAPSULE ORAL 2 TIMES DAILY
Status: DISCONTINUED | OUTPATIENT
Start: 2020-04-19 | End: 2020-04-24 | Stop reason: HOSPADM

## 2020-04-19 RX ORDER — POLYETHYLENE GLYCOL 3350 17 G/17G
1 POWDER, FOR SOLUTION ORAL
Status: DISCONTINUED | OUTPATIENT
Start: 2020-04-19 | End: 2020-04-24 | Stop reason: HOSPADM

## 2020-04-19 RX ORDER — ONDANSETRON 4 MG/1
4 TABLET, ORALLY DISINTEGRATING ORAL EVERY 4 HOURS PRN
Status: DISCONTINUED | OUTPATIENT
Start: 2020-04-19 | End: 2020-04-24 | Stop reason: HOSPADM

## 2020-04-19 RX ORDER — OXYCODONE HYDROCHLORIDE 10 MG/1
10 TABLET ORAL
Status: DISCONTINUED | OUTPATIENT
Start: 2020-04-19 | End: 2020-04-24

## 2020-04-19 RX ORDER — PROMETHAZINE HYDROCHLORIDE 25 MG/1
12.5-25 SUPPOSITORY RECTAL EVERY 4 HOURS PRN
Status: DISCONTINUED | OUTPATIENT
Start: 2020-04-19 | End: 2020-04-24 | Stop reason: HOSPADM

## 2020-04-19 RX ORDER — ENALAPRILAT 1.25 MG/ML
1.25 INJECTION INTRAVENOUS EVERY 6 HOURS PRN
Status: DISCONTINUED | OUTPATIENT
Start: 2020-04-19 | End: 2020-04-24

## 2020-04-19 RX ORDER — DEXTROSE MONOHYDRATE 25 G/50ML
50 INJECTION, SOLUTION INTRAVENOUS
Status: DISCONTINUED | OUTPATIENT
Start: 2020-04-19 | End: 2020-04-24 | Stop reason: HOSPADM

## 2020-04-19 RX ORDER — OXYCODONE HYDROCHLORIDE 5 MG/1
5 TABLET ORAL
Status: DISCONTINUED | OUTPATIENT
Start: 2020-04-19 | End: 2020-04-24 | Stop reason: HOSPADM

## 2020-04-19 RX ORDER — PROMETHAZINE HYDROCHLORIDE 25 MG/1
12.5-25 TABLET ORAL EVERY 4 HOURS PRN
Status: DISCONTINUED | OUTPATIENT
Start: 2020-04-19 | End: 2020-04-24 | Stop reason: HOSPADM

## 2020-04-19 RX ORDER — ACETAMINOPHEN 325 MG/1
650 TABLET ORAL EVERY 6 HOURS PRN
Status: DISCONTINUED | OUTPATIENT
Start: 2020-04-19 | End: 2020-04-24 | Stop reason: HOSPADM

## 2020-04-19 RX ORDER — ONDANSETRON 2 MG/ML
4 INJECTION INTRAMUSCULAR; INTRAVENOUS EVERY 4 HOURS PRN
Status: DISCONTINUED | OUTPATIENT
Start: 2020-04-19 | End: 2020-04-24 | Stop reason: HOSPADM

## 2020-04-19 RX ORDER — BISACODYL 10 MG
10 SUPPOSITORY, RECTAL RECTAL
Status: DISCONTINUED | OUTPATIENT
Start: 2020-04-19 | End: 2020-04-24 | Stop reason: HOSPADM

## 2020-04-19 RX ORDER — PROCHLORPERAZINE EDISYLATE 5 MG/ML
5-10 INJECTION INTRAMUSCULAR; INTRAVENOUS EVERY 4 HOURS PRN
Status: DISCONTINUED | OUTPATIENT
Start: 2020-04-19 | End: 2020-04-24 | Stop reason: HOSPADM

## 2020-04-19 RX ADMIN — POTASSIUM CHLORIDE AND SODIUM CHLORIDE: 900; 150 INJECTION, SOLUTION INTRAVENOUS at 23:41

## 2020-04-19 RX ADMIN — PROPOFOL 80 MG: 10 INJECTION, EMULSION INTRAVENOUS at 21:59

## 2020-04-19 RX ADMIN — OXYCODONE HYDROCHLORIDE 10 MG: 10 TABLET ORAL at 23:40

## 2020-04-19 RX ADMIN — FENTANYL CITRATE 100 MCG: 0.05 INJECTION, SOLUTION INTRAMUSCULAR; INTRAVENOUS at 20:01

## 2020-04-19 ASSESSMENT — ENCOUNTER SYMPTOMS
CHILLS: 0
FEVER: 0
FALLS: 1
CONSTIPATION: 0
SHORTNESS OF BREATH: 0
LOSS OF CONSCIOUSNESS: 0
COUGH: 0
MYALGIAS: 0
HEADACHES: 0
VOMITING: 0
PALPITATIONS: 0
DIZZINESS: 0
SPUTUM PRODUCTION: 0
DIARRHEA: 0
DEPRESSION: 0
TINGLING: 0
STRIDOR: 0
ABDOMINAL PAIN: 0
NAUSEA: 0
WEAKNESS: 0

## 2020-04-19 ASSESSMENT — FIBROSIS 4 INDEX: FIB4 SCORE: 0.85

## 2020-04-19 ASSESSMENT — LIFESTYLE VARIABLES: EVER_SMOKED: NEVER

## 2020-04-20 ENCOUNTER — APPOINTMENT (OUTPATIENT)
Dept: RADIOLOGY | Facility: MEDICAL CENTER | Age: 65
DRG: 482 | End: 2020-04-20
Attending: ORTHOPAEDIC SURGERY
Payer: COMMERCIAL

## 2020-04-20 ENCOUNTER — ANESTHESIA EVENT (OUTPATIENT)
Dept: SURGERY | Facility: MEDICAL CENTER | Age: 65
DRG: 482 | End: 2020-04-20
Payer: COMMERCIAL

## 2020-04-20 ENCOUNTER — ANESTHESIA (OUTPATIENT)
Dept: SURGERY | Facility: MEDICAL CENTER | Age: 65
DRG: 482 | End: 2020-04-20
Payer: COMMERCIAL

## 2020-04-20 ENCOUNTER — APPOINTMENT (OUTPATIENT)
Dept: RADIOLOGY | Facility: MEDICAL CENTER | Age: 65
DRG: 482 | End: 2020-04-20
Attending: INTERNAL MEDICINE
Payer: COMMERCIAL
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
## 2020-04-20 PROBLEM — S72.91XA FEMUR FRACTURE, RIGHT (HCC): Status: ACTIVE | Noted: 2020-04-20

## 2020-04-20 PROBLEM — Z85.118 HISTORY OF LUNG CANCER: Status: ACTIVE | Noted: 2020-04-20

## 2020-04-20 PROBLEM — R73.9 HYPERGLYCEMIA: Status: ACTIVE | Noted: 2020-04-20

## 2020-04-20 LAB
ANION GAP SERPL CALC-SCNC: 9 MMOL/L (ref 7–16)
BUN SERPL-MCNC: 15 MG/DL (ref 8–22)
CALCIUM SERPL-MCNC: 8.6 MG/DL (ref 8.5–10.5)
CHLORIDE SERPL-SCNC: 108 MMOL/L (ref 96–112)
CO2 SERPL-SCNC: 24 MMOL/L (ref 20–33)
CREAT SERPL-MCNC: 0.62 MG/DL (ref 0.5–1.4)
ERYTHROCYTE [DISTWIDTH] IN BLOOD BY AUTOMATED COUNT: 53.1 FL (ref 35.9–50)
EST. AVERAGE GLUCOSE BLD GHB EST-MCNC: 123 MG/DL
GLUCOSE BLD-MCNC: 120 MG/DL (ref 65–99)
GLUCOSE BLD-MCNC: 147 MG/DL (ref 65–99)
GLUCOSE BLD-MCNC: 177 MG/DL (ref 65–99)
GLUCOSE BLD-MCNC: 97 MG/DL (ref 65–99)
GLUCOSE SERPL-MCNC: 168 MG/DL (ref 65–99)
HBA1C MFR BLD: 5.9 % (ref 0–5.6)
HCT VFR BLD AUTO: 35.8 % (ref 37–47)
HGB BLD-MCNC: 11.5 G/DL (ref 12–16)
MCH RBC QN AUTO: 31.2 PG (ref 27–33)
MCHC RBC AUTO-ENTMCNC: 32.1 G/DL (ref 33.6–35)
MCV RBC AUTO: 97 FL (ref 81.4–97.8)
PLATELET # BLD AUTO: 233 K/UL (ref 164–446)
PMV BLD AUTO: 9.4 FL (ref 9–12.9)
POTASSIUM SERPL-SCNC: 4.8 MMOL/L (ref 3.6–5.5)
RBC # BLD AUTO: 3.69 M/UL (ref 4.2–5.4)
SODIUM SERPL-SCNC: 141 MMOL/L (ref 135–145)
WBC # BLD AUTO: 6.8 K/UL (ref 4.8–10.8)

## 2020-04-20 PROCEDURE — 700111 HCHG RX REV CODE 636 W/ 250 OVERRIDE (IP): Performed by: ANESTHESIOLOGY

## 2020-04-20 PROCEDURE — 500424 HCHG DRESSING, AIRSTRIP: Performed by: ORTHOPAEDIC SURGERY

## 2020-04-20 PROCEDURE — 700105 HCHG RX REV CODE 258: Performed by: ANESTHESIOLOGY

## 2020-04-20 PROCEDURE — 500891 HCHG PACK, ORTHO MAJOR: Performed by: ORTHOPAEDIC SURGERY

## 2020-04-20 PROCEDURE — 160036 HCHG PACU - EA ADDL 30 MINS PHASE I: Performed by: ORTHOPAEDIC SURGERY

## 2020-04-20 PROCEDURE — 501838 HCHG SUTURE GENERAL: Performed by: ORTHOPAEDIC SURGERY

## 2020-04-20 PROCEDURE — 160009 HCHG ANES TIME/MIN: Performed by: ORTHOPAEDIC SURGERY

## 2020-04-20 PROCEDURE — 160035 HCHG PACU - 1ST 60 MINS PHASE I: Performed by: ORTHOPAEDIC SURGERY

## 2020-04-20 PROCEDURE — C1713 ANCHOR/SCREW BN/BN,TIS/BN: HCPCS | Performed by: ORTHOPAEDIC SURGERY

## 2020-04-20 PROCEDURE — 80048 BASIC METABOLIC PNL TOTAL CA: CPT

## 2020-04-20 PROCEDURE — 160002 HCHG RECOVERY MINUTES (STAT): Performed by: ORTHOPAEDIC SURGERY

## 2020-04-20 PROCEDURE — 83036 HEMOGLOBIN GLYCOSYLATED A1C: CPT

## 2020-04-20 PROCEDURE — 36415 COLL VENOUS BLD VENIPUNCTURE: CPT

## 2020-04-20 PROCEDURE — 700102 HCHG RX REV CODE 250 W/ 637 OVERRIDE(OP): Performed by: ANESTHESIOLOGY

## 2020-04-20 PROCEDURE — 99232 SBSQ HOSP IP/OBS MODERATE 35: CPT | Performed by: INTERNAL MEDICINE

## 2020-04-20 PROCEDURE — 500054 HCHG BANDAGE, ELASTIC 6: Performed by: ORTHOPAEDIC SURGERY

## 2020-04-20 PROCEDURE — 501445 HCHG STAPLER, SKIN DISP: Performed by: ORTHOPAEDIC SURGERY

## 2020-04-20 PROCEDURE — 770006 HCHG ROOM/CARE - MED/SURG/GYN SEMI*

## 2020-04-20 PROCEDURE — 0QSB04Z REPOSITION RIGHT LOWER FEMUR WITH INTERNAL FIXATION DEVICE, OPEN APPROACH: ICD-10-PCS | Performed by: ORTHOPAEDIC SURGERY

## 2020-04-20 PROCEDURE — 160048 HCHG OR STATISTICAL LEVEL 1-5: Performed by: ORTHOPAEDIC SURGERY

## 2020-04-20 PROCEDURE — 160028 HCHG SURGERY MINUTES - 1ST 30 MINS LEVEL 3: Performed by: ORTHOPAEDIC SURGERY

## 2020-04-20 PROCEDURE — 700102 HCHG RX REV CODE 250 W/ 637 OVERRIDE(OP): Performed by: INTERNAL MEDICINE

## 2020-04-20 PROCEDURE — A6454 SELF-ADHER BAND W>=3" <5"/YD: HCPCS | Performed by: ORTHOPAEDIC SURGERY

## 2020-04-20 PROCEDURE — 160039 HCHG SURGERY MINUTES - EA ADDL 1 MIN LEVEL 3: Performed by: ORTHOPAEDIC SURGERY

## 2020-04-20 PROCEDURE — 85027 COMPLETE CBC AUTOMATED: CPT

## 2020-04-20 PROCEDURE — 73560 X-RAY EXAM OF KNEE 1 OR 2: CPT | Mod: RT

## 2020-04-20 PROCEDURE — 51798 US URINE CAPACITY MEASURE: CPT

## 2020-04-20 PROCEDURE — A9270 NON-COVERED ITEM OR SERVICE: HCPCS | Performed by: INTERNAL MEDICINE

## 2020-04-20 PROCEDURE — A9270 NON-COVERED ITEM OR SERVICE: HCPCS | Performed by: ANESTHESIOLOGY

## 2020-04-20 PROCEDURE — 501721 HCHG WIRE, GUIDE 2.5 DRILL TIP: Performed by: ORTHOPAEDIC SURGERY

## 2020-04-20 PROCEDURE — 501631 HCHG TUBE, NG FEEDING 8FR 15: Performed by: ORTHOPAEDIC SURGERY

## 2020-04-20 PROCEDURE — 700101 HCHG RX REV CODE 250: Performed by: ANESTHESIOLOGY

## 2020-04-20 PROCEDURE — 700101 HCHG RX REV CODE 250: Performed by: ORTHOPAEDIC SURGERY

## 2020-04-20 PROCEDURE — 82962 GLUCOSE BLOOD TEST: CPT

## 2020-04-20 DEVICE — IMPLANTABLE DEVICE: Type: IMPLANTABLE DEVICE | Site: FEMUR | Status: FUNCTIONAL

## 2020-04-20 DEVICE — SCREW SYN CORTEX 4.5X28 ST (2TX6+2TX12+3TX3=45): Type: IMPLANTABLE DEVICE | Site: FEMUR | Status: FUNCTIONAL

## 2020-04-20 DEVICE — SCREW VARIABLE ANGLE LOCKING SELF TAPPING STARDRIVE 5.0MM 75MM (1TX3=3): Type: IMPLANTABLE DEVICE | Site: FEMUR | Status: FUNCTIONAL

## 2020-04-20 DEVICE — WIRE K 1.6 X 150 292.16 (10EA/PK) (11TX10+2TX6+1TX20=142)(CYC=30): Type: IMPLANTABLE DEVICE | Site: FEMUR | Status: FUNCTIONAL

## 2020-04-20 DEVICE — SCREW VARIABLE ANGLE LOCKING SELF TAPPING STARDRIVE 5.0MM 80MM (1TX3=3): Type: IMPLANTABLE DEVICE | Site: FEMUR | Status: FUNCTIONAL

## 2020-04-20 DEVICE — SCREW SYN CORTEX 4.5X30 ST (2TX6+2TX12+3TX3=45): Type: IMPLANTABLE DEVICE | Site: FEMUR | Status: FUNCTIONAL

## 2020-04-20 DEVICE — SCREW CORT PELV 3.5X60 ST - (PELV3+LPPELV4=7): Type: IMPLANTABLE DEVICE | Site: FEMUR | Status: FUNCTIONAL

## 2020-04-20 RX ORDER — CEFAZOLIN SODIUM 2 G/100ML
2 INJECTION, SOLUTION INTRAVENOUS EVERY 8 HOURS
Status: COMPLETED | OUTPATIENT
Start: 2020-04-21 | End: 2020-04-21

## 2020-04-20 RX ORDER — LIDOCAINE HYDROCHLORIDE 20 MG/ML
INJECTION, SOLUTION EPIDURAL; INFILTRATION; INTRACAUDAL; PERINEURAL PRN
Status: DISCONTINUED | OUTPATIENT
Start: 2020-04-20 | End: 2020-04-20 | Stop reason: SURG

## 2020-04-20 RX ORDER — SODIUM CHLORIDE 9 MG/ML
INJECTION, SOLUTION INTRAVENOUS
Status: COMPLETED
Start: 2020-04-20 | End: 2020-04-21

## 2020-04-20 RX ORDER — HALOPERIDOL 5 MG/ML
1 INJECTION INTRAMUSCULAR
Status: DISCONTINUED | OUTPATIENT
Start: 2020-04-20 | End: 2020-04-20 | Stop reason: HOSPADM

## 2020-04-20 RX ORDER — HYDROMORPHONE HYDROCHLORIDE 2 MG/ML
INJECTION, SOLUTION INTRAMUSCULAR; INTRAVENOUS; SUBCUTANEOUS PRN
Status: DISCONTINUED | OUTPATIENT
Start: 2020-04-20 | End: 2020-04-20 | Stop reason: SURG

## 2020-04-20 RX ORDER — OXYCODONE HCL 5 MG/5 ML
5 SOLUTION, ORAL ORAL
Status: COMPLETED | OUTPATIENT
Start: 2020-04-20 | End: 2020-04-20

## 2020-04-20 RX ORDER — HYDROMORPHONE HYDROCHLORIDE 1 MG/ML
0.2 INJECTION, SOLUTION INTRAMUSCULAR; INTRAVENOUS; SUBCUTANEOUS
Status: DISCONTINUED | OUTPATIENT
Start: 2020-04-20 | End: 2020-04-20 | Stop reason: HOSPADM

## 2020-04-20 RX ORDER — DIPHENHYDRAMINE HYDROCHLORIDE 50 MG/ML
12.5 INJECTION INTRAMUSCULAR; INTRAVENOUS
Status: DISCONTINUED | OUTPATIENT
Start: 2020-04-20 | End: 2020-04-20 | Stop reason: HOSPADM

## 2020-04-20 RX ORDER — SODIUM CHLORIDE, SODIUM LACTATE, POTASSIUM CHLORIDE, CALCIUM CHLORIDE 600; 310; 30; 20 MG/100ML; MG/100ML; MG/100ML; MG/100ML
INJECTION, SOLUTION INTRAVENOUS
Status: DISCONTINUED | OUTPATIENT
Start: 2020-04-20 | End: 2020-04-20 | Stop reason: SURG

## 2020-04-20 RX ORDER — HYDROMORPHONE HYDROCHLORIDE 1 MG/ML
0.4 INJECTION, SOLUTION INTRAMUSCULAR; INTRAVENOUS; SUBCUTANEOUS
Status: DISCONTINUED | OUTPATIENT
Start: 2020-04-20 | End: 2020-04-20 | Stop reason: HOSPADM

## 2020-04-20 RX ORDER — ONDANSETRON 2 MG/ML
4 INJECTION INTRAMUSCULAR; INTRAVENOUS
Status: DISCONTINUED | OUTPATIENT
Start: 2020-04-20 | End: 2020-04-20 | Stop reason: HOSPADM

## 2020-04-20 RX ORDER — MAGNESIUM HYDROXIDE 1200 MG/15ML
LIQUID ORAL
Status: COMPLETED | OUTPATIENT
Start: 2020-04-20 | End: 2020-04-20

## 2020-04-20 RX ORDER — CEFAZOLIN SODIUM 1 G/3ML
INJECTION, POWDER, FOR SOLUTION INTRAMUSCULAR; INTRAVENOUS PRN
Status: DISCONTINUED | OUTPATIENT
Start: 2020-04-20 | End: 2020-04-20 | Stop reason: SURG

## 2020-04-20 RX ORDER — HYDROMORPHONE HYDROCHLORIDE 1 MG/ML
0.1 INJECTION, SOLUTION INTRAMUSCULAR; INTRAVENOUS; SUBCUTANEOUS
Status: DISCONTINUED | OUTPATIENT
Start: 2020-04-20 | End: 2020-04-20 | Stop reason: HOSPADM

## 2020-04-20 RX ORDER — MEPERIDINE HYDROCHLORIDE 25 MG/ML
6.25 INJECTION INTRAMUSCULAR; INTRAVENOUS; SUBCUTANEOUS
Status: DISCONTINUED | OUTPATIENT
Start: 2020-04-20 | End: 2020-04-20 | Stop reason: HOSPADM

## 2020-04-20 RX ORDER — SODIUM CHLORIDE, SODIUM LACTATE, POTASSIUM CHLORIDE, CALCIUM CHLORIDE 600; 310; 30; 20 MG/100ML; MG/100ML; MG/100ML; MG/100ML
INJECTION, SOLUTION INTRAVENOUS CONTINUOUS
Status: DISCONTINUED | OUTPATIENT
Start: 2020-04-20 | End: 2020-04-20 | Stop reason: HOSPADM

## 2020-04-20 RX ORDER — OXYCODONE HCL 5 MG/5 ML
10 SOLUTION, ORAL ORAL
Status: COMPLETED | OUTPATIENT
Start: 2020-04-20 | End: 2020-04-20

## 2020-04-20 RX ADMIN — OXYCODONE HYDROCHLORIDE 5 MG: 5 SOLUTION ORAL at 20:21

## 2020-04-20 RX ADMIN — FENTANYL CITRATE 25 MCG: 0.05 INJECTION, SOLUTION INTRAMUSCULAR; INTRAVENOUS at 20:23

## 2020-04-20 RX ADMIN — CEFAZOLIN 2 G: 330 INJECTION, POWDER, FOR SOLUTION INTRAMUSCULAR; INTRAVENOUS at 17:30

## 2020-04-20 RX ADMIN — SODIUM CHLORIDE, POTASSIUM CHLORIDE, SODIUM LACTATE AND CALCIUM CHLORIDE: 600; 310; 30; 20 INJECTION, SOLUTION INTRAVENOUS at 17:05

## 2020-04-20 RX ADMIN — ACETAMINOPHEN 650 MG: 325 TABLET, FILM COATED ORAL at 08:15

## 2020-04-20 RX ADMIN — HYDROMORPHONE HYDROCHLORIDE 2 MG: 2 INJECTION, SOLUTION INTRAMUSCULAR; INTRAVENOUS; SUBCUTANEOUS at 17:46

## 2020-04-20 RX ADMIN — PROPOFOL 200 MG: 10 INJECTION, EMULSION INTRAVENOUS at 17:41

## 2020-04-20 RX ADMIN — MEPERIDINE HYDROCHLORIDE 6.25 MG: 25 INJECTION INTRAMUSCULAR; INTRAVENOUS; SUBCUTANEOUS at 20:37

## 2020-04-20 RX ADMIN — MIDAZOLAM HYDROCHLORIDE 2 MG: 1 INJECTION, SOLUTION INTRAMUSCULAR; INTRAVENOUS at 17:05

## 2020-04-20 RX ADMIN — SODIUM CHLORIDE, POTASSIUM CHLORIDE, SODIUM LACTATE AND CALCIUM CHLORIDE: 600; 310; 30; 20 INJECTION, SOLUTION INTRAVENOUS at 20:27

## 2020-04-20 RX ADMIN — LIDOCAINE HYDROCHLORIDE 100 MG: 20 INJECTION, SOLUTION EPIDURAL; INFILTRATION; INTRACAUDAL at 17:41

## 2020-04-20 ASSESSMENT — ENCOUNTER SYMPTOMS
FEVER: 0
BLURRED VISION: 0
NAUSEA: 0
CONSTIPATION: 0
PHOTOPHOBIA: 0
COUGH: 0
BACK PAIN: 0
TINGLING: 0
PALPITATIONS: 0
ORTHOPNEA: 0
SENSORY CHANGE: 0
FOCAL WEAKNESS: 0
SPEECH CHANGE: 0
VOMITING: 0
NECK PAIN: 0
TREMORS: 0
HALLUCINATIONS: 0
SPUTUM PRODUCTION: 0
DIZZINESS: 0
ABDOMINAL PAIN: 0
EYE PAIN: 0
HEADACHES: 0
MYALGIAS: 1
CHILLS: 0
DOUBLE VISION: 0
SHORTNESS OF BREATH: 0
DIARRHEA: 0
WEIGHT LOSS: 0

## 2020-04-20 ASSESSMENT — COPD QUESTIONNAIRES
COPD SCREENING SCORE: 2
HAVE YOU SMOKED AT LEAST 100 CIGARETTES IN YOUR ENTIRE LIFE: NO/DON'T KNOW
DURING THE PAST 4 WEEKS HOW MUCH DID YOU FEEL SHORT OF BREATH: NONE/LITTLE OF THE TIME
IN THE PAST 12 MONTHS DO YOU DO LESS THAN YOU USED TO BECAUSE OF YOUR BREATHING PROBLEMS: DISAGREE/UNSURE
DO YOU EVER COUGH UP ANY MUCUS OR PHLEGM?: NO/ONLY WITH OCCASIONAL COLDS OR INFECTIONS

## 2020-04-20 ASSESSMENT — COGNITIVE AND FUNCTIONAL STATUS - GENERAL
STANDING UP FROM CHAIR USING ARMS: TOTAL
SUGGESTED CMS G CODE MODIFIER DAILY ACTIVITY: CK
TOILETING: A LOT
DRESSING REGULAR LOWER BODY CLOTHING: TOTAL
MOBILITY SCORE: 8
CLIMB 3 TO 5 STEPS WITH RAILING: TOTAL
WALKING IN HOSPITAL ROOM: TOTAL
DAILY ACTIVITIY SCORE: 17
TURNING FROM BACK TO SIDE WHILE IN FLAT BAD: A LITTLE
MOVING FROM LYING ON BACK TO SITTING ON SIDE OF FLAT BED: UNABLE
MOVING TO AND FROM BED TO CHAIR: UNABLE
HELP NEEDED FOR BATHING: A LOT
SUGGESTED CMS G CODE MODIFIER MOBILITY: CM

## 2020-04-20 ASSESSMENT — LIFESTYLE VARIABLES
CONSUMPTION TOTAL: NEGATIVE
SUBSTANCE_ABUSE: 0
ALCOHOL_USE: NO
ON A TYPICAL DAY WHEN YOU DRINK ALCOHOL HOW MANY DRINKS DO YOU HAVE: 0
EVER FELT BAD OR GUILTY ABOUT YOUR DRINKING: NO
HOW MANY TIMES IN THE PAST YEAR HAVE YOU HAD 5 OR MORE DRINKS IN A DAY: 0
EVER HAD A DRINK FIRST THING IN THE MORNING TO STEADY YOUR NERVES TO GET RID OF A HANGOVER: NO
TOTAL SCORE: 0
TOTAL SCORE: 0
HAVE YOU EVER FELT YOU SHOULD CUT DOWN ON YOUR DRINKING: NO
AVERAGE NUMBER OF DAYS PER WEEK YOU HAVE A DRINK CONTAINING ALCOHOL: 0
TOTAL SCORE: 0
HAVE PEOPLE ANNOYED YOU BY CRITICIZING YOUR DRINKING: NO
ALCOHOL_USE: NO

## 2020-04-20 ASSESSMENT — PATIENT HEALTH QUESTIONNAIRE - PHQ9
1. LITTLE INTEREST OR PLEASURE IN DOING THINGS: NOT AT ALL
SUM OF ALL RESPONSES TO PHQ9 QUESTIONS 1 AND 2: 0
2. FEELING DOWN, DEPRESSED, IRRITABLE, OR HOPELESS: NOT AT ALL

## 2020-04-20 NOTE — THERAPY
OT consult received and acknowledged. Will hold OT eval as pt planned for OR today. Will resume eval post-op as pt is able/appropriate.

## 2020-04-20 NOTE — ASSESSMENT & PLAN NOTE
In remission for the past 2 years.  Patient follows with oncology as an outpatient.  She does not need supplemental oxygen.

## 2020-04-20 NOTE — ASSESSMENT & PLAN NOTE
Fall was initially secondary to mechanical cause.  The patient did not lose consciousness or have cardiac issues.  At this point physical therapy and occupational therapy have worked with her and she will need home therapy 5 times per week to continue with her rehabilitation.

## 2020-04-20 NOTE — CARE PLAN
Patient's room is located near nurses station. Patient is compliant with use of call light and waits for staff assistance. Frequent rounding.

## 2020-04-20 NOTE — ED NOTES
Med rec updated and complete. Allergies reviewed. Called and spoke to family ( ).  stated that pt takes no medications.        Home pharmacy [facility] [address]

## 2020-04-20 NOTE — ASSESSMENT & PLAN NOTE
Patient now is postoperative day #3 after ORIF of distal right femoral fracture.  Patient's pain is down to 5 out of 10 and is mostly in her mid thigh.  Patient at this point has decided she would like to go home with her .  Assistive devices at this point are being set up for her including a wheelchair.  The wheelchair duration will need to be about 6 weeks at which point in time she is toe-touch weightbearing only.  Pain management at this point will need to be optimized.  And she will need some pain medications to go home with as well.

## 2020-04-20 NOTE — PROGRESS NOTES
Patient had breast cancer on the right side with lymph node removal. There was no breast cancer or lymph nodes removed on the left but the left breast was removed along with the right breast and reconstructive surgery was done.  An IV was placed via ultrasound in the emergency room on the right. Stopped fluids. Leaving IV in in case of emergency, but patient will need an ultrasound guided IV placed today.

## 2020-04-20 NOTE — ASSESSMENT & PLAN NOTE
Initial blood sugar was elevated secondary to stress response this has now resolved.  Hemoglobin A1c is only 5.9.

## 2020-04-20 NOTE — PROGRESS NOTES
65yoF with right supracondylar femur fracture with intercondylar extension.    S: NPO awaiting surgery    O:  Vitals:    04/20/20 1631   BP: 129/75   Pulse: 91   Resp: 16   Temp: 37.8 °C (100 °F)   SpO2: 99%     Exam:  General-NAD, alert and following commands  RLE-knee immobilizer in place, +EHL/FHL/TA/GS motor, diminished sensation in foot, palp pt pulse    Hct: 35.8    A: 65yoF with right supracondylar femur fracture with intercondylar extension.    Recs:  --I discussed recommendations for surgery including potential risks such as DVT/PE, infection, nonunion/malunion, implant prominence possibly requiring removal, knee stiffness, arthritis, neurovascular injury, bleeding and general risks of anesthesia including MI/CVA/death as well as potential risk of exposure to COVID-19 during perioperative period.  She expressed understanding and wishes to proceed with surgery  --NPO planning surgery today

## 2020-04-20 NOTE — ED TRIAGE NOTES
Carmelina Lena Leblanc   65 y.o. female   Chief Complaint   Patient presents with   • Knee Pain     Right knee pain from a mechanical fall on a dog toy      The patient presents to the ED via EMS for evaluation of right knee and leg pain. The patient sustained a mechanical fall on a dog toy and twisted her right knee and fell on a hard surface.The patient denies LOC or use of blood thinning medications.     Pt is alert and oriented, speaking in full sentences, follows commands and responds appropriately to questions. NAD. Resp are even and unlabored.

## 2020-04-20 NOTE — THERAPY
Physical Therapy Contact Note    PT orders received and acknowledged. Will hold PT eval as pt going to OR today. Will resume eval post-operatively.    Cailin Tobar PT  Pager [phone]

## 2020-04-20 NOTE — PROGRESS NOTES
2 RN skin check complete with Deborah.   Devices in place immobilizer just applied in ED.  Confirmed pressure ulcers found none.  New potential pressure ulcers noted none.  The following interventions in place pillows in use for support, and patient repositioned every two hours.    The patient's skin is intact except for slight redness under the right breast.

## 2020-04-20 NOTE — CONSULTS
"Orthopaedic Surgery Consult Note:    Nathan Morales M.D.  Date & Time note created:    4/19/2020   10:24 PM     Referring MD:  Dr. Ghotra    Patient ID:   Name:             Carmelina Leblanc   YOB: 1955  Age:                 65 y.o.  female   MRN:               [id number]                                                             Reason for Consult:      Right distal femur fracture    History of Present Illness:    Mrs. Leblanc is a pleasant lady who tripped over a dog toy at home and fell onto her right knee.  She sustained a right supracondylar femur fracture and was brought to [facility] for evaluation.  Her right hip was replaced seven years ago and she denies pain in the hip.  She does report peripheral neuropathy in BLE.    Review of Systems:      Constitutional: Denies fevers, Denies weight changes  Eyes: Denies changes in vision, no eye pain  Ears/Nose/Throat/Mouth: Denies nasal congestion or sore throat   Cardiovascular: Denies chest pain   Respiratory: Denies shortness of breath , Denies cough  Gastrointestinal/Hepatic: Denies abdominal pain, nausea, vomiting, diarrhea, constipation or GI bleeding   Genitourinary: Denies dysuria or frequency  Musculoskeletal/Rheum: Right knee pain  Skin: Denies rash  Neurological: Reports peripheral neuropathy in BLE.  Denies headache, confusion, memory loss  Psychiatric: denies mood disorder   Endocrine: Felicita thyroid problems  Heme/Oncology/Lymph Nodes: Denies enlarged lymph nodes, denies brusing or known bleeding disorder  All other systems were reviewed and are negative (AMA/CMS criteria)                Past Medical History:   Past Medical History:   Diagnosis Date   • Anemia 6-1-17    \"D/T Chemo\"   • Anesthesia 6-1-17    PONV   • Cancer (HCC) 8/4/16    Right Breast Treated With Chemo   • Cancer (HCC) 2016    Lung CA   • Cataract     IOL OU   • Dental disorder     dental implant bottom    • Osteoarthritis 6-1-17    \"Right Ankle, Left Ankle & " "Lower Back\"   • Port catheter in place 6-1-17    Left Side   • Snoring      There are no active hospital problems to display for this patient.      Past Surgical History:  Past Surgical History:   Procedure Laterality Date   • TISSUE EXPANDER PLACE/REMOVE Bilateral 6/8/2017    Procedure: TISSUE EXPANDER PLACE/REMOVE;  Surgeon: Everardo Matthews M.D.;  Location: SURGERY SAME DAY [facility];  Service:    • BREAST IMPLANT REVISION Bilateral 6/8/2017    Procedure: BREAST IMPLANT;  Surgeon: Everardo Matthews M.D.;  Location: SURGERY SAME DAY [facility];  Service:    • CAPSULOTOMY Bilateral 6/8/2017    Procedure: CAPSULOTOMY FOR PARTIAL CAPSULECTOMIES;  Surgeon: Everardo Matthews M.D.;  Location: SURGERY SAME DAY [facility];  Service:    • BREAST RECONSTRUCTION Bilateral 6/8/2017    Procedure: BREAST RECONSTRUCTION USING LOCAL TISSUE & FAT GRAFTING;  Surgeon: Everardo Matthews M.D.;  Location: SURGERY SAME DAY [facility];  Service:    • THORACOSCOPY Right 5/1/2017    Procedure: THORACOSCOPY, WEDGE RESECTION LOWER LOBE MASS;  Surgeon: John H Ganser, M.D.;  Location: SURGERY [facility];  Service:    • CATH PLACEMENT Left 3/17/2017    Procedure: CATH PLACEMENT FOR SUBCLAVIAN PORT LEFT;  Surgeon: Carly Wright M.D.;  Location: [facility];  Service:    • LUNG NEEDLE BIOPSY  02-   • TISSUE EXPANDER PLACE/REMOVE Bilateral 1/19/2017    Procedure: TISSUE EXPANDER PLACEment;  Surgeon: Everardo Matthews M.D.;  Location: SURGERY SAME DAY [facility];  Service:    • FLAP GRAFT  1/19/2017    Procedure: FLAP GRAFT- FOR DERMAL ADIPOFASCIAL FLAPS ;  Surgeon: Everardo Matthews M.D.;  Location: SURGERY SAME DAY [facility];  Service:    • MASTECTOMY Bilateral 1/19/2017    Procedure: MASTECTOMY PROPHYLACTIC LEFT, AND RIGHT TOTAL MASTECTOMY;  Surgeon: Carly Wright M.D.;  Location: SURGERY SAME DAY [facility];  Service:    • AXILLARY NODE DISSECTION Right 1/19/2017    Procedure: AXILLARY NODE " DISSECTION;  Surgeon: Carly Wright M.D.;  Location: SURGERY SAME DAY [facility];  Service:    • CATH PLACEMENT Left 1/19/2017    Procedure: Removal of left subclavian port ;  Surgeon: Carly Wright M.D.;  Location: SURGERY SAME DAY [facility];  Service:    • CATH PLACEMENT Left 9/5/2016    Procedure: CATH PLACEMENT - SUBCLAVIAN PORT - SIDE TO BE DETERMINED;  Surgeon: Carly Wright M.D.;  Location: SURGERY [facility];  Service:    • STEREOTACTIC BIOPSY Right 08/10/2016   • HIP ARTHROPLASTY TOTAL Right 2014   • OTHER ORTHOPEDIC SURGERY  6/2012    right hip arthroplasty   • CATARACT PHACO WITH IOL Bilateral    • GYN SURGERY  Approx 2013    Hysterectomy       Hospital Medications:    Current Facility-Administered Medications:   •  senna-docusate (PERICOLACE or SENOKOT S) 8.6-50 MG per tablet 2 Tab, 2 Tab, Oral, BID **AND** polyethylene glycol/lytes (MIRALAX) PACKET 1 Packet, 1 Packet, Oral, QDAY PRN **AND** magnesium hydroxide (MILK OF MAGNESIA) suspension 30 mL, 30 mL, Oral, QDAY PRN **AND** bisacodyl (DULCOLAX) suppository 10 mg, 10 mg, Rectal, QDAY PRN, Abhya Jamison D.O.  •  0.9 % NaCl with KCl 20 mEq infusion, , Intravenous, Continuous, Abhay Jamison D.O.  •  acetaminophen (TYLENOL) tablet 650 mg, 650 mg, Oral, Q6HRS PRN, Abhay Jamison D.O.  •  Notify provider if pain remains uncontrolled, , , CONTINUOUS **AND** Use the numeric rating scale (NRS-11) on regular floors and Critical-Care Pain Observation Tool (CPOT) on ICUs/Trauma to assess pain, , , CONTINUOUS **AND** Pulse Ox (Oximetry), , , CONTINUOUS **AND** Pharmacy Consult Request ...Pain Management Review 1 Each, 1 Each, Other, PHARMACY TO DOSE **AND** If patient difficult to arouse and/or has respiratory depression, stop any opiates that are currently infusing and call a Rapid Response., , , CONTINUOUS **AND** oxyCODONE immediate-release (ROXICODONE) tablet 5 mg, 5 mg, Oral, Q3HRS PRN **AND** oxyCODONE immediate-release  (ROXICODONE) tablet 10 mg, 10 mg, Oral, Q3HRS PRN **AND** HYDROmorphone pf (DILAUDID) injection 0.5 mg, 0.5 mg, Intravenous, Q3HRS PRN, JHONY Plaza.O.  •  enalaprilat (VASOTEC) injection 1.25 mg, 1.25 mg, Intravenous, Q6HRS PRN, JHONY Plaza.O.  •  ondansetron (ZOFRAN) syringe/vial injection 4 mg, 4 mg, Intravenous, Q4HRS PRN, JHONY Plaza.O.  •  ondansetron (ZOFRAN ODT) dispertab 4 mg, 4 mg, Oral, Q4HRS PRN, JHONY Plaza.O.  •  promethazine (PHENERGAN) tablet 12.5-25 mg, 12.5-25 mg, Oral, Q4HRS PRN, JHONY Plaza.O.  •  promethazine (PHENERGAN) suppository 12.5-25 mg, 12.5-25 mg, Rectal, Q4HRS PRN, JHONY Plaza.O.  •  prochlorperazine (COMPAZINE) injection 5-10 mg, 5-10 mg, Intravenous, Q4HRS PRN, JHONY Plaza.O.  •  [START ON 4/20/2020] insulin regular (HUMULIN R) injection 1-6 Units, 1-6 Units, Subcutaneous, Q6HRS **AND** POC Blood Glucose, , , Q6H **AND** NOTIFY MD and PharmD, , , Once **AND** glucose 4 g chewable tablet 16 g, 16 g, Oral, Q15 MIN PRN **AND** dextrose 50% (D50W) injection 50 mL, 50 mL, Intravenous, Q15 MIN PRN, JHONY Plaza.O.    Current Outpatient Medications:   •  albuterol 108 (90 Base) MCG/ACT Aero Soln inhalation aerosol, Inhale 2 Puffs by mouth every 6 hours as needed for Shortness of Breath., Disp: 8.5 g, Rfl: 0  •  benzonatate (TESSALON) 100 MG Cap, Take 2 Caps by mouth 3 times a day as needed for Cough., Disp: 30 Cap, Rfl: 0  •  metFORMIN ER (GLUCOPHAGE XR) 500 MG TABLET SR 24 HR, Take 500 mg by mouth every day., Disp: , Rfl:   •  Multiple Vitamins-Minerals (MULTIVITAMIN GUMMIES ADULT PO), Take  by mouth., Disp: , Rfl:   •  Trastuzumab (HERCEPTIN IV), by Intravenous route., Disp: , Rfl:     Current Outpatient Medications:  (Not in a hospital admission)      Medication Allergy:  Allergies   Allergen Reactions   • Sulfa Drugs Unspecified      RXN=As a child unsure of reaction   • Morphine      Nausea and vomiting   • Iodine Diarrhea and  "Nausea     Pt states reaction as a child to iodine not injected iodine       Family History:  History reviewed. No pertinent family history.    Social History:  Social History     Socioeconomic History   • Marital status:      Spouse name: Not on file   • Number of children: Not on file   • Years of education: Not on file   • Highest education level: Not on file   Occupational History   • Not on file   Social Needs   • Financial resource strain: Not on file   • Food insecurity     Worry: Not on file     Inability: Not on file   • Transportation needs     Medical: Not on file     Non-medical: Not on file   Tobacco Use   • Smoking status: Never Smoker   • Smokeless tobacco: Never Used   Substance and Sexual Activity   • Alcohol use: No     Alcohol/week: 0.0 oz   • Drug use: No   • Sexual activity: Not on file   Lifestyle   • Physical activity     Days per week: Not on file     Minutes per session: Not on file   • Stress: Not on file   Relationships   • Social connections     Talks on phone: Not on file     Gets together: Not on file     Attends Jainism service: Not on file     Active member of club or organization: Not on file     Attends meetings of clubs or organizations: Not on file     Relationship status: Not on file   • Intimate partner violence     Fear of current or ex partner: Not on file     Emotionally abused: Not on file     Physically abused: Not on file     Forced sexual activity: Not on file   Other Topics Concern   • Not on file   Social History Narrative   • Not on file         Physical Exam:  Vitals/ General Appearance:   Weight/BMI: Body mass index is 35.9 kg/m².  /60   Pulse 79   Temp 36.1 °C (97 °F) (Tympanic)   Resp 12   Ht 1.549 m (5' 1\")   Wt 86.2 kg (190 lb)   SpO2 99%   Vitals:    04/19/20 2204 04/19/20 2204 04/19/20 2205 04/19/20 2206   BP: 139/64 132/60 132/60    Pulse: 83 85 81 79   Resp: 18 17 19 12   Temp:       TempSrc:       SpO2: 99% 100% 99% 99%   Weight:     "   Height:           Constitutional:   Well developed, Well nourished, No acute distress  HENMT:  Normocephalic, Atraumatic, Oropharynx moist mucous membranes, No oral exudates, Nose normal.  No thyromegaly.  Eyes:  EOMI, Conjunctiva normal, No discharge.  Neck:  Normal range of motion, No cervical tenderness,  no JVD.  Cardiovascular:  Regular rate and rhythm  Lungs:  Normal breathing  Abdomen: Soft, non-tender, non-distended.  Skin: Warm, Dry, No erythema, No rash, no induration.  Neurologic: Alert & oriented x 3, No focal deficits noted, cranial nerves II through X are grossly intact.  Psychiatric: Affect normal, Judgment normal, Mood normal.  Musculoskeletal: Exam of the RLE reveals:  Mild angulation deformity at the knee  There is external rotation at the knee  No open wounds  TTP about the knee  Deep/sup peroneal and tibial nerves are intact to what she calls baseline with respect to the neuropathy  DP pulse 1+    Lab Data Review:  Recent Results (from the past 24 hour(s))   CBC w/ Differential    Collection Time: 04/19/20  8:06 PM   Result Value Ref Range    WBC 7.6 4.8 - 10.8 K/uL    RBC 4.07 (L) 4.20 - 5.40 M/uL    Hemoglobin 12.9 12.0 - 16.0 g/dL    Hematocrit 38.7 37.0 - 47.0 %    MCV 95.1 81.4 - 97.8 fL    MCH 31.7 27.0 - 33.0 pg    MCHC 33.3 (L) 33.6 - 35.0 g/dL    RDW 50.5 (H) 35.9 - 50.0 fL    Platelet Count 262 164 - 446 K/uL    MPV 9.5 9.0 - 12.9 fL    Neutrophils-Polys 76.60 (H) 44.00 - 72.00 %    Lymphocytes 13.50 (L) 22.00 - 41.00 %    Monocytes 7.30 0.00 - 13.40 %    Eosinophils 1.70 0.00 - 6.90 %    Basophils 0.50 0.00 - 1.80 %    Immature Granulocytes 0.40 0.00 - 0.90 %    Nucleated RBC 0.00 /100 WBC    Neutrophils (Absolute) 5.83 2.00 - 7.15 K/uL    Lymphs (Absolute) 1.03 1.00 - 4.80 K/uL    Monos (Absolute) 0.56 0.00 - 0.85 K/uL    Eos (Absolute) 0.13 0.00 - 0.51 K/uL    Baso (Absolute) 0.04 0.00 - 0.12 K/uL    Immature Granulocytes (abs) 0.03 0.00 - 0.11 K/uL    NRBC (Absolute) 0.00 K/uL    Basic Metabolic Panel (BMP)    Collection Time: 20  8:06 PM   Result Value Ref Range    Sodium 139 135 - 145 mmol/L    Potassium 3.7 3.6 - 5.5 mmol/L    Chloride 105 96 - 112 mmol/L    Co2 21 20 - 33 mmol/L    Glucose 154 (H) 65 - 99 mg/dL    Bun 16 8 - 22 mg/dL    Creatinine 0.70 0.50 - 1.40 mg/dL    Calcium 9.0 8.5 - 10.5 mg/dL    Anion Gap 13.0 7.0 - 16.0   APTT    Collection Time: 20  8:06 PM   Result Value Ref Range    APTT 21.1 (L) 24.7 - 36.0 sec   Prothrombin (PT/INR)    Collection Time: 20  8:06 PM   Result Value Ref Range    PT 13.0 12.0 - 14.6 sec    INR 0.96 0.87 - 1.13   ESTIMATED GFR    Collection Time: 20  8:06 PM   Result Value Ref Range    GFR If African American >60 >60 mL/min/1.73 m 2    GFR If Non African American >60 >60 mL/min/1.73 m 2   EKG    Collection Time: 20  9:09 PM   Result Value Ref Range    Report       [facility]    Test Date:  2020  Pt Name:    PERI ROBLES             Department: ER  MRN:        [id number]                      Room:       [facility]  Gender:     Female                       Technician: [id number]  :        1955                   Requested By:HOLLIS MARIN  Order #:    [id number]                    Reading MD: HOLLIS MARIN MD    Measurements  Intervals                                Axis  Rate:       87                           P:          68  TN:         172                          QRS:        -21  QRSD:       88                           T:          14  QT:         368  QTc:        443    Interpretive Statements  Twelve-lead EKG shows a normal sinus rhythm with a ventricular rate 87, QRS  has  poor R wave progression, no ST segment elevation or depression, T waves flat  inferiorly.  Electronically Signed On 2020 22:16:04 PDT by HOLLIS MARIN MD         Imaging: Right femur imaging reveals supracondylar femur fracture that is comminuted and displaced    Assessment: Right  supracondylar femur fracture    Plan: Closed reduction right distal femur    Procedure plan:  After conscious sedation was performed by the ER staff gentle traction and rotation was applied to bring the knee into a more anatomic alignment.  There was palpable crepitus with reduction.  A knee immobilizer was placed.  DP pulse was 1+ post reduction and capillary refill < 2 sec.    Plan: CT scan right distal femur for preop planning  ORIF right distal femur by Dr. Ag tomorrow  SARAN KAT

## 2020-04-20 NOTE — H&P
Hospital Medicine History & Physical Note    Date of Service  4/19/2020    Primary Care Physician  HERMILO Rios    Code Status  Full Code    Chief Complaint  Chief Complaint   Patient presents with   • Knee Pain     Right knee pain from a mechanical fall on a dog toy       History of Presenting Illness  65 y.o. female who presented 4/19/2020 with right knee and hip pain.  Patient states she was in her usual state of health, she stumbled and lost her balance landing on her right side.  She had severe right hip and knee pain, she was unable to get up.  Pain did start instantly after falling.  Upon arrival, imaging did show a distal femur fracture.  She states she frequently has poor balance and a history of neuropathy.  She describes the pain is more of a pressure, worse with any movement at all.  I did discuss the case including labs and imaging with the ER physician.    Review of Systems  Review of Systems   Constitutional: Negative for chills, fever and malaise/fatigue.   HENT: Negative for congestion.    Respiratory: Negative for cough, sputum production, shortness of breath and stridor.    Cardiovascular: Negative for chest pain, palpitations and leg swelling.   Gastrointestinal: Negative for abdominal pain, constipation, diarrhea, nausea and vomiting.   Genitourinary: Negative for dysuria and urgency.   Musculoskeletal: Positive for falls and joint pain (right hip and knee). Negative for myalgias.   Neurological: Negative for dizziness, tingling, loss of consciousness, weakness and headaches.   Psychiatric/Behavioral: Negative for depression and suicidal ideas.   All other systems reviewed and are negative.      Past Medical History   has a past medical history of Anemia (6-1-17), Anesthesia (6-1-17), Cancer ([facility]) (8/4/16), Cancer ([facility]) (2016), Cataract, Dental disorder, Osteoarthritis (6-1-17), Port catheter in place (6-1-17), and Snoring.    Surgical History   has a past surgical history that  includes stereotactic biopsy (Right, 08/10/2016); cath placement (Left, 9/5/2016); tissue expander place/remove (Bilateral, 1/19/2017); flap graft (1/19/2017); mastectomy (Bilateral, 1/19/2017); axillary node dissection (Right, 1/19/2017); cath placement (Left, 1/19/2017); lung needle biopsy (02-); hip arthroplasty total (Right, 2014); cath placement (Left, 3/17/2017); thoracoscopy (Right, 5/1/2017); cataract phaco with iol (Bilateral); other orthopedic surgery (6/2012); gyn surgery (Approx 2013); tissue expander place/remove (Bilateral, 6/8/2017); breast implant revision (Bilateral, 6/8/2017); capsulotomy (Bilateral, 6/8/2017); and breast reconstruction (Bilateral, 6/8/2017).     Family History  Hypertension    Social History   reports that she has never smoked. She has never used smokeless tobacco. She reports that she does not drink alcohol or use drugs.    Allergies  Allergies   Allergen Reactions   • Sulfa Drugs Unspecified      RXN=As a child unsure of reaction   • Morphine      Nausea and vomiting   • Iodine Diarrhea and Nausea     Pt states reaction as a child to iodine not injected iodine       Medications  Prior to Admission Medications   Prescriptions Last Dose Informant Patient Reported? Taking?   Multiple Vitamins-Minerals (MULTIVITAMIN GUMMIES ADULT PO)   Yes No   Sig: Take  by mouth.   Trastuzumab (HERCEPTIN IV)   Yes No   Sig: by Intravenous route.   albuterol 108 (90 Base) MCG/ACT Aero Soln inhalation aerosol   No No   Sig: Inhale 2 Puffs by mouth every 6 hours as needed for Shortness of Breath.   benzonatate (TESSALON) 100 MG Cap   No No   Sig: Take 2 Caps by mouth 3 times a day as needed for Cough.   metFORMIN ER (GLUCOPHAGE XR) 500 MG TABLET SR 24 HR   Yes No   Sig: Take 500 mg by mouth every day.   omeprazole (PRILOSEC) 20 MG delayed-release capsule   Yes No   Sig: Take 20 mg by mouth every day.      Facility-Administered Medications: None       Physical Exam  Temp:  [36.1 °C (97 °F)]  36.1 °C (97 °F)  Pulse:  [75] 75  Resp:  [15] 15  BP: (121)/(68) 121/68  SpO2:  [98 %] 98 %    Physical Exam  Vitals signs and nursing note reviewed.   Constitutional:       General: She is not in acute distress.     Appearance: She is well-developed. She is not diaphoretic.   HENT:      Head: Normocephalic and atraumatic.      Right Ear: External ear normal.      Left Ear: External ear normal.      Nose: Nose normal. No congestion or rhinorrhea.      Mouth/Throat:      Mouth: Mucous membranes are dry.      Pharynx: No oropharyngeal exudate.   Eyes:      General: No scleral icterus.        Right eye: No discharge.         Left eye: No discharge.      Extraocular Movements: Extraocular movements intact.   Neck:      Musculoskeletal: Normal range of motion and neck supple.      Trachea: No tracheal deviation.   Cardiovascular:      Rate and Rhythm: Normal rate and regular rhythm.      Heart sounds: No murmur. No friction rub. No gallop.    Pulmonary:      Effort: Pulmonary effort is normal. No respiratory distress.      Breath sounds: Normal breath sounds. No stridor. No wheezing or rales.   Chest:      Chest wall: No tenderness.   Abdominal:      General: Bowel sounds are normal. There is no distension.      Palpations: Abdomen is soft.      Tenderness: There is no abdominal tenderness.   Musculoskeletal:      Right hip: She exhibits decreased range of motion and tenderness.      Right knee: She exhibits decreased range of motion. Tenderness found.      Right lower leg: No edema.      Left lower leg: No edema.   Lymphadenopathy:      Cervical: No cervical adenopathy.   Skin:     General: Skin is warm and dry.      Coloration: Skin is not jaundiced.      Findings: No erythema or rash.   Neurological:      General: No focal deficit present.      Mental Status: She is alert and oriented to person, place, and time.      Cranial Nerves: No cranial nerve deficit.   Psychiatric:         Mood and Affect: Mood normal.          Behavior: Behavior normal.         Thought Content: Thought content normal.         Judgment: Judgment normal.         Laboratory:  Recent Labs     04/19/20 2006   WBC 7.6   RBC 4.07*   HEMOGLOBIN 12.9   HEMATOCRIT 38.7   MCV 95.1   MCH 31.7   MCHC 33.3*   RDW 50.5*   PLATELETCT 262   MPV 9.5     Recent Labs     04/19/20 2006   SODIUM 139   POTASSIUM 3.7   CHLORIDE 105   CO2 21   GLUCOSE 154*   BUN 16   CREATININE 0.70   CALCIUM 9.0     Recent Labs     04/19/20 2006   GLUCOSE 154*     Recent Labs     04/19/20 2006   APTT 21.1*   INR 0.96     No results for input(s): NTPROBNP in the last 72 hours.      No results for input(s): TROPONINT in the last 72 hours.    Imaging:  DX-HIP-UNILATERAL-WITH PELVIS-1 VIEW RIGHT   Final Result         No proximal femur fracture.      DX-KNEE 2- RIGHT   Final Result      Acute comminuted fracture of the distal femur with questionable extension to the knee joint      DX-CHEST-LIMITED (1 VIEW)    (Results Pending)         Assessment/Plan:    * Femur fracture, right (HCC)- (present on admission)  Assessment & Plan  -I did personally review her knee x-ray, did note a significant distal right femur fracture  -The patient n.p.o. and await Ortho recommendations  -She is cleared for surgery  -Symptomatic care with narcotics    Fall- (present on admission)  Assessment & Plan  -Due to poor balance   -Denies any syncopal episode  -Did not hit her head  -Pain PT/OT when cleared by Ortho    History of lung cancer- (present on admission)  Assessment & Plan  -I did obtain a chest x-ray for preop clearance, no sign of recurrent cancer    Hyperglycemia- (present on admission)  Assessment & Plan  -No history of diabetes  -Start insulin sliding scale  -Adjust as needed  -Pain hemoglobin A1c

## 2020-04-20 NOTE — PROGRESS NOTES
[facility] Medicine Daily Progress Note    Date of Service  4/20/2020    Chief Complaint  65 y.o. female admitted 4/19/2020 with right knee and hip pain    Hospital Course    *65-year-old female with past medical history of lung cancer presented to the hospital on April 19, 2020 with complaint of right knee and hip pain.  On presentation patient reported that she lost her balance and landing on her right side.  She underwent x-ray and it showed acute commuted fracture of the distal femur with questionable extension to the knee joint.  Orthopedic surgery was consulted and they recommended close reduction right distal femur.*      Interval Problem Update    Today I evaluated and examined her at the bedside.  She is going to have close reduction of right distal femur pain  I discussed plan of care with her.  Continue provided pain control and monitor for adverse effect of pain medications.  I discussed plan of care with bedside RN.  Currently she is n.p.o.  Continue IV fluid for hydration while she is n.p.o.  Consultants/Specialty  Orthopedic surgery    Code Status  Full code    Disposition  To be decided    Review of Systems  Review of Systems   Constitutional: Negative for chills, fever and weight loss.   HENT: Negative for hearing loss and tinnitus.    Eyes: Negative for blurred vision, double vision, photophobia and pain.   Respiratory: Negative for cough, sputum production and shortness of breath.    Cardiovascular: Negative for chest pain, palpitations, orthopnea and leg swelling.   Gastrointestinal: Negative for abdominal pain, constipation, diarrhea, nausea and vomiting.   Genitourinary: Negative for dysuria, frequency and urgency.   Musculoskeletal: Positive for joint pain and myalgias. Negative for back pain and neck pain.   Skin: Negative for rash.   Neurological: Negative for dizziness, tingling, tremors, sensory change, speech change, focal weakness and headaches.   Psychiatric/Behavioral: Negative for  hallucinations and substance abuse.   All other systems reviewed and are negative.       Physical Exam  Temp:  [36.1 °C (97 °F)-36.8 °C (98.2 °F)] 36.8 °C (98.2 °F)  Pulse:  [] 85  Resp:  [11-42] 17  BP: (105-142)/(56-71) 106/60  SpO2:  [97 %-100 %] 99 %    Physical Exam  Vitals signs reviewed.   Constitutional:       General: She is not in acute distress.     Appearance: Normal appearance. She is not ill-appearing.   HENT:      Head: Normocephalic and atraumatic.      Nose: No congestion.   Eyes:      General:         Right eye: No discharge.         Left eye: No discharge.      Pupils: Pupils are equal, round, and reactive to light.   Neck:      Musculoskeletal: Normal range of motion. No neck rigidity.   Cardiovascular:      Rate and Rhythm: Normal rate and regular rhythm.      Pulses: Normal pulses.      Heart sounds: Normal heart sounds. No murmur.   Pulmonary:      Effort: Pulmonary effort is normal. No respiratory distress.      Breath sounds: Normal breath sounds. No stridor.   Abdominal:      General: Bowel sounds are normal. There is no distension.      Palpations: Abdomen is soft.      Tenderness: There is no abdominal tenderness.   Musculoskeletal:         General: Tenderness present. No swelling.      Comments: Decreased range of motion   Skin:     General: Skin is warm.      Capillary Refill: Capillary refill takes less than 2 seconds.      Coloration: Skin is not jaundiced or pale.      Findings: No bruising.   Neurological:      General: No focal deficit present.      Mental Status: She is alert and oriented to person, place, and time.      Cranial Nerves: No cranial nerve deficit.   Psychiatric:         Mood and Affect: Mood normal.         Behavior: Behavior normal.         Fluids    Intake/Output Summary (Last 24 hours) at 4/20/2020 0861  Last data filed at 4/19/2020 4081  Gross per 24 hour   Intake 0 ml   Output --   Net 0 ml       Laboratory  Recent Labs     04/19/20 2006 04/20/20  0578    WBC 7.6 6.8   RBC 4.07* 3.69*   HEMOGLOBIN 12.9 11.5*   HEMATOCRIT 38.7 35.8*   MCV 95.1 97.0   MCH 31.7 31.2   MCHC 33.3* 32.1*   RDW 50.5* 53.1*   PLATELETCT 262 233   MPV 9.5 9.4     Recent Labs     04/19/20 2006 04/20/20  0456   SODIUM 139 141   POTASSIUM 3.7 4.8   CHLORIDE 105 108   CO2 21 24   GLUCOSE 154* 168*   BUN 16 15   CREATININE 0.70 0.62   CALCIUM 9.0 8.6     Recent Labs     04/19/20 2006   APTT 21.1*   INR 0.96               Imaging  CT-KNEE W/O PLUS RECONS RIGHT   Final Result      Acute comminuted fracture of the distal femur with extension to the lateral femoral condyle with a linear fracture line extending to the intercondylar notch      DX-CHEST-LIMITED (1 VIEW)   Final Result         No acute cardiopulmonary abnormalities are identified.      DX-HIP-UNILATERAL-WITH PELVIS-1 VIEW RIGHT   Final Result         No proximal femur fracture.      DX-KNEE 2- RIGHT   Final Result      Acute comminuted fracture of the distal femur with questionable extension to the knee joint           Assessment/Plan  * Femur fracture, right (HCC)- (present on admission)  Assessment & Plan  On x-ray she found to have acute comminuted fracture of the distal femur and orthopedic surgery was consulted and she is going to have surgical repair for fracture.  Currently she is n.p.o. and on IV fluids for hydration.  Continue provided pain control and monitor for adverse effect of pain medications.  I discussed plan of care with her.       Fall- (present on admission)  Assessment & Plan  Mechanical fall.  On presentation she reported that she did not hit her head.  PT/OT evaluation.      History of lung cancer- (present on admission)  Assessment & Plan  Chest x-ray did not show any acute abnormalities.  She will need continued outpatient follow-up.      Hyperglycemia- (present on admission)  Assessment & Plan  She does not have history of diabetes per  Continue insulin sliding scale with hypoglycemia protocol.  This morning  HbA1c is in process.       I discussed plan of care during multidisciplinary rounds.    VTE prophylaxis: RYANs as she is going for surgery

## 2020-04-20 NOTE — PROGRESS NOTES
Patient did not want to use a bedpan due to discomfort to her right leg. A pur wick was used however the patient had difficulty urinating. She was bladder scanned for 751. She decided to try the bed pan. She was finally able to urinate. She soaked the blue abner which was in the bedpan and she soaked her green pad, so a measurement of urine was not able to be taken.

## 2020-04-20 NOTE — ED PROVIDER NOTES
"ED Provider Note    CHIEF COMPLAINT  Chief Complaint   Patient presents with   • Knee Pain     Right knee pain from a mechanical fall on a dog toy       [facility]  Carmelina Leblanc is a 65 y.o. female who presents with right knee pain.  The patient states she tripped over a dog toy falling on her right knee and right hip.  She presents with severe right knee pain.  She states she cannot ambulate due to the pain.  She also has pain in the right hip.  She is unaware of any other injuries.  She did not strike her head.  She does not have any neck or back pain.  She has normal sensation and movement at the right foot.  The pain is severe in intensity.    REVIEW OF SYSTEMS  See HPI for further details. All other systems are negative.     PAST MEDICAL HISTORY  Past Medical History:   Diagnosis Date   • Anemia 6-1-17    \"D/T Chemo\"   • Osteoarthritis 6-1-17    \"Right Ankle, Left Ankle & Lower Back\"   • Port catheter in place 6-1-17    Left Side   • Anesthesia 6-1-17    PONV   • Cancer (HCC) 8/4/16    Right Breast Treated With Chemo   • Cancer (HCC) 2016    Lung CA   • Cataract     IOL OU   • Dental disorder     dental implant bottom    • Snoring        FAMILY HISTORY  [unfilled]    SOCIAL HISTORY  Social History     Socioeconomic History   • Marital status:      Spouse name: Not on file   • Number of children: Not on file   • Years of education: Not on file   • Highest education level: Not on file   Occupational History   • Not on file   Social Needs   • Financial resource strain: Not on file   • Food insecurity     Worry: Not on file     Inability: Not on file   • Transportation needs     Medical: Not on file     Non-medical: Not on file   Tobacco Use   • Smoking status: Never Smoker   • Smokeless tobacco: Never Used   Substance and Sexual Activity   • Alcohol use: No     Alcohol/week: 0.0 oz   • Drug use: No   • Sexual activity: Not on file   Lifestyle   • Physical activity     Days per week: Not on file     Minutes " per session: Not on file   • Stress: Not on file   Relationships   • Social connections     Talks on phone: Not on file     Gets together: Not on file     Attends Shinto service: Not on file     Active member of club or organization: Not on file     Attends meetings of clubs or organizations: Not on file     Relationship status: Not on file   • Intimate partner violence     Fear of current or ex partner: Not on file     Emotionally abused: Not on file     Physically abused: Not on file     Forced sexual activity: Not on file   Other Topics Concern   • Not on file   Social History Narrative   • Not on file       SURGICAL HISTORY  Past Surgical History:   Procedure Laterality Date   • TISSUE EXPANDER PLACE/REMOVE Bilateral 6/8/2017    Procedure: TISSUE EXPANDER PLACE/REMOVE;  Surgeon: Everardo Matthews M.D.;  Location: SURGERY SAME DAY [facility];  Service:    • BREAST IMPLANT REVISION Bilateral 6/8/2017    Procedure: BREAST IMPLANT;  Surgeon: Everardo Matthews M.D.;  Location: SURGERY SAME DAY [facility];  Service:    • CAPSULOTOMY Bilateral 6/8/2017    Procedure: CAPSULOTOMY FOR PARTIAL CAPSULECTOMIES;  Surgeon: Everardo Matthews M.D.;  Location: SURGERY SAME DAY [facility];  Service:    • BREAST RECONSTRUCTION Bilateral 6/8/2017    Procedure: BREAST RECONSTRUCTION USING LOCAL TISSUE & FAT GRAFTING;  Surgeon: Everardo Matthews M.D.;  Location: SURGERY SAME DAY [facility];  Service:    • THORACOSCOPY Right 5/1/2017    Procedure: THORACOSCOPY, WEDGE RESECTION LOWER LOBE MASS;  Surgeon: John H Ganser, M.D.;  Location: [facility];  Service:    • CATH PLACEMENT Left 3/17/2017    Procedure: CATH PLACEMENT FOR SUBCLAVIAN PORT LEFT;  Surgeon: Carly Wright M.D.;  Location: SURGERY [facility];  Service:    • LUNG NEEDLE BIOPSY  02-   • TISSUE EXPANDER PLACE/REMOVE Bilateral 1/19/2017    Procedure: TISSUE EXPANDER PLACEment;  Surgeon: Everardo Matthews M.D.;  Location: SURGERY [facility]  "DAY [facility];  Service:    • FLAP GRAFT  1/19/2017    Procedure: FLAP GRAFT- FOR DERMAL ADIPOFASCIAL FLAPS ;  Surgeon: Everardo Matthews M.D.;  Location: SURGERY SAME DAY [facility];  Service:    • MASTECTOMY Bilateral 1/19/2017    Procedure: MASTECTOMY PROPHYLACTIC LEFT, AND RIGHT TOTAL MASTECTOMY;  Surgeon: Carly Wright M.D.;  Location: SURGERY SAME DAY [facility];  Service:    • AXILLARY NODE DISSECTION Right 1/19/2017    Procedure: AXILLARY NODE DISSECTION;  Surgeon: Carly Wright M.D.;  Location: SURGERY SAME DAY [facility];  Service:    • CATH PLACEMENT Left 1/19/2017    Procedure: Removal of left subclavian port ;  Surgeon: Carly Wright M.D.;  Location: SURGERY SAME DAY [facility];  Service:    • CATH PLACEMENT Left 9/5/2016    Procedure: CATH PLACEMENT - SUBCLAVIAN PORT - SIDE TO BE DETERMINED;  Surgeon: Carly Wright M.D.;  Location: SURGERY [facility];  Service:    • STEREOTACTIC BIOPSY Right 08/10/2016   • HIP ARTHROPLASTY TOTAL Right 2014   • OTHER ORTHOPEDIC SURGERY  6/2012    right hip arthroplasty   • CATARACT PHACO WITH IOL Bilateral    • GYN SURGERY  Approx 2013    Hysterectomy       CURRENT MEDICATIONS  Home Medications    **Home medications have not yet been reviewed for this encounter**         ALLERGIES  Allergies   Allergen Reactions   • Sulfa Drugs Unspecified      RXN=As a child unsure of reaction   • Morphine      Nausea and vomiting   • Iodine Diarrhea and Nausea     Pt states reaction as a child to iodine not injected iodine       PHYSICAL EXAM  VITAL SIGNS: /68   Pulse 75   Temp 36.1 °C (97 °F) (Tympanic)   Resp 15   Ht 1.549 m (5' 1\")   Wt 86.2 kg (190 lb)   SpO2 98%   BMI 35.90 kg/m²       Constitutional: in acute distress, Non-toxic appearance.   HENT: Normocephalic, Atraumatic, Bilateral external ears normal, Oropharynx moist, No oral exudates, Nose normal.   Eyes: PERRLA, EOMI, Conjunctiva normal, No discharge.   Neck: " Normal range of motion, No tenderness, Supple, No stridor.   Lymphatic: No lymphadenopathy noted.   Cardiovascular: Normal heart rate, Normal rhythm, No murmurs, No rubs, No gallops.   Thorax & Lungs: Normal breath sounds, No respiratory distress, No wheezing, No chest tenderness.   Abdomen: Bowel sounds normal, Soft, No tenderness, No masses, No pulsatile masses.   Skin: Warm, Dry, No erythema, No rash.   Back: No tenderness, No CVA tenderness.   Extremities: Right lower extremity is shortened and externally rotated.  She has diffuse tenderness throughout the right knee the patient does have some pain in the right hip however she has so much pain in the right knee is difficult to determine if the pain is really in the knee or the hip.  Extremities otherwise intact distal pulses, No edema, No tenderness, No cyanosis, No clubbing.   Neurologic: GCS of 15  Psychiatric: Affect normal, Judgment normal, Mood normal.     Results for orders placed or performed during the hospital encounter of 04/19/20   CBC w/ Differential   Result Value Ref Range    WBC 7.6 4.8 - 10.8 K/uL    RBC 4.07 (L) 4.20 - 5.40 M/uL    Hemoglobin 12.9 12.0 - 16.0 g/dL    Hematocrit 38.7 37.0 - 47.0 %    MCV 95.1 81.4 - 97.8 fL    MCH 31.7 27.0 - 33.0 pg    MCHC 33.3 (L) 33.6 - 35.0 g/dL    RDW 50.5 (H) 35.9 - 50.0 fL    Platelet Count 262 164 - 446 K/uL    MPV 9.5 9.0 - 12.9 fL    Neutrophils-Polys 76.60 (H) 44.00 - 72.00 %    Lymphocytes 13.50 (L) 22.00 - 41.00 %    Monocytes 7.30 0.00 - 13.40 %    Eosinophils 1.70 0.00 - 6.90 %    Basophils 0.50 0.00 - 1.80 %    Immature Granulocytes 0.40 0.00 - 0.90 %    Nucleated RBC 0.00 /100 WBC    Neutrophils (Absolute) 5.83 2.00 - 7.15 K/uL    Lymphs (Absolute) 1.03 1.00 - 4.80 K/uL    Monos (Absolute) 0.56 0.00 - 0.85 K/uL    Eos (Absolute) 0.13 0.00 - 0.51 K/uL    Baso (Absolute) 0.04 0.00 - 0.12 K/uL    Immature Granulocytes (abs) 0.03 0.00 - 0.11 K/uL    NRBC (Absolute) 0.00 K/uL   Basic Metabolic Panel  (BMP)   Result Value Ref Range    Sodium 139 135 - 145 mmol/L    Potassium 3.7 3.6 - 5.5 mmol/L    Chloride 105 96 - 112 mmol/L    Co2 21 20 - 33 mmol/L    Glucose 154 (H) 65 - 99 mg/dL    Bun 16 8 - 22 mg/dL    Creatinine 0.70 0.50 - 1.40 mg/dL    Calcium 9.0 8.5 - 10.5 mg/dL    Anion Gap 13.0 7.0 - 16.0   APTT   Result Value Ref Range    APTT 21.1 (L) 24.7 - 36.0 sec   Prothrombin (PT/INR)   Result Value Ref Range    PT 13.0 12.0 - 14.6 sec    INR 0.96 0.87 - 1.13   ESTIMATED GFR   Result Value Ref Range    GFR If African American >60 >60 mL/min/1.73 m 2    GFR If Non African American >60 >60 mL/min/1.73 m 2   EKG   Result Value Ref Range    Report       [facility] Emergency Dept.    Test Date:  2020  Pt Name:    PERI ROBLES             Department: ER  MRN:        [id number]                      Room:       [facility]  Gender:     Female                       Technician: [id number]  :        [id number]                   Requested By:HOLLIS MARIN  Order #:    [id number]                    Reading MD: HOLLIS MARIN MD    Measurements  Intervals                                Axis  Rate:       87                           P:          68  OH:         172                          QRS:        -21  QRSD:       88                           T:          14  QT:         368  QTc:        443    Interpretive Statements  Twelve-lead EKG shows a normal sinus rhythm with a ventricular rate 87, QRS  has  poor R wave progression, no ST segment elevation or depression, T waves flat  inferiorly.  Electronically Signed On 2020 22:16:04 PDT by HOLLIS MARIN MD         RADIOLOGY/PROCEDURES  DX-HIP-UNILATERAL-WITH PELVIS-1 VIEW RIGHT   Final Result         No proximal femur fracture.      DX-KNEE 2- RIGHT   Final Result      Acute comminuted fracture of the distal femur with questionable extension to the knee joint        Procedure conscious sedation  Conscious Sedation Procedure  Note    Indication: Distal femur fracture reduction per orthopedics    Consent: I have discussed with the patient and/or the patient representative the indication, alternatives, and the possible risks and/or complications of the planned procedure and the anesthesia methods. The patient and/or patient representative appear to understand and agree to proceed.    Physician Involvement: The attending physician was present and supervising this procedure.    Pre-Sedation Documentation and Exam: I have personally completed a history, physical exam & review of systems for this patient (see notes).  Airway Assessment: normal  f3  Prior History of Anesthesia Complications: none    ASA Classification: Class 1 - A normal healthy patient    Sedation/ Anesthesia Plan: intravenous sedation    Medications Used: propofol intravenously    Monitoring and Safety: The patient was placed on a cardiac monitor and vital signs, pulse oximetry and level of consciousness were continuously evaluated throughout the procedure. The patient was closely monitored until recovery from the medications was complete and the patient had returned to baseline status. Respiratory therapy was on standby at all times during the procedure.      (The following sections must be completed)  Post-Sedation Vital Signs: Pulse vital signs have been reviewed and are stable.  The patient is alert and appropriate after the sedation and she feels significantly better.  She is got good distal pulses to the right foot.            Intraservice Time: Greater than 10 minutes    Post-Sedation Exam: The patient's lungs are clear, as mentioned above she is got good distal pulses to the right foot.  Exam is otherwise stable           Complications: none    I provided both the sedation and procedure, a nurse was present at the bedside for the entire procedure.   Total sedation time including recovery approximate 30 minutes      COURSE & MEDICAL DECISION MAKING  Pertinent Labs &  Imaging studies reviewed. (See chart for details)  This is a 65-year-old female who presents after a fall.  X-ray does show a comminuted intra-articular fracture of the right distal femur.  I spoke with orthopedics and they recommended conscious sedation emergency department and reduction to pull her out to length and place her in a knee immobilizer.  This was accomplished without any difficulty and Dr. Morales did the reduction.  Preoperative work-up has been ordered.  The patient has good neurovascular supply to the right foot.  She will be admitted to the hospital in stable condition.    FINAL IMPRESSION  1.  Right comminuted intra-articular distal femur fracture  2.  Conscious sedation    Disposition  The patient will be admitted in stable condition         Electronically signed by: Kavon Ghotra M.D., 4/19/2020 7:41 PM

## 2020-04-20 NOTE — DISCHARGE PLANNING
Medical Social Work    MSW received a call from Unit Clerk that pt's  is in the Lobby and ERP would like to speak with him in the Family Support Room.  MSW could not locate pt's  in the Lobby and per staff he went home due to no visitors.  Pt's  Riki can be reached at: [phone] or [phone].  ERP updated and provided with pt's husbands numbers to call.

## 2020-04-21 PROBLEM — Z85.3 HISTORY OF BREAST CANCER: Status: ACTIVE | Noted: 2020-04-21

## 2020-04-21 LAB
ANION GAP SERPL CALC-SCNC: 13 MMOL/L (ref 7–16)
BUN SERPL-MCNC: 11 MG/DL (ref 8–22)
CALCIUM SERPL-MCNC: 8.2 MG/DL (ref 8.5–10.5)
CHLORIDE SERPL-SCNC: 104 MMOL/L (ref 96–112)
CO2 SERPL-SCNC: 23 MMOL/L (ref 20–33)
CREAT SERPL-MCNC: 0.71 MG/DL (ref 0.5–1.4)
ERYTHROCYTE [DISTWIDTH] IN BLOOD BY AUTOMATED COUNT: 53.2 FL (ref 35.9–50)
GLUCOSE BLD-MCNC: 136 MG/DL (ref 65–99)
GLUCOSE BLD-MCNC: 139 MG/DL (ref 65–99)
GLUCOSE BLD-MCNC: 162 MG/DL (ref 65–99)
GLUCOSE BLD-MCNC: 171 MG/DL (ref 65–99)
GLUCOSE SERPL-MCNC: 208 MG/DL (ref 65–99)
HCT VFR BLD AUTO: 30.2 % (ref 37–47)
HGB BLD-MCNC: 10 G/DL (ref 12–16)
MCH RBC QN AUTO: 32.1 PG (ref 27–33)
MCHC RBC AUTO-ENTMCNC: 33.1 G/DL (ref 33.6–35)
MCV RBC AUTO: 96.8 FL (ref 81.4–97.8)
PLATELET # BLD AUTO: 184 K/UL (ref 164–446)
PMV BLD AUTO: 9.3 FL (ref 9–12.9)
POTASSIUM SERPL-SCNC: 4.6 MMOL/L (ref 3.6–5.5)
RBC # BLD AUTO: 3.12 M/UL (ref 4.2–5.4)
SODIUM SERPL-SCNC: 140 MMOL/L (ref 135–145)
WBC # BLD AUTO: 8.1 K/UL (ref 4.8–10.8)

## 2020-04-21 PROCEDURE — 97166 OT EVAL MOD COMPLEX 45 MIN: CPT

## 2020-04-21 PROCEDURE — 36415 COLL VENOUS BLD VENIPUNCTURE: CPT

## 2020-04-21 PROCEDURE — 51798 US URINE CAPACITY MEASURE: CPT

## 2020-04-21 PROCEDURE — 700111 HCHG RX REV CODE 636 W/ 250 OVERRIDE (IP): Performed by: HOSPITALIST

## 2020-04-21 PROCEDURE — 85027 COMPLETE CBC AUTOMATED: CPT

## 2020-04-21 PROCEDURE — 700102 HCHG RX REV CODE 250 W/ 637 OVERRIDE(OP): Performed by: INTERNAL MEDICINE

## 2020-04-21 PROCEDURE — 700111 HCHG RX REV CODE 636 W/ 250 OVERRIDE (IP): Performed by: ORTHOPAEDIC SURGERY

## 2020-04-21 PROCEDURE — 82962 GLUCOSE BLOOD TEST: CPT | Mod: 91

## 2020-04-21 PROCEDURE — 80048 BASIC METABOLIC PNL TOTAL CA: CPT

## 2020-04-21 PROCEDURE — A9270 NON-COVERED ITEM OR SERVICE: HCPCS | Performed by: INTERNAL MEDICINE

## 2020-04-21 PROCEDURE — 700105 HCHG RX REV CODE 258

## 2020-04-21 PROCEDURE — 770006 HCHG ROOM/CARE - MED/SURG/GYN SEMI*

## 2020-04-21 PROCEDURE — 99232 SBSQ HOSP IP/OBS MODERATE 35: CPT | Performed by: HOSPITALIST

## 2020-04-21 PROCEDURE — 97162 PT EVAL MOD COMPLEX 30 MIN: CPT

## 2020-04-21 RX ORDER — SODIUM CHLORIDE 9 MG/ML
INJECTION, SOLUTION INTRAVENOUS
Status: COMPLETED
Start: 2020-04-21 | End: 2020-04-21

## 2020-04-21 RX ADMIN — CEFAZOLIN SODIUM 2 G: 2 INJECTION, SOLUTION INTRAVENOUS at 09:00

## 2020-04-21 RX ADMIN — ENOXAPARIN SODIUM 30 MG: 30 INJECTION SUBCUTANEOUS at 12:29

## 2020-04-21 RX ADMIN — POLYETHYLENE GLYCOL 3350 1 PACKET: 17 POWDER, FOR SOLUTION ORAL at 17:02

## 2020-04-21 RX ADMIN — OXYCODONE HYDROCHLORIDE 5 MG: 5 TABLET ORAL at 17:03

## 2020-04-21 RX ADMIN — SENNOSIDES AND DOCUSATE SODIUM 2 TABLET: 8.6; 5 TABLET ORAL at 17:03

## 2020-04-21 RX ADMIN — OXYCODONE HYDROCHLORIDE 10 MG: 10 TABLET ORAL at 07:37

## 2020-04-21 RX ADMIN — SODIUM CHLORIDE 500 ML: 9 INJECTION, SOLUTION INTRAVENOUS at 00:07

## 2020-04-21 RX ADMIN — CEFAZOLIN SODIUM 2 G: 2 INJECTION, SOLUTION INTRAVENOUS at 00:07

## 2020-04-21 RX ADMIN — SODIUM CHLORIDE: 9 INJECTION, SOLUTION INTRAVENOUS at 07:30

## 2020-04-21 RX ADMIN — INSULIN HUMAN 1 UNITS: 100 INJECTION, SOLUTION PARENTERAL at 12:33

## 2020-04-21 RX ADMIN — OXYCODONE HYDROCHLORIDE 10 MG: 10 TABLET ORAL at 00:06

## 2020-04-21 RX ADMIN — ACETAMINOPHEN 650 MG: 325 TABLET, FILM COATED ORAL at 04:58

## 2020-04-21 RX ADMIN — OXYCODONE HYDROCHLORIDE 5 MG: 5 TABLET ORAL at 20:28

## 2020-04-21 RX ADMIN — OXYCODONE HYDROCHLORIDE 10 MG: 10 TABLET ORAL at 12:42

## 2020-04-21 ASSESSMENT — ENCOUNTER SYMPTOMS
DIZZINESS: 0
FOCAL WEAKNESS: 0
DIARRHEA: 0
NERVOUS/ANXIOUS: 0
CONSTIPATION: 1
EYES NEGATIVE: 1
LOSS OF CONSCIOUSNESS: 0
HEMOPTYSIS: 0
DIAPHORESIS: 0
DOUBLE VISION: 0
HEADACHES: 0
RESPIRATORY NEGATIVE: 1
ABDOMINAL PAIN: 0
CARDIOVASCULAR NEGATIVE: 1
NAUSEA: 0
HEARTBURN: 0
SEIZURES: 0
PSYCHIATRIC NEGATIVE: 1
BLOOD IN STOOL: 0
FEVER: 0
VOMITING: 0
CONSTITUTIONAL NEGATIVE: 1
BRUISES/BLEEDS EASILY: 0
PALPITATIONS: 0
NEUROLOGICAL NEGATIVE: 1
CHILLS: 0
WHEEZING: 0
COUGH: 0

## 2020-04-21 ASSESSMENT — COGNITIVE AND FUNCTIONAL STATUS - GENERAL
DRESSING REGULAR LOWER BODY CLOTHING: A LOT
DAILY ACTIVITIY SCORE: 19
CLIMB 3 TO 5 STEPS WITH RAILING: TOTAL
SUGGESTED CMS G CODE MODIFIER DAILY ACTIVITY: CK
SUGGESTED CMS G CODE MODIFIER MOBILITY: CL
WALKING IN HOSPITAL ROOM: A LOT
MOVING FROM LYING ON BACK TO SITTING ON SIDE OF FLAT BED: UNABLE
MOVING TO AND FROM BED TO CHAIR: UNABLE
STANDING UP FROM CHAIR USING ARMS: A LITTLE
DRESSING REGULAR UPPER BODY CLOTHING: A LITTLE
TURNING FROM BACK TO SIDE WHILE IN FLAT BAD: A LOT
MOBILITY SCORE: 10
HELP NEEDED FOR BATHING: A LOT

## 2020-04-21 ASSESSMENT — GAIT ASSESSMENTS
DEVIATION: BRADYKINETIC
GAIT LEVEL OF ASSIST: MODERATE ASSIST
DISTANCE (FEET): 5
ASSISTIVE DEVICE: FRONT WHEEL WALKER

## 2020-04-21 ASSESSMENT — ACTIVITIES OF DAILY LIVING (ADL): TOILETING: INDEPENDENT

## 2020-04-21 NOTE — PROGRESS NOTES
[facility] Daily Progress Note    Date of Service  4/21/2020    Chief Complaint  65 y.o. female admitted 4/19/2020 with right leg pain.    Hospital Course    Patient is a 65-year-old female with previous right hip replacement.  The patient apparently had a fall event.  It was not caused by loss of consciousness or chest pain.  The patient landed on her right leg and was not able to ambulate on it.  The patient was brought to the hospital for evaluation and she is found at this point to have a distal right femoral comminuted fracture.  The patient's fracture was surgically repaired.  The patient will need at this point physical therapy occupational therapy evaluation and placement to a skilled nursing facility for rehabilitation.      Interval Problem Update  Pain management to be optimized  PT OT evaluation pending  Referred to skilled facilities for placement  Blood sugar management was evaluated and her hemoglobin A1c is only 5.9.  No further action needed at this point.    Consultants/Specialty  Orthopedics    Code Status  Full code    Disposition  Discharged to a skilled nursing facility once we have acceptance and insurance approval.    Review of Systems  Review of Systems   Constitutional: Negative.  Negative for chills, diaphoresis and fever.   HENT: Negative.    Eyes: Negative.  Negative for double vision.   Respiratory: Negative.  Negative for cough, hemoptysis and wheezing.    Cardiovascular: Negative.  Negative for chest pain, palpitations and leg swelling.   Gastrointestinal: Positive for constipation. Negative for abdominal pain, blood in stool, diarrhea, heartburn, nausea and vomiting.   Genitourinary: Negative.  Negative for frequency, hematuria and urgency.   Musculoskeletal: Positive for joint pain (right knee).   Skin: Negative.  Negative for itching and rash.   Neurological: Negative.  Negative for dizziness, focal weakness, seizures, loss of consciousness and headaches.    Endo/Heme/Allergies: Negative.  Does not bruise/bleed easily.   Psychiatric/Behavioral: Negative.  Negative for suicidal ideas. The patient is not nervous/anxious.    All other systems reviewed and are negative.       Physical Exam  Temp:  [35.8 °C (96.5 °F)-37.8 °C (100 °F)] 36.8 °C (98.2 °F)  Pulse:  [70-99] 98  Resp:  [13-20] 16  BP: (106-157)/(48-78) 106/60  SpO2:  [92 %-99 %] 97 %    Physical Exam  Vitals signs and nursing note reviewed.   Constitutional:       Appearance: Normal appearance. She is well-developed. She is obese. She is not ill-appearing or diaphoretic.   HENT:      Head: Normocephalic and atraumatic.      Right Ear: External ear normal.      Left Ear: External ear normal.      Nose: Nose normal.      Mouth/Throat:      Mouth: Mucous membranes are moist.      Pharynx: Oropharynx is clear.   Eyes:      Conjunctiva/sclera: Conjunctivae normal.      Pupils: Pupils are equal, round, and reactive to light.   Neck:      Musculoskeletal: Normal range of motion and neck supple.      Thyroid: No thyromegaly.      Vascular: No JVD.   Cardiovascular:      Rate and Rhythm: Normal rate and regular rhythm.      Pulses: Normal pulses.      Heart sounds: Normal heart sounds.   Pulmonary:      Effort: Pulmonary effort is normal.      Breath sounds: Normal breath sounds.   Chest:      Chest wall: No tenderness.   Abdominal:      General: Abdomen is flat. Bowel sounds are normal. There is no distension.      Palpations: Abdomen is soft. There is no mass.      Tenderness: There is no abdominal tenderness. There is no guarding or rebound.   Musculoskeletal:         General: Tenderness present.      Right knee: She exhibits decreased range of motion and swelling. Tenderness found.   Lymphadenopathy:      Cervical: No cervical adenopathy.   Skin:     General: Skin is warm and dry.      Capillary Refill: Capillary refill takes 2 to 3 seconds.      Coloration: Skin is not pale.      Findings: No rash.   Neurological:       General: No focal deficit present.      Mental Status: She is alert and oriented to person, place, and time. Mental status is at baseline.      Cranial Nerves: No cranial nerve deficit.      Deep Tendon Reflexes: Reflexes are normal and symmetric.   Psychiatric:         Attention and Perception: Attention and perception normal.         Mood and Affect: Affect normal. Mood is anxious.         Speech: Speech normal.         Behavior: Behavior normal. Behavior is cooperative.         Thought Content: Thought content normal.         Cognition and Memory: Cognition and memory normal.         Judgment: Judgment normal.         Fluids    Intake/Output Summary (Last 24 hours) at 4/21/2020 1132  Last data filed at 4/21/2020 0800  Gross per 24 hour   Intake 1500 ml   Output 1100 ml   Net 400 ml       Laboratory  Recent Labs     04/19/20 2006 04/20/20 0456 04/21/20  0058   WBC 7.6 6.8 8.1   RBC 4.07* 3.69* 3.12*   HEMOGLOBIN 12.9 11.5* 10.0*   HEMATOCRIT 38.7 35.8* 30.2*   MCV 95.1 97.0 96.8   MCH 31.7 31.2 32.1   MCHC 33.3* 32.1* 33.1*   RDW 50.5* 53.1* 53.2*   PLATELETCT 262 233 184   MPV 9.5 9.4 9.3     Recent Labs     04/19/20 2006 04/20/20 0456 04/21/20  0058   SODIUM 139 141 140   POTASSIUM 3.7 4.8 4.6   CHLORIDE 105 108 104   CO2 21 24 23   GLUCOSE 154* 168* 208*   BUN 16 15 11   CREATININE 0.70 0.62 0.71   CALCIUM 9.0 8.6 8.2*     Recent Labs     04/19/20 2006   APTT 21.1*   INR 0.96               Imaging  DX-PORTABLE FLUORO > 1 HOUR   Final Result      Intraoperative evaluation of right femur fracture ORIF.      DX-KNEE 2- RIGHT   Final Result      Intraoperative evaluation of right femur fracture ORIF.      IR-US GUIDED PIV   Final Result    Ultrasound-guided PERIPHERAL IV INSERTION performed by    qualified nursing staff as above.            CT-KNEE W/O PLUS RECONS RIGHT   Final Result      Acute comminuted fracture of the distal femur with extension to the lateral femoral condyle with a linear fracture  line extending to the intercondylar notch      DX-CHEST-LIMITED (1 VIEW)   Final Result         No acute cardiopulmonary abnormalities are identified.      DX-HIP-UNILATERAL-WITH PELVIS-1 VIEW RIGHT   Final Result         No proximal femur fracture.      DX-KNEE 2- RIGHT   Final Result      Acute comminuted fracture of the distal femur with questionable extension to the knee joint           Assessment/Plan  * Femur fracture, right (HCC)- (present on admission)  Assessment & Plan  Patient had a comminuted distal right femoral fracture.  She is now postoperative day #1 after surgical repair.  Patient is having pain and this is being addressed with pain management  Initiate at this point DVT prophylaxis  PT OT evaluation  Possible skilled placement      Fall- (present on admission)  Assessment & Plan  Secondary to mechanical fall and patient resulted with a fractures which is now status post orthopedic surgical repair.      Hyperglycemia- (present on admission)  Assessment & Plan  Most likely stress response as the patient's hemoglobin A1c is only 5.9.  Continue to monitor blood sugars      History of breast cancer  Assessment & Plan  Status post mastectomy.  Patient has been in remission for the past 2 years.    History of lung cancer- (present on admission)  Assessment & Plan  Patient has had surgery and radiation and she is followed as an outpatient by Dr. Adkins, patient has been in remission for the past 2 years.  Respiratory rate currently stable if necessary given oxygen support.         VTE prophylaxis: lovenox

## 2020-04-21 NOTE — PROGRESS NOTES
Patient had asked this RN if she can have her pure wick back so that she could void. Patient was educated about her weight bearing status and on the importance of ambulating post op. Patient attempted to ambulate to bathroom with help of this RN and ASHLEY Baumann. As the patient stood up with front wheel walker, she reported feeling dizziness. The patient then started to become unsteady and leaning backwards. Patient was helped back to bed by this RN and ASHLEY Baumann.

## 2020-04-21 NOTE — OR SURGEON
Immediate Post OP Note    PreOp Diagnosis: Right supracondylar femur fracture with intercondylar extension    PostOp Diagnosis: same    Procedure(s):  ORIF, FRACTURE,  DISTAL FEMUR - Wound Class: Clean    Surgeon(s):  Christian Ag M.D.    Anesthesiologist/Type of Anesthesia:  Anesthesiologist: Tony Neves M.D./General    Surgical Staff:  Circulator: Shavonne Hoover R.N.  Relief Circulator: Sol Peterson R.N.  Scrub Person: August Florez  First Assist: Abhay Mariano P.A.-C.    Specimens removed if any:  * No specimens in log *    Estimated Blood Loss: 300cc    Findings: see dictation    Complications: none known    PLAN:  --readmit medicine postop  --TTWB RLE  --okay for knee ROM, no brace needed  --ancef x 2 doses postop  --PT/OT for mobilization ASAP  --okay to start lovenox or equivalent tomorrow, continue SCDs        4/20/2020 7:38 PM Christian Ag M.D.

## 2020-04-21 NOTE — ANESTHESIA POSTPROCEDURE EVALUATION
Patient: Carmelina Leblanc    Procedure Summary     Date:  04/20/20 Room / Location:  [place] [id number] / SURGERY [facility]    Anesthesia Start:  1705 Anesthesia Stop:  2000    Procedure:  ORIF, FRACTURE,  DISTAL FEMUR (Right Thigh) Diagnosis:  (right supracondylar femur fracture with intercondylar extension.)    Surgeon:  Christian Ag M.D. Responsible Provider:  Tony Neves M.D.    Anesthesia Type:  general ASA Status:  3 - Emergent          Final Anesthesia Type: general  Last vitals  BP   Blood Pressure: 126/74, NIBP: 121/68    Temp   36.1 °C (96.9 °F)    Pulse   Pulse: 88   Resp   15    SpO2   94 %      Anesthesia Post Evaluation    Patient location during evaluation: PACU  Patient participation: complete - patient participated  Level of consciousness: awake and alert    Airway patency: patent  Anesthetic complications: no  Cardiovascular status: hemodynamically stable  Respiratory status: acceptable  Hydration status: euvolemic    PONV: none           Nurse Pain Score: 0 (NPRS)

## 2020-04-21 NOTE — PROGRESS NOTES
Patient refuses bed alarm to placed. Patient educated on the the importance of having a bed alarm. Patient still refused. Charge RN Kate notified.

## 2020-04-21 NOTE — ANESTHESIA PROCEDURE NOTES
Airway  Date/Time: 4/20/2020 5:43 PM  Performed by: Tony Neves M.D.  Authorized by: Tony Neves M.D.     Location:  OR  Urgency:  Elective  Indications for Airway Management:  Anesthesia      Spontaneous Ventilation: absent    Sedation Level:  Deep  Preoxygenated: Yes    Final Airway Type:  Supraglottic airway  Final Supraglottic Airway:  Standard LMA  SGA Size:  3  Number of Attempts at Approach:  1

## 2020-04-21 NOTE — THERAPY
"Physical Therapy Evaluation completed.   Bed Mobility:  Minimum assist   Transfers:  Minimum assist  Gait: Moderate assist 5ft  with Front-Wheel Walker       Plan of Care: Will benefit from Physical Therapy 5 times per week  Discharge Recommendations: Equipment: Front-Wheel Walker and Wheelchair. Post-acute therapy Recommend home health transitional care for continued physical therapy services.     Ms. Leblanc is a 64 y/o female who presents to acute secondary to fall that resulted in R supracondylar femur fracture with intracondylar extension s/p ORIF. She is TTWB. Reviewed precautions with patient who demonstrated understanding. Significant lower extremity weakness, gross motor coordination deficits, and dynamic balance deficits that negatively impact her ability to perform gait, transfers, bed mobility, and stairs at her prior level of function and prevent her safe discharge home. This was patient's first time out of bed which impacted her mobility. Gait was limited by hypotension (88/57mmHg). Pt strongly desires to go home. Discussed getting a portable ramp to install as she has 2 platform steps, states her spouse could do this. Anticipate if this can occur and her transfers improve to a supervision level of function her deficits can be managed by home health therapies and spouse if she is agreeable to be at a wheelchair level of function. Pt supports this plan.    See \"Rehab Therapy-Acute\" Patient Summary Report for complete documentation.     "

## 2020-04-21 NOTE — PROGRESS NOTES
2 RN skin check completed with Sage AGUILERA.   Devices in place scds.  Skin assessed under devices yes.  Confirmed pressure ulcers found on none found.  Ace wrap around right leg from surgery, otherwise skin completely intact.  The following interventions in place, pillows in use for support and comfort, patient turns self in bed frequently, silicon ear tips in place.

## 2020-04-21 NOTE — ANESTHESIA PREPROCEDURE EVALUATION
Relevant Problems   NEURO   (+) History of lung cancer       Physical Exam    Airway   Mallampati: II  TM distance: >3 FB  Neck ROM: full       Cardiovascular - normal exam  Rhythm: regular  Rate: normal  (-) murmur     Dental - normal exam         Pulmonary - normal exam  Breath sounds clear to auscultation     Abdominal    Neurological - normal exam                 Anesthesia Plan    ASA 3- EMERGENT   ASA physical status 3 criteria: morbid obesity - BMI greater than or equal to 40ASA physical status emergent criteria: displaced fracture with possible neurovascular compromise    Plan - general       Airway plan will be LMA        Induction: intravenous    Postoperative Plan: Postoperative administration of opioids is intended.    Pertinent diagnostic labs and testing reviewed    Informed Consent:    Anesthetic plan and risks discussed with patient.    Use of blood products discussed with: patient whom consented to blood products.

## 2020-04-21 NOTE — THERAPY
"Occupational Therapy Evaluation completed.   Functional Status:    Pleasant 66 y/o female admitted to hospital with GLF resulting in distal femur fx, now s/p ORIF. TTWB RLE, ok for ROM. Pt completed bed mobility with Min A, STS with Min A, does well maintaining TTWB, though requires cues and encouragement to initiate rest breaks. Pt became nauseous a couple times with mobility. She completed seated G/H supv, BSC xfer supv, pericare supv, Max A LB dressing. Patient currently limited by decreased balance and overall mobility, and decreased activity tolerance. Pt reports that she does not want to go to post-acute rehab, however is agreeable to home health. Reviewed AE and provided with equipment resource sheet. Will continue working with her in this setting.     Plan of Care: Will benefit from Occupational Therapy 5 times per week  Discharge Recommendations:  Equipment: Bedside Commode and Will Continue to Assess for Equipment Needs. Post-acute therapy Recommend home health for continued occupational therapy services.       See \"Rehab Therapy-Acute\" Patient Summary Report for complete documentation.    "

## 2020-04-21 NOTE — PROGRESS NOTES
"Patient arrived onto unit with transport on bed. Patient A&Ox4, and denies any N/V or shortness of breath. Patient currently on 2L sating at 95%. Patient reporting pain of 7/10 and reports pain as \"tolerable\" and declines pain medication at this time.  All notes, labs and orders have been reviewed. Bed locked and in lowest position. Threaded socks in place. Call light and personal belongings within reach. Patient educated to use call light prior to getting up.   "

## 2020-04-21 NOTE — ANESTHESIA TIME REPORT
Anesthesia Start and Stop Event Times     Date Time Event    4/20/2020 1705 Ready for Procedure     1705 Anesthesia Start     2006 Anesthesia Stop        Responsible Staff  04/20/20    Name Role Begin End    Tony Neves M.D. Anesth 1705 2006        Preop Diagnosis (Free Text):  Pre-op Diagnosis     right supracondylar femur fracture with intercondylar extension.        Preop Diagnosis (Codes):    Post op Diagnosis  Femur fracture (HCC)      Premium Reason  B. 1st Call    Comments:

## 2020-04-21 NOTE — DISCHARGE PLANNING
Care Transition Team Assessment    LSW spoke w/ pt via room phone and verified facesheet demographics.  Pt lives w/ her spouse in a single story home with 2-3 steps to get inside.  Pt reports that she does not currently have any DME and that her and spouse are in the process of getting a walker and commode. Pt says that she was completely independent with A(I)DLs prior to this admission.  Pt states that she has sufficient support from her spouse and friends, she's current w/ her PCP, and uses [facility] pharmacy on [address].    No needs assessed at this time.        Information Source  Orientation : Oriented x 4  Information Given By: Patient  Informant's Name: Carmelina  Who is responsible for making decisions for patient? : Patient         Elopement Risk  Legal Hold: No  Ambulatory or Self Mobile in Wheelchair: No-Not an Elopement Risk  Elopement Risk: Not at Risk for Elopement    Interdisciplinary Discharge Planning  Patient or legal guardian wants to designate a caregiver (see row info): No    Discharge Preparedness  What is your plan after discharge?: Home with help  What are your discharge supports?: Spouse  Prior Functional Level: Independent with Activities of Daily Living  Difficulity with ADLs: None  Difficulity with IADLs: None    Functional Assesment  Prior Functional Level: Independent with Activities of Daily Living    Finances  Financial Barriers to Discharge: No  Prescription Coverage: Yes    Vision / Hearing Impairment  Vision Impairment : Yes  Right Eye Vision: Impaired, Wears Glasses, Other (Comments)  Left Eye Vision: Impaired, Wears Glasses, Other (Comments)  Hearing Impairment : No              Domestic Abuse  Have you ever been the victim of abuse or violence?: No  Physical Abuse or Sexual Abuse: No  Verbal Abuse or Emotional Abuse: No  Possible Abuse Reported to:: Not Applicable    Psychological Assessment  History of Substance Abuse: None  History of Psychiatric Problems: No  Non-compliant with  Treatment: No  Newly Diagnosed Illness: No    Discharge Risks or Barriers  Discharge risks or barriers?: No    Anticipated Discharge Information  Anticipated discharge disposition: Home

## 2020-04-21 NOTE — PROGRESS NOTES
Pt informed of need for skin check under immobilizer. Pt expressed concerns that moving would be too painful and wishes to refuse. Pt educated, alternative ways to move explained. Pt still wishes to refuse, skin check to be completed when pt returns from surg, will inform NOC RN. No other areas of skin breakdown noted

## 2020-04-21 NOTE — CARE PLAN
Problem: Communication  Goal: The ability to communicate needs accurately and effectively will improve  Outcome: PROGRESSING AS EXPECTED   Patient updated on POC. Patient uses call light appropriately to verbalize needs to staff. Hourly rounding on going.     Problem: Infection  Goal: Will remain free from infection  Outcome: PROGRESSING AS EXPECTED  Standard precautions in place. Surgical mask being worn at all times while in patient's room. Foaming in an out of patients room on going. Patient has scheduled abx per MAR.

## 2020-04-21 NOTE — OR NURSING
"Pt A&OX4. VSS. Pt on 2 L of oxygen via nasal cannula - placed on mask 6 L for transport back to room. Afebrile. Post R femur fracture ORIF, dressing CDI. Pt states pain to R hip with pillows under leg for elevation so removed. Ice pack in place. 3/5 strength to RLE, able to wiggle toes. 2+ R pedal pulse with less than 3 second capillary refill. Pain and soreness \"mild\" to RLE post pain medication administration. No complaints of nausea. Report called to [facility] [place]. Pt out of PACU via bed with transport.   "

## 2020-04-21 NOTE — OP REPORT
DATE OF SERVICE:  04/20/2020    PREOPERATIVE DIAGNOSIS:  Right supracondylar femur fracture with intercondylar   extension.      POSTOPERATIVE DIAGNOSIS:  Right supracondylar femur fracture with   intercondylar extension.      PROCEDURE PERFORMED:  Open treatment with internal fixation of right   supracondylar femur fracture with intercondylar extension.      SURGEON:  Christian Ag MD    ANESTHESIOLOGIST:  Tony Neves MD    ANESTHESIA:  General.    ASSISTANT:  Jamar Mariano PA-C    ESTIMATED BLOOD LOSS:  300 mL.     IMPLANTS:  Synthes 14-hole lateral distal femoral variable angle locking   plate.      INDICATION FOR PROCEDURE:  The patient is a 65-year-old female.  She had a   mechanical fall and sustained right supracondylar femur fracture with CT   confirmation of intercondylar extension.  She was admitted to the hospital   service.  My colleague, Dr. Morales, was initially consulted and asked if I   would be available for definitive surgical management, which I was happy to   do.  I met the patient in the preop holding area.  We discussed risks,   benefits, and alternatives of recommended surgical reduction and fixation.    She signed informed consent and wished to proceed with surgery as outlined   above.      DESCRIPTION OF PROCEDURE:  The patient was met in the preop holding area and   her surgical site was signed and her consent was confirmed to be accurate.    She was taken back to the operating room and general anesthesia was induced.    Ancef was administered.  The right lower extremity was then prepped and draped   in the usual sterile fashion.  A formal time-out was performed to confirm   patient's correct name, correct surgical site, correct procedure and correct   laterality.  A lateral approach to the distal femur was performed with a   scalpel down through skin.  Dissection was carried with Bovie cautery down to   the IT band, which was split longitudinally.  An arthrotomy was made  distally   exposing the lateral femoral trochlea and condyle.  There was an intercondylar   split, which I was able to reduce with a freer elevator and a 2-point   reduction forcep and placed several 1.6 mm K-wires to stabilize the reduction   and placed two 3.5 mm lag screws to achieve interfragmentary compression   across the intercondylar split.  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  I then applied traction and slid   a submuscular lateral distal femoral locking plate in appropriate position,   fixed it distally to bone with a drill pin more proximally fixed to bone with   another drill pin.  Overall, this helped maintain the fracture alignment,   which I felt is acceptable.  We did bypass her total hip arthroplasty, femoral   stem to prevent propagation of a stress riser in that area between the stem   and the plate.  I placed a bicortical nonlocking screw proximally using   percutaneous technique and distally placed several locking screws, proximally   placed 2 more bicortical nonlocking screws using percutaneous technique and   then where the stem was bypassed by the plate.  Proximally, I placed 2   unicortical locking screws, which had good bony purchase.  Final fluoroscopic   imaging confirmed overall acceptable alignment of the fracture and acceptable   position of the implants.  The wound was thoroughly irrigated with Pulsavac   using normal saline.  I repaired the arthrotomy with 0 Vicryl, the IT band   with size #2 Stratafix sutures, the subcutaneous layer with 0 Vicryl, 2-0   Vicryl, and skin edges with staples.  The wound was thoroughly cleansed and   dried and sterile compressive dressing was applied.  She was then awoken from   anesthesia and transferred on the [facility] and taken to the postanesthesia care   unit in stable condition.      PLAN:    1.  The patient will be readmitted to the medical service  postop.    2.  She should be toe-touch  weightbearing to the right lower extremity and can   perform knee range of motion as tolerated without the need for any bracing.    3.  She will need Ancef for 2 doses postop for infection prophylaxis.    4.  She can be started on Lovenox or equivalent tomorrow if otherwise   clinically appropriate and should continue SCDs for DVT prophylaxis in the   meantime.    5.  She should work with physical and occupational therapy as soon as possible   for mobilization.       ____________________________________     MD BRITTNY Esparza / RUFUS    DD:  04/20/2020 19:45:19  DT:  04/20/2020 20:20:23    D#:  [id number]  Job#:  [id number]

## 2020-04-22 ENCOUNTER — HOME HEALTH ADMISSION (OUTPATIENT)
Dept: HOME HEALTH SERVICES | Facility: HOME HEALTHCARE | Age: 65
End: 2020-04-22
Payer: COMMERCIAL

## 2020-04-22 LAB
GLUCOSE BLD-MCNC: 108 MG/DL (ref 65–99)
GLUCOSE BLD-MCNC: 126 MG/DL (ref 65–99)
GLUCOSE BLD-MCNC: 132 MG/DL (ref 65–99)
GLUCOSE BLD-MCNC: 163 MG/DL (ref 65–99)

## 2020-04-22 PROCEDURE — 82962 GLUCOSE BLOOD TEST: CPT | Mod: 91

## 2020-04-22 PROCEDURE — 97535 SELF CARE MNGMENT TRAINING: CPT

## 2020-04-22 PROCEDURE — A9270 NON-COVERED ITEM OR SERVICE: HCPCS | Performed by: INTERNAL MEDICINE

## 2020-04-22 PROCEDURE — 770006 HCHG ROOM/CARE - MED/SURG/GYN SEMI*

## 2020-04-22 PROCEDURE — 97110 THERAPEUTIC EXERCISES: CPT

## 2020-04-22 PROCEDURE — 700102 HCHG RX REV CODE 250 W/ 637 OVERRIDE(OP): Performed by: INTERNAL MEDICINE

## 2020-04-22 PROCEDURE — 97530 THERAPEUTIC ACTIVITIES: CPT

## 2020-04-22 PROCEDURE — 700111 HCHG RX REV CODE 636 W/ 250 OVERRIDE (IP): Performed by: HOSPITALIST

## 2020-04-22 PROCEDURE — 99232 SBSQ HOSP IP/OBS MODERATE 35: CPT | Performed by: HOSPITALIST

## 2020-04-22 PROCEDURE — 97116 GAIT TRAINING THERAPY: CPT

## 2020-04-22 RX ADMIN — SENNOSIDES AND DOCUSATE SODIUM 2 TABLET: 8.6; 5 TABLET ORAL at 06:02

## 2020-04-22 RX ADMIN — INSULIN HUMAN 1 UNITS: 100 INJECTION, SOLUTION PARENTERAL at 13:51

## 2020-04-22 RX ADMIN — OXYCODONE HYDROCHLORIDE 5 MG: 5 TABLET ORAL at 00:21

## 2020-04-22 RX ADMIN — ENOXAPARIN SODIUM 30 MG: 30 INJECTION SUBCUTANEOUS at 00:21

## 2020-04-22 RX ADMIN — POLYETHYLENE GLYCOL 3350 1 PACKET: 17 POWDER, FOR SOLUTION ORAL at 17:05

## 2020-04-22 RX ADMIN — SENNOSIDES AND DOCUSATE SODIUM 2 TABLET: 8.6; 5 TABLET ORAL at 17:06

## 2020-04-22 RX ADMIN — ENOXAPARIN SODIUM 30 MG: 30 INJECTION SUBCUTANEOUS at 23:56

## 2020-04-22 RX ADMIN — ACETAMINOPHEN 650 MG: 325 TABLET, FILM COATED ORAL at 19:50

## 2020-04-22 RX ADMIN — OXYCODONE HYDROCHLORIDE 5 MG: 5 TABLET ORAL at 06:02

## 2020-04-22 RX ADMIN — ENOXAPARIN SODIUM 30 MG: 30 INJECTION SUBCUTANEOUS at 12:10

## 2020-04-22 RX ADMIN — OXYCODONE HYDROCHLORIDE 5 MG: 5 TABLET ORAL at 12:16

## 2020-04-22 ASSESSMENT — ENCOUNTER SYMPTOMS
WEIGHT LOSS: 0
TREMORS: 0
CLAUDICATION: 0
FLANK PAIN: 0
BLURRED VISION: 0
CONSTITUTIONAL NEGATIVE: 1
ORTHOPNEA: 0
EYE DISCHARGE: 0
NECK PAIN: 0
FOCAL WEAKNESS: 0
PSYCHIATRIC NEGATIVE: 1
NEUROLOGICAL NEGATIVE: 1
SHORTNESS OF BREATH: 0
POLYDIPSIA: 0
RESPIRATORY NEGATIVE: 1
NAUSEA: 0
INSOMNIA: 0
EYE PAIN: 0
CONSTIPATION: 1
BRUISES/BLEEDS EASILY: 0
DEPRESSION: 0
SEIZURES: 0
VOMITING: 0
SPUTUM PRODUCTION: 0
DIZZINESS: 0
CARDIOVASCULAR NEGATIVE: 1
LOSS OF CONSCIOUSNESS: 0
EYES NEGATIVE: 1
BLOOD IN STOOL: 0
HEARTBURN: 0
MEMORY LOSS: 0
MYALGIAS: 0
SENSORY CHANGE: 0
HEADACHES: 0

## 2020-04-22 ASSESSMENT — COGNITIVE AND FUNCTIONAL STATUS - GENERAL
TOILETING: A LITTLE
WALKING IN HOSPITAL ROOM: A LITTLE
MOBILITY SCORE: 11
SUGGESTED CMS G CODE MODIFIER MOBILITY: CL
DRESSING REGULAR LOWER BODY CLOTHING: A LITTLE
MOVING TO AND FROM BED TO CHAIR: UNABLE
MOVING FROM LYING ON BACK TO SITTING ON SIDE OF FLAT BED: UNABLE
SUGGESTED CMS G CODE MODIFIER DAILY ACTIVITY: CJ
HELP NEEDED FOR BATHING: A LITTLE
DAILY ACTIVITIY SCORE: 21
CLIMB 3 TO 5 STEPS WITH RAILING: TOTAL
TURNING FROM BACK TO SIDE WHILE IN FLAT BAD: A LOT
STANDING UP FROM CHAIR USING ARMS: A LITTLE

## 2020-04-22 ASSESSMENT — LIFESTYLE VARIABLES: SUBSTANCE_ABUSE: 0

## 2020-04-22 ASSESSMENT — GAIT ASSESSMENTS
ASSISTIVE DEVICE: FRONT WHEEL WALKER
GAIT LEVEL OF ASSIST: MINIMAL ASSIST
DEVIATION: BRADYKINETIC;STEP TO
DISTANCE (FEET): 30

## 2020-04-22 NOTE — THERAPY
"Physical Therapy Treatment completed.   Bed Mobility:  Supine to Sit: NT- in chair pre/post session  Transfers: Sit to Stand: Minimal Assist  Gait: Level Of Assist: Minimal Assist with Front-Wheel Walker  30ft x2   Plan of Care: Will benefit from Physical Therapy 5 times per week  Discharge Recommendations: Equipment: Wheelchair. Post-acute therapy: see assessment    Pt pleasant and agreeable for PT tx session. Pt motivated to walk to the bathroom and requesting to brush her teeth. Pt requiring VC to sequence STS maintaining TTWB RLE and to not pull herself up using FWW. Minimal change in SBP from sit 102, to stand 98. No complaints of dizziness when upright today. Pt able to maintain TTWB with VC throughout amb to and from the bathroom. Instructed pt on seated there ex including LAQ, heel slides, and ankle pumps to facilitate quad/HS strengthening, improve knee AROM, and reduce swelling in operative RLE. Provided pt with handout for HEP and information on care chest to obtain loaner WC. Currently, recommend post-acute placement for continued physical therapy services prior to discharge home. However, if pt is able to attain WC and ramp to enter home and  is agreeable to provide physical assistance then home with HHPT is recommended.    See \"Rehab Therapy-Acute\" Patient Summary Report for complete documentation.       "

## 2020-04-22 NOTE — FACE TO FACE
Face to Face Note  -  Durable Medical Equipment    Michael Hector M.D. - NPI: [id number]  I certify that this patient is under my care and that they had a durable medical equipment(DME)face to face encounter by myself that meets the physician DME face-to-face encounter requirements with this patient on:    Date of encounter:   Patient:                    MRN:                       YOB: 2019  Carmelina Leblanc  [id number]  1955     The encounter with the patient was in whole, or in part, for the following medical condition, which is the primary reason for durable medical equipment:  Post-Op Surgery    I certify that, based on my findings, the following durable medical equipment is medically necessary:  Wheel Chair.    HOME O2 Saturation Measurements:(Values must be present for Home Oxygen orders)         ,     ,         My Clinical findings support the need for the above equipment due to:  Abnormal Gait    Supporting Symptoms: patient with abnormal gait after femoral fracture repair

## 2020-04-22 NOTE — DISCHARGE PLANNING
Received Choice form at 6992  Agency/Facility Name: [facility]  Referral sent per Choice form @ 7052

## 2020-04-22 NOTE — PROGRESS NOTES
"   Orthopaedic Progress Note    Interval changes:  Patient doing well  Cleared by ortho for DC home pending medicine and therapy clearance  Patient adamant about DC home    ROS - Patient denies any new issues.  Pain well controlled.    /55   Pulse 71   Temp 37.3 °C (99.2 °F) (Temporal)   Resp 16   Ht 1.549 m (5' 1\")   Wt 86.2 kg (190 lb)   SpO2 95%       Patient seen and examined  No acute distress  Breathing non labored  RRR  RLE surgical dressing is clean, dry, and intact. Patient clearly fires tibialis anterior, EHL, and gastrocnemius/soleus. Sensation is intact to light touch throughout superficial peroneal, deep peroneal, tibial, saphenous, and sural nerve distributions. Strong and palpable 2+ dorsalis pedis and posterior tibial pulses with capillary refill less than 2 seconds. No lower leg tenderness or discomfort.       Recent Labs     04/19/20 2006 04/20/20  0456 04/21/20  0058   WBC 7.6 6.8 8.1   RBC 4.07* 3.69* 3.12*   HEMOGLOBIN 12.9 11.5* 10.0*   HEMATOCRIT 38.7 35.8* 30.2*   MCV 95.1 97.0 96.8   MCH 31.7 31.2 32.1   MCHC 33.3* 32.1* 33.1*   RDW 50.5* 53.1* 53.2*   PLATELETCT 262 233 184   MPV 9.5 9.4 9.3       Active Hospital Problems    Diagnosis   • Femur fracture, right (HCC) [S72.91XA]     Priority: High   • Fall [W19.XXXA]     Priority: High   • Hyperglycemia [R73.9]     Priority: Medium   • History of breast cancer [Z85.3]     Priority: Low   • History of lung cancer [Z85.118]     Priority: Low       Assessment/Plan:  Cleared by ortho for DC home pending medicine and therapy clearance  POD#2 S/P Open treatment with internal fixation of right supracondylar femur fracture with intercondylar extension  Wt bearing status - TTWB LRE ok to start ROM  Wound care/Drains - dressing left in place   Future Procedures - none planned   Lovenox: Start 4/21, Duration-until ambulatory > 150'  Sutures/Staples out- 10-14 days post operatively  PT/OT-initiated  Antibiotics: completed  DVT Prophylaxis- " TEDS/SCDs/Foot pumps  Layla-none  Case Coordination for Discharge Planning - Disposition home

## 2020-04-22 NOTE — DISCHARGE PLANNING
Received Choice form at 1400  Agency/Facility Name: [facility]  Referral sent per Choice form @ 6671

## 2020-04-22 NOTE — DISCHARGE PLANNING
Agency/Facility Name: [facility]  Spoke To: Aixa  Outcome: Referral received. Still under review.    RN CM informed

## 2020-04-22 NOTE — PROGRESS NOTES
[facility] Medicine Daily Progress Note    Date of Service  4/22/2020    Chief Complaint  65 y.o. female admitted 4/19/2020 with right leg pain.    Hospital Course    4/21/2020-patient is a 65-year-old female with previous right hip replacement.  The patient apparently had a fall event.  It was not caused by loss of consciousness or chest pain.  The patient landed on her right leg and was not able to ambulate on it.  The patient was brought to the hospital for evaluation and she is found at this point to have a distal right femoral comminuted fracture.  The patient's fracture was surgically repaired.  The patient will need at this point physical therapy occupational therapy evaluation and placement to a skilled nursing facility for rehabilitation.  4/22/2020- patient today is more mentally alert.  She is also more physically strong.  She is been able to get out of bed with assistance into a chair.  She is also been able to get up out of her chair with a walker using one person and able to get to the bathroom independently.  She is at this point with minimal pain according to her.  She is deciding between going to skilled versus going home with home health and assistive devices.      Interval Problem Update  Continue working with physical therapy and Occupational Therapy to establish a plan of going home with assistive devices versus rehab.  Continue with pain management  Patient will need continued bowel protocol  Continue with anticoagulation for DVT prophylaxis.    Consultants/Specialty  Orthopedics    Code Status  Full code    Disposition  Possibly home with home health in the next day or 2.    Review of Systems  Review of Systems   Constitutional: Negative.  Negative for malaise/fatigue and weight loss.   HENT: Negative.  Negative for ear discharge and ear pain.    Eyes: Negative.  Negative for blurred vision, pain and discharge.   Respiratory: Negative.  Negative for sputum production and shortness of breath.     Cardiovascular: Negative.  Negative for orthopnea and claudication.   Gastrointestinal: Positive for constipation. Negative for blood in stool, heartburn, nausea and vomiting.   Genitourinary: Negative.  Negative for dysuria and flank pain.   Musculoskeletal: Positive for joint pain (right knee). Negative for myalgias and neck pain.   Skin: Negative.  Negative for itching and rash.   Neurological: Negative.  Negative for dizziness, tremors, sensory change, focal weakness, seizures, loss of consciousness and headaches.   Endo/Heme/Allergies: Negative.  Negative for polydipsia. Does not bruise/bleed easily.   Psychiatric/Behavioral: Negative.  Negative for depression, memory loss, substance abuse and suicidal ideas. The patient does not have insomnia.    All other systems reviewed and are negative.       Physical Exam  Temp:  [36.3 °C (97.4 °F)-37.3 °C (99.2 °F)] 37.3 °C (99.2 °F)  Pulse:  [] 71  Resp:  [16-17] 16  BP: (104-118)/(55-70) 104/55  SpO2:  [89 %-100 %] 95 %    Physical Exam  Vitals signs and nursing note reviewed.   Constitutional:       General: She is not in acute distress.     Appearance: Normal appearance. She is well-developed. She is obese. She is not ill-appearing, toxic-appearing or diaphoretic.   HENT:      Head: Normocephalic and atraumatic.      Right Ear: External ear normal. There is no impacted cerumen.      Left Ear: External ear normal. There is no impacted cerumen.      Nose: Nose normal. No congestion.      Mouth/Throat:      Mouth: Mucous membranes are moist.      Pharynx: Oropharynx is clear. No oropharyngeal exudate or posterior oropharyngeal erythema.   Eyes:      General:         Right eye: No discharge.         Left eye: No discharge.      Conjunctiva/sclera: Conjunctivae normal.      Pupils: Pupils are equal, round, and reactive to light.   Neck:      Musculoskeletal: Normal range of motion and neck supple. No neck rigidity or muscular tenderness.      Thyroid: No  thyromegaly.      Vascular: No JVD.   Cardiovascular:      Rate and Rhythm: Normal rate and regular rhythm.      Pulses: Normal pulses.      Heart sounds: Normal heart sounds. No murmur. No friction rub.   Pulmonary:      Effort: Pulmonary effort is normal. No respiratory distress.      Breath sounds: Normal breath sounds. No stridor. No wheezing, rhonchi or rales.   Chest:      Chest wall: No tenderness.   Abdominal:      General: Abdomen is flat. Bowel sounds are normal. There is no distension.      Palpations: Abdomen is soft. There is no mass.      Tenderness: There is no abdominal tenderness. There is no guarding or rebound.      Hernia: No hernia is present.   Musculoskeletal:         General: Swelling and tenderness present.      Right hip: She exhibits decreased range of motion and decreased strength.      Right knee: She exhibits decreased range of motion and swelling. Tenderness found.   Lymphadenopathy:      Cervical: No cervical adenopathy.   Skin:     General: Skin is warm and dry.      Capillary Refill: Capillary refill takes 2 to 3 seconds.      Coloration: Skin is not pale.      Findings: No rash.   Neurological:      General: No focal deficit present.      Mental Status: She is alert and oriented to person, place, and time. Mental status is at baseline.      Cranial Nerves: No cranial nerve deficit.      Deep Tendon Reflexes: Reflexes are normal and symmetric.   Psychiatric:         Attention and Perception: Attention and perception normal.         Mood and Affect: Affect normal. Mood is anxious.         Speech: Speech normal.         Behavior: Behavior normal. Behavior is cooperative.         Thought Content: Thought content normal.         Cognition and Memory: Cognition and memory normal.         Judgment: Judgment normal.         Fluids    Intake/Output Summary (Last 24 hours) at 4/22/2020 0950  Last data filed at 4/21/2020 1300  Gross per 24 hour   Intake --   Output 200 ml   Net -200 ml        Laboratory  Recent Labs     04/19/20 2006 04/20/20  0456 04/21/20  0058   WBC 7.6 6.8 8.1   RBC 4.07* 3.69* 3.12*   HEMOGLOBIN 12.9 11.5* 10.0*   HEMATOCRIT 38.7 35.8* 30.2*   MCV 95.1 97.0 96.8   MCH 31.7 31.2 32.1   MCHC 33.3* 32.1* 33.1*   RDW 50.5* 53.1* 53.2*   PLATELETCT 262 233 184   MPV 9.5 9.4 9.3     Recent Labs     04/19/20 2006 04/20/20 0456 04/21/20  0058   SODIUM 139 141 140   POTASSIUM 3.7 4.8 4.6   CHLORIDE 105 108 104   CO2 21 24 23   GLUCOSE 154* 168* 208*   BUN 16 15 11   CREATININE 0.70 0.62 0.71   CALCIUM 9.0 8.6 8.2*     Recent Labs     04/19/20 2006   APTT 21.1*   INR 0.96               Imaging  DX-PORTABLE FLUORO > 1 HOUR   Final Result      Intraoperative evaluation of right femur fracture ORIF.      DX-KNEE 2- RIGHT   Final Result      Intraoperative evaluation of right femur fracture ORIF.      IR-US GUIDED PIV   Final Result    Ultrasound-guided PERIPHERAL IV INSERTION performed by    qualified nursing staff as above.            CT-KNEE W/O PLUS RECONS RIGHT   Final Result      Acute comminuted fracture of the distal femur with extension to the lateral femoral condyle with a linear fracture line extending to the intercondylar notch      DX-CHEST-LIMITED (1 VIEW)   Final Result         No acute cardiopulmonary abnormalities are identified.      DX-HIP-UNILATERAL-WITH PELVIS-1 VIEW RIGHT   Final Result         No proximal femur fracture.      DX-KNEE 2- RIGHT   Final Result      Acute comminuted fracture of the distal femur with questionable extension to the knee joint           Assessment/Plan  * Femur fracture, right (HCC)- (present on admission)  Assessment & Plan  Patient suffered a distal femoral fracture and this has now been repaired with ORIF.  The patient now is postoperative day #2.  Pain is still severe but more in the thigh region at this point.  She says she is unable to bend her knees.  She has been getting out of bed now and she is contemplating going home with home  health.  She feels she has the assistive devices which she will need a wheelchair.  This will be put in for her.  Continue at this point with pain management  Continue working with physical therapy and Occupational Therapy.  I have discussed her care with them and at this point they are going to determine whether the patient is really safe to go home.        Fall- (present on admission)  Assessment & Plan  Mechanical fall without loss of consciousness or chest pain.  Patient continues at this point postoperatively with pain management.  She will need at this point home health to help her with regaining her balance and stamina.      Hyperglycemia- (present on admission)  Assessment & Plan  This was a stress response.  Hemoglobin A1c is only 5.9.      History of breast cancer  Assessment & Plan  Status post mastectomy.  Continue to follow as an outpatient as scheduled.    History of lung cancer- (present on admission)  Assessment & Plan  Patient is currently in remission for the past 2 years.  She is status post surgery and radiation.  Continue follow-up as an outpatient with .  Currently patient is on room air.         VTE prophylaxis: lovenox

## 2020-04-22 NOTE — CARE PLAN
Problem: Communication  Goal: The ability to communicate needs accurately and effectively will improve  Outcome: PROGRESSING AS EXPECTED  Intervention: Educate patient and significant other/support system about the plan of care, procedures, treatments, medications and allow for questions  Note: Patient has been updated in regards to plan of care including medications/procedures for this shift. Answered all patients questions accordingly, verbalizes understanding. Communicates needs effectively with use of call light, hourly rounding in progress.      Problem: Safety  Goal: Will remain free from falls  Outcome: PROGRESSING AS EXPECTED  Intervention: Assess risk factors for falls  Note: Provided patient education about fall risk and importance of calling for assistance when wanting to get out of bed. Patient verbalizes understanding. Fall precautions in place: bed locked and in lowest position, patient wearing threaded footwear, call light within reach, bed alarm on

## 2020-04-22 NOTE — PROGRESS NOTES
Pt is alert and oriented, able to make needs known  Participating in PT/OT today  Stand pivot transfer to bedside commode for elimination  Ace wrap in place to RLE, CSMT+  Medicated per MAR  Anticipate discharge home with home health when ready

## 2020-04-22 NOTE — DISCHARGE PLANNING
66 yo female who sustained a ground level fall. She has a right femoral comminuted fracture, s/p ORIF. Patient had a previous remote right hip surgery.     Anticipated Discharge Disposition: home, to her prior living arrangement. Patent lives with her spouse. [facility] for PT/OT evaluation and treatment.     Action: choice letter provided to patient, (she has no preference which home health agency to provide services. Referral faxed to [facility].     Barriers to Discharge: waiting for clinical improvement    Plan: patient to discharge home with her spouse when medically cleared. [facility] PT/OT to evaluate and treat patient. DME, to be delivered to bedside.     CM following for all discharge needs.     Cailin RIVERA, RN, CCM  [email or web address]  [phone]    Addendum: 4/22/20@ 1446: Patient has been accepted by [facility] for PT/OT to evaluate and treat. DME providers, [facility] and [facility] are pending insurance verification for wheelchair.     CM following, patient is anticipated to discharge in cary. Two days.     Cailin RIVERA, RN, CCM

## 2020-04-22 NOTE — THERAPY
"Occupational Therapy Treatment completed with focus on ADLs, ADL transfers and patient education.  Functional Status: Yola for LE dressing with AE, Yola for toilet transfer, spv for toileting  Plan of Care: Will benefit from Occupational Therapy 5 times per week  Discharge Recommendations:  Equipment Will Continue to Assess for Equipment Needs. Post-acute therapy Recommend home health for continued occupational therapy services.     Pt greeted seated in chair, alert, and agreeable to OT tx with encouragement. Pt able to doff socks with reacher with spv and don socks with sock aid with Yola. Pt reported she would like to obtain sock aid and already has reacher at home. Pt provided a handout for further information on obtaining AE. Pt able to stand from chair with Yola and use of FWW. Pt ambulated to bathroom, with good adherence to TTWB RLE with CGA. Pt transferred onto toilet with Yola and required verbal cues for safety/technique. Pt completed toileting with spv. Pt ambulated back to chair and sat with CGA. Pt reports  is retired and available to assist with all ADLs/IADLs as needed. Pt educated on use of AE for showering, toileting, and dressing. Pt verbalized understanding/agreement. Pt would benefit from continued acute OT tx to address shower transfers, standing ADLs, and activity tolerance. As pt is demonstrating ability to complete ADLs with Yola and  available to assist, recommending home health for continued occupational therapy services.       See \"Rehab Therapy-Acute\" Patient Summary Report for complete documentation.   "

## 2020-04-22 NOTE — FACE TO FACE
Face to Face Supporting Documentation - Home Health    The encounter with this patient was in whole or in part the primary reason for home health admission.    Date of encounter:   Patient:                    MRN:                       YOB: 2019  Carmelina Leblanc  [id number]  1955     Home health to see patient for:  Skilled Nursing care for assessment, interventions & education    Skilled need for:  Surgical Aftercare right distal femoral fracture with ORIF    Skilled nursing interventions to include:  Wound Care    Homebound status evidenced by:  Need the aid of supportive devices such as crutches, canes, wheelchairs or walkers. Leaving home requires a considerable and taxing effort. There is a normal inability to leave the home.    Community Physician to provide follow up care: HERMILO Rios     Optional Interventions? Yes, Details: wound care, pain management, ambulation and therapy management      I certify the face to face encounter for this home health care referral meets the CMS requirements and the encounter/clinical assessment with the patient was, in whole, or in part, for the medical condition(s) listed above, which is the primary reason for home health care. Based on my clinical findings: the service(s) are medically necessary, support the need for home health care, and the homebound criteria are met.  I certify that this patient has had a face to face encounter by myself.  Michael Hector M.D. - NPI: [id number]

## 2020-04-22 NOTE — DISCHARGE PLANNING
ATTN: Case Management  RE: Referral for Home Health    As of 4/22/2020, we have accepted the Home Health referral for the patient listed above.    A [facility] clinician will be out to see the patient within 48 hours. If you have any questions or concerns regarding the patient’s transition to Home Health, please do not hesitate to contact us at [phone].      We look forward to collaborating with you,  [facility] Home Health Team

## 2020-04-22 NOTE — CARE PLAN
Problem: Bowel/Gastric:  Goal: Normal bowel function is maintained or improved  Outcome: PROGRESSING SLOWER THAN EXPECTED     No BM yet, drinking prune juice  Pt does not want to talk milk of magnesia- education provided    Problem: Pain Management  Goal: Pain level will decrease to patient's comfort goal  Outcome: PROGRESSING AS EXPECTED     Pt resting comfortably in chair  Calls appropriate for pain management

## 2020-04-23 ENCOUNTER — APPOINTMENT (OUTPATIENT)
Dept: RADIOLOGY | Facility: MEDICAL CENTER | Age: 65
DRG: 482 | End: 2020-04-23
Attending: HOSPITALIST
Payer: COMMERCIAL

## 2020-04-23 LAB
GLUCOSE BLD-MCNC: 105 MG/DL (ref 65–99)
GLUCOSE BLD-MCNC: 115 MG/DL (ref 65–99)
GLUCOSE BLD-MCNC: 120 MG/DL (ref 65–99)
GLUCOSE BLD-MCNC: 124 MG/DL (ref 65–99)

## 2020-04-23 PROCEDURE — 700111 HCHG RX REV CODE 636 W/ 250 OVERRIDE (IP): Performed by: HOSPITALIST

## 2020-04-23 PROCEDURE — 700102 HCHG RX REV CODE 250 W/ 637 OVERRIDE(OP): Performed by: INTERNAL MEDICINE

## 2020-04-23 PROCEDURE — 99232 SBSQ HOSP IP/OBS MODERATE 35: CPT | Performed by: HOSPITALIST

## 2020-04-23 PROCEDURE — 82962 GLUCOSE BLOOD TEST: CPT | Mod: 91

## 2020-04-23 PROCEDURE — A9270 NON-COVERED ITEM OR SERVICE: HCPCS | Performed by: HOSPITALIST

## 2020-04-23 PROCEDURE — 97116 GAIT TRAINING THERAPY: CPT | Mod: CQ

## 2020-04-23 PROCEDURE — 97535 SELF CARE MNGMENT TRAINING: CPT

## 2020-04-23 PROCEDURE — A9270 NON-COVERED ITEM OR SERVICE: HCPCS | Performed by: INTERNAL MEDICINE

## 2020-04-23 PROCEDURE — 97530 THERAPEUTIC ACTIVITIES: CPT | Mod: CQ

## 2020-04-23 PROCEDURE — 770006 HCHG ROOM/CARE - MED/SURG/GYN SEMI*

## 2020-04-23 PROCEDURE — 700102 HCHG RX REV CODE 250 W/ 637 OVERRIDE(OP): Performed by: HOSPITALIST

## 2020-04-23 RX ORDER — BISACODYL 10 MG
10 SUPPOSITORY, RECTAL RECTAL ONCE
Status: COMPLETED | OUTPATIENT
Start: 2020-04-23 | End: 2020-04-23

## 2020-04-23 RX ADMIN — BISACODYL 10 MG: 10 SUPPOSITORY RECTAL at 10:09

## 2020-04-23 RX ADMIN — ACETAMINOPHEN 650 MG: 325 TABLET, FILM COATED ORAL at 20:39

## 2020-04-23 RX ADMIN — ENOXAPARIN SODIUM 30 MG: 30 INJECTION SUBCUTANEOUS at 12:05

## 2020-04-23 RX ADMIN — SENNOSIDES AND DOCUSATE SODIUM 2 TABLET: 8.6; 5 TABLET ORAL at 18:37

## 2020-04-23 ASSESSMENT — ENCOUNTER SYMPTOMS
POLYDIPSIA: 0
EYE PAIN: 0
FLANK PAIN: 0
CONSTIPATION: 1
DEPRESSION: 1
TINGLING: 0
NAUSEA: 1
INSOMNIA: 1
CHILLS: 0
BLURRED VISION: 0
NEUROLOGICAL NEGATIVE: 1
NERVOUS/ANXIOUS: 1
VOMITING: 0
EYE DISCHARGE: 0
ABDOMINAL PAIN: 1
WEIGHT LOSS: 0
PALPITATIONS: 0
BRUISES/BLEEDS EASILY: 0
CONSTITUTIONAL NEGATIVE: 1
DIZZINESS: 0
SINUS PAIN: 0
SPUTUM PRODUCTION: 0
HEADACHES: 0
BLOOD IN STOOL: 0
HEARTBURN: 0
RESPIRATORY NEGATIVE: 1
COUGH: 0
WEAKNESS: 0
EYES NEGATIVE: 1
SPEECH CHANGE: 0
STRIDOR: 0
CARDIOVASCULAR NEGATIVE: 1
FEVER: 0
SHORTNESS OF BREATH: 0

## 2020-04-23 ASSESSMENT — COGNITIVE AND FUNCTIONAL STATUS - GENERAL
STANDING UP FROM CHAIR USING ARMS: A LITTLE
DAILY ACTIVITIY SCORE: 21
HELP NEEDED FOR BATHING: A LITTLE
CLIMB 3 TO 5 STEPS WITH RAILING: A LOT
SUGGESTED CMS G CODE MODIFIER MOBILITY: CL
MOVING FROM LYING ON BACK TO SITTING ON SIDE OF FLAT BED: A LOT
TURNING FROM BACK TO SIDE WHILE IN FLAT BAD: A LOT
MOVING TO AND FROM BED TO CHAIR: UNABLE
TOILETING: A LITTLE
MOBILITY SCORE: 13
DRESSING REGULAR LOWER BODY CLOTHING: A LITTLE
SUGGESTED CMS G CODE MODIFIER DAILY ACTIVITY: CJ
WALKING IN HOSPITAL ROOM: A LITTLE

## 2020-04-23 ASSESSMENT — GAIT ASSESSMENTS
DEVIATION: STEP TO;BRADYKINETIC
ASSISTIVE DEVICE: FRONT WHEEL WALKER
GAIT LEVEL OF ASSIST: SUPERVISED
DISTANCE (FEET): 60

## 2020-04-23 ASSESSMENT — LIFESTYLE VARIABLES: SUBSTANCE_ABUSE: 0

## 2020-04-23 NOTE — CARE PLAN
Problem: Bowel/Gastric:  Goal: Normal bowel function is maintained or improved  Outcome: PROGRESSING SLOWER THAN EXPECTED     No BM yet, anticipate suppository today, pt is agreeable    Problem: Discharge Barriers/Planning  Goal: Patient's continuum of care needs will be met  Outcome: PROGRESSING AS EXPECTED     Discharge plan discussed with patient  Case management in communication with pt about discharge

## 2020-04-23 NOTE — DISCHARGE PLANNING
LSW was notified by bedside RN that pt's spouse had some questions, msg left for Riki ([phone]).

## 2020-04-23 NOTE — THERAPY
"Occupational Therapy Treatment completed with focus on ADLs and patient education.  Functional Status: setup for seated grooming/hygiene  Plan of Care: Will benefit from Occupational Therapy 5 times per week  Discharge Recommendations:  Equipment Will Continue to Assess for Equipment Needs. Post-acute therapy Recommend home health for continued occupational therapy services.     Pt greeted seated on BSC, alert, and worked up about not having recent BM. Pt reported she will not participate in therapy until she has BM. Pt only agreeable to education and seated ADLs. Pt sat in BSC and completed oral hygiene with setup. Pt had several questions regarding home setup and safety with functional mobility and ADLs. Pts questions answered. Pt states she has platform bed, requiring her to step up onto step to get into bed. Pt reports she would like to practice step in a future therapy session after BM. Pt also states that her  would like a call from CM and \"feels out of the loop.\" CM notified of request. Pt left seated in BSC, with personal items and call device within reach. Pt will benefit from continued acute OT tx 5x/week to address functional mobility, ADL transfers, and activity tolerance.    See \"Rehab Therapy-Acute\" Patient Summary Report for complete documentation.   "

## 2020-04-23 NOTE — FACE TO FACE
Face to Face Note  -  Durable Medical Equipment    Michael Hector M.D. - NPI: [id number]  I certify that this patient is under my care and that they had a durable medical equipment(DME)face to face encounter by myself that meets the physician DME face-to-face encounter requirements with this patient on:    Date of encounter:   Patient:                    MRN:                       YOB: 2019  Carmelina Leblanc  [id number]  1955     The encounter with the patient was in whole, or in part, for the following medical condition, which is the primary reason for durable medical equipment:  Post-Op Surgery    I certify that, based on my findings, the following durable medical equipment is medically necessary:  Wheel Chair.    HOME O2 Saturation Measurements:(Values must be present for Home Oxygen orders)         ,     ,         My Clinical findings support the need for the above equipment due to:  Abnormal Gait    Supporting Symptoms: abnormal gait and TTWB for 6 weeks

## 2020-04-23 NOTE — PROGRESS NOTES
"   Orthopaedic Progress Note    Interval changes:  Patient doing well  Cleared by ortho for DC home pending medicine and therapy clearance  DC home with home health services likely tomorrow    ROS - Patient denies any new issues.  Pain well controlled.    /57   Pulse 93   Temp 36.1 °C (97 °F) (Temporal)   Resp 15   Ht 1.549 m (5' 1\")   Wt 86.2 kg (190 lb)   SpO2 94%       Patient seen and examined  No acute distress  Breathing non labored  RRR  RLE surgical dressing is clean, dry, and intact. Patient clearly fires tibialis anterior, EHL, and gastrocnemius/soleus. Sensation is intact to light touch throughout superficial peroneal, deep peroneal, tibial, saphenous, and sural nerve distributions. Strong and palpable 2+ dorsalis pedis and posterior tibial pulses with capillary refill less than 2 seconds. No lower leg tenderness or discomfort.       Recent Labs     04/21/20  0058   WBC 8.1   RBC 3.12*   HEMOGLOBIN 10.0*   HEMATOCRIT 30.2*   MCV 96.8   MCH 32.1   MCHC 33.1*   RDW 53.2*   PLATELETCT 184   MPV 9.3       Active Hospital Problems    Diagnosis   • Femur fracture, right (HCC) [S72.91XA]     Priority: High   • Fall [W19.XXXA]     Priority: High   • Hyperglycemia [R73.9]     Priority: Medium   • History of breast cancer [Z85.3]     Priority: Low   • History of lung cancer [Z85.118]     Priority: Low       Assessment/Plan:  Cleared by ortho for DC home pending medicine and therapy clearance  POD#3 S/P Open treatment with internal fixation of right supracondylar femur fracture with intercondylar extension  Wt bearing status - TTWB LRE ok to start ROM  Wound care/Drains - dressing left in place   Future Procedures - none planned   Lovenox: Start 4/21, Duration-until ambulatory > 150'  Sutures/Staples out- 14 days post operatively  PT/OT-initiated  Antibiotics: completed  DVT Prophylaxis- TEDS/SCDs/Foot pumps  Rich-none  Case Coordination for Discharge Planning - Disposition home   "

## 2020-04-23 NOTE — PROGRESS NOTES
[facility] Medicine Daily Progress Note    Date of Service  4/23/2020    Chief Complaint  65 y.o. female admitted 4/19/2020 with right leg pain.    Hospital Course    4/21/2020-patient is a 65-year-old female with previous right hip replacement.  The patient apparently had a fall event.  It was not caused by loss of consciousness or chest pain.  The patient landed on her right leg and was not able to ambulate on it.  The patient was brought to the hospital for evaluation and she is found at this point to have a distal right femoral comminuted fracture.  The patient's fracture was surgically repaired.  The patient will need at this point physical therapy occupational therapy evaluation and placement to a skilled nursing facility for rehabilitation.  4/22/2020- patient today is more mentally alert.  She is also more physically strong.  She is been able to get out of bed with assistance into a chair.  She is also been able to get up out of her chair with a walker using one person and able to get to the bathroom independently.  She is at this point with minimal pain according to her.  She is deciding between going to skilled versus going home with home health and assistive devices  4/23/2020-patient today is sitting up on the commode.  She is extremely tearful as overnight she has had severe abdominal pain and she has not been able to have a bowel movement she says she has been passing gas.  But the pain is severe.  At this point we are going to try to give her a suppository followed by an enema.  She has decided she would like to go home once she is had a bowel movement.  We will try to get her assistive devices set up..      Interval Problem Update  Suppository with a enema.  Arrange outpatient assistive devices  Pain management as needed  PT OT as tolerated  She is more depressed and at this point will continue working with her for her depression as well.    Consultants/Specialty  Orthopedics    Code Status  Full  code    Disposition  Possibly home with home health in the next day or 2.    Review of Systems  Review of Systems   Constitutional: Negative.  Negative for chills, fever, malaise/fatigue and weight loss.   HENT: Negative.  Negative for ear discharge, ear pain, hearing loss and sinus pain.    Eyes: Negative.  Negative for blurred vision, pain and discharge.   Respiratory: Negative.  Negative for cough, sputum production, shortness of breath and stridor.    Cardiovascular: Negative.  Negative for chest pain, palpitations and leg swelling.   Gastrointestinal: Positive for abdominal pain, constipation (more severe) and nausea. Negative for blood in stool, heartburn and vomiting.   Genitourinary: Negative.  Negative for dysuria and flank pain.   Musculoskeletal: Positive for joint pain (right knee and thigh 5/10).   Skin: Negative.    Neurological: Negative.  Negative for dizziness, tingling, speech change, weakness and headaches.   Endo/Heme/Allergies: Negative.  Negative for polydipsia. Does not bruise/bleed easily.   Psychiatric/Behavioral: Positive for depression. Negative for substance abuse and suicidal ideas. The patient is nervous/anxious and has insomnia.    All other systems reviewed and are negative.       Physical Exam  Temp:  [36.1 °C (97 °F)-37.1 °C (98.7 °F)] 36.1 °C (97 °F)  Pulse:  [] 93  Resp:  [15-18] 15  BP: (100-118)/(57-73) 100/57  SpO2:  [92 %-97 %] 94 %    Physical Exam  Vitals signs and nursing note reviewed. Exam conducted with a chaperone present.   Constitutional:       General: She is awake.      Appearance: Normal appearance. She is well-developed. She is obese. She is ill-appearing.   HENT:      Head: Normocephalic and atraumatic.      Right Ear: External ear normal. There is no impacted cerumen.      Left Ear: External ear normal. There is no impacted cerumen.      Nose: Nose normal. No congestion.      Mouth/Throat:      Mouth: Mucous membranes are moist.      Pharynx: Oropharynx is  clear. No oropharyngeal exudate or posterior oropharyngeal erythema.   Eyes:      General:         Right eye: No discharge.         Left eye: No discharge.      Extraocular Movements: Extraocular movements intact.      Conjunctiva/sclera: Conjunctivae normal.      Pupils: Pupils are equal, round, and reactive to light.   Neck:      Musculoskeletal: Normal range of motion and neck supple. No neck rigidity or muscular tenderness.      Thyroid: No thyromegaly.      Vascular: No JVD.   Cardiovascular:      Rate and Rhythm: Normal rate and regular rhythm.      Pulses: Normal pulses.      Heart sounds: Normal heart sounds. No murmur. No friction rub.   Pulmonary:      Effort: Pulmonary effort is normal.      Breath sounds: Normal breath sounds.   Abdominal:      General: Abdomen is flat. Bowel sounds are normal.      Palpations: Abdomen is soft.   Musculoskeletal:         General: Swelling and tenderness present.      Right hip: She exhibits decreased range of motion and decreased strength.      Right knee: She exhibits decreased range of motion and swelling. Tenderness found.   Lymphadenopathy:      Cervical: No cervical adenopathy.   Skin:     General: Skin is warm and dry.      Capillary Refill: Capillary refill takes 2 to 3 seconds.      Coloration: Skin is not pale.      Findings: No rash.   Neurological:      General: No focal deficit present.      Mental Status: She is alert and oriented to person, place, and time. Mental status is at baseline.      Cranial Nerves: No cranial nerve deficit.      Deep Tendon Reflexes: Reflexes are normal and symmetric.   Psychiatric:         Attention and Perception: Attention and perception normal.         Mood and Affect: Mood is anxious and depressed. Affect is tearful.         Speech: Speech normal.         Behavior: Behavior normal. Behavior is cooperative.         Thought Content: Thought content normal.         Cognition and Memory: Cognition and memory normal.          Judgment: Judgment normal.         Fluids    Intake/Output Summary (Last 24 hours) at 4/23/2020 1118  Last data filed at 4/22/2020 2036  Gross per 24 hour   Intake 480 ml   Output --   Net 480 ml       Laboratory  Recent Labs     04/21/20  0058   WBC 8.1   RBC 3.12*   HEMOGLOBIN 10.0*   HEMATOCRIT 30.2*   MCV 96.8   MCH 32.1   MCHC 33.1*   RDW 53.2*   PLATELETCT 184   MPV 9.3     Recent Labs     04/21/20  0058   SODIUM 140   POTASSIUM 4.6   CHLORIDE 104   CO2 23   GLUCOSE 208*   BUN 11   CREATININE 0.71   CALCIUM 8.2*                   Imaging  DX-PORTABLE FLUORO > 1 HOUR   Final Result      Intraoperative evaluation of right femur fracture ORIF.      DX-KNEE 2- RIGHT   Final Result      Intraoperative evaluation of right femur fracture ORIF.      IR-US GUIDED PIV   Final Result    Ultrasound-guided PERIPHERAL IV INSERTION performed by    qualified nursing staff as above.            CT-KNEE W/O PLUS RECONS RIGHT   Final Result      Acute comminuted fracture of the distal femur with extension to the lateral femoral condyle with a linear fracture line extending to the intercondylar notch      DX-CHEST-LIMITED (1 VIEW)   Final Result         No acute cardiopulmonary abnormalities are identified.      DX-HIP-UNILATERAL-WITH PELVIS-1 VIEW RIGHT   Final Result         No proximal femur fracture.      DX-KNEE 2- RIGHT   Final Result      Acute comminuted fracture of the distal femur with questionable extension to the knee joint           Assessment/Plan  * Femur fracture, right (HCC)- (present on admission)  Assessment & Plan  Patient now is postoperative day #3 after ORIF of distal right femoral fracture.  Patient's pain is down to 5 out of 10 and is mostly in her mid thigh.  Patient at this point has decided she would like to go home with her .  Assistive devices at this point are being set up for her including a wheelchair.  The wheelchair duration will need to be about 6 weeks at which point in time she is  toe-touch weightbearing only.  Pain management at this point will need to be optimized.  And she will need some pain medications to go home with as well.          Fall- (present on admission)  Assessment & Plan  Fall was initially secondary to mechanical cause.  The patient did not lose consciousness or have cardiac issues.  At this point physical therapy and occupational therapy have worked with her and she will need home therapy 5 times per week to continue with her rehabilitation.      Hyperglycemia- (present on admission)  Assessment & Plan  Initial blood sugar was elevated secondary to stress response this has now resolved.  Hemoglobin A1c is only 5.9.      History of breast cancer  Assessment & Plan  Status post mastectomy and in remission for the past 2 years.    History of lung cancer- (present on admission)  Assessment & Plan  In remission for the past 2 years.  Patient follows with oncology as an outpatient.  She does not need supplemental oxygen.         VTE prophylaxis: lovenox

## 2020-04-23 NOTE — CARE PLAN
Problem: Communication  Goal: The ability to communicate needs accurately and effectively will improve  Outcome: PROGRESSING AS EXPECTED  Intervention: Educate patient and significant other/support system about the plan of care, procedures, treatments, medications and allow for questions  Note: Patient has been updated in regards to plan of care including medications/procedures for this shift. Answered all patients questions accordingly, verbalizes understanding. Communicates needs effectively with use of call light, hourly rounding in progress.      Problem: Bowel/Gastric:  Goal: Normal bowel function is maintained or improved  Outcome: PROGRESSING SLOWER THAN EXPECTED  Intervention: Educate patient and significant other/support system about diet, fluid intake, medications and activity to promote bowel function  Note: Patient has not had a proper bowel movement since the 18th, bowel protocol initiated, pt complaining of abdominal cramping but passing gas. Had a small smear this evening, pt educated in regards to continuing to take stool softeners/laxatives. Verbalizes understanding.

## 2020-04-24 ENCOUNTER — PATIENT OUTREACH (OUTPATIENT)
Dept: HEALTH INFORMATION MANAGEMENT | Facility: OTHER | Age: 65
End: 2020-04-24

## 2020-04-24 VITALS
OXYGEN SATURATION: 99 % | HEART RATE: 100 BPM | BODY MASS INDEX: 35.87 KG/M2 | WEIGHT: 190 LBS | RESPIRATION RATE: 18 BRPM | TEMPERATURE: 97.8 F | SYSTOLIC BLOOD PRESSURE: 95 MMHG | HEIGHT: 61 IN | DIASTOLIC BLOOD PRESSURE: 60 MMHG

## 2020-04-24 LAB
GLUCOSE BLD-MCNC: 108 MG/DL (ref 65–99)
GLUCOSE BLD-MCNC: 123 MG/DL (ref 65–99)
GLUCOSE BLD-MCNC: 138 MG/DL (ref 65–99)

## 2020-04-24 PROCEDURE — 82962 GLUCOSE BLOOD TEST: CPT

## 2020-04-24 PROCEDURE — 97530 THERAPEUTIC ACTIVITIES: CPT | Mod: CQ

## 2020-04-24 PROCEDURE — 700111 HCHG RX REV CODE 636 W/ 250 OVERRIDE (IP): Performed by: HOSPITALIST

## 2020-04-24 PROCEDURE — 97116 GAIT TRAINING THERAPY: CPT | Mod: CQ

## 2020-04-24 PROCEDURE — 99239 HOSP IP/OBS DSCHRG MGMT >30: CPT | Performed by: HOSPITALIST

## 2020-04-24 RX ORDER — ACETAMINOPHEN 325 MG/1
650 TABLET ORAL EVERY 6 HOURS PRN
Qty: 30 TAB | Refills: 0
Start: 2020-04-24 | End: 2021-03-11

## 2020-04-24 RX ORDER — AMOXICILLIN 250 MG
2 CAPSULE ORAL 2 TIMES DAILY
Qty: 30 TAB | Refills: 0
Start: 2020-04-24 | End: 2021-03-11

## 2020-04-24 RX ADMIN — ENOXAPARIN SODIUM 30 MG: 30 INJECTION SUBCUTANEOUS at 12:29

## 2020-04-24 RX ADMIN — ENOXAPARIN SODIUM 30 MG: 30 INJECTION SUBCUTANEOUS at 00:08

## 2020-04-24 ASSESSMENT — COGNITIVE AND FUNCTIONAL STATUS - GENERAL
CLIMB 3 TO 5 STEPS WITH RAILING: A LITTLE
WALKING IN HOSPITAL ROOM: A LITTLE
TURNING FROM BACK TO SIDE WHILE IN FLAT BAD: A LOT
SUGGESTED CMS G CODE MODIFIER MOBILITY: CK
MOVING FROM LYING ON BACK TO SITTING ON SIDE OF FLAT BED: A LITTLE
STANDING UP FROM CHAIR USING ARMS: A LITTLE
MOVING TO AND FROM BED TO CHAIR: UNABLE
MOBILITY SCORE: 15

## 2020-04-24 ASSESSMENT — GAIT ASSESSMENTS
DISTANCE (FEET): 40
GAIT LEVEL OF ASSIST: SUPERVISED
ASSISTIVE DEVICE: FRONT WHEEL WALKER

## 2020-04-24 NOTE — DISCHARGE PLANNING
Agency/Facility Name: [facility]   Spoke To: fax  Outcome: Unable to write order for longer than 6 months.  states that pt will need to buy wheelchair. Sent disregard referral.    LSW informed this [facility]

## 2020-04-24 NOTE — DISCHARGE INSTRUCTIONS
Discharge Instructions    Discharged to home by car with relative. Discharged via wheelchair, hospital escort: Yes.  Special equipment needed: Wheelchair    Be sure to schedule a follow-up appointment with your primary care doctor or any specialists as instructed.     Discharge Plan:   Diet Plan: Discussed  Activity Level: Discussed  Confirmed Follow up Appointment: Patient to Call and Schedule Appointment  Confirmed Symptoms Management: Discussed  Medication Reconciliation Updated: Yes  Influenza Vaccine Indication: Not indicated: Previously immunized this influenza season and > 8 years of age    I understand that a diet low in cholesterol, fat, and sodium is recommended for good health. Unless I have been given specific instructions below for another diet, I accept this instruction as my diet prescription.   Other diet: Regular    Special Instructions: Discharge instructions for the Orthopedic Patient    Follow up with Primary Care Physician within 2 weeks of discharge to home, regarding:  Review of medications and diagnostic testing.  Surveillance for medical complications.  Workup and treatment of osteoporosis, if appropriate.     -Is this a Hip/Knee/Shoulder Joint Replacement patient? No    -Is this patient being discharged with medication to prevent blood clots?  Yes, Aspirin Aspirin, ASA oral tablets  What is this medicine?  ASPIRIN (AS pir in) is a pain reliever. It is used to treat mild pain and fever. This medicine is also used as directed by a doctor to prevent and to treat heart attacks, to prevent strokes, and to treat arthritis or inflammation.  This medicine may be used for other purposes; ask your health care provider or pharmacist if you have questions.  COMMON BRAND NAME(S): Aspir-Low, Aspir-Yana, Aspirtab, Nathan Advanced Aspirin, Nathan Aspirin, Nathan Aspirin Extra Strength, Nathan Aspirin Plus, Nathan Extra Strength, Nathan Extra Strength Plus, Nathan Genuine Aspirin, Nathan Womens Aspirin, Bufferin,  Bufferin Extra Strength, Bufferin Low Dose  What should I tell my health care provider before I take this medicine?  They need to know if you have any of these conditions:  -anemia  -asthma  -bleeding problems  -child with chickenpox, the flu, or other viral infection  -diabetes  -gout  -if you frequently drink alcohol containing drinks  -kidney disease  -liver disease  -low level of vitamin K  -lupus  -smoke tobacco  -stomach ulcers or other problems  -an unusual or allergic reaction to aspirin, tartrazine dye, other medicines, dyes, or preservatives  -pregnant or trying to get pregnant  -breast-feeding  How should I use this medicine?  Take this medicine by mouth with a glass of water. Follow the directions on the package or prescription label. You can take this medicine with or without food. If it upsets your stomach, take it with food. Do not take your medicine more often than directed.  Talk to your pediatrician regarding the use of this medicine in children. While this drug may be prescribed for children as young as 12 years of age for selected conditions, precautions do apply. Children and teenagers should not use this medicine to treat chicken pox or flu symptoms unless directed by a doctor.  Patients over 65 years old may have a stronger reaction and need a smaller dose.  Overdosage: If you think you have taken too much of this medicine contact a poison control center or emergency room at once.  NOTE: This medicine is only for you. Do not share this medicine with others.  What if I miss a dose?  If you are taking this medicine on a regular schedule and miss a dose, take it as soon as you can. If it is almost time for your next dose, take only that dose. Do not take double or extra doses.  What may interact with this medicine?  Do not take this medicine with any of the following medications:  -cidofovir  -ketorolac  -probenecid  This medicine may also interact with the following  medications:  -alcohol  -alendronate  -bismuth subsalicylate  -flavocoxid  -herbal supplements like feverfew, garlic, penny, ginkgo biloba, horse chestnut  -medicines for diabetes or glaucoma like acetazolamide, methazolamide  -medicines for gout  -medicines that treat or prevent blood clots like enoxaparin, heparin, ticlopidine, warfarin  -other aspirin and aspirin-like medicines  -NSAIDs, medicines for pain and inflammation, like ibuprofen or naproxen  -pemetrexed  -sulfinpyrazone  -varicella live vaccine  This list may not describe all possible interactions. Give your health care provider a list of all the medicines, herbs, non-prescription drugs, or dietary supplements you use. Also tell them if you smoke, drink alcohol, or use illegal drugs. Some items may interact with your medicine.  What should I watch for while using this medicine?  If you are treating yourself for pain, tell your doctor or health care professional if the pain lasts more than 10 days, if it gets worse, or if there is a new or different kind of pain. Tell your doctor if you see redness or swelling. Also, check with your doctor if you have a fever that lasts for more than 3 days. Only take this medicine to prevent heart attacks or blood clotting if prescribed by your doctor or health care professional.  Do not take aspirin or aspirin-like medicines with this medicine. Too much aspirin can be dangerous. Always read the labels carefully.  This medicine can irritate your stomach or cause bleeding problems. Do not smoke cigarettes or drink alcohol while taking this medicine. Do not lie down for 30 minutes after taking this medicine to prevent irritation to your throat.  If you are scheduled for any medical or dental procedure, tell your healthcare provider that you are taking this medicine. You may need to stop taking this medicine before the procedure.  This medicine may be used to treat migraines. If you take migraine medicines for 10 or more  days a month, your migraines may get worse. Keep a diary of headache days and medicine use. Contact your healthcare professional if your migraine attacks occur more frequently.  What side effects may I notice from receiving this medicine?  Side effects that you should report to your doctor or health care professional as soon as possible:  -allergic reactions like skin rash, itching or hives, swelling of the face, lips, or tongue  -breathing problems  -changes in hearing, ringing in the ears  -confusion  -general ill feeling or flu-like symptoms  -pain on swallowing  -redness, blistering, peeling or loosening of the skin, including inside the mouth or nose  -signs and symptoms of bleeding such as bloody or black, tarry stools; red or dark-brown urine; spitting up blood or brown material that looks like coffee grounds; red spots on the skin; unusual bruising or bleeding from the eye, gums, or nose  -trouble passing urine or change in the amount of urine  -unusually weak or tired  -yellowing of the eyes or skin  Side effects that usually do not require medical attention (report to your doctor or health care professional if they continue or are bothersome):  -diarrhea or constipation  -headache  -nausea, vomiting  -stomach gas, heartburn  This list may not describe all possible side effects. Call your doctor for medical advice about side effects. You may report side effects to FDA at 4-41956.  Where should I keep my medicine?  Keep out of the reach of children.  Store at room temperature between 15 and 30 degrees C (59 and 86 degrees F). Protect from heat and moisture. Do not use this medicine if it has a strong vinegar smell. Throw away any unused medicine after the expiration date.  NOTE: This sheet is a summary. It may not cover all possible information. If you have questions about this medicine, talk to your doctor, pharmacist, or health care provider.  © 2018 [employer] (2014-08-19  11:30:31)      · Is patient discharged on Warfarin / Coumadin?   No Fracture Care, Generic  The 206 bones in our body are important for supporting our body (skeleton) and also for production of blood cells by the bone marrow. A fracture is a break in a tissue. A tissue is a collection of cells that performs a function or job in our body. We most commonly think of fractures in bones.  When a bone is broken, or fractured, it affects not only blood production and function. There may also be other damage when structures near the bones are injured.  There are three main types of fractures:  Open - where there is a wound leading to the fracture site or the bone is protruding from the skin.   Closed - where the bone has fractured but has no external wound.   Complicated - this may involve damage to associated vital organs and major blood vessels as a result of the fracture.   Fractures are usually managed by keeping the bones in place long enough for them to heal. This is usually done with splints or casts. Sometimes surgery is required and pins, plates and screws may be used to hold fractures in proper position. The amount of time it takes a fracture to heal depends mostly on the age and health of the patient.  Young children are prone to fractures. These fractures heal rather quickly. The common fractures suffered by children tend to be associated with the arms and wrists. As young bones do not harden for some years, children's fractures tend to 'bend and splinter', similar to a broken branch on a tree. This the reason for the name, 'greenstick fracture'.  As we grow older, there may be a loss of bone known as osteopenia or osteoporosis. These conditions make breaking a bone much easier. Sometimes a minor accident or simply over-use may produce a fracture. These fractures do not heal as fast a younger person's.  SYMPTOMS  The signs and symptoms of fractures of bones depend on how bad the injury is. If shock is present,  there may be pale, cool, clammy skin with a rapid, weak pulse. There is usually pain and tenderness in the area of the fracture. There may or may not be deformity of the bone. There may be injury to surrounding tissues.  TREATMENT  Care and treatment of fractures relies on immobilization and adequate splinting of the injury. If the fracture is complex, the wound associated with an open fracture may be difficult to handle without professional help.   If the pulse to the end part of the limb (distal pulse) cannot be restored by gentle traction, then the limb should be stabilized in its current position. Urgent ambulance transport should be obtained. Do not waste time with splinting.   Generally, fractured limbs should be made immobile and left for medical aid. In remote areas or some distance from medical aid, you may be required to treat as follows.   FRACTURED FOREARM  Check for pulse at the end part of the limb. If none - gentle traction until pulse returns   Treat any wounds   Pad bony prominences   Apply adequate splinting.   Secure above and below fracture, secure wrist.   Reassess pulse or return of color and/or warmth.   Elevate injury with arm sling.    FRACTURED UPPER ARM  Check for pulse at the end part of the limb, if none - gentle traction until pulse returns.   Treat any wounds.   Pad between arm and chest.   Apply 'collar and cuff' sling, secure above and below fracture firmly against chest with triangular bandages.   Reassess pulse or return of color and/or warmth.    FRACTURED LEG  Check for circulation and pulse at the end part of the limb (skin color and temperature). If no circulation, apply gentle traction until pulse or color returns.   Call '911' for an ambulance.   Treat any wounds.   Immobilize (keep it from moving) the limb.   Pad bony prominences.   Reassess circulation below injury.   FRACTURED PELVIS  Call '911' for an ambulance.   Check for pulses in both legs.   Bend legs at knees,  elevate lower legs slightly and support on pillows or something padded.   Support both hips with folded blankets either side.   Discourage attempts to urinate.   Care must be exercised with a suspected fractured pelvis. This injury may have serious complications. The casualty should always be transported by ambulance and not by alternative means unless absolutely necessary.   FRACTURED JAW  A common injury in certain contact sports is dislocation, or fracture of the lower jaw (mandible). The casualty will have pain in the jaw, be unable to speak properly, and may have trouble swallowing.   Call '911' for an ambulance.   Support the jaw.   Sit the injured person leaning slightly forward.   Rest the injured jaw on a pad held by the injured person.   DO NOT apply a bandage to support the jaw.   Observe the casualty carefully for signs of breathing difficulties and any indication that he or she is becoming drowsy or unconscious.   SLINGS  Slings are used to support an injured arm, or to supplement treatment for another injury such as fractured ribs. Generally, the most effective sling is made with a triangular bandage. Every first aid kit, no matter how small, should have at least one of these bandages.   Although triangular bandages are preferable, any material (tie, belt, or piece of thick twine or rope) can be used in an emergency. If no likely material is at hand, an injured arm can be adequately supported by inserting it inside the injured person's shirt or blouse. Similarly, a safety pin applied to a sleeve and secured to clothing on the chest may work well enough.   There are essentially three types of sling; the arm sling for injuries to the forearm, the elevated sling for injuries to the shoulder, and the 'collar-and-cuff' or clove hitch for injuries to the upper arm and as supplementary support to fractured ribs.   After application of any sling, always check the circulation to the limb by feeling for the  "pulse at the wrist, or by squeezing a fingernail and observing for change of color in the nail bed. All slings must be in a position that is comfortable for the injured person. Never force an arm into the 'right position'.   ARM SLING  Support the injured forearm approximately parallel to the ground with the wrist slightly higher than the elbow.   Place an opened triangular bandage between the body and the arm, with its apex towards the elbow.   Extend the upper point of the bandage over the shoulder on the uninjured side.   Bring the lower point up over the arm, across the shoulder on the injured side to join the upper point and tie firmly with a reef knot.   Ensure the elbow is secured by folding the excess bandage over the elbow and securing with a safety pin.   ELEVATED SLING  Support the injured arm with the elbow beside the body and the hand extended towards the uninjured shoulder.   Place an opened triangular bandage over the forearm and hand, with the apex towards the elbow.   Extend the upper point of the bandage over the uninjured shoulder.   Tuck the lower part of the bandage under the injured arm, bring it under the elbow and around the back and extend the lower point up to meet the upper point at the shoulder.   Tie firmly with a reef knot.   Secure the elbow by folding the excess material and applying a safety pin, then ensure that the sling is tucked under the arm giving firm support.   COLLAR-AND-CUFF (CLOVE HITCH)   Allow the elbow to hang naturally at the side and place the hand extended towards the shoulder on the uninjured side.   Form a clove hitch by forming two loops - one towards you, one away from you.   Put the loops together by sliding your hands under the loops and closing with a \"clapping\" motion. If you are experienced at forming a clove hitch, then apply a clove hitch directly on the wrist, but take care not to move the injured arm.   Slide the clove hitch over the hand and gently pull " it firmly to secure the wrist.   Extend the points of the bandage to either side of the neck and tie firmly with a reef knot.   Allow the arm to hang comfortably. For support to fractured ribs, apply triangular bandages around the body and upper arm to hold the arm firmly against the chest.   If your caregiver has given you a follow-up appointment, it is very important to keep that appointment. This includes any orthopedic referrals, physical therapy, and rehabilitation. Any delay in obtaining necessary care could result in a delay or failure of the bones to heal. Not keeping the appointment could result in a chronic or permanent injury, pain, and disability. If there is any problem keeping the appointment, you must call back to this facility for assistance.       Depression / Suicide Risk    As you are discharged from this [facility] facility, it is important to learn how to keep safe from harming yourself.    Recognize the warning signs:  · Abrupt changes in personality, positive or negative- including increase in energy   · Giving away possessions  · Change in eating patterns- significant weight changes-  positive or negative  · Change in sleeping patterns- unable to sleep or sleeping all the time   · Unwillingness or inability to communicate  · Depression  · Unusual sadness, discouragement and loneliness  · Talk of wanting to die  · Neglect of personal appearance   · Rebelliousness- reckless behavior  · Withdrawal from people/activities they love  · Confusion- inability to concentrate     If you or a loved one observes any of these behaviors or has concerns about self-harm, here's what you can do:  · Talk about it- your feelings and reasons for harming yourself  · Remove any means that you might use to hurt yourself (examples: pills, rope, extension cords, firearm)  · Get professional help from the community (Mental Health, Substance Abuse, psychological counseling)  · Do not be alone:Call your Safe Contact-  someone whom you trust who will be there for you.  · Call your local CRISIS HOTLINE 785-7719 or 831-096-1406  · Call your local Children's Mobile Crisis Response Team [place] (081) 041-4573 or www.[employer]  · Call the toll free National Suicide Prevention Hotlines   · National Suicide Prevention Lifeline 162-412-SXDF (0726)  · dscovered Line Network 800-SUICIDE (431-0484)

## 2020-04-24 NOTE — THERAPY
"Physical Therapy Treatment completed.   Bed Mobility:  Supine to Sit: (NT up in chair)  Transfers: Sit to Stand: Supervised  Gait: Level Of Assist: Supervised with Front-Wheel Walker       Plan of Care: Will benefit from Physical Therapy 5 times per week  Discharge Recommendations: Equipment: Front-Wheel Walker. Post-acute therapy Recommend home health transitional care for continued physical therapy services.     See \"Rehab Therapy-Acute\" Patient Summary Report for complete documentation.     Pt was pleasant and motivated to participate in therapy session. During transfers she completed with SPV but required reminders to kick RLE out to maintain TTWB. She performed gait with good carryover technique of TTWB today, therapist adjusted walker to help reduce strain on BUE. Provided extensive education on different techniques for single step into her house. She was able to go up step facing backward using fww versus hopping up forward due to BUE weakness. She performed with CGA strictly for safety and was able to maintain TTWB. She will benefit from home health and assist from  at home. Will continue to follow while in house.   "

## 2020-04-24 NOTE — PROGRESS NOTES
PIV discontinued. Discharge paperwork reviewed and all questions answered. Follow-up appointment discussed. Patient discharged with all personal belongings to spouse.

## 2020-04-24 NOTE — DISCHARGE PLANNING
Agency/Facility Name: [facility]  Spoke To: Aixa  Outcome: States that they still haven't received the updated orders. This CCA sent to another fax number and was told to call back an hour from now.    LSW informed

## 2020-04-24 NOTE — DISCHARGE PLANNING
Agency/Facility Name: [facility]  Spoke To: Aixa  Outcome: Received and now sending to further review. CCA will callback.    LSW informed

## 2020-04-24 NOTE — DISCHARGE PLANNING
Agency/Facility Name: [facility]  Spoke To: Felicita  Outcome: States they still haven't received updated orders.      LSW informed

## 2020-04-24 NOTE — DISCHARGE PLANNING
Agency/Facility Name: [facility]  Spoke To: Felicita  Outcome: Length of need must be longer than six months.    LSW informed

## 2020-04-24 NOTE — PROGRESS NOTES
Pt seen by MD today, new orders received  One time suppository administered for constipation yielded large BM  Pt transfers with FWW, continues TTWB status  Anticipate discharge home with HH when ready

## 2020-04-24 NOTE — CARE PLAN
Problem: Venous Thromboembolism (VTW)/Deep Vein Thrombosis (DVT) Prevention:  Goal: Patient will participate in Venous Thrombosis (VTE)/Deep Vein Thrombosis (DVT)Prevention Measures  Outcome: PROGRESSING AS EXPECTED     Problem: Mobility  Goal: Risk for activity intolerance will decrease  Outcome: PROGRESSING AS EXPECTED      Patient wears SCD's and ambulates through out the day. Will continue to monitor for decline in ambulation and educate on importance of ambulation

## 2020-04-24 NOTE — PROGRESS NOTES
"   Orthopaedic Progress Note    Interval changes:  Patient doing well  Cleared by ortho for DC home pending medicine and therapy clearance    ROS - Patient denies any new issues.  Pain well controlled.    BP (!) 95/60   Pulse 100   Temp 36.6 °C (97.8 °F) (Temporal)   Resp 18   Ht 1.549 m (5' 1\")   Wt 86.2 kg (190 lb)   SpO2 99%       Patient seen and examined  No acute distress  Breathing non labored  RRR  RLE surgical dressing falling off, dressings changed surgical incisions are well approximated and are dry and clean.  There is no erythema, induration, or signs of infection at any of the incision sites. Patient clearly fires tibialis anterior, EHL, and gastrocnemius/soleus. Sensation is intact to light touch throughout superficial peroneal, deep peroneal, tibial, saphenous, and sural nerve distributions. Strong and palpable 2+ dorsalis pedis and posterior tibial pulses with capillary refill less than 2 seconds. No lower leg tenderness or discomfort.        Active Hospital Problems    Diagnosis   • Femur fracture, right (HCC) [S72.91XA]     Priority: High   • Fall [W19.XXXA]     Priority: High   • Hyperglycemia [R73.9]     Priority: Medium   • History of breast cancer [Z85.3]     Priority: Low   • History of lung cancer [Z85.118]     Priority: Low       Assessment/Plan:  Cleared by ortho for DC home pending medicine and therapy clearance  POD#4 S/P Open treatment with internal fixation of right supracondylar femur fracture with intercondylar extension  Wt bearing status - TTWB LRE ok to start ROM  Wound care/Drains - dressing left in place   Future Procedures - none planned   Lovenox: Start 4/21, Duration-until ambulatory > 150'  Sutures/Staples out- 14 days post operatively  PT/OT-initiated  Antibiotics: completed  DVT Prophylaxis- TEDS/SCDs/Foot pumps  Rich-none  Case Coordination for Discharge Planning - Disposition home   "

## 2020-04-24 NOTE — DISCHARGE SUMMARY
Discharge Summary    CHIEF COMPLAINT ON ADMISSION  Chief Complaint   Patient presents with   • Knee Pain     Right knee pain from a mechanical fall on a dog toy       Reason for Admission  EMS     Admission Date  4/19/2020    CODE STATUS  Full Code    HPI & HOSPITAL COURSE  This is a 65 y.o. female here with right thigh pain. Was found to have right distal femoral fracture. She has previous right hip replacement.  The patient apparently had a fall event.  It was not caused by loss of consciousness or chest pain.  The patient landed on her right leg and was not able to ambulate on it.  The patient was brought to the hospital for evaluation and she is found at this point to have a distal right femoral comminuted fracture.  The patient's fracture was surgically repaired on 4/20/2020. She was evaluated by therapies and she is found to be able to continue therapy with Home Health and thus she will be discharged home with a wheelchair. Therapies and ASA for DVT prophylaxis. Pain is minimal and she is requesting no pain medications.     Therefore, she is discharged in good and stable condition to home with organized home healthcare and close outpatient follow-up.    The patient met 2-midnight criteria for an inpatient stay at the time of discharge.    Discharge Date  4/24/2020    FOLLOW UP ITEMS POST DISCHARGE  With Dr Ag as schedueld    DISCHARGE DIAGNOSES  Principal Problem:    Femur fracture, right (HCC) POA: Yes  Active Problems:    Fall POA: Yes    Hyperglycemia POA: Yes    History of lung cancer POA: Yes    History of breast cancer POA: Unknown  Resolved Problems:    * No resolved hospital problems. *      FOLLOW UP  No future appointments.  No follow-up provider specified.    MEDICATIONS ON DISCHARGE     Medication List      START taking these medications      Instructions   acetaminophen 325 MG Tabs  Commonly known as:  TYLENOL   Take 2 Tabs by mouth every 6 hours as needed (Mild Pain; (Pain scale 1-3); Temp  greater than 100.5 F).  Dose:  650 mg     aspirin EC 81 MG Tbec  Commonly known as:  ECOTRIN   Take 1 Tab by mouth every 12 hours for 30 days.  Dose:  81 mg     senna-docusate 8.6-50 MG Tabs  Commonly known as:  PERICOLACE or SENOKOT S   Take 2 Tabs by mouth 2 Times a Day.  Dose:  2 Tab            Allergies  Allergies   Allergen Reactions   • Sulfa Drugs Unspecified      RXN=As a child unsure of reaction   • Morphine      Nausea and vomiting   • Iodine Diarrhea and Nausea     Pt states reaction as a child to iodine not injected iodine       DIET  Orders Placed This Encounter   Procedures   • Diet Order Regular     Standing Status:   Standing     Number of Occurrences:   1     Order Specific Question:   Diet:     Answer:   Regular [1]       ACTIVITY  As tolerated.  Toe touch RIGHT leg for 6 weeks    CONSULTATIONS  Orthopedics Dr Ag    PROCEDURES  ORIF right distal femur 4/20/2020    LABORATORY  Lab Results   Component Value Date    SODIUM 140 04/21/2020    POTASSIUM 4.6 04/21/2020    CHLORIDE 104 04/21/2020    CO2 23 04/21/2020    GLUCOSE 208 (H) 04/21/2020    BUN 11 04/21/2020    CREATININE 0.71 04/21/2020        Lab Results   Component Value Date    WBC 8.1 04/21/2020    HEMOGLOBIN 10.0 (L) 04/21/2020    HEMATOCRIT 30.2 (L) 04/21/2020    PLATELETCT 184 04/21/2020        Total time of the discharge process exceeds 40 minutes.

## 2020-04-24 NOTE — THERAPY
"Physical Therapy Treatment completed.   Bed Mobility:  Supine to Sit: (NT up in chair)  Transfers: Sit to Stand: Supervised  Gait: Level Of Assist: Supervised with Front-Wheel Walker       Plan of Care: Will benefit from Physical Therapy 5 times per week  Discharge Recommendations: Equipment: Will Continue to Assess for Equipment Needs. Post-acute therapy Recommend home health transitional care for continued physical therapy services.     See \"Rehab Therapy-Acute\" Patient Summary Report for complete documentation.     Pt progressing well w/ therapy. Pt very upset today about not having a BM and that her  is out of the loop. Long discussion w/ pt about concerns and how we will assist her. Pt more calm as tx went on. Pt needing a lot of education on how this rehab is different from her PA because she is not TTWB. Pt did need a lot of cues to maintain but states that she will only need to ambulate certain parts of the house. Pt wanting to trial stairs tomorrow d/t being very upset today. Pt educated on how to have her son bump her up the steps in the w/c. Pt has a lot of support at home. She is currently at a level in which she will be able to DC home w/ HH.  "

## 2020-04-25 ENCOUNTER — HOME CARE VISIT (OUTPATIENT)
Dept: HOME HEALTH SERVICES | Facility: HOME HEALTHCARE | Age: 65
End: 2020-04-25
Payer: COMMERCIAL

## 2020-04-25 VITALS
HEART RATE: 88 BPM | HEIGHT: 61 IN | TEMPERATURE: 97.8 F | DIASTOLIC BLOOD PRESSURE: 65 MMHG | OXYGEN SATURATION: 99 % | BODY MASS INDEX: 35.87 KG/M2 | RESPIRATION RATE: 16 BRPM | WEIGHT: 190 LBS | SYSTOLIC BLOOD PRESSURE: 110 MMHG

## 2020-04-25 PROCEDURE — 665001 SOC-HOME HEALTH

## 2020-04-25 PROCEDURE — G0493 RN CARE EA 15 MIN HH/HOSPICE: HCPCS

## 2020-04-25 SDOH — ECONOMIC STABILITY: HOUSING INSECURITY
HOME SAFETY: REVIEWED FALL RISK AND SAFETY REGARDING AREA RUGS AND FLOOR COVERINGS FOR FRONT AREA DUE TO DOG AND FLOORING.

## 2020-04-25 ASSESSMENT — PATIENT HEALTH QUESTIONNAIRE - PHQ9
2. FEELING DOWN, DEPRESSED, IRRITABLE, OR HOPELESS: 00
CLINICAL INTERPRETATION OF PHQ2 SCORE: 0
1. LITTLE INTEREST OR PLEASURE IN DOING THINGS: 00

## 2020-04-25 ASSESSMENT — ACTIVITIES OF DAILY LIVING (ADL)
AMBULATION ASSISTANCE: STAND BY ASSIST
TRANSPORTATION COMMENTS: FATIGUES EASILY
CURRENT_FUNCTION: ONE PERSON
OASIS_M1830: 02

## 2020-04-25 ASSESSMENT — ENCOUNTER SYMPTOMS
SHORTNESS OF BREATH: T
NAUSEA: DENIES
LIMITED RANGE OF MOTION: 1
MUSCLE WEAKNESS: 1
VOMITING: DENIES

## 2020-04-25 ASSESSMENT — FIBROSIS 4 INDEX: FIB4 SCORE: 1.167508023204554655

## 2020-04-25 NOTE — PROGRESS NOTES
Pharmacy: meds reconciled    Primary Dx: Fall result femur fx and post op, hx of lung and breast cancer    Skilled Need: sn assessment, safety, fall prevention, education and management of meds, pain, bm, incision    Zip Code: [address]    SNV Frequency:.  1w1, 2w1, 1w7    Disciplines Ordered: pt/ot, refused msw, hha, and diet    Insurance/Auth: rubén steward/bs    Certification Period: 4/25-6/23    Special Considerations:

## 2020-04-27 ENCOUNTER — ANTICOAGULATION MONITORING (OUTPATIENT)
Dept: MEDICAL GROUP | Facility: PHYSICIAN GROUP | Age: 65
End: 2020-04-27

## 2020-04-27 ENCOUNTER — HOME CARE VISIT (OUTPATIENT)
Dept: HOME HEALTH SERVICES | Facility: HOME HEALTHCARE | Age: 65
End: 2020-04-27
Payer: COMMERCIAL

## 2020-04-27 VITALS
TEMPERATURE: 97.8 F | DIASTOLIC BLOOD PRESSURE: 62 MMHG | OXYGEN SATURATION: 99 % | SYSTOLIC BLOOD PRESSURE: 100 MMHG | HEART RATE: 88 BPM | RESPIRATION RATE: 16 BRPM

## 2020-04-27 PROCEDURE — G0151 HHCP-SERV OF PT,EA 15 MIN: HCPCS

## 2020-04-27 SDOH — ECONOMIC STABILITY: HOUSING INSECURITY: HOME SAFETY: PT STATES EITHER SPOUSE OR SON CAN PROVIDE 24/7 SUPERVISION

## 2020-04-27 ASSESSMENT — ACTIVITIES OF DAILY LIVING (ADL)
AMBULATION ASSISTANCE: SUPERVISION
AMBULATION ASSISTANCE ON FLAT SURFACES: 1
AMBULATION ASSISTANCE: 1
PHYSICAL_TRANSFERS_DEVICES: FWW
PHYSICAL TRANSFERS ASSESSED: 1
CURRENT_FUNCTION: SUPERVISION
AMBULATION_DISTANCE/DURATION_TOLERATED: 20 FEET X 2

## 2020-04-27 ASSESSMENT — ENCOUNTER SYMPTOMS
DEBILITATING PAIN: 1
MUSCLE WEAKNESS: 1

## 2020-04-27 NOTE — PROGRESS NOTES
Received referral from [facility]. Medications reviewed. No clinically significant interactions noted.       She should continue to talk with her primary care provider or cardiologist about the risks/benefits of aspirin for primary prevention of cardiovascular disease or stroke.  She might not meet the threshold of 10% to warrant aspirin therapy.    Neto Trevino, PharmD, MS, [facility], [facility]    This note was created using voice recognition software (Dragon). The accuracy of the dictation is limited by the abilities of the software. I have reviewed the note prior to signing, however some errors in grammar and context are still possible. If you have any questions related to this note please do not hesitate to contact our office.

## 2020-04-28 ENCOUNTER — HOME CARE VISIT (OUTPATIENT)
Dept: HOME HEALTH SERVICES | Facility: HOME HEALTHCARE | Age: 65
End: 2020-04-28
Payer: COMMERCIAL

## 2020-04-28 VITALS
HEART RATE: 89 BPM | RESPIRATION RATE: 14 BRPM | SYSTOLIC BLOOD PRESSURE: 102 MMHG | OXYGEN SATURATION: 98 % | DIASTOLIC BLOOD PRESSURE: 60 MMHG | TEMPERATURE: 96.7 F

## 2020-04-28 PROCEDURE — G0495 RN CARE TRAIN/EDU IN HH: HCPCS

## 2020-04-28 ASSESSMENT — ENCOUNTER SYMPTOMS
NAUSEA: DENIES
VOMITING: DENIES

## 2020-04-29 ENCOUNTER — HOME CARE VISIT (OUTPATIENT)
Dept: HOME HEALTH SERVICES | Facility: HOME HEALTHCARE | Age: 65
End: 2020-04-29
Payer: COMMERCIAL

## 2020-04-29 VITALS
OXYGEN SATURATION: 96 % | RESPIRATION RATE: 16 BRPM | DIASTOLIC BLOOD PRESSURE: 66 MMHG | TEMPERATURE: 97 F | SYSTOLIC BLOOD PRESSURE: 112 MMHG | HEART RATE: 84 BPM

## 2020-04-29 PROCEDURE — G0152 HHCP-SERV OF OT,EA 15 MIN: HCPCS

## 2020-04-29 SDOH — ECONOMIC STABILITY: HOUSING INSECURITY
HOME SAFETY: PT DOES HAVE DOG THAT POSES A FALL RISK, DOG GOES TO DAYCARE SOME OF THE TIME. DISCUSSED FALL RISK W/ BR YHROW RUG, PT DEMO'S GOOD AWARENESS.

## 2020-04-29 ASSESSMENT — ACTIVITIES OF DAILY LIVING (ADL)
BATHING_CURRENT_FUNCTION: SUPERVISION
AMBULATION ASSISTANCE: SUPERVISION
GROOMING EQUIPMENT USED: SEATED AT SINK
DRESSING_UB_CURRENT_FUNCTION: INDEPENDENT
GROOMING_COMMENTS: SEATED AT SINK
FEEDING_WITHIN_DEFINED_LIMITS: 1
GROOMING_CURRENT_FUNCTION: INDEPENDENT
TOILETING EQUIPMENT USED: OVER TOILET COMMODE
GROOMING ASSESSED: 1
TOILETING: 1
BATHING EQUIPMENT USED: SHOWER CHAIR
GROOMING_WITHIN_DEFINED_LIMITS: 1
PHYSICAL TRANSFERS ASSESSED: 1
DRESSING_LB_CURRENT_FUNCTION: INDEPENDENT
CURRENT_FUNCTION: SUPERVISION
AMBULATION ASSISTANCE: 1
BATHING_WITHIN_DEFINED_LIMITS: 1
TOILETING: SUPERVISION
BATHING ASSESSED: 1
PREPARING MEALS: DEPENDENT

## 2020-05-01 ENCOUNTER — HOME CARE VISIT (OUTPATIENT)
Dept: HOME HEALTH SERVICES | Facility: HOME HEALTHCARE | Age: 65
End: 2020-05-01
Payer: COMMERCIAL

## 2020-05-01 VITALS
OXYGEN SATURATION: 99 % | HEART RATE: 88 BPM | DIASTOLIC BLOOD PRESSURE: 70 MMHG | TEMPERATURE: 97.8 F | SYSTOLIC BLOOD PRESSURE: 98 MMHG | RESPIRATION RATE: 16 BRPM

## 2020-05-01 PROCEDURE — G0151 HHCP-SERV OF PT,EA 15 MIN: HCPCS

## 2020-05-01 ASSESSMENT — ENCOUNTER SYMPTOMS: DEBILITATING PAIN: 1

## 2020-05-04 ENCOUNTER — HOME CARE VISIT (OUTPATIENT)
Dept: HOME HEALTH SERVICES | Facility: HOME HEALTHCARE | Age: 65
End: 2020-05-04
Payer: COMMERCIAL

## 2020-05-04 VITALS
HEART RATE: 91 BPM | DIASTOLIC BLOOD PRESSURE: 78 MMHG | RESPIRATION RATE: 18 BRPM | SYSTOLIC BLOOD PRESSURE: 114 MMHG | OXYGEN SATURATION: 98 % | TEMPERATURE: 97 F

## 2020-05-04 PROCEDURE — G0151 HHCP-SERV OF PT,EA 15 MIN: HCPCS

## 2020-05-04 ASSESSMENT — ENCOUNTER SYMPTOMS: MENTAL STATUS CHANGE: 0

## 2020-05-05 NOTE — DOCUMENTATION QUERY
[facility]                                                                       Query Response Note      PATIENT:               PERI ROBLES  ACCT #:                  [id number]  MRN:                     [id number]  :                      1955  ADMIT DATE:       2020 7:15 PM  DISCH DATE:        2020 4:17 PM  RESPONDING  PROVIDER #:        [id number]           QUERY TEXT:    Depression is documented in the Medical Record.  Please specify the type.    NOTE:  If an appropriate response is not listed below, please respond with a new note.              The patient's Clinical Indicators include:  Per Dr. Hector's progress note : She is more depressed and at this point will continue working with her for her depression as well.  Options provided:   -- MDD, recurrent, in full remission   -- MDD, single episode, in full remission   -- MDD, recurrent, in partial remission   -- MDD, single episode, in partial remission   -- MDD, mild, single episode   -- MDD, mild, recurrent, current episode   -- MDD, moderate, single episode   -- MDD, moderate, recurrent, current episode   -- MDD, severe, single episode, without psychotic features   -- MDD, severe, single episode, with psychotic features   -- MDD, severe, recurrent, current episode, without psychotic features   -- MDD, severe, recurrent, current episode, with psychotic features   -- Situational/Grief Reaction depression   -- Unable to determine      Query created by: Meredith Keith on 2020 4:57 PM    RESPONSE TEXT:    MDD, recurrent, in full remission          Electronically signed by:  BIANCA HECTOR MD 2020 7:13 AM

## 2020-05-07 ENCOUNTER — HOME CARE VISIT (OUTPATIENT)
Dept: HOME HEALTH SERVICES | Facility: HOME HEALTHCARE | Age: 65
End: 2020-05-07
Payer: COMMERCIAL

## 2020-05-07 VITALS
TEMPERATURE: 96.9 F | HEART RATE: 73 BPM | OXYGEN SATURATION: 98 % | SYSTOLIC BLOOD PRESSURE: 102 MMHG | RESPIRATION RATE: 16 BRPM | DIASTOLIC BLOOD PRESSURE: 68 MMHG

## 2020-05-07 PROCEDURE — G0495 RN CARE TRAIN/EDU IN HH: HCPCS

## 2020-05-07 ASSESSMENT — ENCOUNTER SYMPTOMS
NAUSEA: DENIES
VOMITING: DENIES

## 2020-05-07 NOTE — PROGRESS NOTES
Sonja, please send to: Lori Leonardo, APRN, Fax: [phone]    ACTION REQUIRED:     Patient's RLE is 3+ non-pitting edema r/t NWB status.  Patient does have compression stockings that she got from a lymphedema clinic several years ago for when she travels. They do not go all the way up the leg, they would hit about mid calf.     Do you recommend that patient wears these stockings (probably 30 mmHg) or would you prefer that we try  a 2-4 layer compression wrap?     Please advise on a course of action. Thank you!     ALE Flynn [facility]  [phone]

## 2020-05-08 ENCOUNTER — HOME CARE VISIT (OUTPATIENT)
Dept: HOME HEALTH SERVICES | Facility: HOME HEALTHCARE | Age: 65
End: 2020-05-08
Payer: COMMERCIAL

## 2020-05-08 VITALS
DIASTOLIC BLOOD PRESSURE: 72 MMHG | OXYGEN SATURATION: 99 % | SYSTOLIC BLOOD PRESSURE: 98 MMHG | RESPIRATION RATE: 16 BRPM | TEMPERATURE: 97.5 F | HEART RATE: 80 BPM

## 2020-05-08 PROCEDURE — G0151 HHCP-SERV OF PT,EA 15 MIN: HCPCS

## 2020-05-08 ASSESSMENT — ENCOUNTER SYMPTOMS: DEBILITATING PAIN: 1

## 2020-05-11 ENCOUNTER — HOME CARE VISIT (OUTPATIENT)
Dept: HOME HEALTH SERVICES | Facility: HOME HEALTHCARE | Age: 65
End: 2020-05-11
Payer: COMMERCIAL

## 2020-05-11 VITALS
RESPIRATION RATE: 16 BRPM | TEMPERATURE: 97.5 F | OXYGEN SATURATION: 98 % | DIASTOLIC BLOOD PRESSURE: 68 MMHG | HEART RATE: 88 BPM | SYSTOLIC BLOOD PRESSURE: 98 MMHG

## 2020-05-11 PROCEDURE — G0151 HHCP-SERV OF PT,EA 15 MIN: HCPCS

## 2020-05-11 NOTE — PROGRESS NOTES
ACTION REQUIRED:   Dr. Rod:   Patient's RLE is 3+ non-pitting edema r/t NWB status. Patient does have compression stockings that she got from a lymphedema clinic several years ago for when she travels. They do not go all the way up the leg, they would hit about mid calf.   Do you recommend that patient wears these stockings (probably 30 mmHg) or would you prefer that we try a 2-4 layer compression wrap?   Please advise on a course of  action. Thank you!   ALE Flynn [facility]   [phone]

## 2020-05-14 ENCOUNTER — HOSPITAL ENCOUNTER (OUTPATIENT)
Dept: RADIOLOGY | Facility: MEDICAL CENTER | Age: 65
End: 2020-05-14
Attending: NURSE PRACTITIONER
Payer: COMMERCIAL

## 2020-05-14 DIAGNOSIS — R22.41 KNEE MASS, RIGHT: ICD-10-CM

## 2020-05-14 PROCEDURE — 93971 EXTREMITY STUDY: CPT | Mod: RT

## 2020-05-15 ENCOUNTER — HOME CARE VISIT (OUTPATIENT)
Dept: HOME HEALTH SERVICES | Facility: HOME HEALTHCARE | Age: 65
End: 2020-05-15
Payer: COMMERCIAL

## 2020-05-15 VITALS
OXYGEN SATURATION: 97 % | RESPIRATION RATE: 16 BRPM | HEART RATE: 77 BPM | SYSTOLIC BLOOD PRESSURE: 102 MMHG | DIASTOLIC BLOOD PRESSURE: 64 MMHG | TEMPERATURE: 97.3 F

## 2020-05-15 PROCEDURE — A6452 HIGH COMPRES BAND W>=3"<5"YD: HCPCS

## 2020-05-15 PROCEDURE — G0299 HHS/HOSPICE OF RN EA 15 MIN: HCPCS

## 2020-05-15 ASSESSMENT — ENCOUNTER SYMPTOMS
VOMITING: DENIES
NAUSEA: DENIES

## 2020-05-22 ENCOUNTER — HOME CARE VISIT (OUTPATIENT)
Dept: HOME HEALTH SERVICES | Facility: HOME HEALTHCARE | Age: 65
End: 2020-05-22
Payer: COMMERCIAL

## 2020-05-22 VITALS
HEART RATE: 85 BPM | RESPIRATION RATE: 16 BRPM | DIASTOLIC BLOOD PRESSURE: 60 MMHG | OXYGEN SATURATION: 99 % | TEMPERATURE: 97 F | SYSTOLIC BLOOD PRESSURE: 98 MMHG

## 2020-05-22 PROCEDURE — G0299 HHS/HOSPICE OF RN EA 15 MIN: HCPCS

## 2020-05-22 PROCEDURE — A6452 HIGH COMPRES BAND W>=3"<5"YD: HCPCS

## 2020-05-22 ASSESSMENT — ENCOUNTER SYMPTOMS
NAUSEA: DENIES
VOMITING: DENIES

## 2020-05-28 ENCOUNTER — HOME CARE VISIT (OUTPATIENT)
Dept: HOME HEALTH SERVICES | Facility: HOME HEALTHCARE | Age: 65
End: 2020-05-28
Payer: COMMERCIAL

## 2020-05-28 PROCEDURE — G0299 HHS/HOSPICE OF RN EA 15 MIN: HCPCS

## 2020-05-28 PROCEDURE — 665001 SOC-HOME HEALTH

## 2020-05-28 ASSESSMENT — ENCOUNTER SYMPTOMS
NAUSEA: DENIES
VOMITING: DENIES
MUSCLE WEAKNESS: 1

## 2020-05-30 ENCOUNTER — HOME CARE VISIT (OUTPATIENT)
Dept: HOME HEALTH SERVICES | Facility: HOME HEALTHCARE | Age: 65
End: 2020-05-30
Payer: COMMERCIAL

## 2020-05-31 ENCOUNTER — HOME CARE VISIT (OUTPATIENT)
Dept: HOME HEALTH SERVICES | Facility: HOME HEALTHCARE | Age: 65
End: 2020-05-31
Payer: COMMERCIAL

## 2020-06-01 NOTE — PROGRESS NOTES
CM spoke with this patient at 0846AM - patient reporting that her foot was now discolored.  Discussed options of having nurse come out to check leg and apply 2 layer wrap instead of Tubigrip. Discussed patient elevating her foot for an hour to see if swelling/discoloration changed.  Patient stated she did not want wrap at this time, but verbalized that she knew to call this nurse if there was change. Agreed to wait for any further wraps/compression until after she had seen surgeon on Wed morning.        Patient text this nurse 20 min later to report that there was bleeding from the incision site. Patient was advised to contact PCP immediately to set up appt today. If unable to get into see PCP, patient was to go to ED for evaluation.

## 2020-06-01 NOTE — PROGRESS NOTES
On call RN note: Pt called  agency and told this nurse that she removed Tubigrip due to disomfort. Pt denied s/sx of DVT, swelling decreased, no localized pain/warmth/redness per pt. Pt told this nurse that her discomfort was 1/10 and only when she bent her knee. She stated that  would assist putting Tubigrip back on, and would contact HH if there's new pain/discomfort or any other concern.

## 2020-06-03 ENCOUNTER — HOME CARE VISIT (OUTPATIENT)
Dept: HOME HEALTH SERVICES | Facility: HOME HEALTHCARE | Age: 65
End: 2020-06-03
Payer: COMMERCIAL

## 2020-06-04 ENCOUNTER — HOME CARE VISIT (OUTPATIENT)
Dept: HOME HEALTH SERVICES | Facility: HOME HEALTHCARE | Age: 65
End: 2020-06-04
Payer: COMMERCIAL

## 2020-06-04 VITALS
DIASTOLIC BLOOD PRESSURE: 68 MMHG | HEART RATE: 96 BPM | OXYGEN SATURATION: 97 % | RESPIRATION RATE: 16 BRPM | SYSTOLIC BLOOD PRESSURE: 100 MMHG | TEMPERATURE: 97.1 F

## 2020-06-04 PROCEDURE — G0493 RN CARE EA 15 MIN HH/HOSPICE: HCPCS

## 2020-06-04 ASSESSMENT — ACTIVITIES OF DAILY LIVING (ADL)
AMBULATION ASSISTANCE: STAND BY ASSIST
HOME_HEALTH_OASIS: 01
OASIS_M1830: 01
CURRENT_FUNCTION: STAND BY ASSIST

## 2020-06-04 ASSESSMENT — ENCOUNTER SYMPTOMS: MUSCLE WEAKNESS: 1

## 2020-06-04 ASSESSMENT — PATIENT HEALTH QUESTIONNAIRE - PHQ9: CLINICAL INTERPRETATION OF PHQ2 SCORE: 0

## 2020-06-15 ENCOUNTER — HOSPITAL ENCOUNTER (OUTPATIENT)
Dept: RADIOLOGY | Facility: MEDICAL CENTER | Age: 65
End: 2020-06-15
Attending: INTERNAL MEDICINE
Payer: COMMERCIAL

## 2020-06-15 DIAGNOSIS — C50.911 MALIGNANT NEOPLASM OF RIGHT FEMALE BREAST, UNSPECIFIED ESTROGEN RECEPTOR STATUS, UNSPECIFIED SITE OF BREAST (HCC): ICD-10-CM

## 2020-06-15 PROCEDURE — 700117 HCHG RX CONTRAST REV CODE 255: Performed by: INTERNAL MEDICINE

## 2020-06-15 PROCEDURE — 71260 CT THORAX DX C+: CPT

## 2020-06-15 RX ADMIN — IOHEXOL 75 ML: 350 INJECTION, SOLUTION INTRAVENOUS at 15:06

## 2020-06-22 ENCOUNTER — HOSPITAL ENCOUNTER (OUTPATIENT)
Dept: LAB | Facility: MEDICAL CENTER | Age: 65
End: 2020-06-22
Attending: INTERNAL MEDICINE
Payer: COMMERCIAL

## 2020-06-22 LAB
BASOPHILS # BLD AUTO: 0.7 % (ref 0–1.8)
BASOPHILS # BLD: 0.03 K/UL (ref 0–0.12)
EOSINOPHIL # BLD AUTO: 0.15 K/UL (ref 0–0.51)
EOSINOPHIL NFR BLD: 3.5 % (ref 0–6.9)
ERYTHROCYTE [DISTWIDTH] IN BLOOD BY AUTOMATED COUNT: 54.4 FL (ref 35.9–50)
HCT VFR BLD AUTO: 39.6 % (ref 37–47)
HGB BLD-MCNC: 12.5 G/DL (ref 12–16)
IMM GRANULOCYTES # BLD AUTO: 0.03 K/UL (ref 0–0.11)
IMM GRANULOCYTES NFR BLD AUTO: 0.7 % (ref 0–0.9)
LYMPHOCYTES # BLD AUTO: 1.1 K/UL (ref 1–4.8)
LYMPHOCYTES NFR BLD: 25.4 % (ref 22–41)
MCH RBC QN AUTO: 31.3 PG (ref 27–33)
MCHC RBC AUTO-ENTMCNC: 31.6 G/DL (ref 33.6–35)
MCV RBC AUTO: 99 FL (ref 81.4–97.8)
MONOCYTES # BLD AUTO: 0.47 K/UL (ref 0–0.85)
MONOCYTES NFR BLD AUTO: 10.9 % (ref 0–13.4)
NEUTROPHILS # BLD AUTO: 2.55 K/UL (ref 2–7.15)
NEUTROPHILS NFR BLD: 58.8 % (ref 44–72)
NRBC # BLD AUTO: 0 K/UL
NRBC BLD-RTO: 0 /100 WBC
PLATELET # BLD AUTO: 345 K/UL (ref 164–446)
PMV BLD AUTO: 9.9 FL (ref 9–12.9)
RBC # BLD AUTO: 4 M/UL (ref 4.2–5.4)
WBC # BLD AUTO: 4.3 K/UL (ref 4.8–10.8)

## 2020-06-22 PROCEDURE — 85025 COMPLETE CBC W/AUTO DIFF WBC: CPT

## 2020-06-22 PROCEDURE — 36415 COLL VENOUS BLD VENIPUNCTURE: CPT

## 2020-06-22 PROCEDURE — 80053 COMPREHEN METABOLIC PANEL: CPT

## 2020-06-23 LAB
ALBUMIN SERPL BCP-MCNC: 4.1 G/DL (ref 3.2–4.9)
ALBUMIN/GLOB SERPL: 1.5 G/DL
ALP SERPL-CCNC: 96 U/L (ref 30–99)
ALT SERPL-CCNC: 12 U/L (ref 2–50)
ANION GAP SERPL CALC-SCNC: 13 MMOL/L (ref 7–16)
AST SERPL-CCNC: 12 U/L (ref 12–45)
BILIRUB SERPL-MCNC: 0.2 MG/DL (ref 0.1–1.5)
BUN SERPL-MCNC: 12 MG/DL (ref 8–22)
CALCIUM SERPL-MCNC: 9.7 MG/DL (ref 8.5–10.5)
CHLORIDE SERPL-SCNC: 104 MMOL/L (ref 96–112)
CO2 SERPL-SCNC: 23 MMOL/L (ref 20–33)
CREAT SERPL-MCNC: 0.61 MG/DL (ref 0.5–1.4)
FASTING STATUS PATIENT QL REPORTED: NORMAL
GLOBULIN SER CALC-MCNC: 2.7 G/DL (ref 1.9–3.5)
GLUCOSE SERPL-MCNC: 140 MG/DL (ref 65–99)
POTASSIUM SERPL-SCNC: 4.2 MMOL/L (ref 3.6–5.5)
PROT SERPL-MCNC: 6.8 G/DL (ref 6–8.2)
SODIUM SERPL-SCNC: 140 MMOL/L (ref 135–145)

## 2020-07-27 ENCOUNTER — HOSPITAL ENCOUNTER (OUTPATIENT)
Dept: RADIOLOGY | Facility: MEDICAL CENTER | Age: 65
End: 2020-07-27
Attending: INTERNAL MEDICINE
Payer: COMMERCIAL

## 2020-07-27 DIAGNOSIS — Z51.12 ENCOUNTER FOR ANTINEOPLASTIC IMMUNOTHERAPY: ICD-10-CM

## 2020-07-27 DIAGNOSIS — C50.911 MALIGNANT NEOPLASM OF RIGHT FEMALE BREAST, UNSPECIFIED ESTROGEN RECEPTOR STATUS, UNSPECIFIED SITE OF BREAST (HCC): ICD-10-CM

## 2020-07-27 PROCEDURE — 93971 EXTREMITY STUDY: CPT | Mod: RT

## 2020-08-05 ENCOUNTER — HOSPITAL ENCOUNTER (OUTPATIENT)
Facility: MEDICAL CENTER | Age: 65
End: 2020-08-05
Attending: NURSE PRACTITIONER
Payer: COMMERCIAL

## 2020-08-05 LAB
APPEARANCE UR: CLEAR
BILIRUB UR QL STRIP.AUTO: NEGATIVE
COLOR UR: YELLOW
GLUCOSE UR STRIP.AUTO-MCNC: NEGATIVE MG/DL
KETONES UR STRIP.AUTO-MCNC: NEGATIVE MG/DL
LEUKOCYTE ESTERASE UR QL STRIP.AUTO: NEGATIVE
MICRO URNS: NORMAL
NITRITE UR QL STRIP.AUTO: NEGATIVE
PH UR STRIP.AUTO: 6.5 [PH] (ref 5–8)
PROT UR QL STRIP: NEGATIVE MG/DL
RBC UR QL AUTO: NEGATIVE
SP GR UR STRIP.AUTO: 1.01
UROBILINOGEN UR STRIP.AUTO-MCNC: 0.2 MG/DL

## 2020-08-05 PROCEDURE — 81003 URINALYSIS AUTO W/O SCOPE: CPT

## 2020-09-04 NOTE — IP AVS SNAPSHOT
New Pulmonary Patient Office Visit      Patient Name: Jaycee Person    Referring Physician: Raudel Eli Home Care Instructions                                                                                                                Name:Carmelina Leblanc  Medical Record Number:[id number]  CSN: [id number]    YOB: 1955   Age: 62 y.o.  Sex: female  HT:1.524 m (5') WT: 81.4 kg (179 lb 7.3 oz)          Admit Date: 6/8/2017     Discharge Date:   Today's Date: 6/8/2017  Attending Doctor:  Everardo Matthews M.D.                  Allergies:  Iodine; Sulfa drugs; and Morphine              Follow-up Information     1. Follow up with Everardo Matthews M.D. On 6/12/2017.    Specialty:  Plastic Surgery    Contact information    [address]  [facility] [address] [phone]          Discharge Instructions         ACTIVITY: Rest and take it easy for the first 24 hours.  A responsible adult is recommended to remain with you during that time.  It is normal to feel sleepy.  We encourage you to not do anything that requires balance, judgment or coordination.    MILD FLU-LIKE SYMPTOMS ARE NORMAL. YOU MAY EXPERIENCE GENERALIZED MUSCLE ACHES, THROAT IRRITATION, HEADACHE AND/OR SOME NAUSEA.    FOR 24 HOURS DO NOT:  Drive, operate machinery or run household appliances.  Drink beer or alcoholic beverages.   Make important decisions or sign legal documents.    SPECIAL INSTRUCTIONS: *Ice packs to chest as needed.**    DIET: To avoid nausea, slowly advance diet as tolerated, avoiding spicy or greasy foods for the first day.  Add more substantial food to your diet according to your physician's instructions.  Babies can be fed formula or breast milk as soon as they are hungry.  INCREASE FLUIDS AND FIBER TO AVOID CONSTIPATION.    SURGICAL DRESSING/BATHING: *Keep dressing dry and clean for 3 days then okay to shower**    FOLLOW-UP APPOINTMENT:  A follow-up appointment should be arranged with your doctor in *FOLLOW UP WITH DR. MATTHEWS**; call to schedule.    You should CALL YOUR PHYSICIAN if you develop:  Fever greater than  101 degrees F.  Pain not relieved by medication, or persistent nausea or vomiting.  Excessive bleeding (blood soaking through dressing) or unexpected drainage from the wound.  Extreme redness or swelling around the incision site, drainage of pus or foul smelling drainage.  Inability to urinate or empty your bladder within 8 hours.  Problems with breathing or chest pain.    You should call 911 if you develop problems with breathing or chest pain.  If you are unable to contact your doctor or surgical center, you should go to the nearest emergency room or urgent care center.  Physician's telephone #: *DR. DOUGHERTY [phone]**    If any questions arise, call your doctor.  If your doctor is not available, please feel free to call the Surgical Center at [phone].  The Center is open Monday through Friday from 7AM to 7PM.  You can also call the [facility] HOTLINE open 24 hours/day, 7 days/week and speak to a nurse at [phone], or toll free at [phone].    A registered nurse may call you a few days after your surgery to see how you are doing after your procedure.    MEDICATIONS: Resume taking daily medication.  Take prescribed pain medication with food.  If no medication is prescribed, you may take non-aspirin pain medication if needed.  PAIN MEDICATION CAN BE VERY CONSTIPATING.  Take a stool softener or laxative such as senokot, pericolace, or milk of magnesia if needed.    Prescription given for *Patient has zofran and pain pills already  PRESCRIPTION FOR KEFLEX GIVEN**.  Last pain medication given at *____9:25 AM________________**.    If your physician has prescribed pain medication that includes Acetaminophen (Tylenol), do not take additional Acetaminophen (Tylenol) while taking the prescribed medication.    Depression / Suicide Risk    As you are discharged from this [facility] facility, it is important to learn how to keep safe from harming yourself.    Recognize the warning signs:  · Abrupt changes in  personality, positive or negative- including increase in energy   · Giving away possessions  · Change in eating patterns- significant weight changes-  positive or negative  · Change in sleeping patterns- unable to sleep or sleeping all the time   · Unwillingness or inability to communicate  · Depression  · Unusual sadness, discouragement and loneliness  · Talk of wanting to die  · Neglect of personal appearance   · Rebelliousness- reckless behavior  · Withdrawal from people/activities they love  · Confusion- inability to concentrate     If you or a loved one observes any of these behaviors or has concerns about self-harm, here's what you can do:  · Talk about it- your feelings and reasons for harming yourself  · Remove any means that you might use to hurt yourself (examples: pills, rope, extension cords, firearm)  · Get professional help from the community (Mental Health, Substance Abuse, psychological counseling)  · Do not be alone:Call your Safe Contact- someone whom you trust who will be there for you.  · Call your local CRISIS HOTLINE 872-9094 or 529-008-2388  · Call your local Children's Mobile Crisis Response Team [place] (607) 559-9297 or [facility]  · Call the toll free National Suicide Prevention Hotlines   · National Suicide Prevention Lifeline 534-072-RSWI (6765)  · National Hope Line Network 800-SUICIDE (300-8896)       Medication List      START taking these medications        Instructions    Morning Afternoon Evening Bedtime    cephALEXin 500 MG Caps   Commonly known as:  KEFLEX        Take 1 Cap by mouth 4 times a day.   Dose:  500 mg                          CONTINUE taking these medications        Instructions    Morning Afternoon Evening Bedtime    HERCEPTIN IV        by Intravenous route.                             Where to Get Your Medications      Information about where to get these medications is not yet available     ! Ask your nurse or doctor about these medications    -  cephALEXin 500 MG Caps            Medication Information     Above and/or attached are the medications your physician expects you to take upon discharge. Review all of your home medications and newly ordered medications with your doctor and/or pharmacist. Follow medication instructions as directed by your doctor and/or pharmacist. Please keep your medication list with you and share with your physician. Update the information when medications are discontinued, doses are changed, or new medications (including over-the-counter products) are added; and carry medication information at all times in the event of emergency situations.        Resources     Quit Smoking / Tobacco Use:    I understand the use of any tobacco products increases my chance of suffering from future heart disease or stroke and could cause other illnesses which may shorten my life. Quitting the use of tobacco products is the single most important thing I can do to improve my health. For further information on smoking / tobacco cessation call a Toll Free Quit Line at 1-683.545.1392 (*National [employer] [place]) or 1-807.455.3374 (American Lung Association) or you can access the web based program at www.[employer].org.    [place] Tobacco Users Help Line:  (443) 366-8345       Toll Free: 1-851.102.3037  Quit Tobacco Program [facility] (665)456-8432    Crisis Hotline:    [place] Crisis Hotline:  1-786-WFINBBL or 1-442.364.9308    [place] Crisis Hotline:    1-568.807.3606 or 394-527-8081    Discharge Survey:   Thank you for choosing [facility]. We hope we did everything we could to make your hospital stay a pleasant one. You may be receiving a survey and we would appreciate your time and participation in answering the questions. Your input is very valuable to us in our efforts to improve our service to our patients and their families.            Signatures     My signature on this form indicates that:    1. I acknowledge receipt and  understanding of these Home Care Instruction.  2. My questions regarding this information have been answered to my satisfaction.  3. I have formulated a plan with my discharge nurse to obtain my prescribed medications for home.    __________________________________      __________________________________                   Patient Signature                                 Guardian/Responsible Adult Signature      __________________________________                 __________       ________                       Nurse Signature                                               Date                 Time

## 2020-10-28 ENCOUNTER — HOSPITAL ENCOUNTER (OUTPATIENT)
Dept: LAB | Facility: MEDICAL CENTER | Age: 65
End: 2020-10-28
Attending: NURSE PRACTITIONER
Payer: COMMERCIAL

## 2020-10-28 LAB
25(OH)D3 SERPL-MCNC: 40 NG/ML (ref 30–100)
APPEARANCE UR: CLEAR
BACTERIA #/AREA URNS HPF: NEGATIVE /HPF
BILIRUB UR QL STRIP.AUTO: NEGATIVE
CHOLEST SERPL-MCNC: 194 MG/DL (ref 100–199)
COLOR UR: YELLOW
EPI CELLS #/AREA URNS HPF: ABNORMAL /HPF
EST. AVERAGE GLUCOSE BLD GHB EST-MCNC: 117 MG/DL
FASTING STATUS PATIENT QL REPORTED: NORMAL
FOLATE SERPL-MCNC: 9.1 NG/ML
GLUCOSE UR STRIP.AUTO-MCNC: NEGATIVE MG/DL
HBA1C MFR BLD: 5.7 % (ref 0–5.6)
HDLC SERPL-MCNC: 69 MG/DL
HYALINE CASTS #/AREA URNS LPF: ABNORMAL /LPF
KETONES UR STRIP.AUTO-MCNC: NEGATIVE MG/DL
LDLC SERPL CALC-MCNC: 107 MG/DL
LEUKOCYTE ESTERASE UR QL STRIP.AUTO: ABNORMAL
MICRO URNS: ABNORMAL
NITRITE UR QL STRIP.AUTO: NEGATIVE
PH UR STRIP.AUTO: 6.5 [PH] (ref 5–8)
PROT UR QL STRIP: NEGATIVE MG/DL
RBC # URNS HPF: ABNORMAL /HPF
RBC UR QL AUTO: NEGATIVE
SP GR UR STRIP.AUTO: 1.01
TRIGL SERPL-MCNC: 92 MG/DL (ref 0–149)
TSH SERPL DL<=0.005 MIU/L-ACNC: 1.56 UIU/ML (ref 0.38–5.33)
UROBILINOGEN UR STRIP.AUTO-MCNC: 0.2 MG/DL
VIT B12 SERPL-MCNC: 788 PG/ML (ref 211–911)
WBC #/AREA URNS HPF: ABNORMAL /HPF

## 2020-10-28 PROCEDURE — 80061 LIPID PANEL: CPT

## 2020-10-28 PROCEDURE — 84443 ASSAY THYROID STIM HORMONE: CPT

## 2020-10-28 PROCEDURE — 36415 COLL VENOUS BLD VENIPUNCTURE: CPT

## 2020-10-28 PROCEDURE — 83036 HEMOGLOBIN GLYCOSYLATED A1C: CPT

## 2020-10-28 PROCEDURE — 82607 VITAMIN B-12: CPT

## 2020-10-28 PROCEDURE — 82306 VITAMIN D 25 HYDROXY: CPT

## 2020-10-28 PROCEDURE — 82746 ASSAY OF FOLIC ACID SERUM: CPT

## 2020-10-28 PROCEDURE — 81001 URINALYSIS AUTO W/SCOPE: CPT

## 2020-11-10 ENCOUNTER — HOSPITAL ENCOUNTER (OUTPATIENT)
Dept: RADIOLOGY | Facility: MEDICAL CENTER | Age: 65
End: 2020-11-10
Attending: INTERNAL MEDICINE
Payer: COMMERCIAL

## 2020-11-10 DIAGNOSIS — I82.4Z1 DEEP VEIN THROMBOSIS (DVT) OF DISTAL VEIN OF RIGHT LOWER EXTREMITY, UNSPECIFIED CHRONICITY (HCC): ICD-10-CM

## 2020-11-10 PROCEDURE — 93971 EXTREMITY STUDY: CPT | Mod: RT

## 2021-01-29 ENCOUNTER — OFFICE VISIT (OUTPATIENT)
Dept: URGENT CARE | Facility: CLINIC | Age: 66
End: 2021-01-29
Payer: COMMERCIAL

## 2021-01-29 ENCOUNTER — HOSPITAL ENCOUNTER (OUTPATIENT)
Facility: MEDICAL CENTER | Age: 66
End: 2021-01-29
Attending: PHYSICIAN ASSISTANT
Payer: COMMERCIAL

## 2021-01-29 VITALS
WEIGHT: 200 LBS | DIASTOLIC BLOOD PRESSURE: 78 MMHG | SYSTOLIC BLOOD PRESSURE: 128 MMHG | TEMPERATURE: 97.7 F | OXYGEN SATURATION: 99 % | HEART RATE: 78 BPM | BODY MASS INDEX: 37.76 KG/M2 | RESPIRATION RATE: 20 BRPM | HEIGHT: 61 IN

## 2021-01-29 DIAGNOSIS — N30.01 ACUTE CYSTITIS WITH HEMATURIA: ICD-10-CM

## 2021-01-29 DIAGNOSIS — R39.15 URINARY URGENCY: ICD-10-CM

## 2021-01-29 DIAGNOSIS — R35.0 URINARY FREQUENCY: ICD-10-CM

## 2021-01-29 DIAGNOSIS — R30.0 DYSURIA: ICD-10-CM

## 2021-01-29 LAB
APPEARANCE UR: NORMAL
BILIRUB UR STRIP-MCNC: NEGATIVE MG/DL
COLOR UR AUTO: YELLOW
GLUCOSE UR STRIP.AUTO-MCNC: NEGATIVE MG/DL
KETONES UR STRIP.AUTO-MCNC: NEGATIVE MG/DL
LEUKOCYTE ESTERASE UR QL STRIP.AUTO: NORMAL
NITRITE UR QL STRIP.AUTO: NEGATIVE
PH UR STRIP.AUTO: 6 [PH] (ref 5–8)
PROT UR QL STRIP: NEGATIVE MG/DL
RBC UR QL AUTO: NORMAL
SP GR UR STRIP.AUTO: 1.01
UROBILINOGEN UR STRIP-MCNC: 0.2 MG/DL

## 2021-01-29 PROCEDURE — 87186 SC STD MICRODIL/AGAR DIL: CPT

## 2021-01-29 PROCEDURE — 99214 OFFICE O/P EST MOD 30 MIN: CPT | Performed by: PHYSICIAN ASSISTANT

## 2021-01-29 PROCEDURE — 81002 URINALYSIS NONAUTO W/O SCOPE: CPT | Performed by: PHYSICIAN ASSISTANT

## 2021-01-29 PROCEDURE — 87077 CULTURE AEROBIC IDENTIFY: CPT

## 2021-01-29 PROCEDURE — 87086 URINE CULTURE/COLONY COUNT: CPT

## 2021-01-29 RX ORDER — OMEPRAZOLE 10 MG/1
CAPSULE, DELAYED RELEASE ORAL
COMMUNITY
End: 2021-11-08

## 2021-01-29 RX ORDER — DOXYCYCLINE HYCLATE 100 MG
TABLET ORAL
COMMUNITY
End: 2021-01-29

## 2021-01-29 RX ORDER — CEFDINIR 300 MG/1
300 CAPSULE ORAL EVERY 12 HOURS
Qty: 10 CAP | Refills: 0 | Status: SHIPPED | OUTPATIENT
Start: 2021-01-29 | End: 2021-01-29

## 2021-01-29 RX ORDER — NITROFURANTOIN 25; 75 MG/1; MG/1
100 CAPSULE ORAL EVERY 12 HOURS
Qty: 10 CAP | Refills: 0 | Status: SHIPPED | OUTPATIENT
Start: 2021-01-29 | End: 2021-02-03

## 2021-01-29 RX ORDER — RIVAROXABAN 20 MG/1
20 TABLET, FILM COATED ORAL
COMMUNITY
Start: 2020-11-17 | End: 2021-03-11

## 2021-01-29 RX ORDER — FUROSEMIDE 20 MG/1
TABLET ORAL
COMMUNITY
End: 2021-03-11

## 2021-01-29 RX ORDER — ASPIRIN 81 MG/1
TABLET ORAL
COMMUNITY
End: 2023-11-15

## 2021-01-29 RX ORDER — OMEPRAZOLE 10 MG/1
10 CAPSULE, DELAYED RELEASE ORAL
COMMUNITY
Start: 2020-12-22 | End: 2021-03-11

## 2021-01-29 ASSESSMENT — ENCOUNTER SYMPTOMS
SWEATS: 0
CHILLS: 0
VOMITING: 0
FLANK PAIN: 0
NAUSEA: 0

## 2021-01-29 ASSESSMENT — FIBROSIS 4 INDEX: FIB4 SCORE: 0.64

## 2021-01-29 NOTE — PROGRESS NOTES
Subjective:   Carmelina Leblanc is a 65 y.o. female who presents for Dysuria (x 1 week, painful urination, incomplete emptying, discomfort)        Dysuria   This is a new problem. The problem occurs every urination. The problem has been unchanged. The quality of the pain is described as burning. The pain is moderate. There has been no fever. There is no history of pyelonephritis. Associated symptoms include frequency and urgency. Pertinent negatives include no chills, discharge, flank pain, hematuria, hesitancy, nausea, sweats or vomiting. Associated symptoms comments: Bladder does not feel obstructed. Symptoms consistent with prior UTIs.. She has tried increased fluids and acetaminophen (azo) for the symptoms. The treatment provided mild relief. Her past medical history is significant for recurrent UTIs.     Review of Systems   Constitutional: Negative for chills.   Gastrointestinal: Negative for nausea and vomiting.   Genitourinary: Positive for dysuria, frequency and urgency. Negative for flank pain, hematuria and hesitancy.       PMH:  has a past medical history of Anemia (6-1-17), Anesthesia (6-1-17), Cancer (Formerly [facility]) (8/4/16), Cancer ([facility]) (2016), Cataract, Dental disorder, Osteoarthritis (6-1-17), Port catheter in place (6-1-17), Snoring, and Urinary tract infection.  MEDS:   Current Outpatient Medications:   •  aspirin 81 MG EC tablet, aspirin 81 mg tablet,delayed release, Disp: , Rfl:   •  nitrofurantoin (MACROBID) 100 MG Cap, Take 1 Cap by mouth every 12 hours for 5 days., Disp: 10 Cap, Rfl: 0  •  omeprazole (PRILOSEC OTC) 20 MG tablet, Take 20 mg by mouth every day. Indications: Heartburn, Disp: , Rfl:   •  acetaminophen (TYLENOL) 500 MG Tab, Take 500-1,000 mg by mouth every 6 hours as needed for Mild Pain., Disp: , Rfl:   •  acetaminophen (TYLENOL) 325 MG Tab, Take 2 Tabs by mouth every 6 hours as needed (Mild Pain; (Pain scale 1-3); Temp greater than 100.5 F)., Disp: 30 Tab, Rfl: 0  •  senna-docusate  (PERICOLACE OR SENOKOT S) 8.6-50 MG Tab, Take 2 Tabs by mouth 2 Times a Day. (Patient taking differently: Take 2 Tabs by mouth 2 times a day as needed for Constipation.), Disp: 30 Tab, Rfl: 0  •  Influenza Vac Subunit Quad (FLUCELVAX) 0.5 ML Suspension Prefilled Syringe injection, Flucelvax Quad 5646-5954 (PF) 60 mcg (15 mcg x 4)/0.5 mL IM syringe  PHARMACY ADMINISTERED, Disp: , Rfl:   •  omeprazole (PRILOSEC) 10 MG CAPSULE DELAYED RELEASE, omeprazole 10 mg capsule,delayed release, Disp: , Rfl:   •  omeprazole (PRILOSEC) 10 MG CAPSULE DELAYED RELEASE, Take 10 mg by mouth every day., Disp: , Rfl:   •  rivaroxaban (XARELTO) 15 MG Tab tablet, Xarelto 15 mg tablet, Disp: , Rfl:   •  rivaroxaban (XARELTO) 20 MG Tab tablet, Xarelto 20 mg tablet, Disp: , Rfl:   •  XARELTO 20 MG Tab tablet, Take 20 mg by mouth every day., Disp: , Rfl:   •  furosemide (LASIX) 20 MG Tab, furosemide 20 mg tablet, Disp: , Rfl:   •  furosemide (LASIX) 20 MG Tab, Take 20 mg by mouth every day. Indications: Edema, Disp: , Rfl:   ALLERGIES:   Allergies   Allergen Reactions   • Povidone Iodine Rash   • Sulfa Drugs Unspecified      RXN=As a child unsure of reaction   • Morphine      Nausea and vomiting   • Iodine Diarrhea and Nausea     Pt states reaction as a child to iodine not injected iodine     SURGHX:   Past Surgical History:   Procedure Laterality Date   • FEMUR ORIF Right 4/20/2020    Procedure: ORIF, FRACTURE,  DISTAL FEMUR;  Surgeon: Christian Ag M.D.;  Location: SURGERY [facility];  Service: Orthopedics   • TISSUE EXPANDER PLACE/REMOVE Bilateral 6/8/2017    Procedure: TISSUE EXPANDER PLACE/REMOVE;  Surgeon: Everardo Matthews M.D.;  Location: SURGERY SAME DAY [facility];  Service:    • BREAST IMPLANT REVISION Bilateral 6/8/2017    Procedure: BREAST IMPLANT;  Surgeon: Everardo Matthews M.D.;  Location: SURGERY SAME DAY [facility];  Service:    • CAPSULOTOMY Bilateral 6/8/2017    Procedure: CAPSULOTOMY FOR PARTIAL CAPSULECTOMIES;   Surgeon: Everardo Matthews M.D.;  Location: SURGERY SAME DAY [facility];  Service:    • BREAST RECONSTRUCTION Bilateral 6/8/2017    Procedure: BREAST RECONSTRUCTION USING LOCAL TISSUE & FAT GRAFTING;  Surgeon: Everardo Matthews M.D.;  Location: SURGERY SAME DAY [facility];  Service:    • THORACOSCOPY Right 5/1/2017    Procedure: THORACOSCOPY, WEDGE RESECTION LOWER LOBE MASS;  Surgeon: John H Ganser, M.D.;  Location: SURGERY [facility];  Service:    • CATH PLACEMENT Left 3/17/2017    Procedure: CATH PLACEMENT FOR SUBCLAVIAN PORT LEFT;  Surgeon: Carly Wright M.D.;  Location: SURGERY [facility];  Service:    • LUNG NEEDLE BIOPSY  02-   • TISSUE EXPANDER PLACE/REMOVE Bilateral 1/19/2017    Procedure: TISSUE EXPANDER PLACEment;  Surgeon: Everardo Matthews M.D.;  Location: SURGERY SAME DAY [facility];  Service:    • FLAP GRAFT  1/19/2017    Procedure: FLAP GRAFT- FOR DERMAL ADIPOFASCIAL FLAPS ;  Surgeon: Everardo Matthews M.D.;  Location: SURGERY SAME DAY [facility];  Service:    • MASTECTOMY Bilateral 1/19/2017    Procedure: MASTECTOMY PROPHYLACTIC LEFT, AND RIGHT TOTAL MASTECTOMY;  Surgeon: Carly Wright M.D.;  Location: SURGERY SAME DAY [facility];  Service:    • AXILLARY NODE DISSECTION Right 1/19/2017    Procedure: AXILLARY NODE DISSECTION;  Surgeon: Carly Wright M.D.;  Location: SURGERY SAME DAY [facility];  Service:    • CATH PLACEMENT Left 1/19/2017    Procedure: Removal of left subclavian port ;  Surgeon: Carly Wright M.D.;  Location: SURGERY SAME DAY [facility];  Service:    • CATH PLACEMENT Left 9/5/2016    Procedure: CATH PLACEMENT - SUBCLAVIAN PORT - SIDE TO BE DETERMINED;  Surgeon: Carly Wright M.D.;  Location: [facility];  Service:    • STEREOTACTIC BIOPSY Right 08/10/2016   • HIP ARTHROPLASTY TOTAL Right 2014   • OTHER ORTHOPEDIC SURGERY  6/2012    right hip arthroplasty   • CATARACT PHACO WITH IOL Bilateral    • GYN  "SURGERY  Approx 2013    Hysterectomy     SOCHX:  reports that she has never smoked. She has never used smokeless tobacco. She reports current alcohol use. She reports that she does not use drugs.  FH: Family history was reviewed, no pertinent findings to report   Objective:   /78   Pulse 78   Temp 36.5 °C (97.7 °F) (Temporal)   Resp 20   Ht 1.549 m (5' 1\")   Wt 90.7 kg (200 lb) Comment: pt. stated  SpO2 99%   BMI 37.79 kg/m²   Physical Exam  Vitals signs reviewed.   Constitutional:       General: She is not in acute distress.     Appearance: Normal appearance. She is well-developed. She is not toxic-appearing.   HENT:      Head: Normocephalic and atraumatic.      Right Ear: External ear normal.      Left Ear: External ear normal.      Nose: Nose normal.   Eyes:      General: Gaze aligned appropriately.   Neck:      Musculoskeletal: Neck supple.   Cardiovascular:      Rate and Rhythm: Normal rate and regular rhythm.   Pulmonary:      Effort: Pulmonary effort is normal. No respiratory distress.      Breath sounds: No stridor.   Abdominal:      Tenderness: There is abdominal tenderness (mild) in the suprapubic area. There is no right CVA tenderness, left CVA tenderness, guarding or rebound.   Skin:     General: Skin is warm and dry.      Capillary Refill: Capillary refill takes less than 2 seconds.   Neurological:      Mental Status: She is alert and oriented to person, place, and time.      Comments: CN2-12 grossly intact   Psychiatric:         Speech: Speech normal.         Behavior: Behavior normal.           Results for orders placed or performed in visit on 01/29/21   POCT Urinalysis   Result Value Ref Range    POC Color yellow Negative    POC Appearance cloudy Negative    POC Leukocyte Esterase Moderate Negative    POC Nitrites negative Negative    POC Urobiligen 0.2 Negative (0.2) mg/dL    POC Protein negative Negative mg/dL    POC Urine PH 6.0 5.0 - 8.0    POC Blood Trace-intact Negative    POC " Specific Gravity 1.010 <1.005 - >1.030    POC Ketones negative Negative mg/dL    POC Bilirubin negative Negative mg/dL    POC Glucose negative Negative mg/dL       Assessment/Plan:   1. Acute cystitis with hematuria  - nitrofurantoin (MACROBID) 100 MG Cap; Take 1 Cap by mouth every 12 hours for 5 days.  Dispense: 10 Cap; Refill: 0    2. Dysuria  - POCT Urinalysis  - Urine Culture; Future    3. Urinary frequency    4. Urinary urgency    Other orders  - aspirin 81 MG EC tablet; aspirin 81 mg tablet,delayed release  - Influenza Vac Subunit Quad (FLUCELVAX) 0.5 ML Suspension Prefilled Syringe injection; Flucelvax Quad 1416-1457 (PF) 60 mcg (15 mcg x 4)/0.5 mL IM syringe   PHARMACY ADMINISTERED  - omeprazole (PRILOSEC) 10 MG CAPSULE DELAYED RELEASE; omeprazole 10 mg capsule,delayed release  - omeprazole (PRILOSEC) 10 MG CAPSULE DELAYED RELEASE; Take 10 mg by mouth every day.  - rivaroxaban (XARELTO) 15 MG Tab tablet; Xarelto 15 mg tablet  - rivaroxaban (XARELTO) 20 MG Tab tablet; Xarelto 20 mg tablet  - XARELTO 20 MG Tab tablet; Take 20 mg by mouth every day.  - furosemide (LASIX) 20 MG Tab; furosemide 20 mg tablet    UA and PE consistent with acute cystitis. Pyelonephritis unlikely as pt has no flank pain, CVA tenderness, N/V, F/C.     Patient records reviewed.  No history of renal dysfunction and last EGFR on 6/20 was over 60. I will treat with Macrobid.    Macrobid 100mg, twice a day for 5 days.  Finish entire course of antibiotics even if symptoms resolve earlier.  Drink 8, 8oz glasses of water every day.  If symptoms fail to improve in 48 hours, worsen or you develop fever, flank pain, nausea, vomiting, or other new symptoms return to the clinic immediately or go the ER.

## 2021-01-31 ENCOUNTER — TELEPHONE (OUTPATIENT)
Dept: URGENT CARE | Facility: CLINIC | Age: 66
End: 2021-01-31

## 2021-01-31 DIAGNOSIS — N30.90 KLEBSIELLA CYSTITIS: ICD-10-CM

## 2021-01-31 DIAGNOSIS — B96.1 KLEBSIELLA CYSTITIS: ICD-10-CM

## 2021-01-31 RX ORDER — CEFDINIR 300 MG/1
300 CAPSULE ORAL 2 TIMES DAILY
Qty: 10 CAP | Refills: 0 | Status: SHIPPED | OUTPATIENT
Start: 2021-01-31 | End: 2021-02-05

## 2021-01-31 NOTE — TELEPHONE ENCOUNTER
Message left for patient.  I also sent her a my chart message with additional instructions.  Stop Macrobid.  Start Omnicef.  Return and ED precautions reviewed.  Patient to call with any questions or concerns.

## 2021-02-08 ENCOUNTER — TELEPHONE (OUTPATIENT)
Dept: SCHEDULING | Facility: IMAGING CENTER | Age: 66
End: 2021-02-08

## 2021-03-03 DIAGNOSIS — Z23 NEED FOR VACCINATION: ICD-10-CM

## 2021-03-11 ENCOUNTER — OFFICE VISIT (OUTPATIENT)
Dept: MEDICAL GROUP | Facility: MEDICAL CENTER | Age: 66
End: 2021-03-11
Payer: MEDICARE

## 2021-03-11 ENCOUNTER — IMMUNIZATION (OUTPATIENT)
Dept: FAMILY PLANNING/WOMEN'S HEALTH CLINIC | Facility: IMMUNIZATION CENTER | Age: 66
End: 2021-03-11
Attending: INTERNAL MEDICINE
Payer: MEDICARE

## 2021-03-11 VITALS
TEMPERATURE: 97.2 F | SYSTOLIC BLOOD PRESSURE: 124 MMHG | BODY MASS INDEX: 37.38 KG/M2 | WEIGHT: 197.97 LBS | DIASTOLIC BLOOD PRESSURE: 88 MMHG | RESPIRATION RATE: 20 BRPM | HEIGHT: 61 IN | OXYGEN SATURATION: 97 % | HEART RATE: 84 BPM

## 2021-03-11 DIAGNOSIS — Z13.0 SCREENING FOR DEFICIENCY ANEMIA: ICD-10-CM

## 2021-03-11 DIAGNOSIS — G63 NEUROPATHY ASSOCIATED WITH CANCER (HCC): ICD-10-CM

## 2021-03-11 DIAGNOSIS — Z78.0 POSTMENOPAUSAL STATUS: ICD-10-CM

## 2021-03-11 DIAGNOSIS — Z85.118 HISTORY OF LUNG CANCER: ICD-10-CM

## 2021-03-11 DIAGNOSIS — Z12.11 COLON CANCER SCREENING: ICD-10-CM

## 2021-03-11 DIAGNOSIS — Z00.00 MEDICARE ANNUAL WELLNESS VISIT, INITIAL: ICD-10-CM

## 2021-03-11 DIAGNOSIS — Z85.3 HISTORY OF BREAST CANCER: ICD-10-CM

## 2021-03-11 DIAGNOSIS — Z13.220 LIPID SCREENING: ICD-10-CM

## 2021-03-11 DIAGNOSIS — Z87.81 HISTORY OF FEMUR FRACTURE: ICD-10-CM

## 2021-03-11 DIAGNOSIS — Z23 NEED FOR VACCINATION: ICD-10-CM

## 2021-03-11 DIAGNOSIS — Z92.21 HISTORY OF CHEMOTHERAPY: ICD-10-CM

## 2021-03-11 DIAGNOSIS — Z13.29 THYROID DISORDER SCREEN: ICD-10-CM

## 2021-03-11 DIAGNOSIS — Z23 ENCOUNTER FOR VACCINATION: Primary | ICD-10-CM

## 2021-03-11 DIAGNOSIS — Z86.39 HISTORY OF ELEVATED GLUCOSE: ICD-10-CM

## 2021-03-11 DIAGNOSIS — C80.1 NEUROPATHY ASSOCIATED WITH CANCER (HCC): ICD-10-CM

## 2021-03-11 PROBLEM — C34.30 MALIGNANT NEOPLASM OF LOWER LOBE, BRONCHUS, OR LUNG: Status: RESOLVED | Noted: 2017-05-01 | Resolved: 2021-03-11

## 2021-03-11 PROBLEM — C50.111 MALIGNANT NEOPLASM OF CENTRAL PORTION OF RIGHT FEMALE BREAST (HCC): Status: RESOLVED | Noted: 2017-01-19 | Resolved: 2021-03-11

## 2021-03-11 PROCEDURE — 91300 PFIZER SARS-COV-2 VACCINE: CPT | Performed by: INTERNAL MEDICINE

## 2021-03-11 PROCEDURE — 0001A PFIZER SARS-COV-2 VACCINE: CPT | Performed by: INTERNAL MEDICINE

## 2021-03-11 PROCEDURE — G0438 PPPS, INITIAL VISIT: HCPCS | Performed by: NURSE PRACTITIONER

## 2021-03-11 SDOH — ECONOMIC STABILITY: TRANSPORTATION INSECURITY
IN THE PAST 12 MONTHS, HAS THE LACK OF TRANSPORTATION KEPT YOU FROM MEDICAL APPOINTMENTS OR FROM GETTING MEDICATIONS?: NO

## 2021-03-11 SDOH — ECONOMIC STABILITY: TRANSPORTATION INSECURITY
IN THE PAST 12 MONTHS, HAS LACK OF RELIABLE TRANSPORTATION KEPT YOU FROM MEDICAL APPOINTMENTS, MEETINGS, WORK OR FROM GETTING THINGS NEEDED FOR DAILY LIVING?: NO

## 2021-03-11 SDOH — ECONOMIC STABILITY: HOUSING INSECURITY
IN THE LAST 12 MONTHS, WAS THERE A TIME WHEN YOU DID NOT HAVE A STEADY PLACE TO SLEEP OR SLEPT IN A SHELTER (INCLUDING NOW)?: NO

## 2021-03-11 SDOH — HEALTH STABILITY: PHYSICAL HEALTH: ON AVERAGE, HOW MANY MINUTES DO YOU ENGAGE IN EXERCISE AT THIS LEVEL?: 20 MINUTES

## 2021-03-11 SDOH — ECONOMIC STABILITY: INCOME INSECURITY: IN THE LAST 12 MONTHS, WAS THERE A TIME WHEN YOU WERE NOT ABLE TO PAY THE MORTGAGE OR RENT ON TIME?: NO

## 2021-03-11 SDOH — ECONOMIC STABILITY: HOUSING INSECURITY: IN THE LAST 12 MONTHS, HOW MANY PLACES HAVE YOU LIVED?: 1

## 2021-03-11 SDOH — HEALTH STABILITY: MENTAL HEALTH
STRESS IS WHEN SOMEONE FEELS TENSE, NERVOUS, ANXIOUS, OR CAN'T SLEEP AT NIGHT BECAUSE THEIR MIND IS TROUBLED. HOW STRESSED ARE YOU?: NOT AT ALL

## 2021-03-11 SDOH — HEALTH STABILITY: PHYSICAL HEALTH: ON AVERAGE, HOW MANY DAYS PER WEEK DO YOU ENGAGE IN MODERATE TO STRENUOUS EXERCISE (LIKE A BRISK WALK)?: 2 DAYS

## 2021-03-11 ASSESSMENT — ENCOUNTER SYMPTOMS: GENERAL WELL-BEING: FAIR

## 2021-03-11 ASSESSMENT — SOCIAL DETERMINANTS OF HEALTH (SDOH)
IN A TYPICAL WEEK, HOW MANY TIMES DO YOU TALK ON THE PHONE WITH FAMILY, FRIENDS, OR NEIGHBORS?: MORE THAN THREE TIMES A WEEK
WITHIN THE PAST 12 MONTHS, THE FOOD YOU BOUGHT JUST DIDN'T LAST AND YOU DIDN'T HAVE MONEY TO GET MORE: NEVER TRUE
DO YOU BELONG TO ANY CLUBS OR ORGANIZATIONS SUCH AS CHURCH GROUPS UNIONS, FRATERNAL OR ATHLETIC GROUPS, OR SCHOOL GROUPS?: YES
WITHIN THE PAST 12 MONTHS, YOU WORRIED THAT YOUR FOOD WOULD RUN OUT BEFORE YOU GOT THE MONEY TO BUY MORE: NEVER TRUE
HOW MANY DRINKS CONTAINING ALCOHOL DO YOU HAVE ON A TYPICAL DAY WHEN YOU ARE DRINKING: 1 OR 2
HOW OFTEN DO YOU HAVE A DRINK CONTAINING ALCOHOL: MONTHLY OR LESS
HOW OFTEN DO YOU GET TOGETHER WITH FRIENDS OR RELATIVES?: ONCE A WEEK
HOW OFTEN DO YOU HAVE SIX OR MORE DRINKS ON ONE OCCASION: NEVER
HOW OFTEN DO YOU ATTEND CHURCH OR RELIGIOUS SERVICES?: 1 TO 4 TIMES PER YEAR
HOW OFTEN DO YOU ATTENT MEETINGS OF THE CLUB OR ORGANIZATION YOU BELONG TO?: MORE THAN 4 TIMES PER YEAR
HOW HARD IS IT FOR YOU TO PAY FOR THE VERY BASICS LIKE FOOD, HOUSING, MEDICAL CARE, AND HEATING?: NOT HARD AT ALL

## 2021-03-11 ASSESSMENT — PATIENT HEALTH QUESTIONNAIRE - PHQ9: CLINICAL INTERPRETATION OF PHQ2 SCORE: 0

## 2021-03-11 ASSESSMENT — FIBROSIS 4 INDEX: FIB4 SCORE: 0.65

## 2021-03-11 ASSESSMENT — ACTIVITIES OF DAILY LIVING (ADL): BATHING_REQUIRES_ASSISTANCE: 0

## 2021-03-11 NOTE — ASSESSMENT & PLAN NOTE
Fell at home due to her lower extremity neuropathy several months ago, underwent surgical repair in April 2020.  She continues with use of a cane.  She enjoys golfing and is still able to do this  She did develop a DVT following surgical repair, was treated for a few months with anticoagulation and is now on an aspirin daily

## 2021-03-11 NOTE — ASSESSMENT & PLAN NOTE
Small growth found in the right lower lobe when she was undergoing evaluation for her breast cancer.  This was suspected to be mets but ended up being a primary lung cancer.  She has no history of tobacco use.  This was surgically removed and she is in remission

## 2021-03-11 NOTE — ASSESSMENT & PLAN NOTE
Affecting both her hands and feet.  States that she has persistent numbness but no associated pain, not requiring any treatment at this time

## 2021-03-11 NOTE — PROGRESS NOTES
CC:  Wellness exam and follow up medical problems.    HPI:  Carmelina is here to establish care and for HRA  History of breast cancer  Initially diagnosed in 2016, treated surgically followed by chemo and radiation.  She is followed by Dr. Adkins  She did develop neuropathy as a result of her chemotherapy treatment.  She states this is not painful but she does have numbness in bilateral feet which increases her risk for falling    History of femur fracture  Fell at home due to her lower extremity neuropathy several months ago, underwent surgical repair in April 2020.  She continues with use of a cane.  She enjoys golfing and is still able to do this  She did develop a DVT following surgical repair, was treated for a few months with anticoagulation and is now on an aspirin daily    History of lung cancer  Small growth found in the right lower lobe when she was undergoing evaluation for her breast cancer.  This was suspected to be mets but ended up being a primary lung cancer.  She has no history of tobacco use.  This was surgically removed and she is in remission    Neuropathy associated with cancer (HCC)  Affecting both her hands and feet.  States that she has persistent numbness but no associated pain, not requiring any treatment at this time    Depression Screening    Little interest or pleasure in doing things?  0 - not at all  Feeling down, depressed , or hopeless? 0 - not at all  Patient Health Questionnaire Score: 0     If depressive symptoms identified deferred to follow up visit unless specifically addressed in assessment and plan.    Interpretation of PHQ-9 Total Score   Score Severity   1-4 No Depression   5-9 Mild Depression   10-14 Moderate Depression   15-19 Moderately Severe Depression   20-27 Severe Depression    Screening for Cognitive Impairment    Three Minute Recall (river, nation, finger) 2/3    Dyllan clock face with all 12 numbers and set the hands to show 10 past 11.  Yes    Cognitive concerns  identified deferred for follow up unless specifically addressed in assessment and plan.    Fall Risk Assessment    Has the patient had two or more falls in the last year or any fall with injury in the last year?  No    Safety Assessment    Throw rugs on floor.  Yes  Handrails on all stairs.  Yes  Good lighting in all hallways.  Yes  Difficulty hearing.  No  Patient counseled about all safety risks that were identified.    Functional Assessment ADLs    Are there any barriers preventing you from cooking for yourself or meeting nutritional needs?  No.    Are there any barriers preventing you from driving safely or obtaining transportation?  Yes.    Are there any barriers preventing you from using a telephone or calling for help?  No.    Are there any barriers preventing you from shopping?  Yes.    Are there any barriers preventing you from taking care of your own finances?  No.    Are there any barriers preventing you from managing your medications?  No.    Are there any barriers preventing you from showering, bathing or dressing yourself?  No.    Are you currently engaging in any exercise or physical activity?  Yes.     What is your perception of your health?  Fair.      Health Maintenance Summary                HEPATITIS C SCREENING Overdue 1955     COVID-19 Vaccine Overdue 2/10/1972     COLONOSCOPY Overdue 2/10/2006     IMM ZOSTER VACCINES Overdue 2/10/2006     MAMMOGRAM Overdue 8/5/2017      Done 8/5/2016 MA DIAGNOSTIC MAMMO W/CAD-BILAT    BONE DENSITY Overdue 2/10/2021     IMM PNEUMOCOCCAL VACCINE: 65+ Years Overdue 2/10/2021     IMM DTaP/Tdap/Td Vaccine Next Due 10/18/2029      Done 10/18/2019 Ext Imm: Tdap     Patient has more history with this topic...          Patient Care Team:  HERMILO Blankenship as PCP - General (Family Medicine)  [facility] as [facility] Health Provider    See chart problem list or referrals section for names of specialist.    Patient Active Problem List    Diagnosis Date Noted    • History of femur fracture 04/20/2020   • Fall 04/20/2020   • Hyperglycemia 04/20/2020   • History of breast cancer 04/21/2020   • History of lung cancer 04/20/2020   • Neuropathy associated with cancer (HCC) 03/11/2021   • History of chemotherapy 03/11/2021       Current Outpatient Medications on File Prior to Visit   Medication Sig Dispense Refill   • aspirin 81 MG EC tablet aspirin 81 mg tablet,delayed release     • Influenza Vac Subunit Quad (FLUCELVAX) 0.5 ML Suspension Prefilled Syringe injection Flucelvax Quad [id number] (PF) 60 mcg (15 mcg x 4)/0.5 mL IM syringe   PHARMACY ADMINISTERED     • omeprazole (PRILOSEC) 10 MG CAPSULE DELAYED RELEASE omeprazole 10 mg capsule,delayed release     • acetaminophen (TYLENOL) 500 MG Tab Take 500-1,000 mg by mouth every 6 hours as needed for Mild Pain.       No current facility-administered medications on file prior to visit.       Povidone iodine, Sulfa drugs, Morphine, and Iodine    Social History     Socioeconomic History   • Marital status:      Spouse name: Not on file   • Number of children: Not on file   • Years of education: Not on file   • Highest education level: Master's degree (e.g., MA, MS, Shasha, MEd, MSW, OSMANY)   Occupational History   • Not on file   Tobacco Use   • Smoking status: Never Smoker   • Smokeless tobacco: Never Used   Substance and Sexual Activity   • Alcohol use: Yes     Alcohol/week: 0.0 oz     Comment: very rarely   • Drug use: No   • Sexual activity: Not on file   Other Topics Concern   • Not on file   Social History Narrative   • Not on file     Social Determinants of Health     Financial Resource Strain: Low Risk    • Difficulty of Paying Living Expenses: Not hard at all   Food Insecurity: No Food Insecurity   • Worried About Running Out of Food in the Last Year: Never true   • Ran Out of Food in the Last Year: Never true   Transportation Needs: No Transportation Needs   • Lack of Transportation (Medical): No   • Lack of  Transportation (Non-Medical): No   Physical Activity: Insufficiently Active   • Days of Exercise per Week: 2 days   • Minutes of Exercise per Session: 20 min   Stress: No Stress Concern Present   • Feeling of Stress : Not at all   Social Connections: Not Isolated   • Frequency of Communication with Friends and Family: More than three times a week   • Frequency of Social Gatherings with Friends and Family: Once a week   • Attends Christian Services: 1 to 4 times per year   • Active Member of Clubs or Organizations: Yes   • Attends Club or Organization Meetings: More than 4 times per year   • Marital Status:    Intimate Partner Violence:    • Fear of Current or Ex-Partner:    • Emotionally Abused:    • Physically Abused:    • Sexually Abused:        Family History   Problem Relation Age of Onset   • Lung Disease Mother    • Arthritis Father    • Heart Disease Father        Past Surgical History:   Procedure Laterality Date   • FEMUR ORIF Right 4/20/2020    Procedure: ORIF, FRACTURE,  DISTAL FEMUR;  Surgeon: Christian Ag M.D.;  Location: SURGERY [facility];  Service: Orthopedics   • TISSUE EXPANDER PLACE/REMOVE Bilateral 6/8/2017    Procedure: TISSUE EXPANDER PLACE/REMOVE;  Surgeon: Everardo Matthews M.D.;  Location: SURGERY SAME DAY [facility];  Service:    • BREAST IMPLANT REVISION Bilateral 6/8/2017    Procedure: BREAST IMPLANT;  Surgeon: Everardo Matthews M.D.;  Location: SURGERY SAME DAY [facility];  Service:    • CAPSULOTOMY Bilateral 6/8/2017    Procedure: CAPSULOTOMY FOR PARTIAL CAPSULECTOMIES;  Surgeon: Everardo Matthews M.D.;  Location: SURGERY SAME DAY [facility];  Service:    • BREAST RECONSTRUCTION Bilateral 6/8/2017    Procedure: BREAST RECONSTRUCTION USING LOCAL TISSUE & FAT GRAFTING;  Surgeon: Everardo Matthews M.D.;  Location: SURGERY SAME DAY [facility];  Service:    • THORACOSCOPY Right 5/1/2017    Procedure: THORACOSCOPY, WEDGE RESECTION LOWER LOBE MASS;  Surgeon: Troy LAMB  Ganser, M.D.;  Location: SURGERY [facility];  Service:    • CATH PLACEMENT Left 3/17/2017    Procedure: CATH PLACEMENT FOR SUBCLAVIAN PORT LEFT;  Surgeon: Carly Wright M.D.;  Location: SURGERY [facility];  Service:    • LUNG NEEDLE BIOPSY  02-   • TISSUE EXPANDER PLACE/REMOVE Bilateral 1/19/2017    Procedure: TISSUE EXPANDER PLACEment;  Surgeon: Everardo Matthews M.D.;  Location: SURGERY SAME DAY [facility];  Service:    • FLAP GRAFT  1/19/2017    Procedure: FLAP GRAFT- FOR DERMAL ADIPOFASCIAL FLAPS ;  Surgeon: Everardo Matthews M.D.;  Location: SURGERY SAME DAY [facility];  Service:    • MASTECTOMY Bilateral 1/19/2017    Procedure: MASTECTOMY PROPHYLACTIC LEFT, AND RIGHT TOTAL MASTECTOMY;  Surgeon: Carly Wright M.D.;  Location: SURGERY SAME DAY [facility];  Service:    • AXILLARY NODE DISSECTION Right 1/19/2017    Procedure: AXILLARY NODE DISSECTION;  Surgeon: Carly Wright M.D.;  Location: SURGERY SAME DAY [facility];  Service:    • CATH PLACEMENT Left 1/19/2017    Procedure: Removal of left subclavian port ;  Surgeon: Carly Wright M.D.;  Location: SURGERY SAME DAY [facility];  Service:    • CATH PLACEMENT Left 9/5/2016    Procedure: CATH PLACEMENT - SUBCLAVIAN PORT - SIDE TO BE DETERMINED;  Surgeon: Carly Wright M.D.;  Location: SURGERY [facility];  Service:    • STEREOTACTIC BIOPSY Right 08/10/2016   • HIP ARTHROPLASTY TOTAL Right 2014   • OTHER ORTHOPEDIC SURGERY  6/2012    right hip arthroplasty   • CATARACT PHACO WITH IOL Bilateral    • GYN SURGERY  Approx 2013    Hysterectomy       ROS:  Denies any Headache, Blurred Vision, Confusion Chest pain,  Shortness of breath,  Abdominal pain, Changes of bowel or bladder, Lower ext edema, Fevers, Nights sweats, Weight Changes, Focal weakness or numbness.  All other systems are negative.    PHYSICAL:    /88 (BP Location: Left arm, Patient Position: Sitting, BP Cuff Size: Adult)   Pulse 84    "Temp 36.2 °C (97.2 °F) (Temporal)   Resp 20   Ht 1.54 m (5' 0.63\")   Wt 89.8 kg (197 lb 15.6 oz)   SpO2 97%     Gen:         Alert and oriented, No apparent distress.  HEENT:   Perrla, TM clear,  Oralpharynx no erythema or exudates.  Neck:       No Jugular venous distension, Lymphadenopathy, Thyromegaly, Bruits.  Lungs:     Clear to auscultation bilaterally  CV:          Regular rate and rhythm. No murmurs, rubs or gallops.  Abd:         Soft non tender, non distended. Normal active bowel sounds. No Hepatosplenomegaly, No pulsatile masses.                       Ext:          No clubbing, cyanosis, edema.  Neuro:     CN II-XII grossly intact.  Strength 5/5 throughout.  DTR's equal and                   symmetrical both upper and lower extremities. Down going Babinski.    MS:          Full range motion all joints no major deformities.  Skin:        No concerning lesions or rashes.    Health Maintenance Due   Topic Date Due   • HEPATITIS C SCREENING  Never done   • COVID-19 Vaccine (1 of 2) Never done   • COLONOSCOPY  Never done   • IMM ZOSTER VACCINES (1 of 2) Never done   • MAMMOGRAM  08/05/2017   • BONE DENSITY  Never done   • IMM PNEUMOCOCCAL VACCINE: 65+ Years (1 of 1 - PPSV23) Never done         ASSESSMENT AND PLAN:  Screening test reviewed with patient today and updated within health-care maintenance record. Discussion today about general wellness and lifestyle habits.      1. Medicare annual wellness visit, initial  Problem list and medications reviewed and updated, preventative health measures addressed.  We will hold off on vaccines as she is planning to receive the COVID-19 vaccine today    2. Neuropathy associated with cancer (HCC)  Causing numbness in bilateral lower extremities as were as hands, nonpainful and not requiring treatment at this time    3. History of breast cancer  In remission, followed by oncology    4. History of lung cancer  In remission    5. History of femur fracture  Recovering " fairly well, she is back to golfing.  She uses a cane    6. History of chemotherapy  - Comp Metabolic Panel; Future    7. History of elevated glucose  - HEMOGLOBIN A1C; Future    8. Thyroid disorder screen  - TSH WITH REFLEX TO FT4; Future    9. Screening for deficiency anemia  - CBC WITH DIFFERENTIAL; Future    10. Postmenopausal status  - DS-BONE DENSITY STUDY (DEXA); Future    11. Lipid screening  - Lipid Profile; Future    12. Colon cancer screening  - COLOGUARD (FIT DNA)

## 2021-04-01 ENCOUNTER — HOSPITAL ENCOUNTER (OUTPATIENT)
Dept: RADIOLOGY | Facility: MEDICAL CENTER | Age: 66
End: 2021-04-01
Attending: NURSE PRACTITIONER
Payer: MEDICARE

## 2021-04-01 DIAGNOSIS — Z78.0 POSTMENOPAUSAL STATUS: ICD-10-CM

## 2021-04-01 PROCEDURE — 77080 DXA BONE DENSITY AXIAL: CPT

## 2021-04-02 ENCOUNTER — IMMUNIZATION (OUTPATIENT)
Dept: FAMILY PLANNING/WOMEN'S HEALTH CLINIC | Facility: IMMUNIZATION CENTER | Age: 66
End: 2021-04-02
Attending: INTERNAL MEDICINE
Payer: MEDICARE

## 2021-04-02 DIAGNOSIS — Z23 ENCOUNTER FOR VACCINATION: Primary | ICD-10-CM

## 2021-04-02 PROCEDURE — 91300 PFIZER SARS-COV-2 VACCINE: CPT

## 2021-04-02 PROCEDURE — 0002A PFIZER SARS-COV-2 VACCINE: CPT

## 2021-04-14 ENCOUNTER — HOSPITAL ENCOUNTER (OUTPATIENT)
Dept: LAB | Facility: MEDICAL CENTER | Age: 66
End: 2021-04-14
Attending: NURSE PRACTITIONER
Payer: MEDICARE

## 2021-04-14 ENCOUNTER — HOSPITAL ENCOUNTER (OUTPATIENT)
Dept: LAB | Facility: MEDICAL CENTER | Age: 66
End: 2021-04-14
Attending: INTERNAL MEDICINE
Payer: MEDICARE

## 2021-04-14 DIAGNOSIS — Z92.21 HISTORY OF CHEMOTHERAPY: ICD-10-CM

## 2021-04-14 DIAGNOSIS — Z13.0 SCREENING FOR DEFICIENCY ANEMIA: ICD-10-CM

## 2021-04-14 DIAGNOSIS — Z13.29 THYROID DISORDER SCREEN: ICD-10-CM

## 2021-04-14 DIAGNOSIS — Z13.220 LIPID SCREENING: ICD-10-CM

## 2021-04-14 DIAGNOSIS — Z86.39 HISTORY OF ELEVATED GLUCOSE: ICD-10-CM

## 2021-04-14 LAB
ALBUMIN SERPL BCP-MCNC: 4.2 G/DL (ref 3.2–4.9)
ALBUMIN/GLOB SERPL: 1.5 G/DL
ALP SERPL-CCNC: 100 U/L (ref 30–99)
ALT SERPL-CCNC: 12 U/L (ref 2–50)
ANION GAP SERPL CALC-SCNC: 11 MMOL/L (ref 7–16)
ANION GAP SERPL CALC-SCNC: 7 MMOL/L (ref 7–16)
AST SERPL-CCNC: 16 U/L (ref 12–45)
BASOPHILS # BLD AUTO: 1.7 % (ref 0–1.8)
BASOPHILS # BLD: 0.06 K/UL (ref 0–0.12)
BILIRUB SERPL-MCNC: 0.4 MG/DL (ref 0.1–1.5)
BUN SERPL-MCNC: 12 MG/DL (ref 8–22)
BUN SERPL-MCNC: 13 MG/DL (ref 8–22)
CALCIUM SERPL-MCNC: 9.3 MG/DL (ref 8.5–10.5)
CALCIUM SERPL-MCNC: 9.3 MG/DL (ref 8.5–10.5)
CHLORIDE SERPL-SCNC: 105 MMOL/L (ref 96–112)
CHLORIDE SERPL-SCNC: 107 MMOL/L (ref 96–112)
CHOLEST SERPL-MCNC: 188 MG/DL (ref 100–199)
CO2 SERPL-SCNC: 24 MMOL/L (ref 20–33)
CO2 SERPL-SCNC: 25 MMOL/L (ref 20–33)
CREAT SERPL-MCNC: 0.64 MG/DL (ref 0.5–1.4)
CREAT SERPL-MCNC: 0.67 MG/DL (ref 0.5–1.4)
EOSINOPHIL # BLD AUTO: 0.11 K/UL (ref 0–0.51)
EOSINOPHIL NFR BLD: 3 % (ref 0–6.9)
ERYTHROCYTE [DISTWIDTH] IN BLOOD BY AUTOMATED COUNT: 52.6 FL (ref 35.9–50)
EST. AVERAGE GLUCOSE BLD GHB EST-MCNC: 123 MG/DL
FASTING STATUS PATIENT QL REPORTED: NORMAL
FASTING STATUS PATIENT QL REPORTED: NORMAL
GLOBULIN SER CALC-MCNC: 2.8 G/DL (ref 1.9–3.5)
GLUCOSE SERPL-MCNC: 96 MG/DL (ref 65–99)
GLUCOSE SERPL-MCNC: 99 MG/DL (ref 65–99)
HBA1C MFR BLD: 5.9 % (ref 4–5.6)
HCT VFR BLD AUTO: 42 % (ref 37–47)
HDLC SERPL-MCNC: 62 MG/DL
HGB BLD-MCNC: 13.8 G/DL (ref 12–16)
IMM GRANULOCYTES # BLD AUTO: 0.01 K/UL (ref 0–0.11)
IMM GRANULOCYTES NFR BLD AUTO: 0.3 % (ref 0–0.9)
LDLC SERPL CALC-MCNC: 110 MG/DL
LYMPHOCYTES # BLD AUTO: 0.87 K/UL (ref 1–4.8)
LYMPHOCYTES NFR BLD: 24.1 % (ref 22–41)
MAGNESIUM SERPL-MCNC: 1.9 MG/DL (ref 1.5–2.5)
MCH RBC QN AUTO: 31.5 PG (ref 27–33)
MCHC RBC AUTO-ENTMCNC: 32.9 G/DL (ref 33.6–35)
MCV RBC AUTO: 95.9 FL (ref 81.4–97.8)
MONOCYTES # BLD AUTO: 0.28 K/UL (ref 0–0.85)
MONOCYTES NFR BLD AUTO: 7.8 % (ref 0–13.4)
NEUTROPHILS # BLD AUTO: 2.28 K/UL (ref 2–7.15)
NEUTROPHILS NFR BLD: 63.1 % (ref 44–72)
NRBC # BLD AUTO: 0 K/UL
NRBC BLD-RTO: 0 /100 WBC
PHOSPHATE SERPL-MCNC: 3.4 MG/DL (ref 2.5–4.5)
PLATELET # BLD AUTO: 276 K/UL (ref 164–446)
PMV BLD AUTO: 9.5 FL (ref 9–12.9)
POTASSIUM SERPL-SCNC: 4 MMOL/L (ref 3.6–5.5)
POTASSIUM SERPL-SCNC: 4.4 MMOL/L (ref 3.6–5.5)
PROT SERPL-MCNC: 7 G/DL (ref 6–8.2)
RBC # BLD AUTO: 4.38 M/UL (ref 4.2–5.4)
SODIUM SERPL-SCNC: 137 MMOL/L (ref 135–145)
SODIUM SERPL-SCNC: 142 MMOL/L (ref 135–145)
TRIGL SERPL-MCNC: 80 MG/DL (ref 0–149)
TSH SERPL DL<=0.005 MIU/L-ACNC: 1.71 UIU/ML (ref 0.38–5.33)
WBC # BLD AUTO: 3.6 K/UL (ref 4.8–10.8)

## 2021-04-14 PROCEDURE — 84100 ASSAY OF PHOSPHORUS: CPT

## 2021-04-14 PROCEDURE — 36415 COLL VENOUS BLD VENIPUNCTURE: CPT

## 2021-04-14 PROCEDURE — 82306 VITAMIN D 25 HYDROXY: CPT

## 2021-04-14 PROCEDURE — 84443 ASSAY THYROID STIM HORMONE: CPT

## 2021-04-14 PROCEDURE — 83036 HEMOGLOBIN GLYCOSYLATED A1C: CPT

## 2021-04-14 PROCEDURE — 80053 COMPREHEN METABOLIC PANEL: CPT

## 2021-04-14 PROCEDURE — 80061 LIPID PANEL: CPT

## 2021-04-14 PROCEDURE — 83735 ASSAY OF MAGNESIUM: CPT

## 2021-04-14 PROCEDURE — 80048 BASIC METABOLIC PNL TOTAL CA: CPT

## 2021-04-14 PROCEDURE — 85025 COMPLETE CBC W/AUTO DIFF WBC: CPT

## 2021-04-15 LAB — 25(OH)D3 SERPL-MCNC: 41 NG/ML (ref 30–80)

## 2021-05-17 ENCOUNTER — PATIENT OUTREACH (OUTPATIENT)
Dept: HEALTH INFORMATION MANAGEMENT | Facility: OTHER | Age: 66
End: 2021-05-17

## 2021-05-17 NOTE — PROGRESS NOTES
Outcome:   Called pt to schedule GISELL, PT declined, due to other medical concerns & an upcoming family wedding.       Attempt # 1

## 2021-07-11 ENCOUNTER — OFFICE VISIT (OUTPATIENT)
Dept: URGENT CARE | Facility: CLINIC | Age: 66
End: 2021-07-11
Payer: MEDICARE

## 2021-07-11 VITALS
OXYGEN SATURATION: 96 % | DIASTOLIC BLOOD PRESSURE: 80 MMHG | RESPIRATION RATE: 14 BRPM | HEIGHT: 62 IN | BODY MASS INDEX: 36.25 KG/M2 | WEIGHT: 197 LBS | SYSTOLIC BLOOD PRESSURE: 122 MMHG | HEART RATE: 103 BPM | TEMPERATURE: 99.1 F

## 2021-07-11 DIAGNOSIS — J98.01 BRONCHOSPASM, ACUTE: ICD-10-CM

## 2021-07-11 DIAGNOSIS — R11.10 POST-TUSSIVE EMESIS: ICD-10-CM

## 2021-07-11 DIAGNOSIS — J02.9 SORE THROAT: ICD-10-CM

## 2021-07-11 DIAGNOSIS — J01.90 ACUTE SINUSITIS, RECURRENCE NOT SPECIFIED, UNSPECIFIED LOCATION: ICD-10-CM

## 2021-07-11 DIAGNOSIS — Z20.818 EXPOSURE TO STREP THROAT: ICD-10-CM

## 2021-07-11 DIAGNOSIS — R05.9 COUGH: ICD-10-CM

## 2021-07-11 LAB
INT CON NEG: NORMAL
INT CON POS: NORMAL
S PYO AG THROAT QL: NORMAL

## 2021-07-11 PROCEDURE — 87880 STREP A ASSAY W/OPTIC: CPT | Performed by: PHYSICIAN ASSISTANT

## 2021-07-11 PROCEDURE — 99214 OFFICE O/P EST MOD 30 MIN: CPT | Performed by: PHYSICIAN ASSISTANT

## 2021-07-11 RX ORDER — ALBUTEROL SULFATE 90 UG/1
2 AEROSOL, METERED RESPIRATORY (INHALATION) EVERY 6 HOURS PRN
Qty: 8.5 G | Refills: 0 | Status: SHIPPED | OUTPATIENT
Start: 2021-07-11 | End: 2022-01-17

## 2021-07-11 RX ORDER — AZITHROMYCIN 250 MG/1
TABLET, FILM COATED ORAL
Qty: 6 TABLET | Refills: 0 | Status: SHIPPED | OUTPATIENT
Start: 2021-07-11 | End: 2021-11-08

## 2021-07-11 RX ORDER — DEXTROMETHORPHAN HYDROBROMIDE AND PROMETHAZINE HYDROCHLORIDE 15; 6.25 MG/5ML; MG/5ML
5 SYRUP ORAL 4 TIMES DAILY PRN
Qty: 200 ML | Refills: 0 | Status: SHIPPED | OUTPATIENT
Start: 2021-07-11 | End: 2021-07-21

## 2021-07-11 ASSESSMENT — FIBROSIS 4 INDEX: FIB4 SCORE: 1.09

## 2021-07-11 NOTE — PROGRESS NOTES
Subjective:      Indu Leblanc is a 66 y.o. female who presents with Headache (x 3 days ) and Sinus Problem    Medications:    • acetaminophen Tabs  • albuterol Aers  • aspirin  • azithromycin Tabs  • Influenza Vac Subunit Quad Tahira  • omeprazole Cpdr  • promethazine-dextromethorphan    Allergies: Povidone iodine, Sulfa drugs, Morphine, and Iodine    Problem List: Carmelina Leblanc does not have any pertinent problems on file.    Surgical History:  Past Surgical History:   Procedure Laterality Date   • FEMUR ORIF Right 4/20/2020    Procedure: ORIF, FRACTURE,  DISTAL FEMUR;  Surgeon: Christian Ag M.D.;  Location: SURGERY [facility];  Service: Orthopedics   • TISSUE EXPANDER PLACE/REMOVE Bilateral 6/8/2017    Procedure: TISSUE EXPANDER PLACE/REMOVE;  Surgeon: Everardo Matthews M.D.;  Location: SURGERY SAME DAY [facility];  Service:    • BREAST IMPLANT REVISION Bilateral 6/8/2017    Procedure: BREAST IMPLANT;  Surgeon: Everardo Matthews M.D.;  Location: SURGERY SAME DAY [facility];  Service:    • CAPSULOTOMY Bilateral 6/8/2017    Procedure: CAPSULOTOMY FOR PARTIAL CAPSULECTOMIES;  Surgeon: Everardo Matthews M.D.;  Location: SURGERY SAME DAY [facility];  Service:    • BREAST RECONSTRUCTION Bilateral 6/8/2017    Procedure: BREAST RECONSTRUCTION USING LOCAL TISSUE & FAT GRAFTING;  Surgeon: Everardo Matthews M.D.;  Location: SURGERY SAME DAY [facility];  Service:    • THORACOSCOPY Right 5/1/2017    Procedure: THORACOSCOPY, WEDGE RESECTION LOWER LOBE MASS;  Surgeon: John H Ganser, M.D.;  Location: [facility];  Service:    • CATH PLACEMENT Left 3/17/2017    Procedure: CATH PLACEMENT FOR SUBCLAVIAN PORT LEFT;  Surgeon: Carly Wright M.D.;  Location: [facility];  Service:    • LUNG NEEDLE BIOPSY  02-   • TISSUE EXPANDER PLACE/REMOVE Bilateral 1/19/2017    Procedure: TISSUE EXPANDER PLACEment;  Surgeon: Everardo Matthews M.D.;  Location: SURGERY SAME DAY [facility]  [facility];  Service:    • FLAP GRAFT  1/19/2017    Procedure: FLAP GRAFT- FOR DERMAL ADIPOFASCIAL FLAPS ;  Surgeon: Everardo Matthews M.D.;  Location: SURGERY SAME DAY [facility];  Service:    • MASTECTOMY Bilateral 1/19/2017    Procedure: MASTECTOMY PROPHYLACTIC LEFT, AND RIGHT TOTAL MASTECTOMY;  Surgeon: Carly Wright M.D.;  Location: SURGERY SAME DAY [facility];  Service:    • AXILLARY NODE DISSECTION Right 1/19/2017    Procedure: AXILLARY NODE DISSECTION;  Surgeon: Carly Wright M.D.;  Location: SURGERY SAME DAY [facility];  Service:    • CATH PLACEMENT Left 1/19/2017    Procedure: Removal of left subclavian port ;  Surgeon: Carly Wright M.D.;  Location: SURGERY SAME DAY [facility];  Service:    • CATH PLACEMENT Left 9/5/2016    Procedure: CATH PLACEMENT - SUBCLAVIAN PORT - SIDE TO BE DETERMINED;  Surgeon: Carly Wright M.D.;  Location: SURGERY [facility]S;  Service:    • STEREOTACTIC BIOPSY Right 08/10/2016   • HIP ARTHROPLASTY TOTAL Right 2014   • OTHER ORTHOPEDIC SURGERY  6/2012    right hip arthroplasty   • CATARACT PHACO WITH IOL Bilateral    • GYN SURGERY  Approx 2013    Hysterectomy       Past Social Hx: Carmelina Leblanc  reports that she has never smoked. She has never used smokeless tobacco. She reports current alcohol use. She reports that she does not use drugs.     Past Family Hx:  Carmelina Leblanc family history includes Arthritis in her father; Heart Disease in her father; Lung Disease in her mother.     Problem list, medications, and allergies reviewed by myself today in Epic.          Patient presents with:URI symptoms  Headache: x 3 days   Sinus Problem, pain pressure and purulent discharge when blowing nose.   Sore throat, Strep throat exposure (everyone in her family has strep-on abx)  Hacking cough with wheeze causing post tussive emesis.     URI   This is a new problem. The current episode started in the past 7 days. The problem has been gradually  "worsening. There has been no fever. Associated symptoms include congestion, coughing, ear pain, headaches, sinus pain, a sore throat, swollen glands and wheezing. Pertinent negatives include no vomiting (post tussive). She has tried decongestant, increased fluids and steam for the symptoms. The treatment provided no relief.       Review of Systems   Constitutional: Negative for chills and fever.   HENT: Positive for congestion, ear pain, sinus pain and sore throat.    Respiratory: Positive for cough and wheezing. Negative for sputum production and shortness of breath.    Gastrointestinal: Negative for vomiting (post tussive).   Neurological: Positive for headaches.   All other systems reviewed and are negative.         Objective:     /80 (BP Location: Left arm, Patient Position: Sitting, BP Cuff Size: Adult)   Pulse (!) 103   Temp 37.3 °C (99.1 °F) (Temporal)   Resp 14   Ht 1.575 m (5' 2\")   Wt 89.4 kg (197 lb)   SpO2 96%   BMI 36.03 kg/m²      Physical Exam  Vitals and nursing note reviewed.   Constitutional:       General: She is not in acute distress.     Appearance: Normal appearance. She is well-developed. She is obese. She is not ill-appearing or toxic-appearing.   HENT:      Head: Normocephalic and atraumatic.      Right Ear: Tympanic membrane normal.      Left Ear: Tympanic membrane normal.      Nose: Mucosal edema, congestion and rhinorrhea present.      Right Sinus: Frontal sinus tenderness present.      Left Sinus: Frontal sinus tenderness present.      Mouth/Throat:      Lips: Pink.      Mouth: Mucous membranes are moist.      Pharynx: Oropharynx is clear. Uvula midline. Posterior oropharyngeal erythema present. No oropharyngeal exudate.   Eyes:      Extraocular Movements: Extraocular movements intact.      Conjunctiva/sclera: Conjunctivae normal.      Pupils: Pupils are equal, round, and reactive to light.   Cardiovascular:      Rate and Rhythm: Regular rhythm. Tachycardia present.      " Pulses: Normal pulses.      Heart sounds: Normal heart sounds.   Pulmonary:      Effort: Pulmonary effort is normal. No respiratory distress.      Breath sounds: Wheezing (faint expiratory) present. No rhonchi or rales.   Abdominal:      General: Bowel sounds are normal.      Palpations: Abdomen is soft.   Musculoskeletal:         General: Normal range of motion.      Cervical back: Normal range of motion and neck supple.   Lymphadenopathy:      Cervical: No cervical adenopathy.   Skin:     General: Skin is warm and dry.      Capillary Refill: Capillary refill takes less than 2 seconds.   Neurological:      General: No focal deficit present.      Mental Status: She is alert and oriented to person, place, and time.      Cranial Nerves: No cranial nerve deficit.      Motor: Motor function is intact.      Coordination: Coordination is intact.      Gait: Gait normal.   Psychiatric:         Mood and Affect: Mood normal.                 Strep: neg         Assessment/Plan:        1. Acute sinusitis, recurrence not specified, unspecified location  POCT Rapid Strep A    promethazine-dextromethorphan (PROMETHAZINE-DM) 6.25-15 MG/5ML syrup    albuterol 108 (90 Base) MCG/ACT Aero Soln inhalation aerosol    azithromycin (ZITHROMAX) 250 MG Tab   2. Sore throat  POCT Rapid Strep A    promethazine-dextromethorphan (PROMETHAZINE-DM) 6.25-15 MG/5ML syrup    albuterol 108 (90 Base) MCG/ACT Aero Soln inhalation aerosol    azithromycin (ZITHROMAX) 250 MG Tab   3. Exposure to strep throat  POCT Rapid Strep A    promethazine-dextromethorphan (PROMETHAZINE-DM) 6.25-15 MG/5ML syrup    albuterol 108 (90 Base) MCG/ACT Aero Soln inhalation aerosol    azithromycin (ZITHROMAX) 250 MG Tab   4. Cough  POCT Rapid Strep A    promethazine-dextromethorphan (PROMETHAZINE-DM) 6.25-15 MG/5ML syrup    albuterol 108 (90 Base) MCG/ACT Aero Soln inhalation aerosol    azithromycin (ZITHROMAX) 250 MG Tab   5. Post-tussive emesis  POCT Rapid Strep A     promethazine-dextromethorphan (PROMETHAZINE-DM) 6.25-15 MG/5ML syrup    albuterol 108 (90 Base) MCG/ACT Aero Soln inhalation aerosol    azithromycin (ZITHROMAX) 250 MG Tab   6. Bronchospasm, acute  POCT Rapid Strep A    promethazine-dextromethorphan (PROMETHAZINE-DM) 6.25-15 MG/5ML syrup    albuterol 108 (90 Base) MCG/ACT Aero Soln inhalation aerosol    azithromycin (ZITHROMAX) 250 MG Tab      Patient has had both vaccines, declined covid testing.    PT to begin prescription medications today as discussed.      PT can begin or continue OTC medications, increase fluids and rest until symptoms improve.     Advised Saline nasal spray for relief of congestion.     PT should follow up with PCP in 1-2 days for re-evaluation if symptoms have not improved.      PT instructed not to drive or operate heavy machinery or drink alcohol while taking this medication because it contains a narcotic, benzodiazepines, antinausea or muscle relaxant which causes drowsiness. PT verbalized understanding of these instructions.     [facility] Aware web site evaluation: I have obtained and reviewed patient utilization report from [facility] pharmacy database prior to writing prescription for controlled substance.  No history of abuse.      Discussed red flags and reasons to return to UC or ED.      Pt and/or family verbalized understanding of diagnosis and follow up instructions and was offered informational handout on diagnosis.  PT discharged.     Patient was evaluated in clinic today while wearing appropriate personal protective equipment.      I have spent 35 minutes on the care of this patient.  This includes preparing for visit which includes review of previous visits if available in EMR, obtaining HPI, exam and evaluation of patient, ordering and independent interpretation of labs, imaging, tests, medical management, counseling, education and documentation.

## 2021-07-16 PROBLEM — C80.1 MALIGNANT NEOPLASTIC DISEASE (HCC): Status: ACTIVE | Noted: 2020-05-06

## 2021-07-16 ASSESSMENT — ENCOUNTER SYMPTOMS
COUGH: 1
SPUTUM PRODUCTION: 0
SWOLLEN GLANDS: 1
FEVER: 0
WHEEZING: 1
SORE THROAT: 1
VOMITING: 0
HEADACHES: 1
SINUS PAIN: 1
SHORTNESS OF BREATH: 0
CHILLS: 0

## 2021-09-19 NOTE — DISCHARGE PLANNING
Agency/Facility Name: [facility]  Spoke To: fax  Outcome: Re sent referral with added info. Called service to advise of new referral.    LSW informed     119

## 2021-10-12 ENCOUNTER — HOSPITAL ENCOUNTER (OUTPATIENT)
Dept: LAB | Facility: MEDICAL CENTER | Age: 66
End: 2021-10-12
Attending: INTERNAL MEDICINE
Payer: MEDICARE

## 2021-10-12 LAB
25(OH)D3 SERPL-MCNC: 64 NG/ML (ref 30–100)
ALBUMIN SERPL BCP-MCNC: 4.2 G/DL (ref 3.2–4.9)
ALBUMIN/GLOB SERPL: 1.6 G/DL
ALP SERPL-CCNC: 59 U/L (ref 30–99)
ALT SERPL-CCNC: 15 U/L (ref 2–50)
ANION GAP SERPL CALC-SCNC: 11 MMOL/L (ref 7–16)
AST SERPL-CCNC: 15 U/L (ref 12–45)
BILIRUB SERPL-MCNC: 0.3 MG/DL (ref 0.1–1.5)
BUN SERPL-MCNC: 16 MG/DL (ref 8–22)
CALCIUM SERPL-MCNC: 9.4 MG/DL (ref 8.5–10.5)
CHLORIDE SERPL-SCNC: 103 MMOL/L (ref 96–112)
CO2 SERPL-SCNC: 23 MMOL/L (ref 20–33)
CREAT SERPL-MCNC: 0.62 MG/DL (ref 0.5–1.4)
GLOBULIN SER CALC-MCNC: 2.6 G/DL (ref 1.9–3.5)
GLUCOSE SERPL-MCNC: 109 MG/DL (ref 65–99)
MAGNESIUM SERPL-MCNC: 2 MG/DL (ref 1.5–2.5)
PHOSPHATE SERPL-MCNC: 3.4 MG/DL (ref 2.5–4.5)
POTASSIUM SERPL-SCNC: 4.7 MMOL/L (ref 3.6–5.5)
PROT SERPL-MCNC: 6.8 G/DL (ref 6–8.2)
SODIUM SERPL-SCNC: 137 MMOL/L (ref 135–145)

## 2021-10-12 PROCEDURE — 83735 ASSAY OF MAGNESIUM: CPT

## 2021-10-12 PROCEDURE — 82306 VITAMIN D 25 HYDROXY: CPT

## 2021-10-12 PROCEDURE — 84100 ASSAY OF PHOSPHORUS: CPT

## 2021-10-12 PROCEDURE — 36415 COLL VENOUS BLD VENIPUNCTURE: CPT

## 2021-10-12 PROCEDURE — 80053 COMPREHEN METABOLIC PANEL: CPT

## 2021-11-08 ENCOUNTER — OFFICE VISIT (OUTPATIENT)
Dept: MEDICAL GROUP | Facility: PHYSICIAN GROUP | Age: 66
End: 2021-11-08
Payer: MEDICARE

## 2021-11-08 VITALS
SYSTOLIC BLOOD PRESSURE: 104 MMHG | DIASTOLIC BLOOD PRESSURE: 70 MMHG | HEART RATE: 83 BPM | WEIGHT: 198.9 LBS | TEMPERATURE: 97.4 F | HEIGHT: 61 IN | BODY MASS INDEX: 37.55 KG/M2 | OXYGEN SATURATION: 96 %

## 2021-11-08 DIAGNOSIS — Z23 NEED FOR VACCINATION: ICD-10-CM

## 2021-11-08 DIAGNOSIS — R73.09 ELEVATED HEMOGLOBIN A1C: ICD-10-CM

## 2021-11-08 DIAGNOSIS — R73.9 HYPERGLYCEMIA: ICD-10-CM

## 2021-11-08 DIAGNOSIS — C80.1 NEUROPATHY ASSOCIATED WITH CANCER (HCC): ICD-10-CM

## 2021-11-08 DIAGNOSIS — G63 NEUROPATHY ASSOCIATED WITH CANCER (HCC): ICD-10-CM

## 2021-11-08 DIAGNOSIS — D70.1 CHEMOTHERAPY-INDUCED NEUTROPENIA (HCC): ICD-10-CM

## 2021-11-08 DIAGNOSIS — T45.1X5A CHEMOTHERAPY-INDUCED NEUTROPENIA (HCC): ICD-10-CM

## 2021-11-08 DIAGNOSIS — H61.21 IMPACTED CERUMEN OF RIGHT EAR: ICD-10-CM

## 2021-11-08 DIAGNOSIS — Z85.3 HISTORY OF BREAST CANCER: ICD-10-CM

## 2021-11-08 DIAGNOSIS — M81.8 OTHER OSTEOPOROSIS WITHOUT CURRENT PATHOLOGICAL FRACTURE: ICD-10-CM
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
## 2021-11-08 PROCEDURE — 90732 PPSV23 VACC 2 YRS+ SUBQ/IM: CPT | Performed by: FAMILY MEDICINE

## 2021-11-08 PROCEDURE — 69209 REMOVE IMPACTED EAR WAX UNI: CPT | Performed by: FAMILY MEDICINE

## 2021-11-08 PROCEDURE — 99215 OFFICE O/P EST HI 40 MIN: CPT | Mod: 25 | Performed by: FAMILY MEDICINE

## 2021-11-08 PROCEDURE — G0009 ADMIN PNEUMOCOCCAL VACCINE: HCPCS | Performed by: FAMILY MEDICINE

## 2021-11-08 RX ORDER — OMEPRAZOLE 20 MG/1
CAPSULE, DELAYED RELEASE ORAL
COMMUNITY
Start: 2021-10-19 | End: 2024-02-20 | Stop reason: SDUPTHER

## 2021-11-08 RX ORDER — AMOXICILLIN 500 MG/1
1000 TABLET, FILM COATED ORAL PRN
COMMUNITY
Start: 2021-08-16 | End: 2022-01-17

## 2021-11-08 ASSESSMENT — ENCOUNTER SYMPTOMS
HEADACHES: 0
MUSCULOSKELETAL NEGATIVE: 1
PALPITATIONS: 0
CONSTIPATION: 1
SENSORY CHANGE: 1
EYES NEGATIVE: 1
ABDOMINAL PAIN: 0
WEIGHT LOSS: 0
DIZZINESS: 0
FEVER: 0
COUGH: 0
SHORTNESS OF BREATH: 0
CHILLS: 0
VOMITING: 0
PSYCHIATRIC NEGATIVE: 1
HEARTBURN: 0
NAUSEA: 0
DIARRHEA: 0

## 2021-11-08 ASSESSMENT — FIBROSIS 4 INDEX: FIB4 SCORE: 0.91

## 2021-11-08 NOTE — ASSESSMENT & PLAN NOTE
Last CBC 4/2021. Patient is being managed by Dr. Adkins, seeing her every 6 months. Will re-order CBC. She will see Jed in December.   WBC 4.8 - 10.8 K/uL 3.6 Low     RBC 4.20 - 5.40 M/uL 4.38    Hemoglobin 12.0 - 16.0 g/dL 13.8    Hematocrit 37.0 - 47.0 % 42.0    MCV 81.4 - 97.8 fL 95.9    MCH 27.0 - 33.0 pg 31.5    MCHC 33.6 - 35.0 g/dL 32.9 Low     RDW 35.9 - 50.0 fL 52.6 High     Platelet Count 164 - 446 K/uL 276    MPV 9.0 - 12.9 fL 9.5    Neutrophils-Polys 44.00 - 72.00 % 63.10    Lymphocytes 22.00 - 41.00 % 24.10    Monocytes 0.00 - 13.40 % 7.80    Eosinophils 0.00 - 6.90 % 3.00    Basophils 0.00 - 1.80 % 1.70    Immature Granulocytes 0.00 - 0.90 % 0.30    Nucleated RBC /100 WBC 0.00    Neutrophils (Absolute) 2.00 - 7.15 K/uL 2.28    Lymphs (Absolute) 1.00 - 4.80 K/uL 0.87 Low     Monos (Absolute) 0.00 - 0.85 K/uL 0.28    Eos (Absolute) 0.00 - 0.51 K/uL 0.11    Baso (Absolute) 0.00 - 0.12 K/uL 0.06    Immature Granulocytes (abs) 0.00 - 0.11 K/uL 0.01    NRBC (Absolute) K/uL 0.00

## 2021-11-08 NOTE — PROGRESS NOTES
Subjective:     CC:   Chief Complaint   Patient presents with   • Establish Care   • Ear Pain     mild itching, started a few days ago   • Other     skin mole, started a few days ago       HISTORY OF THE PRESENT ILLNESS: Patient is a 66 y.o. female. This pleasant patient is here today to establish care and discuss her medical history. Her prior PCP was Dr. Fatima.     Hyperglycemia  In PreDM range for the last year, hx of chemotherapy and steroid tx. Discussed dietary changes. Overall patient eating well balanced diet. Has tried metformin in the past which she could not tolerate. Will continue to monitor .   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 Affects both her hands and feet. She has no pain, just persistent numbness. No tx at this time.     Chemotherapy-induced neutropenia (HCC)  Last CBC 4/2021. Patient is being managed by Dr. Adkins, seeing her every 6 months. Will re-order CBC. She will see Jed in December.   WBC 4.8 - 10.8 K/uL 3.6 Low     RBC 4.20 - 5.40 M/uL 4.38    Hemoglobin 12.0 - 16.0 g/dL 13.8    Hematocrit 37.0 - 47.0 % 42.0    MCV 81.4 - 97.8 fL 95.9    MCH 27.0 - 33.0 pg 31.5    MCHC 33.6 - 35.0 g/dL 32.9 Low     RDW 35.9 - 50.0 fL 52.6 High     Platelet Count 164 - 446 K/uL 276    MPV 9.0 - 12.9 fL 9.5    Neutrophils-Polys 44.00 - 72.00 % 63.10    Lymphocytes 22.00 - 41.00 % 24.10    Monocytes 0.00 - 13.40 % 7.80    Eosinophils 0.00 - 6.90 % 3.00    Basophils 0.00 - 1.80 % 1.70    Immature Granulocytes 0.00 - 0.90 % 0.30    Nucleated RBC /100 WBC 0.00    Neutrophils (Absolute) 2.00 - 7.15 K/uL 2.28    Lymphs (Absolute) 1.00 - 4.80 K/uL 0.87 Low     Monos (Absolute) 0.00 - 0.85 K/uL 0.28    Eos (Absolute) 0.00 - 0.51 K/uL 0.11    Baso (Absolute) 0.00 - 0.12 K/uL 0.06    Immature Granulocytes (abs) 0.00 - 0.11 K/uL 0.01    NRBC (Absolute) K/uL 0.00        Other osteoporosis without current pathological  fracture  The proximal left femur has a mean bone mineral density of 0.611 g/cm2, with a T score of -3.2 and a Z score of -2.6. Was given one dose of Prolia in April, did not get second dose due to crown breaking, so they held her second dose. She did go to dentist last week and had a jaw xray which was normal and did not show necrosis, will get second shot  In a few weeks. Dr. Adkins ordering her Prolia.   Hx of Femur fx last year after fall due to her neuropathy.     Impacted cerumen of right ear  Procedure: Cerumen Removal  Risks and benefits of procedure discussed with patient.  Cerumen removed with  lavage   Patient tolerated the procedure well  Pt educated about proper care of ear canal. Q-tip cleaning discouraged, use of debrox and warm water lavage discussed.  .          Current Outpatient Medications Ordered in Epic   Medication Sig Dispense Refill   • Amoxicillin 500 MG Tab Take 1,000 mg by mouth as needed. Takes 2,000 mg once prior to dental appointments     • albuterol 108 (90 Base) MCG/ACT Aero Soln inhalation aerosol Inhale 2 Puffs every 6 hours as needed for Shortness of Breath. 8.5 g 0   • aspirin 81 MG EC tablet aspirin 81 mg tablet,delayed release     • acetaminophen (TYLENOL) 500 MG Tab Take 500-1,000 mg by mouth every 6 hours as needed for Mild Pain.     • omeprazole (PRILOSEC) 20 MG delayed-release capsule        No current Epic-ordered facility-administered medications on file.       Health Maintenance: Completed    Review of Systems   Constitutional: Negative for chills, fever, malaise/fatigue and weight loss.   HENT: Negative.    Eyes: Negative.    Respiratory: Negative for cough and shortness of breath.    Cardiovascular: Negative for chest pain, palpitations and leg swelling.   Gastrointestinal: Positive for constipation. Negative for abdominal pain, diarrhea, heartburn, nausea and vomiting.   Genitourinary: Negative.    Musculoskeletal: Negative.    Skin: Negative.    Neurological:  "Positive for sensory change. Negative for dizziness and headaches.        Neuropathy d/t chemo bilateral feet   Psychiatric/Behavioral: Negative.          Objective:     Exam: /70 (BP Location: Left arm, Patient Position: Sitting, BP Cuff Size: Adult)   Pulse 83   Temp 36.3 °C (97.4 °F) (Temporal)   Ht 1.539 m (5' 0.6\")   Wt 90.2 kg (198 lb 14.4 oz)   SpO2 96%  Body mass index is 38.08 kg/m².    Physical Exam  Vitals reviewed.   Constitutional:       Appearance: Normal appearance.   HENT:      Right Ear: There is impacted cerumen.      Left Ear: Tympanic membrane, ear canal and external ear normal.      Ears:      Comments: Mild effusion L ear     Mouth/Throat:      Mouth: Mucous membranes are moist.      Pharynx: Oropharynx is clear.   Eyes:      Conjunctiva/sclera: Conjunctivae normal.      Pupils: Pupils are equal, round, and reactive to light.   Cardiovascular:      Rate and Rhythm: Normal rate and regular rhythm.      Pulses: Normal pulses.      Heart sounds: Normal heart sounds. No murmur heard.      Pulmonary:      Effort: Pulmonary effort is normal. No respiratory distress.      Breath sounds: Normal breath sounds. No stridor. No wheezing, rhonchi or rales.   Chest:      Chest wall: No tenderness.   Musculoskeletal:      Right lower leg: No edema.      Left lower leg: No edema.   Skin:     General: Skin is warm and dry.   Neurological:      Mental Status: She is alert and oriented to person, place, and time.   Psychiatric:         Mood and Affect: Mood normal.         Behavior: Behavior normal.          A chaperone was offered to the patient during today's exam. Patient declined chaperone.        Assessment & Plan:   66 y.o. female with the following -    1. Need for vaccination  - PneumoVax PPV23 =>3yo    2. History of breast cancer  Followed by Dr. Adkins q 6 months.   - CBC WITH DIFFERENTIAL; Future    3. Elevated hemoglobin A1c  - HEMOGLOBIN A1C; Future    4. Neuropathy associated with cancer " (HCC)  Chronic, stable without medications.  5. Chemotherapy-induced neutropenia (HCC)  Chronic, will recheck CBC next month. F/u with Dr. Adkins in December.   6. Other osteoporosis without current pathological fracture  Chronic, has had 1 dose of Prolia, will get her second shot in a few weeks. Dr. Adkins's office managing.   Other orders  - Amoxicillin 500 MG Tab; Take 1,000 mg by mouth as needed. Takes 2,000 mg once prior to dental appointments  - omeprazole (PRILOSEC) 20 MG delayed-release capsule       I spent a total of 55 minutes with record review, exam, communication with the patient, communication with other providers, and documentation of this encounter.    Return in about 9 weeks (around 1/10/2022) for F/U labs, AWV.    Please note that this dictation was created using voice recognition software. I have made every reasonable attempt to correct obvious errors, but I expect that there are errors of grammar and possibly content that I did not discover before finalizing the note.

## 2021-11-08 NOTE — ASSESSMENT & PLAN NOTE
Chronic d/t chemotherapy. Affects both her hands and feet. She has no pain, just persistent numbness. No tx at this time.

## 2021-11-08 NOTE — ASSESSMENT & PLAN NOTE
Procedure: Cerumen Removal  Risks and benefits of procedure discussed with patient.  Cerumen removed with  lavage   Patient tolerated the procedure well  Pt educated about proper care of ear canal. Q-tip cleaning discouraged, use of debrox and warm water lavage discussed.  .

## 2021-11-08 NOTE — ASSESSMENT & PLAN NOTE
In PreDM range for the last year, hx of chemotherapy and steroid tx. Discussed dietary changes. Overall patient eating well balanced diet. Has tried metformin in the past which she could not tolerate. Will continue to monitor .   4/2021:   Glycohemoglobin 4.0 - 5.6 % 5.9 High

## 2021-11-08 NOTE — ASSESSMENT & PLAN NOTE
The proximal left femur has a mean bone mineral density of 0.611 g/cm2, with a T score of -3.2 and a Z score of -2.6. Was given one dose of Prolia in April, did not get second dose due to crown breaking, so they held her second dose. She did go to dentist last week and had a jaw xray which was normal and did not show necrosis, will get second shot  In a few weeks. Dr. Adkins ordering her Prolia.   Hx of Femur fx last year after fall due to her neuropathy.

## 2021-12-13 ENCOUNTER — HOSPITAL ENCOUNTER (OUTPATIENT)
Dept: LAB | Facility: MEDICAL CENTER | Age: 66
End: 2021-12-13
Attending: FAMILY MEDICINE
Payer: MEDICARE

## 2021-12-13 DIAGNOSIS — Z85.3 HISTORY OF BREAST CANCER: ICD-10-CM

## 2021-12-13 DIAGNOSIS — R73.09 ELEVATED HEMOGLOBIN A1C: ICD-10-CM

## 2021-12-13 LAB
BASOPHILS # BLD AUTO: 1.8 % (ref 0–1.8)
BASOPHILS # BLD: 0.08 K/UL (ref 0–0.12)
EOSINOPHIL # BLD AUTO: 0.1 K/UL (ref 0–0.51)
EOSINOPHIL NFR BLD: 2.2 % (ref 0–6.9)
ERYTHROCYTE [DISTWIDTH] IN BLOOD BY AUTOMATED COUNT: 52.8 FL (ref 35.9–50)
EST. AVERAGE GLUCOSE BLD GHB EST-MCNC: 108 MG/DL
HBA1C MFR BLD: 5.4 % (ref 4–5.6)
HCT VFR BLD AUTO: 42.6 % (ref 37–47)
HGB BLD-MCNC: 13.8 G/DL (ref 12–16)
IMM GRANULOCYTES # BLD AUTO: 0.01 K/UL (ref 0–0.11)
IMM GRANULOCYTES NFR BLD AUTO: 0.2 % (ref 0–0.9)
LYMPHOCYTES # BLD AUTO: 1.32 K/UL (ref 1–4.8)
LYMPHOCYTES NFR BLD: 29.1 % (ref 22–41)
MCH RBC QN AUTO: 31.3 PG (ref 27–33)
MCHC RBC AUTO-ENTMCNC: 32.4 G/DL (ref 33.6–35)
MCV RBC AUTO: 96.6 FL (ref 81.4–97.8)
MONOCYTES # BLD AUTO: 0.38 K/UL (ref 0–0.85)
MONOCYTES NFR BLD AUTO: 8.4 % (ref 0–13.4)
NEUTROPHILS # BLD AUTO: 2.64 K/UL (ref 2–7.15)
NEUTROPHILS NFR BLD: 58.3 % (ref 44–72)
NRBC # BLD AUTO: 0 K/UL
NRBC BLD-RTO: 0 /100 WBC
PLATELET # BLD AUTO: 285 K/UL (ref 164–446)
PMV BLD AUTO: 9.8 FL (ref 9–12.9)
RBC # BLD AUTO: 4.41 M/UL (ref 4.2–5.4)
WBC # BLD AUTO: 4.5 K/UL (ref 4.8–10.8)

## 2021-12-13 PROCEDURE — 83036 HEMOGLOBIN GLYCOSYLATED A1C: CPT

## 2021-12-13 PROCEDURE — 36415 COLL VENOUS BLD VENIPUNCTURE: CPT

## 2021-12-13 PROCEDURE — 85025 COMPLETE CBC W/AUTO DIFF WBC: CPT

## 2022-01-17 ENCOUNTER — OFFICE VISIT (OUTPATIENT)
Dept: MEDICAL GROUP | Facility: PHYSICIAN GROUP | Age: 67
End: 2022-01-17
Payer: MEDICARE

## 2022-01-17 VITALS
HEIGHT: 61 IN | WEIGHT: 197.7 LBS | OXYGEN SATURATION: 97 % | DIASTOLIC BLOOD PRESSURE: 68 MMHG | TEMPERATURE: 98.3 F | RESPIRATION RATE: 12 BRPM | BODY MASS INDEX: 37.33 KG/M2 | SYSTOLIC BLOOD PRESSURE: 108 MMHG | HEART RATE: 88 BPM

## 2022-01-17 DIAGNOSIS — C80.1 NEUROPATHY ASSOCIATED WITH CANCER (HCC): ICD-10-CM

## 2022-01-17 DIAGNOSIS — Z00.00 ENCOUNTER FOR MEDICARE ANNUAL WELLNESS EXAM: Primary | ICD-10-CM

## 2022-01-17 DIAGNOSIS — C80.1 MALIGNANT NEOPLASTIC DISEASE (HCC): ICD-10-CM

## 2022-01-17 DIAGNOSIS — M81.8 OTHER OSTEOPOROSIS WITHOUT CURRENT PATHOLOGICAL FRACTURE: ICD-10-CM

## 2022-01-17 DIAGNOSIS — D70.1 CHEMOTHERAPY-INDUCED NEUTROPENIA (HCC): ICD-10-CM

## 2022-01-17 DIAGNOSIS — T45.1X5A CHEMOTHERAPY-INDUCED NEUTROPENIA (HCC): ICD-10-CM

## 2022-01-17 DIAGNOSIS — G63 NEUROPATHY ASSOCIATED WITH CANCER (HCC): ICD-10-CM

## 2022-01-17 PROCEDURE — 99214 OFFICE O/P EST MOD 30 MIN: CPT | Performed by: FAMILY MEDICINE

## 2022-01-17 RX ORDER — M-VIT,TX,IRON,MINS/CALC/FOLIC 27MG-0.4MG
1 TABLET ORAL DAILY
COMMUNITY
End: 2022-03-15

## 2022-01-17 ASSESSMENT — ENCOUNTER SYMPTOMS: GENERAL WELL-BEING: FAIR

## 2022-01-17 ASSESSMENT — PATIENT HEALTH QUESTIONNAIRE - PHQ9: CLINICAL INTERPRETATION OF PHQ2 SCORE: 0

## 2022-01-17 ASSESSMENT — ACTIVITIES OF DAILY LIVING (ADL): BATHING_REQUIRES_ASSISTANCE: 0

## 2022-01-17 ASSESSMENT — FIBROSIS 4 INDEX: FIB4 SCORE: 0.88

## 2022-01-17 NOTE — PROGRESS NOTES
Chief Complaint   Patient presents with   • Annual Exam       HPI:  Carmelina Leblanc is a 66 y.o. here for Medicare Annual Wellness Visit.     Patient Active Problem List    Diagnosis Date Noted   • Chemotherapy-induced neutropenia (HCC) 11/08/2021   • Other osteoporosis without current pathological fracture 11/08/2021   • Impacted cerumen of right ear 11/08/2021   • Neuropathy associated with cancer (HCC) 03/11/2021   • History of chemotherapy 03/11/2021   • Malignant neoplastic disease (HCC) 05/06/2020   • History of breast cancer 04/21/2020   • History of femur fracture 04/20/2020   • Hyperglycemia 04/20/2020   • History of lung cancer 04/20/2020       Current Outpatient Medications   Medication Sig Dispense Refill   • therapeutic multivitamin-minerals (THERAGRAN-M) Tab Take 1 Tablet by mouth every day.     • omeprazole (PRILOSEC) 20 MG delayed-release capsule      • aspirin 81 MG EC tablet aspirin 81 mg tablet,delayed release     • acetaminophen (TYLENOL) 500 MG Tab Take 500-1,000 mg by mouth every 6 hours as needed for Mild Pain.       No current facility-administered medications for this visit.          Current supplements as per medication list.     Allergies: Sulfa drugs and Morphine    Current social contact/activities: Golf, Group of friends gets together once a week,      She  reports that she has never smoked. She has never used smokeless tobacco. She reports current alcohol use. She reports that she does not use drugs.  Counseling given: Not Answered      DPA/Advanced Directive:  Patient has Advanced Directive, but it is not on file. Instructed to bring in a copy to scan into their chart.    ROS:    Gait: Uses no assistive device  Ostomy: No  Other tubes: No  Amputations: No  Chronic oxygen use: No  Last eye exam: 2018  Wears hearing aids: No   : Denies any urinary leakage during the last 6 months  Annual Health Assessment Questions:    1.  Are you currently engaging in any exercise or physical  activity? Yes    2.  How would you describe your mood or emotional well-being today? good    3.  Have you had any falls in the last year? No    4.  Have you noticed any problems with your balance or had difficulty walking? Yes    5.  In the last six months have you experienced any leakage of urine? No    6. DPA/Advanced Directive: Patient has Advanced Directive, but it is not on file. Instructed to bring in a copy to scan into their chart.  Screening:  Annual Exam   Depression Screening    Little interest or pleasure in doing things?  0 - not at all  Feeling down, depressed , or hopeless? 0 - not at all  Patient Health Questionnaire Score: 0     If depressive symptoms identified deferred to follow up visit unless specifically addressed in assessment and plan.    Interpretation of PHQ-9 Total Score   Score Severity   1-4 No Depression   5-9 Mild Depression   10-14 Moderate Depression   15-19 Moderately Severe Depression   20-27 Severe Depression    Screening for Cognitive Impairment    Three Minute Recall (captain, garden, picture) 3/3    Dyllan clock face with all 12 numbers and set the hands to show 5 past 8.  Yes    Cognitive concerns identified deferred for follow up unless specifically addressed in assessment and plan.    Fall Risk Assessment    Has the patient had two or more falls in the last year or any fall with injury in the last year?  No    Safety Assessment    Throw rugs on floor.  Yes  Handrails on all stairs.  Yes  Good lighting in all hallways.  Yes  Difficulty hearing.  No  Patient counseled about all safety risks that were identified.    Functional Assessment ADLs    Are there any barriers preventing you from cooking for yourself or meeting nutritional needs?  No.    Are there any barriers preventing you from driving safely or obtaining transportation?  No.    Are there any barriers preventing you from using a telephone or calling for help?  No.    Are there any barriers preventing you from shopping?   No.    Are there any barriers preventing you from taking care of your own finances?  No.    Are there any barriers preventing you from managing your medications?  No.    Are there any barriers preventing you from showering, bathing or dressing yourself?  No.    Are you currently engaging in any exercise or physical activity?  Yes.  Golf, walking   What is your perception of your health?  Fair.      Health Maintenance Summary          Overdue - HEPATITIS C SCREENING (Once) Overdue - never done    No completion history exists for this topic.          Postponed - IMM ZOSTER VACCINES (1 of 2) Postponed until 4/8/2022    No completion history exists for this topic.          Postponed - MAMMOGRAM (Yearly) Postponed until 11/8/2022 08/05/2016  MA-DIAGNOSTIC MAMMO W/CAD-BILAT          Annual Wellness Visit (Every 366 Days) Next due on 1/18/2023 01/17/2022  Visit Dx: Encounter for Medicare annual wellness exam    03/11/2021  Visit Dx: Medicare annual wellness visit, initial          COLORECTAL CANCER SCREENING (COLOGUARD STOOL DNA - Every 3 Years) Next due on 3/23/2024    03/23/2021  COLOGUARD (FIT DNA)          BONE DENSITY (Every 5 Years) Tentatively due on 4/1/2026 04/01/2021  DS-BONE DENSITY STUDY (DEXA)          IMM DTaP/Tdap/Td Vaccine (3 - Td or Tdap) Next due on 10/18/2029    10/18/2019  Imm Admin: Tdap Vaccine    05/11/2011  Imm Admin: Tdap Vaccine          IMM INFLUENZA (Series Information) Completed    09/28/2021  Imm Admin: Influenza Vaccine Adult HD    09/29/2020  Imm Admin: Influenza Vac Subunit Quad Inj (Pf)    10/18/2019  Imm Admin: Influenza Vaccine Quad Inj (Pf)    10/29/2018  Imm Admin: Influenza Vaccine Quad Inj (Pf)    10/15/2014  Imm Admin: Influenza Vaccine Quad Inj (Pf)    Only the first 5 history entries have been loaded, but more history exists.          COVID-19 Vaccine (Series Information) Completed    10/08/2021  Imm Admin: Pfizer SARS-CoV-2 Vaccine    04/02/2021  Imm Admin: Pfizer  SARS-CoV-2 Vaccine    03/11/2021  Imm Admin: Pfizer SARS-CoV-2 Vaccine          IMM PNEUMOCOCCAL VACCINE: 65+ Years (Series Information) Completed    11/08/2021  Imm Admin: Pneumococcal polysaccharide vaccine (PPSV-23)          IMM HEP B VACCINE (Series Information) Aged Out    No completion history exists for this topic.          IMM MENINGOCOCCAL VACCINE (MCV4) (Series Information) Aged Out    No completion history exists for this topic.          Discontinued - PAP SMEAR  Discontinued    No completion history exists for this topic.                Patient Care Team:  HERMILO Victoria as PCP - General (Nurse Practitioner Family)  [facility] as Home Health Provider        Social History     Tobacco Use   • Smoking status: Never Smoker   • Smokeless tobacco: Never Used   Vaping Use   • Vaping Use: Never used   Substance Use Topics   • Alcohol use: Yes     Alcohol/week: 0.0 oz     Comment: very rarely   • Drug use: No     Family History   Problem Relation Age of Onset   • Lung Disease Mother    • Arthritis Father    • Heart Disease Father      She  has a past medical history of Anemia (6-1-17), Anesthesia (6-1-17), Cancer (HCC) (8/4/16), Cancer (HCC) (2016), Cataract, Dental disorder, Fall (4/20/2020), Osteoarthritis (6-1-17), Port catheter in place (6-1-17), Snoring, and Urinary tract infection.   Past Surgical History:   Procedure Laterality Date   • ORIF, FRACTURE, FEMUR Right 4/20/2020    Procedure: ORIF, FRACTURE,  DISTAL FEMUR;  Surgeon: Christian Ag M.D.;  Location: SURGERY [facility];  Service: Orthopedics   • TISSUE EXPANDER PLACE/REMOVE Bilateral 6/8/2017    Procedure: TISSUE EXPANDER PLACE/REMOVE;  Surgeon: Everardo Matthews M.D.;  Location: SURGERY SAME DAY [facility];  Service:    • BREAST IMPLANT REVISION Bilateral 6/8/2017    Procedure: BREAST IMPLANT;  Surgeon: Everardo Matthews M.D.;  Location: SURGERY SAME DAY [facility];  Service:    • CAPSULOTOMY Bilateral 6/8/2017     Procedure: CAPSULOTOMY FOR PARTIAL CAPSULECTOMIES;  Surgeon: Everardo Matthews M.D.;  Location: SURGERY SAME DAY [facility];  Service:    • BREAST RECONSTRUCTION Bilateral 6/8/2017    Procedure: BREAST RECONSTRUCTION USING LOCAL TISSUE & FAT GRAFTING;  Surgeon: Everardo Matthews M.D.;  Location: SURGERY SAME DAY [facility];  Service:    • THORACOSCOPY Right 5/1/2017    Procedure: THORACOSCOPY, WEDGE RESECTION LOWER LOBE MASS;  Surgeon: John H Ganser, M.D.;  Location: SURGERY [facility];  Service:    • CATH PLACEMENT Left 3/17/2017    Procedure: CATH PLACEMENT FOR SUBCLAVIAN PORT LEFT;  Surgeon: Carly Wright M.D.;  Location: SURGERY [facility];  Service:    • LUNG NEEDLE BIOPSY  02-   • TISSUE EXPANDER PLACE/REMOVE Bilateral 1/19/2017    Procedure: TISSUE EXPANDER PLACEment;  Surgeon: Everardo Matthews M.D.;  Location: SURGERY SAME DAY [facility];  Service:    • FLAP GRAFT  1/19/2017    Procedure: FLAP GRAFT- FOR DERMAL ADIPOFASCIAL FLAPS ;  Surgeon: Everardo aMtthews M.D.;  Location: SURGERY SAME DAY [facility];  Service:    • MASTECTOMY Bilateral 1/19/2017    Procedure: MASTECTOMY PROPHYLACTIC LEFT, AND RIGHT TOTAL MASTECTOMY;  Surgeon: Carly Wright M.D.;  Location: SURGERY SAME DAY [facility];  Service:    • AXILLARY NODE DISSECTION Right 1/19/2017    Procedure: AXILLARY NODE DISSECTION;  Surgeon: Carly Wright M.D.;  Location: SURGERY SAME DAY [facility];  Service:    • CATH PLACEMENT Left 1/19/2017    Procedure: Removal of left subclavian port ;  Surgeon: Carly Wright M.D.;  Location: SURGERY SAME DAY [facility];  Service:    • CATH PLACEMENT Left 9/5/2016    Procedure: CATH PLACEMENT - SUBCLAVIAN PORT - SIDE TO BE DETERMINED;  Surgeon: Carly Wright M.D.;  Location: [facility];  Service:    • STEREOTACTIC BIOPSY Right 08/10/2016   • HIP ARTHROPLASTY TOTAL Right 2014   • OTHER ORTHOPEDIC SURGERY  6/2012    right hip arthroplasty  "  • CATARACT PHACO WITH IOL Bilateral    • GYN SURGERY  Approx 2013    Hysterectomy       Exam:   /68 (BP Location: Right arm, Patient Position: Sitting, BP Cuff Size: Adult)   Pulse 88   Temp 36.8 °C (98.3 °F) (Temporal)   Resp 12   Ht 1.539 m (5' 0.59\")   Wt 89.7 kg (197 lb 11.2 oz)   SpO2 97%  Body mass index is 37.86 kg/m².    Hearing good.    Dentition good  Alert, oriented in no acute distress.  Eye contact is good, speech goal directed, affect calm    Assessment and Plan. The following treatment and monitoring plan is recommended:      Problem List Items Addressed This Visit     Neuropathy associated with cancer (HCC)     Chronic d/t chemotherapy. Affects both her hands and feet. She has no pain, just persistent numbness. No tx at this time.          Malignant neoplastic disease (HCC)     Chronic, seeing Dr. Adkins. Is going down to once a year visits.          Chemotherapy-induced neutropenia (HCC)     Chronic, having lab work done every 6 months and followed by Dr. Adkins  WBC 4.8 - 10.8 K/uL 4.5 Low   3.6 Low     RBC 4.20 - 5.40 M/uL 4.41  4.38    Hemoglobin 12.0 - 16.0 g/dL 13.8  13.8    Hematocrit 37.0 - 47.0 % 42.6  42.0    MCV 81.4 - 97.8 fL 96.6  95.9    MCH 27.0 - 33.0 pg 31.3  31.5    MCHC 33.6 - 35.0 g/dL 32.4 Low   32.9 Low     RDW 35.9 - 50.0 fL 52.8 High   52.6 High     Platelet Count 164 - 446 K/uL 285  276    MPV 9.0 - 12.9 fL 9.8  9.5    Neutrophils-Polys 44.00 - 72.00 % 58.30  63.10    Lymphocytes 22.00 - 41.00 % 29.10  24.10    Monocytes 0.00 - 13.40 % 8.40  7.80    Eosinophils 0.00 - 6.90 % 2.20  3.00    Basophils 0.00 - 1.80 % 1.80  1.70    Immature Granulocytes 0.00 - 0.90 % 0.20  0.30    Nucleated RBC /100 WBC 0.00  0.00    Neutrophils (Absolute) 2.00 - 7.15 K/uL 2.64  2.28 CM    Lymphs (Absolute) 1.00 - 4.80 K/uL 1.32  0.87 Low     Monos (Absolute) 0.00 - 0.85 K/uL 0.38  0.28    Eos (Absolute) 0.00 - 0.51 K/uL 0.10  0.11    Baso (Absolute) 0.00 - 0.12 K/uL 0.08  0.06  "   Immature Granulocytes (abs) 0.00 - 0.11 K/uL 0.01  0.01    NRBC (Absolute) K/uL 0.00                Other osteoporosis without current pathological fracture     Chronic, the proximal left femur has a mean bone mineral density of 0.611 g/cm2, with a T score of -3.2 and a Z score of -2.6. Was given one dose of Prolia in April, did not get second dose due to crown breaking, so they held her second dose. She did go to dentist last week and had a jaw xray which was normal and did not show necrosis, will get second shot  In a few weeks. Dr. Adkins ordering her Prolia, due for her next shot in April.   Hx of Femur fx last year after fall due to her neuropathy           Other Visit Diagnoses     Encounter for Medicare annual wellness exam    -  Primary            Services suggested: No services needed at this time  Health Care Screening: Age-appropriate preventive services recommended by USPTF and ACIP covered by Medicare were discussed today. Services ordered if indicated and agreed upon by the patient.  Referrals offered: Community-based lifestyle interventions to reduce health risks and promote self-management and wellness, fall prevention, nutrition, physical activity, tobacco-use cessation, weight loss, and mental health services as per orders if indicated.    Discussion today about general wellness and lifestyle habits:    · Prevent falls and reduce trip hazards; Cautioned about securing or removing rugs.  · Have a working fire alarm and carbon monoxide detector;   · Engage in regular physical activity and social activities     Follow-up: Return in about 6 months (around 7/17/2022).

## 2022-01-17 NOTE — ASSESSMENT & PLAN NOTE
Chronic d/t chemotherapy. Affects both her hands and feet. She has no pain, just persistent numbness. No tx at this time.   
Chronic, having lab work done every 6 months and followed by Dr. Adkins  WBC 4.8 - 10.8 K/uL 4.5 Low   3.6 Low     RBC 4.20 - 5.40 M/uL 4.41  4.38    Hemoglobin 12.0 - 16.0 g/dL 13.8  13.8    Hematocrit 37.0 - 47.0 % 42.6  42.0    MCV 81.4 - 97.8 fL 96.6  95.9    MCH 27.0 - 33.0 pg 31.3  31.5    MCHC 33.6 - 35.0 g/dL 32.4 Low   32.9 Low     RDW 35.9 - 50.0 fL 52.8 High   52.6 High     Platelet Count 164 - 446 K/uL 285  276    MPV 9.0 - 12.9 fL 9.8  9.5    Neutrophils-Polys 44.00 - 72.00 % 58.30  63.10    Lymphocytes 22.00 - 41.00 % 29.10  24.10    Monocytes 0.00 - 13.40 % 8.40  7.80    Eosinophils 0.00 - 6.90 % 2.20  3.00    Basophils 0.00 - 1.80 % 1.80  1.70    Immature Granulocytes 0.00 - 0.90 % 0.20  0.30    Nucleated RBC /100 WBC 0.00  0.00    Neutrophils (Absolute) 2.00 - 7.15 K/uL 2.64  2.28 CM    Lymphs (Absolute) 1.00 - 4.80 K/uL 1.32  0.87 Low     Monos (Absolute) 0.00 - 0.85 K/uL 0.38  0.28    Eos (Absolute) 0.00 - 0.51 K/uL 0.10  0.11    Baso (Absolute) 0.00 - 0.12 K/uL 0.08  0.06    Immature Granulocytes (abs) 0.00 - 0.11 K/uL 0.01  0.01    NRBC (Absolute) K/uL 0.00         
Chronic, seeing Dr. Adkins. Is going down to once a year visits.   
Chronic, the proximal left femur has a mean bone mineral density of 0.611 g/cm2, with a T score of -3.2 and a Z score of -2.6. Was given one dose of Prolia in April, did not get second dose due to crown breaking, so they held her second dose. She did go to dentist last week and had a jaw xray which was normal and did not show necrosis, will get second shot  In a few weeks. Dr. Adkins ordering her Prolia, due for her next shot in April.   Hx of Femur fx last year after fall due to her neuropathy  
You can access the FollowMyHealth Patient Portal offered by [facility] by registering at the following website: [email or web address]. By joining [employer]’s FollowMyHealth portal, you will also be able to view your health information using other applications (apps) compatible with our system.

## 2022-02-08 ENCOUNTER — PATIENT MESSAGE (OUTPATIENT)
Dept: HEALTH INFORMATION MANAGEMENT | Facility: OTHER | Age: 67
End: 2022-02-08
Payer: MEDICARE

## 2022-03-15 ENCOUNTER — OFFICE VISIT (OUTPATIENT)
Dept: URGENT CARE | Facility: CLINIC | Age: 67
End: 2022-03-15
Payer: MEDICARE

## 2022-03-15 ENCOUNTER — HOSPITAL ENCOUNTER (OUTPATIENT)
Dept: RADIOLOGY | Facility: MEDICAL CENTER | Age: 67
End: 2022-03-15
Attending: PHYSICIAN ASSISTANT
Payer: MEDICARE

## 2022-03-15 VITALS
HEIGHT: 61 IN | TEMPERATURE: 97.3 F | WEIGHT: 200 LBS | BODY MASS INDEX: 37.76 KG/M2 | OXYGEN SATURATION: 96 % | HEART RATE: 83 BPM | RESPIRATION RATE: 16 BRPM | DIASTOLIC BLOOD PRESSURE: 78 MMHG | SYSTOLIC BLOOD PRESSURE: 112 MMHG

## 2022-03-15 DIAGNOSIS — M79.661 RIGHT CALF PAIN: ICD-10-CM

## 2022-03-15 DIAGNOSIS — M79.89 SWELLING OF RIGHT FOOT: ICD-10-CM

## 2022-03-15 DIAGNOSIS — B96.89 ACUTE BACTERIAL SINUSITIS: ICD-10-CM

## 2022-03-15 DIAGNOSIS — J01.90 ACUTE BACTERIAL SINUSITIS: ICD-10-CM

## 2022-03-15 PROCEDURE — 99215 OFFICE O/P EST HI 40 MIN: CPT | Performed by: PHYSICIAN ASSISTANT

## 2022-03-15 PROCEDURE — 93971 EXTREMITY STUDY: CPT | Mod: RT

## 2022-03-15 RX ORDER — AMOXICILLIN AND CLAVULANATE POTASSIUM 875; 125 MG/1; MG/1
1 TABLET, FILM COATED ORAL 2 TIMES DAILY
Qty: 14 TABLET | Refills: 0 | Status: SHIPPED | OUTPATIENT
Start: 2022-03-15 | End: 2022-03-22

## 2022-03-15 RX ORDER — AMOXICILLIN 500 MG/1
CAPSULE ORAL
COMMUNITY
Start: 2022-03-11 | End: 2022-06-23

## 2022-03-15 ASSESSMENT — ENCOUNTER SYMPTOMS
SPUTUM PRODUCTION: 0
NAUSEA: 0
VOMITING: 0
SINUS PAIN: 1
SORE THROAT: 0
COUGH: 1
ABDOMINAL PAIN: 0
FEVER: 0
WHEEZING: 0
HEADACHES: 1
CHILLS: 0
DIARRHEA: 0
SHORTNESS OF BREATH: 0
PALPITATIONS: 0

## 2022-03-15 ASSESSMENT — FIBROSIS 4 INDEX: FIB4 SCORE: 0.9

## 2022-03-15 NOTE — PROGRESS NOTES
Subjective:   Carmelina Leblanc is a 67 y.o. female who presents for Sinus Problem (X2 weeks, Possible sinus infection ) and Foot Swelling (X1 week, Right foot )      HPI  67 y.o. female presents to urgent care with new problem to provider of sinus pain/pressure, congestion, postnasal drip, mild cough, and ear pain onset about 2 weeks ago.  Patient reports history of sinus infection and states this feels similar.  She denies fevers.  Patient is vaccinated for COVID and denies specific exposure.  No sick contacts in her home.  Patient also complaining of right foot swelling noted over the last week.  She has mild right calf pain as well.  History of chronic DVT.  Currently she takes aspirin.  She denies chest pain or shortness of breath.  No recent surgery, travel, or history of smoking.  Patient does not take supplemental estrogen.  History of breast cancer, patient is in remission. Denies other associated aggravating or alleviating factors.       Review of Systems   Constitutional: Negative for chills, fever and malaise/fatigue.   HENT: Positive for congestion, ear pain and sinus pain. Negative for sore throat.    Respiratory: Positive for cough. Negative for sputum production, shortness of breath and wheezing.    Cardiovascular: Negative for chest pain and palpitations.   Gastrointestinal: Negative for abdominal pain, diarrhea, nausea and vomiting.   Musculoskeletal:        Right foot swelling  Calf pain   Neurological: Positive for headaches.     Patient Active Problem List   Diagnosis   • History of femur fracture   • Hyperglycemia   • History of lung cancer   • History of breast cancer   • Neuropathy associated with cancer (HCC)   • History of chemotherapy   • Malignant neoplastic disease (HCC)   • Chemotherapy-induced neutropenia (HCC)   • Other osteoporosis without current pathological fracture   • Impacted cerumen of right ear     Past Surgical History:   Procedure Laterality Date   • ORIF, FRACTURE, FEMUR  Right 4/20/2020    Procedure: ORIF, FRACTURE,  DISTAL FEMUR;  Surgeon: Christian Ag M.D.;  Location: SURGERY [facility];  Service: Orthopedics   • TISSUE EXPANDER PLACE/REMOVE Bilateral 6/8/2017    Procedure: TISSUE EXPANDER PLACE/REMOVE;  Surgeon: Everardo Matthews M.D.;  Location: SURGERY SAME DAY [facility];  Service:    • BREAST IMPLANT REVISION Bilateral 6/8/2017    Procedure: BREAST IMPLANT;  Surgeon: Everardo Matthews M.D.;  Location: SURGERY SAME DAY [facility];  Service:    • CAPSULOTOMY Bilateral 6/8/2017    Procedure: CAPSULOTOMY FOR PARTIAL CAPSULECTOMIES;  Surgeon: Everardo Matthews M.D.;  Location: SURGERY SAME DAY [facility];  Service:    • BREAST RECONSTRUCTION Bilateral 6/8/2017    Procedure: BREAST RECONSTRUCTION USING LOCAL TISSUE & FAT GRAFTING;  Surgeon: Everardo Matthews M.D.;  Location: SURGERY SAME DAY [facility];  Service:    • THORACOSCOPY Right 5/1/2017    Procedure: THORACOSCOPY, WEDGE RESECTION LOWER LOBE MASS;  Surgeon: John H Ganser, M.D.;  Location: [facility];  Service:    • CATH PLACEMENT Left 3/17/2017    Procedure: CATH PLACEMENT FOR SUBCLAVIAN PORT LEFT;  Surgeon: Carly Wright M.D.;  Location: [facility];  Service:    • LUNG NEEDLE BIOPSY  02-   • TISSUE EXPANDER PLACE/REMOVE Bilateral 1/19/2017    Procedure: TISSUE EXPANDER PLACEment;  Surgeon: Everardo Matthews M.D.;  Location: SURGERY SAME DAY [facility];  Service:    • FLAP GRAFT  1/19/2017    Procedure: FLAP GRAFT- FOR DERMAL ADIPOFASCIAL FLAPS ;  Surgeon: Everardo Matthews M.D.;  Location: SURGERY SAME DAY [facility];  Service:    • MASTECTOMY Bilateral 1/19/2017    Procedure: MASTECTOMY PROPHYLACTIC LEFT, AND RIGHT TOTAL MASTECTOMY;  Surgeon: Carly Wright M.D.;  Location: SURGERY SAME DAY [facility];  Service:    • AXILLARY NODE DISSECTION Right 1/19/2017    Procedure: AXILLARY NODE DISSECTION;  Surgeon: Carly Wright M.D.;  Location: SURGERY  "SAME DAY [facility];  Service:    • CATH PLACEMENT Left 1/19/2017    Procedure: Removal of left subclavian port ;  Surgeon: Carly Wright M.D.;  Location: SURGERY SAME DAY [facility];  Service:    • CATH PLACEMENT Left 9/5/2016    Procedure: CATH PLACEMENT - SUBCLAVIAN PORT - SIDE TO BE DETERMINED;  Surgeon: Carly Wright M.D.;  Location: SURGERY [facility];  Service:    • STEREOTACTIC BIOPSY Right 08/10/2016   • HIP ARTHROPLASTY TOTAL Right 2014   • OTHER ORTHOPEDIC SURGERY  6/2012    right hip arthroplasty   • CATARACT PHACO WITH IOL Bilateral    • GYN SURGERY  Approx 2013    Hysterectomy     Social History     Tobacco Use   • Smoking status: Never Smoker   • Smokeless tobacco: Never Used   Vaping Use   • Vaping Use: Never used   Substance Use Topics   • Alcohol use: Yes     Alcohol/week: 0.0 oz     Comment: very rarely   • Drug use: No      Family History   Problem Relation Age of Onset   • Lung Disease Mother    • Arthritis Father    • Heart Disease Father       (Allergies, Medications, & Tobacco/Substance Use were reconciled by the Medical Assistant and reviewed by myself. The family history is prepopulated)     Objective:     /78   Pulse 83   Temp 36.3 °C (97.3 °F) (Temporal)   Resp 16   Ht 1.549 m (5' 1\")   Wt 90.7 kg (200 lb)   SpO2 96%   BMI 37.79 kg/m²     Physical Exam  Vitals reviewed.   Constitutional:       General: She is not in acute distress.     Appearance: Normal appearance. She is not ill-appearing.   HENT:      Head: Normocephalic and atraumatic.      Nose: Congestion present.      Mouth/Throat:      Mouth: Mucous membranes are moist.      Pharynx: Oropharynx is clear. Posterior oropharyngeal erythema present.   Eyes:      Conjunctiva/sclera: Conjunctivae normal.   Cardiovascular:      Rate and Rhythm: Normal rate and regular rhythm.      Heart sounds: Normal heart sounds.   Pulmonary:      Effort: Pulmonary effort is normal. No respiratory distress.      " Breath sounds: Normal breath sounds.   Musculoskeletal:      Cervical back: Normal range of motion and neck supple.        Legs:    Skin:     General: Skin is warm and dry.      Findings: No bruising or erythema.   Neurological:      General: No focal deficit present.      Mental Status: She is alert and oriented to person, place, and time.   Psychiatric:         Mood and Affect: Mood normal.         Behavior: Behavior normal.         Thought Content: Thought content normal.         Judgment: Judgment normal.         Assessment/Plan:     1. Swelling of right foot  US-EXTREMITY VENOUS LOWER UNILAT RIGHT   2. Right calf pain  US-EXTREMITY VENOUS LOWER UNILAT RIGHT   3. Acute bacterial sinusitis  amoxicillin-clavulanate (AUGMENTIN) 875-125 MG Tab     Narrative & Impression  HISTORY/REASON FOR EXAM:  Swelling of Limb; hx DVT     TECHNIQUE/EXAM DESCRIPTION: Right  lower extremity venous ultrasound was performed using both spectral and color flow Doppler 3/15/2022 5:46 PM.     COMPARISON:  May 14, 2020     FINDINGS:     REAL-TIME GRAY-SCALE IMAGING:  Real-time gray-scale imaging reveals no evidence of focal wall thickening.     COLOR AND DUPLEX DOPPLER IMAGING:  Using both spectral and color flow Doppler imaging, multiple images were obtained from the common femoral vein origin distally through the popliteal trifurcation.     There is no evidence of acute DVT. There is chronic thrombus of the gastrocnemius vein. Incidental note is made of arterial flow.     IMPRESSION:  No acute deep venous thrombosis.  Chronic thrombosis of the gastrocnemius vein.     No acute DVT or changes of chronic thrombosis of gastrocnemius vein seen on ultrasound imaging today.  Patient reports swelling is localized to the top of her foot and she has pain with wearing shoes covering that aspect of her foot.  There was no acute injury.  She does not have any concerning symptoms of chest pain or shortness of breath concerning for PE.  I recommend  compression stockings, ice, and elevation.  Discussed ED and return precautions.      Take full course of antibiotic  Tylenol and Motrin for fever and pain  OTC meds as discussed including oral decongestant if tolerated  Increase fluids and rest  Nasal spray, nasal wash, Rachael pot    Differential diagnosis, natural history, supportive care, and indications for immediate follow-up discussed.    Advised the patient to follow-up with the primary care physician for recheck, reevaluation, and consideration of further management.  Patient verbalized understanding of treatment plan and has no further questions regarding care.     I personally reviewed prior external notes and test results pertinent to today's visit. My total time spent caring for the patient on the day of the encounter that included review of prior records, obtaining history, examination, discussion of plan and return precautions was greater than 40 minutes.     Please note that this dictation was created using voice recognition software. I have made a reasonable attempt to correct obvious errors, but I expect that there are errors of grammar and possibly content that I did not discover before finalizing the note.    This note was electronically signed by Michelle Gonsalez PA-C

## 2022-05-11 ENCOUNTER — HOSPITAL ENCOUNTER (OUTPATIENT)
Dept: LAB | Facility: MEDICAL CENTER | Age: 67
End: 2022-05-11
Attending: INTERNAL MEDICINE
Payer: MEDICARE

## 2022-05-11 LAB
ANION GAP SERPL CALC-SCNC: 14 MMOL/L (ref 7–16)
BUN SERPL-MCNC: 12 MG/DL (ref 8–22)
CALCIUM SERPL-MCNC: 9.4 MG/DL (ref 8.5–10.5)
CHLORIDE SERPL-SCNC: 106 MMOL/L (ref 96–112)
CO2 SERPL-SCNC: 21 MMOL/L (ref 20–33)
CREAT SERPL-MCNC: 0.7 MG/DL (ref 0.5–1.4)
GFR SERPLBLD CREATININE-BSD FMLA CKD-EPI: 95 ML/MIN/1.73 M 2
GLUCOSE SERPL-MCNC: 103 MG/DL (ref 65–99)
MAGNESIUM SERPL-MCNC: 2.1 MG/DL (ref 1.5–2.5)
PHOSPHATE SERPL-MCNC: 3.2 MG/DL (ref 2.5–4.5)
POTASSIUM SERPL-SCNC: 3.8 MMOL/L (ref 3.6–5.5)
SODIUM SERPL-SCNC: 141 MMOL/L (ref 135–145)

## 2022-05-11 PROCEDURE — 83735 ASSAY OF MAGNESIUM: CPT

## 2022-05-11 PROCEDURE — 80048 BASIC METABOLIC PNL TOTAL CA: CPT

## 2022-05-11 PROCEDURE — 84100 ASSAY OF PHOSPHORUS: CPT

## 2022-05-11 PROCEDURE — 36415 COLL VENOUS BLD VENIPUNCTURE: CPT

## 2022-05-31 DIAGNOSIS — R73.09 ELEVATED HEMOGLOBIN A1C: ICD-10-CM

## 2022-05-31 DIAGNOSIS — Z00.00 HEALTHCARE MAINTENANCE: ICD-10-CM

## 2022-06-23 ENCOUNTER — OFFICE VISIT (OUTPATIENT)
Dept: MEDICAL GROUP | Facility: PHYSICIAN GROUP | Age: 67
End: 2022-06-23
Payer: MEDICARE

## 2022-06-23 VITALS
WEIGHT: 203 LBS | SYSTOLIC BLOOD PRESSURE: 110 MMHG | HEART RATE: 97 BPM | DIASTOLIC BLOOD PRESSURE: 78 MMHG | BODY MASS INDEX: 38.33 KG/M2 | RESPIRATION RATE: 16 BRPM | OXYGEN SATURATION: 95 % | TEMPERATURE: 96.9 F | HEIGHT: 61 IN

## 2022-06-23 DIAGNOSIS — S31.109A OPEN WOUND OF ABDOMEN, INITIAL ENCOUNTER: ICD-10-CM

## 2022-06-23 PROCEDURE — 99215 OFFICE O/P EST HI 40 MIN: CPT | Performed by: FAMILY MEDICINE

## 2022-06-23 ASSESSMENT — FIBROSIS 4 INDEX: FIB4 SCORE: 0.9

## 2022-06-23 NOTE — PROGRESS NOTES
"Subjective:     CC:   Chief Complaint   Patient presents with   • Wound Check     X 2 weeks, no px, abdomen, put heating pad and fell asleep        HPI:   Carmelina presents today with complaints of lower abdominal wounds.  She states that 2 weeks ago she had left an abdominal heating pad on her lower abdomen and accidentally fell asleep with it on but states that the heating pad has a timer set for a two hour automatic shut off.  She states that she had previously had bug bites which were making her itchy and in the middle of the night she had begun to itch herself and was unaware that the heating pad had created blisters.  She then felt as if her abdomen was wet and realized she had open blisters. Reports seeing pus green-like fluid drainage the days following the initial injury.  Denies any fever or pain and states she is applying Polysporin after showering and dressing the area with gauze and tape.  Reports no other treatment.  Also states that the wounds do not impact her ability to move or complete her activities of daily living.    No problem-specific Assessment & Plan notes found for this encounter.      Current Outpatient Medications Ordered in Epic   Medication Sig Dispense Refill   • omeprazole (PRILOSEC) 20 MG delayed-release capsule      • aspirin 81 MG EC tablet aspirin 81 mg tablet,delayed release     • acetaminophen (TYLENOL) 500 MG Tab Take 500-1,000 mg by mouth every 6 hours as needed for Mild Pain.       No current Epic-ordered facility-administered medications on file.       Health Maintenance: Completed        Objective:     Exam:  /78 (BP Location: Right arm, Patient Position: Sitting, BP Cuff Size: Adult)   Pulse 97   Temp 36.1 °C (96.9 °F) (Temporal)   Resp 16   Ht 1.549 m (5' 1\")   Wt 92.1 kg (203 lb)   SpO2 95%   BMI 38.36 kg/m²  Body mass index is 38.36 kg/m².    Physical Exam  Vitals reviewed.   Constitutional:       Appearance: Normal appearance.   Pulmonary:      Effort: " Pulmonary effort is normal.   Skin:            Comments: 3 small circular wounds along pannus caused from heating pad burn.  Far right wound had eschar covering 100%, and far left wound is 100% slough covered.   Neurological:      Mental Status: She is alert and oriented to person, place, and time.   Psychiatric:         Mood and Affect: Mood normal.         Behavior: Behavior normal.          A chaperone was offered to the patient during today's exam. Patient declined chaperone.      Assessment & Plan:     67 y.o. female with the following -     1. Open wound of abdomen, initial encounter  New acute problem x2 weeks.  3 small circular wounds along pannus from a heating pad burn.  She is currently using antibiotic ointment and covering with gauze, the wounds look clean however 1 has eschar and the other has slough which needs to be debrided for wound healing.  Discussed getting Triad paste from [employer] which she is agreeable with and will put this on her wound beds every 48-72 hours cover with gauze secured with tape and she will cover with Tegaderm when she showers to keep the dressing clean and dry.  Discussed signs and symptoms of infection and increasing her protein intake to help with wound healing.  She will let me know if the wound is still not healing or if she experiences an infection.  We will follow-up in 1 month.    I spent a total of 56 minutes with record review, exam, communication with the patient, communication with other providers, and documentation of this encounter.      No follow-ups on file.    Please note that this dictation was created using voice recognition software. I have made every reasonable attempt to correct obvious errors, but I expect that there are errors of grammar and possibly content that I did not discover before finalizing the note.

## 2022-06-23 NOTE — PATIENT INSTRUCTIONS
Triad paste-order from [facility] instructions:  Cleanse wound with normal saline or when you shower with no unscented soap and water, let air dry.  Place a layer of Triad paste over wounds, cover with gauze and secure with tape or Tegaderm.  Change your gauze daily if needed, however leave the triad paste on for 48 to 72 hours.  After 48 to 72 hours you can wipe off the triad paste while you shower.     These let me know if you experience any signs of infection such as redness around the wound, increased drainage that may have a bad smell to it, chills, or fevers.

## 2022-07-12 ENCOUNTER — HOSPITAL ENCOUNTER (OUTPATIENT)
Dept: LAB | Facility: MEDICAL CENTER | Age: 67
End: 2022-07-12
Attending: FAMILY MEDICINE
Payer: MEDICARE

## 2022-07-12 ENCOUNTER — OFFICE VISIT (OUTPATIENT)
Dept: URGENT CARE | Facility: CLINIC | Age: 67
End: 2022-07-12
Payer: MEDICARE

## 2022-07-12 VITALS
WEIGHT: 201 LBS | SYSTOLIC BLOOD PRESSURE: 116 MMHG | HEIGHT: 61 IN | TEMPERATURE: 97.6 F | HEART RATE: 85 BPM | BODY MASS INDEX: 37.95 KG/M2 | DIASTOLIC BLOOD PRESSURE: 78 MMHG | RESPIRATION RATE: 14 BRPM | OXYGEN SATURATION: 97 %

## 2022-07-12 DIAGNOSIS — R73.09 ELEVATED HEMOGLOBIN A1C: ICD-10-CM

## 2022-07-12 DIAGNOSIS — Z00.00 HEALTHCARE MAINTENANCE: ICD-10-CM

## 2022-07-12 DIAGNOSIS — H10.33 ACUTE BACTERIAL CONJUNCTIVITIS OF BOTH EYES: ICD-10-CM

## 2022-07-12 LAB
25(OH)D3 SERPL-MCNC: 77 NG/ML (ref 30–100)
ALBUMIN SERPL BCP-MCNC: 4.4 G/DL (ref 3.2–4.9)
ALBUMIN/GLOB SERPL: 1.8 G/DL
ALP SERPL-CCNC: 59 U/L (ref 30–99)
ALT SERPL-CCNC: 14 U/L (ref 2–50)
ANION GAP SERPL CALC-SCNC: 10 MMOL/L (ref 7–16)
AST SERPL-CCNC: 13 U/L (ref 12–45)
BASOPHILS # BLD AUTO: 1.1 % (ref 0–1.8)
BASOPHILS # BLD: 0.04 K/UL (ref 0–0.12)
BILIRUB SERPL-MCNC: 0.4 MG/DL (ref 0.1–1.5)
BUN SERPL-MCNC: 12 MG/DL (ref 8–22)
CALCIUM SERPL-MCNC: 9.3 MG/DL (ref 8.5–10.5)
CHLORIDE SERPL-SCNC: 102 MMOL/L (ref 96–112)
CHOLEST SERPL-MCNC: 183 MG/DL (ref 100–199)
CO2 SERPL-SCNC: 23 MMOL/L (ref 20–33)
CREAT SERPL-MCNC: 0.67 MG/DL (ref 0.5–1.4)
EOSINOPHIL # BLD AUTO: 0.17 K/UL (ref 0–0.51)
EOSINOPHIL NFR BLD: 4.8 % (ref 0–6.9)
ERYTHROCYTE [DISTWIDTH] IN BLOOD BY AUTOMATED COUNT: 51.5 FL (ref 35.9–50)
EST. AVERAGE GLUCOSE BLD GHB EST-MCNC: 117 MG/DL
FASTING STATUS PATIENT QL REPORTED: NORMAL
GFR SERPLBLD CREATININE-BSD FMLA CKD-EPI: 96 ML/MIN/1.73 M 2
GLOBULIN SER CALC-MCNC: 2.5 G/DL (ref 1.9–3.5)
GLUCOSE SERPL-MCNC: 88 MG/DL (ref 65–99)
HBA1C MFR BLD: 5.7 % (ref 4–5.6)
HCT VFR BLD AUTO: 40.3 % (ref 37–47)
HDLC SERPL-MCNC: 60 MG/DL
HGB BLD-MCNC: 13.5 G/DL (ref 12–16)
IMM GRANULOCYTES # BLD AUTO: 0.01 K/UL (ref 0–0.11)
IMM GRANULOCYTES NFR BLD AUTO: 0.3 % (ref 0–0.9)
LDLC SERPL CALC-MCNC: 106 MG/DL
LYMPHOCYTES # BLD AUTO: 0.96 K/UL (ref 1–4.8)
LYMPHOCYTES NFR BLD: 26.9 % (ref 22–41)
MCH RBC QN AUTO: 31.7 PG (ref 27–33)
MCHC RBC AUTO-ENTMCNC: 33.5 G/DL (ref 33.6–35)
MCV RBC AUTO: 94.6 FL (ref 81.4–97.8)
MONOCYTES # BLD AUTO: 0.39 K/UL (ref 0–0.85)
MONOCYTES NFR BLD AUTO: 10.9 % (ref 0–13.4)
NEUTROPHILS # BLD AUTO: 2 K/UL (ref 2–7.15)
NEUTROPHILS NFR BLD: 56 % (ref 44–72)
NRBC # BLD AUTO: 0 K/UL
NRBC BLD-RTO: 0 /100 WBC
PLATELET # BLD AUTO: 279 K/UL (ref 164–446)
PMV BLD AUTO: 10.3 FL (ref 9–12.9)
POTASSIUM SERPL-SCNC: 4.4 MMOL/L (ref 3.6–5.5)
PROT SERPL-MCNC: 6.9 G/DL (ref 6–8.2)
RBC # BLD AUTO: 4.26 M/UL (ref 4.2–5.4)
SODIUM SERPL-SCNC: 135 MMOL/L (ref 135–145)
TRIGL SERPL-MCNC: 84 MG/DL (ref 0–149)
TSH SERPL DL<=0.005 MIU/L-ACNC: 2.23 UIU/ML (ref 0.38–5.33)
WBC # BLD AUTO: 3.6 K/UL (ref 4.8–10.8)

## 2022-07-12 PROCEDURE — 82306 VITAMIN D 25 HYDROXY: CPT

## 2022-07-12 PROCEDURE — 84443 ASSAY THYROID STIM HORMONE: CPT

## 2022-07-12 PROCEDURE — 85025 COMPLETE CBC W/AUTO DIFF WBC: CPT

## 2022-07-12 PROCEDURE — 83036 HEMOGLOBIN GLYCOSYLATED A1C: CPT

## 2022-07-12 PROCEDURE — 80061 LIPID PANEL: CPT

## 2022-07-12 PROCEDURE — 80053 COMPREHEN METABOLIC PANEL: CPT

## 2022-07-12 PROCEDURE — 36415 COLL VENOUS BLD VENIPUNCTURE: CPT

## 2022-07-12 PROCEDURE — 99213 OFFICE O/P EST LOW 20 MIN: CPT | Performed by: PHYSICIAN ASSISTANT

## 2022-07-12 RX ORDER — OFLOXACIN 3 MG/ML
1 SOLUTION/ DROPS OPHTHALMIC 4 TIMES DAILY
Qty: 10 ML | Refills: 0 | Status: SHIPPED | OUTPATIENT
Start: 2022-07-12 | End: 2022-07-19

## 2022-07-12 ASSESSMENT — ENCOUNTER SYMPTOMS
BLURRED VISION: 1
EYE REDNESS: 1
FEVER: 0
PHOTOPHOBIA: 0
CHILLS: 0
EYE DISCHARGE: 1
EYE PAIN: 0
COUGH: 0
DOUBLE VISION: 0

## 2022-07-12 ASSESSMENT — FIBROSIS 4 INDEX: FIB4 SCORE: 0.9

## 2022-07-12 ASSESSMENT — VISUAL ACUITY: OU: 1

## 2022-07-12 NOTE — PROGRESS NOTES
Subjective:   Carmelina Leblanc is a 67 y.o. female who presents today with   Chief Complaint   Patient presents with   • Eye Problem     X2days, both eyes, Itchy, green drainage, red, swollen        Conjunctivitis  This is a new problem. Episode onset: 2 days. The problem occurs constantly. The problem has been unchanged. Pertinent negatives include no chills, coughing or fever. She has tried nothing for the symptoms. The treatment provided no relief.     No recent injury or trauma noted and patient states she does not wear contacts.  PMH:  has a past medical history of Anemia (6-1-17), Anesthesia (6-1-17), Cancer ([facility]) (8/4/16), Cancer (HCC) (2016), Cataract, Dental disorder, Fall (4/20/2020), Osteoarthritis (6-1-17), Port catheter in place (6-1-17), Snoring, and Urinary tract infection.  MEDS:   Current Outpatient Medications:   •  ofloxacin (OCUFLOX) 0.3 % Solution, Administer 1 Drop into both eyes 4 times a day for 7 days., Disp: 10 mL, Rfl: 0  •  omeprazole (PRILOSEC) 20 MG delayed-release capsule, , Disp: , Rfl:   •  aspirin 81 MG EC tablet, aspirin 81 mg tablet,delayed release, Disp: , Rfl:   •  acetaminophen (TYLENOL) 500 MG Tab, Take 500-1,000 mg by mouth every 6 hours as needed for Mild Pain., Disp: , Rfl:   ALLERGIES:   Allergies   Allergen Reactions   • Povidone Iodine Rash   • Sulfa Drugs Unspecified      RXN=As a child unsure of reaction   • Morphine      Nausea and vomiting     SURGHX:   Past Surgical History:   Procedure Laterality Date   • ORIF, FRACTURE, FEMUR Right 4/20/2020    Procedure: ORIF, FRACTURE,  DISTAL FEMUR;  Surgeon: Christian Ag M.D.;  Location: SURGERY [facility];  Service: Orthopedics   • TISSUE EXPANDER PLACE/REMOVE Bilateral 6/8/2017    Procedure: TISSUE EXPANDER PLACE/REMOVE;  Surgeon: Everadro Matthews M.D.;  Location: SURGERY SAME DAY [facility];  Service:    • BREAST IMPLANT REVISION Bilateral 6/8/2017    Procedure: BREAST IMPLANT;  Surgeon: Everardo Matthews,  M.D.;  Location: SURGERY SAME DAY [facility];  Service:    • CAPSULOTOMY Bilateral 6/8/2017    Procedure: CAPSULOTOMY FOR PARTIAL CAPSULECTOMIES;  Surgeon: Everardo Matthews M.D.;  Location: SURGERY SAME DAY [facility];  Service:    • BREAST RECONSTRUCTION Bilateral 6/8/2017    Procedure: BREAST RECONSTRUCTION USING LOCAL TISSUE & FAT GRAFTING;  Surgeon: Everardo Matthews M.D.;  Location: SURGERY SAME DAY [facility];  Service:    • THORACOSCOPY Right 5/1/2017    Procedure: THORACOSCOPY, WEDGE RESECTION LOWER LOBE MASS;  Surgeon: John H Ganser, M.D.;  Location: SURGERY [facility];  Service:    • CATH PLACEMENT Left 3/17/2017    Procedure: CATH PLACEMENT FOR SUBCLAVIAN PORT LEFT;  Surgeon: Carly Wright M.D.;  Location: [facility];  Service:    • LUNG NEEDLE BIOPSY  02-   • TISSUE EXPANDER PLACE/REMOVE Bilateral 1/19/2017    Procedure: TISSUE EXPANDER PLACEment;  Surgeon: Everardo Matthews M.D.;  Location: SURGERY SAME DAY [facility];  Service:    • FLAP GRAFT  1/19/2017    Procedure: FLAP GRAFT- FOR DERMAL ADIPOFASCIAL FLAPS ;  Surgeon: Everardo Matthews M.D.;  Location: SURGERY SAME DAY [facility];  Service:    • MASTECTOMY Bilateral 1/19/2017    Procedure: MASTECTOMY PROPHYLACTIC LEFT, AND RIGHT TOTAL MASTECTOMY;  Surgeon: Carly Wright M.D.;  Location: SURGERY SAME DAY [facility];  Service:    • AXILLARY NODE DISSECTION Right 1/19/2017    Procedure: AXILLARY NODE DISSECTION;  Surgeon: Carly Wright M.D.;  Location: SURGERY SAME DAY [facility];  Service:    • CATH PLACEMENT Left 1/19/2017    Procedure: Removal of left subclavian port ;  Surgeon: Carly Wright M.D.;  Location: SURGERY SAME DAY [facility];  Service:    • CATH PLACEMENT Left 9/5/2016    Procedure: CATH PLACEMENT - SUBCLAVIAN PORT - SIDE TO BE DETERMINED;  Surgeon: Carly Wright M.D.;  Location: [facility];  Service:    • STEREOTACTIC BIOPSY Right 08/10/2016   •  "HIP ARTHROPLASTY TOTAL Right 2014   • OTHER ORTHOPEDIC SURGERY  6/2012    right hip arthroplasty   • CATARACT PHACO WITH IOL Bilateral    • GYN SURGERY  Approx 2013    Hysterectomy     SOCHX:  reports that she has never smoked. She has never used smokeless tobacco. She reports current alcohol use. She reports that she does not use drugs.  FH: Reviewed with patient, not pertinent to this visit.     Review of Systems   Constitutional: Negative for chills and fever.   Eyes: Positive for blurred vision (from discharge), discharge and redness. Negative for double vision, photophobia and pain.        Irritation/itching   Respiratory: Negative for cough.         Objective:   /78   Pulse 85   Temp 36.4 °C (97.6 °F) (Temporal)   Resp 14   Ht 1.549 m (5' 1\")   Wt 91.2 kg (201 lb)   SpO2 97%   BMI 37.98 kg/m²   Physical Exam  Vitals and nursing note reviewed.   Constitutional:       General: She is not in acute distress.     Appearance: Normal appearance. She is well-developed. She is not ill-appearing or toxic-appearing.   HENT:      Head: Normocephalic and atraumatic.      Right Ear: Hearing normal.      Left Ear: Hearing normal.   Eyes:      General: Vision grossly intact.         Right eye: Discharge present. No foreign body.         Left eye: Discharge present.No foreign body.      Extraocular Movements: Extraocular movements intact.      Conjunctiva/sclera:      Right eye: Right conjunctiva is injected.      Left eye: Left conjunctiva is injected.      Pupils: Pupils are equal, round, and reactive to light.      Comments: Purulent discharge present bilaterally.   Cardiovascular:      Rate and Rhythm: Normal rate and regular rhythm.      Heart sounds: Normal heart sounds.   Pulmonary:      Effort: Pulmonary effort is normal.      Breath sounds: Normal breath sounds.   Musculoskeletal:      Comments: Normal movement in all 4 extremities   Skin:     General: Skin is warm and dry.   Neurological:      Mental " Status: She is alert.      Coordination: Coordination normal.   Psychiatric:         Mood and Affect: Mood normal.       Assessment/Plan:   Assessment    1. Acute bacterial conjunctivitis of both eyes  - ofloxacin (OCUFLOX) 0.3 % Solution; Administer 1 Drop into both eyes 4 times a day for 7 days.  Dispense: 10 mL; Refill: 0  Symptoms and presentation are most consistent with bacterial conjunctivitis of both eyes and we will treat accordingly with eyedrops.  Recommend patient wipe down sunglasses and proper hand hygiene as well.  Differential diagnosis, natural history, supportive care, and indications for immediate follow-up discussed.   Patient given instructions and understanding of medications and treatment.    If not improving in 3-5 days, F/U with PCP or return to UC if symptoms worsen.    Patient agreeable to plan.    Please note that this dictation was created using voice recognition software. I have made every reasonable attempt to correct obvious errors, but I expect that there are errors of grammar and possibly content that I did not discover before finalizing the note.    Christopher Gotti PA-C

## 2022-07-26 ENCOUNTER — TELEPHONE (OUTPATIENT)
Dept: MEDICAL GROUP | Facility: PHYSICIAN GROUP | Age: 67
End: 2022-07-26
Payer: MEDICARE

## 2022-07-26 NOTE — TELEPHONE ENCOUNTER
ESTABLISHED PATIENT PRE-VISIT PLANNING     Patient was NOT contacted to complete PVP.     Note: Patient will not be contacted if there is no indication to call.     1.  Reviewed notes from the last few office visits within the medical group: Yes    2.  If any orders were placed at last visit or intended to be done for this visit (i.e. 6 mos follow-up), do we have Results/Consult Notes?         •  Labs - Labs were not ordered at last office visit.  Note: If patient appointment is for lab review and patient did not complete labs, check with provider if OK to reschedule patient until labs completed.       •  Imaging - Imaging was not ordered at last office visit.       •  Referrals - No referrals were ordered at last office visit.    3. Is this appointment scheduled as a Hospital Follow-Up? No    4.  Immunizations were updated in Epic using Reconcile Outside Information activity? Yes    5.  Patient is due for the following Health Maintenance Topics:   Health Maintenance Due   Topic Date Due   • HEPATITIS C SCREENING  Never done       6.  AHA (Pulse[employer]) form printed for Provider? No, already completed

## 2022-07-27 ENCOUNTER — OFFICE VISIT (OUTPATIENT)
Dept: MEDICAL GROUP | Facility: PHYSICIAN GROUP | Age: 67
End: 2022-07-27
Payer: MEDICARE

## 2022-07-27 VITALS
HEIGHT: 61 IN | RESPIRATION RATE: 16 BRPM | HEART RATE: 83 BPM | TEMPERATURE: 98.1 F | DIASTOLIC BLOOD PRESSURE: 76 MMHG | SYSTOLIC BLOOD PRESSURE: 122 MMHG | BODY MASS INDEX: 37.57 KG/M2 | WEIGHT: 199 LBS | OXYGEN SATURATION: 95 %

## 2022-07-27 DIAGNOSIS — S31.109A WOUND OF ABDOMEN: ICD-10-CM

## 2022-07-27 DIAGNOSIS — L98.9 LESION OF SKIN OF FACE: ICD-10-CM

## 2022-07-27 PROCEDURE — 99214 OFFICE O/P EST MOD 30 MIN: CPT | Performed by: FAMILY MEDICINE

## 2022-07-27 ASSESSMENT — FIBROSIS 4 INDEX: FIB4 SCORE: 0.82

## 2022-07-27 NOTE — ASSESSMENT & PLAN NOTE
Occurred early June, started with 3 small circular wounds along LLQ of abdomen from a heating pad burn.  She has been using Triad paste every 48-72 hours cover with gauze secured with tape and she will cover with Tegaderm when she showers to keep the dressing clean and dry.  Discussed signs and symptoms of infection and increasing her protein intake to help with wound healing. Wound 80% healed, almost fully granulated. Down to 1 small circular wound slightly larger than marble left, thin layer of skin has now formed over her other burns at this time. Discussed cont. With triad paste likely another month until healed.

## 2022-07-27 NOTE — ASSESSMENT & PLAN NOTE
New problem she would like to discuss a scab on her face, states this has been present for awhile right above her lip and below her nose, states that her Oncology APRN as well as her  has noticed it and told her to have it looked at. States she gets facials monthly. Currently appears mostly gone due to her scratching it off but she states she tends to pick if off and it comes back each time. Denies any pain/bleeding associated with this.
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
## 2022-08-17 ENCOUNTER — TELEPHONE (OUTPATIENT)
Dept: HEALTH INFORMATION MANAGEMENT | Facility: OTHER | Age: 67
End: 2022-08-17

## 2022-09-06 ENCOUNTER — OFFICE VISIT (OUTPATIENT)
Dept: DERMATOLOGY | Facility: IMAGING CENTER | Age: 67
End: 2022-09-06
Payer: MEDICARE

## 2022-09-06 DIAGNOSIS — L82.1 SK (SEBORRHEIC KERATOSIS): ICD-10-CM

## 2022-09-06 DIAGNOSIS — L81.4 LENTIGINES: ICD-10-CM

## 2022-09-06 DIAGNOSIS — D18.01 CHERRY ANGIOMA: ICD-10-CM

## 2022-09-06 DIAGNOSIS — L57.0 ACTINIC KERATOSIS: ICD-10-CM

## 2022-09-06 PROCEDURE — 17003 DESTRUCT PREMALG LES 2-14: CPT | Performed by: NURSE PRACTITIONER

## 2022-09-06 PROCEDURE — 17000 DESTRUCT PREMALG LESION: CPT | Performed by: NURSE PRACTITIONER

## 2022-09-06 PROCEDURE — 99213 OFFICE O/P EST LOW 20 MIN: CPT | Mod: 25 | Performed by: NURSE PRACTITIONER

## 2022-09-06 NOTE — PROGRESS NOTES
DERMATOLOGY NOTE  NEW VISIT       Chief complaint: Skin Lesion (Face)     HPI/location: Above upper lip  Time present: 2yrs+  Painful lesion: No  Itching lesion: No  Enlarging lesion: No  Also pt has 2 moles of concern on right cheek. Pt stated she had 2 spots on her back that were frozen 15yrs ago, they now came back.  Pt is a cancer pt.      History of skin cancer: No  History of precancers/actinic keratoses: No  History of biopsies:No  History of blistering/severe sunburns:Yes, Details: Childhood to teens  Family history of skin cancer:No  Family history of atypical moles:No      Allergies   Allergen Reactions    Povidone Iodine Rash    Sulfa Drugs Unspecified      RXN=As a child unsure of reaction    Morphine      Nausea and vomiting        MEDICATIONS:  Medications relevant to specialty reviewed.     REVIEW OF SYSTEMS:   Positive for skin (see HPI)  Negative for fevers and chills       EXAM:  There were no vitals taken for this visit.  Constitutional: Well-developed, well-nourished, and in no distress.     A focused skin exam was performed including the affected areas of the face and back. Notable findings on exam today listed below and/or in assessment/plan.     Ill-defined erythematous gritty/scaly papules over the philtrum and R nasolabial sulcus  Several light brown medium brown skin-colored stuck-on waxy papules scattered on the face and trunk  -sun exposed skin of trunk and b/l upper, lower extremities and face with scattered clinically benign light brown reticulated macules all of which were morphologically similar and none of which were suspicious for skin cancer today on exam  Several scattered 1-3mm bright red macules and thin papules on the trunk      IMPRESSION / PLAN:    1. Actinic keratosis  - NMSC education/counseling   CRYOTHERAPY:  Risks (including, but not limited to: skin discoloration, redness, blister, blood blister, recurrence, need for further treatment, infection, scar) and benefits of  cryotherapy discussed. Patient verbally agreed to proceed with treatment. 1 cryotherapy freeze thaw cycles of 10 seconds were applied to 2 lesions on areas as noted on exam with cryac. Patient tolerated procedure well. Aftercare instructions given--no specific care needed unless irritated during healing process, can apply Vaseline with small band-aid if needed.      2. SK (seborrheic keratosis)  - Benign-appearing nature of lesions discussed during exam.   - Advised to continue to monitor for any return to clinic for new or concerning changes.      3. Lentigines  - Benign-appearing nature of lesions discussed during exam.   - Advised to continue to monitor for any return to clinic for new or concerning changes.      4. Cherry angioma  - Benign-appearing nature of lesions discussed during exam.   - Advised to continue to monitor for any return to clinic for new or concerning changes.        Discussed risks, benefits, alternative treatments as well as common side effects associated with prescribed treatment, Patient verbalized understanding and agrees with plan regarding the above          Please note that this dictation was created using voice recognition software. I have made every reasonable attempt to correct obvious errors, but I expect that there are errors of grammar and possibly content that I did not discover before finalizing the note.      Return to clinic in: Return for PRN. and as needed for any new or changing skin lesions.

## 2022-09-30 ENCOUNTER — HOSPITAL ENCOUNTER (OUTPATIENT)
Facility: MEDICAL CENTER | Age: 67
End: 2022-09-30
Attending: FAMILY MEDICINE
Payer: MEDICARE

## 2022-09-30 ENCOUNTER — OFFICE VISIT (OUTPATIENT)
Dept: URGENT CARE | Facility: CLINIC | Age: 67
End: 2022-09-30
Payer: MEDICARE

## 2022-09-30 VITALS
DIASTOLIC BLOOD PRESSURE: 82 MMHG | HEIGHT: 61 IN | HEART RATE: 72 BPM | BODY MASS INDEX: 37.76 KG/M2 | SYSTOLIC BLOOD PRESSURE: 130 MMHG | TEMPERATURE: 96.9 F | RESPIRATION RATE: 12 BRPM | OXYGEN SATURATION: 97 % | WEIGHT: 200 LBS

## 2022-09-30 DIAGNOSIS — N30.00 ACUTE CYSTITIS WITHOUT HEMATURIA: ICD-10-CM

## 2022-09-30 LAB
APPEARANCE UR: NORMAL
BILIRUB UR STRIP-MCNC: NEGATIVE MG/DL
COLOR UR AUTO: NORMAL
GLUCOSE UR STRIP.AUTO-MCNC: NEGATIVE MG/DL
KETONES UR STRIP.AUTO-MCNC: NEGATIVE MG/DL
LEUKOCYTE ESTERASE UR QL STRIP.AUTO: NORMAL
NITRITE UR QL STRIP.AUTO: NEGATIVE
PH UR STRIP.AUTO: 5.5 [PH] (ref 5–8)
PROT UR QL STRIP: NEGATIVE MG/DL
RBC UR QL AUTO: NORMAL
SP GR UR STRIP.AUTO: <=1.005
UROBILINOGEN UR STRIP-MCNC: 0.2 MG/DL

## 2022-09-30 PROCEDURE — 87077 CULTURE AEROBIC IDENTIFY: CPT

## 2022-09-30 PROCEDURE — 99213 OFFICE O/P EST LOW 20 MIN: CPT | Performed by: FAMILY MEDICINE

## 2022-09-30 PROCEDURE — 87086 URINE CULTURE/COLONY COUNT: CPT

## 2022-09-30 PROCEDURE — 87186 SC STD MICRODIL/AGAR DIL: CPT

## 2022-09-30 PROCEDURE — 81002 URINALYSIS NONAUTO W/O SCOPE: CPT | Performed by: FAMILY MEDICINE

## 2022-09-30 PROCEDURE — 99000 SPECIMEN HANDLING OFFICE-LAB: CPT | Performed by: FAMILY MEDICINE

## 2022-09-30 RX ORDER — CEFDINIR 300 MG/1
300 CAPSULE ORAL EVERY 12 HOURS
Qty: 10 CAPSULE | Refills: 0 | Status: SHIPPED | OUTPATIENT
Start: 2022-09-30 | End: 2022-10-05

## 2022-09-30 ASSESSMENT — FIBROSIS 4 INDEX: FIB4 SCORE: 0.82

## 2022-09-30 NOTE — PROGRESS NOTES
Subjective:      CC:  presents with Dysuria            Dysuria   This is a new problem. The current episode started in the past 2 days. The problem occurs every urination. The problem has been unchanged. The quality of the pain is described as burning. There has been no fever. Pt is not sexually active. There is no history of pyelonephritis. Associated symptoms include frequency and urgency. Pertinent negatives include no chills, discharge, flank pain, nausea or vomiting. Pt has tried nothing for the symptoms. There is no history of recurrent UTIs.     Social History     Socioeconomic History    Marital status:      Spouse name: Not on file    Number of children: Not on file    Years of education: Not on file    Highest education level: Master's degree (e.g., MA, MS, Shasha, MEd, MSW, [employer]SM[employer])   Occupational History    Not on file   Tobacco Use    Smoking status: Never    Smokeless tobacco: Never   Vaping Use    Vaping Use: Never used   Substance and Sexual Activity    Alcohol use: Yes     Alcohol/week: 0.0 oz     Comment: very rarely    Drug use: No    Sexual activity: Not on file   Other Topics Concern    Not on file   Social History Narrative    Retired teacher, early education special .      Social Determinants of Health     Financial Resource Strain: Not on file   Food Insecurity: Not on file   Transportation Needs: Not on file   Physical Activity: Not on file   Stress: Not on file   Social Connections: Not on file   Intimate Partner Violence: Not on file   Housing Stability: Not on file         Family History   Problem Relation Age of Onset    Lung Disease Mother     Arthritis Father     Heart Disease Father          Allergies   Allergen Reactions    Povidone Iodine Rash    Sulfa Drugs Unspecified      RXN=As a child unsure of reaction    Morphine      Nausea and vomiting           Current Outpatient Medications on File Prior to Visit   Medication Sig Dispense Refill    omeprazole (PRILOSEC) 20 MG  "delayed-release capsule       aspirin 81 MG EC tablet aspirin 81 mg tablet,delayed release      acetaminophen (TYLENOL) 500 MG Tab Take 500-1,000 mg by mouth every 6 hours as needed for Mild Pain.       No current facility-administered medications on file prior to visit.       Review of Systems   Constitutional: Negative for chills.   Respiratory: Negative for shortness of breath.    Cardiovascular: Negative for chest pain.   Gastrointestinal: Negative for nausea, vomiting and abdominal pain.   Genitourinary: Positive for dysuria, urgency and frequency. Negative for flank pain.   Skin: Negative for rash.   Neurological: Negative for dizziness and headaches.   All other systems reviewed and are negative.         Objective:      /82   Pulse 72   Temp 36.1 °C (96.9 °F) (Temporal)   Resp 12   Ht 1.549 m (5' 1\")   Wt 90.7 kg (200 lb)   SpO2 97%       Physical Exam   Constitutional: pt is oriented to person, place, and time. Pt appears well-developed and well-nourished. No distress.   HENT:   Head: Normocephalic and atraumatic.   Mouth/Throat: Mucous membranes are normal.   Eyes: Conjunctivae and EOM are normal. Pupils are equal, round, and reactive to light. Right eye exhibits no discharge. Left eye exhibits no discharge. No scleral icterus.   Neck: Normal range of motion. Neck supple.   Cardiovascular: Normal rate, regular rhythm, normal heart sounds and intact distal pulses.    No murmur heard.  Pulmonary/Chest: Effort normal and breath sounds normal. No respiratory distress. Pt has no wheezes,  rales.   Abdominal: Bowel sounds are normal. Pt exhibits no distension and no mass. There is no tenderness. There is no rebound, no guarding and no CVA tenderness.   Neurological: pt is alert and oriented to person, place, and time.   Skin: Skin is warm and dry.   Psychiatric: behavior is normal.   Nursing note and vitals reviewed.             Assessment/Plan:        1. Acute cystitis without hematuria  UA findings " c/w infection  Sulfa allergic.    - cefdinir (OMNICEF) 300 MG Cap; Take 1 Capsule by mouth every 12 hours for 5 days.  Dispense: 10 Capsule; Refill: 0  - POCT Urinalysis  - URINE CULTURE(NEW); Future    Differential diagnosis, natural history, supportive care, and indications for immediate follow-up discussed. All questions answered. Patient agrees with the plan of care.     Follow-up as needed if symptoms worsen or fail to improve to PCP, Urgent care or Emergency Room.     I have personally reviewed prior external notes and test results pertinent to today's visit.  I have independently reviewed and interpreted all diagnostics ordered during this urgent care acute visit.

## 2022-11-09 ENCOUNTER — OFFICE VISIT (OUTPATIENT)
Dept: MEDICAL GROUP | Facility: PHYSICIAN GROUP | Age: 67
End: 2022-11-09
Payer: MEDICARE

## 2022-11-09 ENCOUNTER — HOSPITAL ENCOUNTER (OUTPATIENT)
Dept: LAB | Facility: MEDICAL CENTER | Age: 67
End: 2022-11-09
Attending: INTERNAL MEDICINE
Payer: MEDICARE

## 2022-11-09 VITALS
BODY MASS INDEX: 37.8 KG/M2 | TEMPERATURE: 97.3 F | WEIGHT: 200.2 LBS | DIASTOLIC BLOOD PRESSURE: 78 MMHG | OXYGEN SATURATION: 97 % | SYSTOLIC BLOOD PRESSURE: 112 MMHG | HEIGHT: 61 IN | HEART RATE: 94 BPM

## 2022-11-09 DIAGNOSIS — J01.00 ACUTE NON-RECURRENT MAXILLARY SINUSITIS: ICD-10-CM

## 2022-11-09 LAB
ALBUMIN SERPL BCP-MCNC: 4.5 G/DL (ref 3.2–4.9)
ALBUMIN/GLOB SERPL: 1.9 G/DL
ALP SERPL-CCNC: 54 U/L (ref 30–99)
ALT SERPL-CCNC: 17 U/L (ref 2–50)
ANION GAP SERPL CALC-SCNC: 10 MMOL/L (ref 7–16)
AST SERPL-CCNC: 15 U/L (ref 12–45)
BILIRUB SERPL-MCNC: 0.4 MG/DL (ref 0.1–1.5)
BUN SERPL-MCNC: 15 MG/DL (ref 8–22)
CALCIUM SERPL-MCNC: 9.5 MG/DL (ref 8.5–10.5)
CHLORIDE SERPL-SCNC: 105 MMOL/L (ref 96–112)
CO2 SERPL-SCNC: 24 MMOL/L (ref 20–33)
CREAT SERPL-MCNC: 0.7 MG/DL (ref 0.5–1.4)
GFR SERPLBLD CREATININE-BSD FMLA CKD-EPI: 95 ML/MIN/1.73 M 2
GLOBULIN SER CALC-MCNC: 2.4 G/DL (ref 1.9–3.5)
GLUCOSE SERPL-MCNC: 98 MG/DL (ref 65–99)
MAGNESIUM SERPL-MCNC: 1.9 MG/DL (ref 1.5–2.5)
PHOSPHATE SERPL-MCNC: 3 MG/DL (ref 2.5–4.5)
POTASSIUM SERPL-SCNC: 4.7 MMOL/L (ref 3.6–5.5)
PROT SERPL-MCNC: 6.9 G/DL (ref 6–8.2)
SODIUM SERPL-SCNC: 139 MMOL/L (ref 135–145)

## 2022-11-09 PROCEDURE — 84100 ASSAY OF PHOSPHORUS: CPT

## 2022-11-09 PROCEDURE — 99213 OFFICE O/P EST LOW 20 MIN: CPT | Performed by: FAMILY MEDICINE

## 2022-11-09 PROCEDURE — 36415 COLL VENOUS BLD VENIPUNCTURE: CPT

## 2022-11-09 PROCEDURE — 80053 COMPREHEN METABOLIC PANEL: CPT

## 2022-11-09 PROCEDURE — 83735 ASSAY OF MAGNESIUM: CPT

## 2022-11-09 RX ORDER — GUAIFENESIN 600 MG/1
600 TABLET, EXTENDED RELEASE ORAL EVERY 12 HOURS
COMMUNITY
End: 2022-12-05

## 2022-11-09 RX ORDER — GUAIFENESIN 200 MG/10ML
10 LIQUID ORAL EVERY 4 HOURS PRN
COMMUNITY
End: 2022-12-05

## 2022-11-09 RX ORDER — AMOXICILLIN AND CLAVULANATE POTASSIUM 875; 125 MG/1; MG/1
1 TABLET, FILM COATED ORAL 2 TIMES DAILY
Qty: 14 TABLET | Refills: 0 | Status: SHIPPED | OUTPATIENT
Start: 2022-11-09 | End: 2022-11-16

## 2022-11-09 RX ORDER — BENZONATATE 100 MG/1
100 CAPSULE ORAL 3 TIMES DAILY PRN
Qty: 60 CAPSULE | Refills: 0 | Status: SHIPPED | OUTPATIENT
Start: 2022-11-09 | End: 2022-12-05

## 2022-11-09 ASSESSMENT — FIBROSIS 4 INDEX: FIB4 SCORE: 0.82

## 2022-11-09 NOTE — PROGRESS NOTES
"Subjective:     CC:   Chief Complaint   Patient presents with    Sinusitis     Possible sinus infection, green discharge x 2 weeks, right sided face pressure       HPI:   Carmelina presents today with possible sinusitis x 2 weeks. She states she did go to [facility] recently however it has been past 2 weeks and does not want to test for COVID. She has a hx of recurrent sinus infections.   Denies fevers, chills, shortness of breath, diarrhea, nausea, or vomiting.   + for sinus pressure over R side of eye,headaches, maxillary pressure, occ otalgia, coughing at night due to post nasal drip.  Mucinex does help break it up but does not overall improve her symptoms especially  at night.    She does have neutropenia which makes her more prone to infection. Last sinus infection Marilyn 2022.   No problem-specific Assessment & Plan notes found for this encounter.      Current Outpatient Medications Ordered in Epic   Medication Sig Dispense Refill    guaiFENesin ER (MUCINEX) 600 MG TABLET SR 12 HR Take 1 Tablet by mouth every 12 hours.      guaiFENesin (ROBITUSSIN) 100 MG/5ML liquid Take 10 mL by mouth every four hours as needed.      amoxicillin-clavulanate (AUGMENTIN) 875-125 MG Tab Take 1 Tablet by mouth 2 times a day for 7 days. 14 Tablet 0    benzonatate (TESSALON) 100 MG Cap Take 1 Capsule by mouth 3 times a day as needed for Cough. 60 Capsule 0    omeprazole (PRILOSEC) 20 MG delayed-release capsule       aspirin 81 MG EC tablet aspirin 81 mg tablet,delayed release      acetaminophen (TYLENOL) 500 MG Tab Take 1-2 Tablets by mouth every 6 hours as needed for Mild Pain.       No current Epic-ordered facility-administered medications on file.       Health Maintenance: Completed        Objective:     Exam:  /78 (BP Location: Left arm, Patient Position: Sitting, BP Cuff Size: Large adult)   Pulse 94   Temp 36.3 °C (97.3 °F) (Temporal)   Ht 1.549 m (5' 1\")   Wt 90.8 kg (200 lb 3.2 oz)   SpO2 97%   BMI 37.83 kg/m²  " Body mass index is 37.83 kg/m².    Physical Exam  Vitals reviewed.   Constitutional:       General: She is not in acute distress.     Appearance: Normal appearance.   HENT:      Nose: Congestion present.      Right Sinus: Maxillary sinus tenderness and frontal sinus tenderness present.      Mouth/Throat:      Mouth: Mucous membranes are moist.      Pharynx: Oropharynx is clear. No oropharyngeal exudate or posterior oropharyngeal erythema.   Eyes:      Conjunctiva/sclera: Conjunctivae normal.      Pupils: Pupils are equal, round, and reactive to light.   Cardiovascular:      Rate and Rhythm: Normal rate and regular rhythm.      Pulses: Normal pulses.      Heart sounds: Normal heart sounds. No murmur heard.  Pulmonary:      Effort: Pulmonary effort is normal. No respiratory distress.      Breath sounds: Normal breath sounds. No stridor. No wheezing, rhonchi or rales.   Chest:      Chest wall: No tenderness.   Abdominal:      General: Bowel sounds are normal.   Skin:     General: Skin is warm.   Neurological:      Mental Status: She is alert and oriented to person, place, and time.   Psychiatric:         Mood and Affect: Mood normal.         Behavior: Behavior normal.        A chaperone was offered to the patient during today's exam. Patient declined chaperone.        Assessment & Plan:     67 y.o. female with the following -     1. Acute non-recurrent maxillary sinusitis  Acute problem x14 days.  She does have a history of sinusitis, most recent infection March 2022.  Discussed saline rinses, Mucinex as needed, Tessalon for her cough, and Augmentin x 5 to 7 days, will take for full 5-day course and if still feeling like her symptoms are present will take for 7 days.  Discussed she is neutropenic so she is more prone to infections, discussed signs and symptoms of systemic infection.  Other orders  - guaiFENesin ER (MUCINEX) 600 MG TABLET SR 12 HR; Take 1 Tablet by mouth every 12 hours.  - guaiFENesin (ROBITUSSIN) 100  MG/5ML liquid; Take 10 mL by mouth every four hours as needed.  - amoxicillin-clavulanate (AUGMENTIN) 875-125 MG Tab; Take 1 Tablet by mouth 2 times a day for 7 days.  Dispense: 14 Tablet; Refill: 0  - benzonatate (TESSALON) 100 MG Cap; Take 1 Capsule by mouth 3 times a day as needed for Cough.  Dispense: 60 Capsule; Refill: 0     I spent a total of  25 minutes with record review, exam, communication with the patient, communication with other providers, and documentation of this encounter.      No follow-ups on file.    Please note that this dictation was created using voice recognition software. I have made every reasonable attempt to correct obvious errors, but I expect that there are errors of grammar and possibly content that I did not discover before finalizing the note.

## 2022-12-05 ENCOUNTER — OFFICE VISIT (OUTPATIENT)
Dept: MEDICAL GROUP | Facility: IMAGING CENTER | Age: 67
End: 2022-12-05
Payer: MEDICARE

## 2022-12-05 VITALS
BODY MASS INDEX: 39.84 KG/M2 | OXYGEN SATURATION: 94 % | HEIGHT: 59 IN | SYSTOLIC BLOOD PRESSURE: 104 MMHG | DIASTOLIC BLOOD PRESSURE: 78 MMHG | TEMPERATURE: 97 F | HEART RATE: 94 BPM | WEIGHT: 197.6 LBS | RESPIRATION RATE: 16 BRPM

## 2022-12-05 DIAGNOSIS — Z23 NEED FOR VACCINATION: ICD-10-CM

## 2022-12-05 DIAGNOSIS — Z76.89 ENCOUNTER TO ESTABLISH CARE WITH NEW DOCTOR: ICD-10-CM

## 2022-12-05 DIAGNOSIS — L98.9 LESION OF SKIN OF FACE: ICD-10-CM

## 2022-12-05 DIAGNOSIS — D70.1 CHEMOTHERAPY-INDUCED NEUTROPENIA (HCC): ICD-10-CM

## 2022-12-05 DIAGNOSIS — M81.8 OTHER OSTEOPOROSIS WITHOUT CURRENT PATHOLOGICAL FRACTURE: ICD-10-CM

## 2022-12-05 DIAGNOSIS — Z87.81 HISTORY OF FEMUR FRACTURE: ICD-10-CM

## 2022-12-05 DIAGNOSIS — T45.1X5A CHEMOTHERAPY-INDUCED NEUTROPENIA (HCC): ICD-10-CM

## 2022-12-05 PROBLEM — S31.109A WOUND OF ABDOMEN: Status: RESOLVED | Noted: 2022-07-27 | Resolved: 2022-12-05

## 2022-12-05 PROCEDURE — 99214 OFFICE O/P EST MOD 30 MIN: CPT | Mod: 25 | Performed by: CLINICAL NURSE SPECIALIST

## 2022-12-05 PROCEDURE — G0009 ADMIN PNEUMOCOCCAL VACCINE: HCPCS | Performed by: CLINICAL NURSE SPECIALIST

## 2022-12-05 PROCEDURE — 90677 PCV20 VACCINE IM: CPT | Performed by: CLINICAL NURSE SPECIALIST

## 2022-12-05 ASSESSMENT — FIBROSIS 4 INDEX: FIB4 SCORE: 0.86

## 2022-12-05 NOTE — PROGRESS NOTES
Subjective     Carmelina Leblanc is a 67 y.o. female who presents with Establish Care (Pt report need a new PCP and how general labs works in our office)            HPI  Chemotherapy-induced neutropenia (HCC)  Sees Dr. Adkins.  Still taking Prolia.  She reports chronic neutropenia.  Has labs done regularly.  Diagnosed in 2016, triple negative breast cancer with concurrent right-sided lung cancer.  Had chemo doxorubicin, cyclophosphamide, taxol, double mastectomy and lung surgery.  Tumor marker elevated then she resumed chemo and radiation. Took carboplatin and Herceptin but had side effects on carbo so stopped.      Continues with neuropathy.  No shortness of breath or chest pain.      She has upcoming dental work. Dentist will speak with Jed.    History of femur fracture  Blood clot discovered in her right calf.  No residual pain. No longer taking Xarelto.     Lesion of skin of face  Cryotherapy lesions on face and no biopsy.    Other osteoporosis without current pathological fracture  On Prolia managed by Dr. Adkins    ROS  See HPI    Allergies   Allergen Reactions    Povidone Iodine Rash    Sulfa Drugs Unspecified      RXN=As a child unsure of reaction    Morphine      Nausea and vomiting     Current Outpatient Medications on File Prior to Visit   Medication Sig Dispense Refill    omeprazole (PRILOSEC) 20 MG delayed-release capsule       aspirin 81 MG EC tablet aspirin 81 mg tablet,delayed release      acetaminophen (TYLENOL) 500 MG Tab Take 1-2 Tablets by mouth every 6 hours as needed for Mild Pain.      guaiFENesin ER (MUCINEX) 600 MG TABLET SR 12 HR Take 1 Tablet by mouth every 12 hours. (Patient not taking: Reported on 12/5/2022)      guaiFENesin (ROBITUSSIN) 100 MG/5ML liquid Take 10 mL by mouth every four hours as needed. (Patient not taking: Reported on 12/5/2022)      benzonatate (TESSALON) 100 MG Cap Take 1 Capsule by mouth 3 times a day as needed for Cough. (Patient not taking: Reported on  "12/5/2022) 60 Capsule 0     No current facility-administered medications on file prior to visit.              Objective     /78 (BP Location: Left arm, Patient Position: Sitting, BP Cuff Size: Adult)   Pulse 94   Temp 36.1 °C (97 °F) (Temporal)   Resp 16   Ht 1.51 m (4' 11.45\")   Wt 89.6 kg (197 lb 9.6 oz)   SpO2 94%   BMI 39.31 kg/m²      Physical Exam  Constitutional:       General: She is not in acute distress.     Appearance: Normal appearance. She is not ill-appearing, toxic-appearing or diaphoretic.   HENT:      Head: Normocephalic and atraumatic.   Eyes:      Extraocular Movements: Extraocular movements intact.      Pupils: Pupils are equal, round, and reactive to light.   Cardiovascular:      Rate and Rhythm: Normal rate and regular rhythm.      Heart sounds: Normal heart sounds.   Pulmonary:      Effort: Pulmonary effort is normal.      Breath sounds: Normal breath sounds.   Skin:     General: Skin is warm and dry.   Neurological:      Mental Status: She is alert and oriented to person, place, and time.      Gait: Gait normal.   Psychiatric:         Mood and Affect: Mood normal.         Behavior: Behavior normal.         Thought Content: Thought content normal.         Judgment: Judgment normal.                           Assessment & Plan      1. Encounter to establish care with new doctor  Complex medical history with triple negative breast cancer, lung cancer, femur fracture.  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  Reviewed labs from previous providers.    2. Need for vaccination    - Pneumococcal Conjugate Vaccine 20-Valent (19 yrs+)    3. Chemotherapy-induced neutropenia (HCC)  Managed by Dr. Adkins    4. History of femur fracture  Resolved    5. Lesion of skin of face  Managed by dermatology    6. Other osteoporosis without current pathological fracture  Taking Prolia managed by Dr. Adkins      Return if symptoms worsen or fail to improve, for Medicare Wellness.    The patient " verbalized agreement and understanding of the current plan. All questions and concerns were addressed at the time of visit.    This note was dictated using Dragon Software.  I have made every reasonable attempt to correct errors, but errors of grammar and content may still exist.

## 2022-12-05 NOTE — ASSESSMENT & PLAN NOTE
Sees Dr. Adkins.  Still taking Prolia.  She reports chronic neutropenia.  Has labs done regularly.  Diagnosed in 2016, triple negative breast cancer with concurrent right-sided lung cancer.  Had chemo doxorubicin, cyclophosphamide, taxol, double mastectomy and lung surgery.  Tumor marker elevated then she resumed chemo and radiation. Took carboplatin and Herceptin but had side effects on carbo so stopped.      Continues with neuropathy.  No shortness of breath or chest pain.      She has upcoming dental work. Dentist will speak with Jed.

## 2023-02-28 ENCOUNTER — OFFICE VISIT (OUTPATIENT)
Dept: MEDICAL GROUP | Facility: PHYSICIAN GROUP | Age: 68
End: 2023-02-28
Payer: MEDICARE

## 2023-02-28 VITALS — BODY MASS INDEX: 39.39 KG/M2 | WEIGHT: 198 LBS | OXYGEN SATURATION: 96 % | TEMPERATURE: 96 F | HEART RATE: 96 BPM

## 2023-02-28 DIAGNOSIS — L53.9 LEG ERYTHEMA: ICD-10-CM

## 2023-02-28 DIAGNOSIS — Z76.89 ESTABLISHING CARE WITH NEW DOCTOR, ENCOUNTER FOR: ICD-10-CM

## 2023-02-28 DIAGNOSIS — Z85.3 HISTORY OF BREAST CANCER: ICD-10-CM

## 2023-02-28 DIAGNOSIS — Z72.89 OTHER PROBLEMS RELATED TO LIFESTYLE: ICD-10-CM

## 2023-02-28 DIAGNOSIS — R73.9 HYPERGLYCEMIA: ICD-10-CM

## 2023-02-28 DIAGNOSIS — C80.1 NEUROPATHY ASSOCIATED WITH CANCER (HCC): ICD-10-CM

## 2023-02-28 DIAGNOSIS — G63 NEUROPATHY ASSOCIATED WITH CANCER (HCC): ICD-10-CM

## 2023-02-28 DIAGNOSIS — E78.00 ELEVATED LDL CHOLESTEROL LEVEL: ICD-10-CM

## 2023-02-28 PROBLEM — H61.21 IMPACTED CERUMEN OF RIGHT EAR: Status: RESOLVED | Noted: 2021-11-08 | Resolved: 2023-02-28

## 2023-02-28 PROCEDURE — 99215 OFFICE O/P EST HI 40 MIN: CPT | Performed by: FAMILY MEDICINE

## 2023-02-28 ASSESSMENT — FIBROSIS 4 INDEX: FIB4 SCORE: 0.87

## 2023-02-28 NOTE — ASSESSMENT & PLAN NOTE
Chronic medical diagnosis.  Last lipid panel from July had LDL improved to106     Latest Reference Range & Units 07/12/22 07:44   Cholesterol,Tot 100 - 199 mg/dL 183   Triglycerides 0 - 149 mg/dL 84   HDL >=40 mg/dL 60   LDL <100 mg/dL 106 (H)   (H): Data is abnormally high

## 2023-02-28 NOTE — ASSESSMENT & PLAN NOTE
New issue.  Approximately a week ago she went to get a pedicure and her stylist mentioned that her lower leg was a little bit red.  Denies any fever, chills, or pain.  Thinks this has been going on for approximately a week.  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  States that she was initially on Xarelto but now only takes aspirin 81 mg daily

## 2023-02-28 NOTE — PROGRESS NOTES
"  CC:  No chief complaint on file.      HISTORY OF THE PRESENT ILLNESS: Patient is a 68 y.o. female. This pleasant patient is here today to establish care. Her prior PCP was Kaleigh Bee.    Hyperglycemia  Chronic medical diagnosis.  Last A1c from July was elevated at 5.7%    Elevated LDL cholesterol level    Chronic medical diagnosis.  Last lipid panel from July had LDL improved to106     Latest Reference Range & Units 07/12/22 07:44   Cholesterol,Tot 100 - 199 mg/dL 183   Triglycerides 0 - 149 mg/dL 84   HDL >=40 mg/dL 60   LDL <100 mg/dL 106 (H)   (H): Data is abnormally high    History of breast cancer  History of.  Diagnosed with right breast invasive ductal cancer in October 2016.  Started on chemo.  Underwent bilateral mastectomies and lymph node dissection on the right side on January 19, 2017  dx'd in 2016. S/p bilateral mastectomy.  F/u onc, Jed, last seen by 11/14/22.    Leg erythema  New issue.  Approximately a week ago she went to get a pedicure and her stylist mentioned that her lower leg was a little bit red.  Denies any fever, chills, or pain.  Thinks this has been going on for approximately a week.  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  States that she was initially on Xarelto but now only takes aspirin 81 mg daily      Allergies: Povidone iodine, Sulfa drugs, and Morphine    Current Outpatient Medications Ordered in Epic   Medication Sig Dispense Refill    omeprazole (PRILOSEC) 20 MG delayed-release capsule       aspirin 81 MG EC tablet aspirin 81 mg tablet,delayed release      acetaminophen (TYLENOL) 500 MG Tab Take 1-2 Tablets by mouth every 6 hours as needed for Mild Pain.       No current Epic-ordered facility-administered medications on file.       Past Medical History:   Diagnosis Date    Anemia 6-1-17    \"D/T Chemo\"    Anesthesia 6-1-17    PONV    Cancer (HCC) 8/4/16    Right Breast Treated With Chemo    Cancer (HCC) 2016    Lung " "CA    Cataract     IOL OU    Dental disorder     dental implant bottom     Fall 4/20/2020    Impacted cerumen of right ear 11/8/2021    Osteoarthritis 6-1-17    \"all over\"    Port catheter in place 6-1-17    Left Side    Snoring     Urinary tract infection        Past Surgical History:   Procedure Laterality Date    ORIF, FRACTURE, FEMUR Right 4/20/2020    Procedure: ORIF, FRACTURE,  DISTAL FEMUR;  Surgeon: Christian Ag M.D.;  Location: SURGERY [facility];  Service: Orthopedics    TISSUE EXPANDER PLACE/REMOVE Bilateral 6/8/2017    Procedure: TISSUE EXPANDER PLACE/REMOVE;  Surgeon: Everardo Matthews M.D.;  Location: SURGERY SAME DAY [facility];  Service:     BREAST IMPLANT REVISION Bilateral 6/8/2017    Procedure: BREAST IMPLANT;  Surgeon: Everardo Matthews M.D.;  Location: SURGERY SAME DAY [facility];  Service:     CAPSULOTOMY Bilateral 6/8/2017    Procedure: CAPSULOTOMY FOR PARTIAL CAPSULECTOMIES;  Surgeon: Everardo Matthews M.D.;  Location: SURGERY SAME DAY [facility];  Service:     BREAST RECONSTRUCTION Bilateral 6/8/2017    Procedure: BREAST RECONSTRUCTION USING LOCAL TISSUE & FAT GRAFTING;  Surgeon: Everardo Matthews M.D.;  Location: SURGERY SAME DAY [facility];  Service:     THORACOSCOPY Right 5/1/2017    Procedure: THORACOSCOPY, WEDGE RESECTION LOWER LOBE MASS;  Surgeon: John H Ganser, M.D.;  [facility];  Service:     CATH PLACEMENT Left 3/17/2017    Procedure: CATH PLACEMENT FOR SUBCLAVIAN PORT LEFT;  Surgeon: Carly Wright M.D.;  Location: SURGERY [facility];  Service:     LUNG NEEDLE BIOPSY  02-    TISSUE EXPANDER PLACE/REMOVE Bilateral 1/19/2017    Procedure: TISSUE EXPANDER PLACEment;  Surgeon: Everardo Matthews M.D.;  Location: SURGERY SAME DAY [facility];  Service:     FLAP GRAFT  1/19/2017    Procedure: FLAP GRAFT- FOR DERMAL ADIPOFASCIAL FLAPS ;  Surgeon: Everardo Matthews M.D.;  Location: SURGERY SAME DAY [facility];  Service:     " MASTECTOMY Bilateral 1/19/2017    Procedure: MASTECTOMY PROPHYLACTIC LEFT, AND RIGHT TOTAL MASTECTOMY;  Surgeon: Carly Wright M.D.;  Location: SURGERY SAME DAY [facility];  Service:     AXILLARY NODE DISSECTION Right 1/19/2017    Procedure: AXILLARY NODE DISSECTION;  Surgeon: Carly Wright M.D.;  Location: SURGERY SAME DAY [facility];  Service:     CATH PLACEMENT Left 1/19/2017    Procedure: Removal of left subclavian port ;  Surgeon: Carly Wright M.D.;  Location: SURGERY SAME DAY [facility];  Service:     CATH PLACEMENT Left 9/5/2016    Procedure: CATH PLACEMENT - SUBCLAVIAN PORT - SIDE TO BE DETERMINED;  Surgeon: Carly Wright M.D.;  Location: SURGERY [facility];  Service:     STEREOTACTIC BIOPSY Right 08/10/2016    HIP ARTHROPLASTY TOTAL Right 2014    OTHER ORTHOPEDIC SURGERY  6/2012    right hip arthroplasty    CATARACT PHACO WITH IOL Bilateral     GYN SURGERY  Approx 2013    Hysterectomy       Social History     Tobacco Use    Smoking status: Never    Smokeless tobacco: Never   Vaping Use    Vaping Use: Never used   Substance Use Topics    Alcohol use: Yes     Alcohol/week: 0.0 oz     Comment: very rarely    Drug use: No       Social History     Social History Narrative    Retired teacher, early education special .          1 son       Family History   Problem Relation Age of Onset    Lung Disease Mother     Arthritis Father     Heart Disease Father        Health Maintenance: Annual wellness visit at next appointment      Objective:     Exam:   Pulse 96   Temp (!) 35.6 °C (96 °F)   Wt 89.8 kg (198 lb)   SpO2 96%   BMI 39.39 kg/m²   Body mass index is 39.39 kg/m².  Wt Readings from Last 4 Encounters:   02/28/23 89.8 kg (198 lb)   12/05/22 89.6 kg (197 lb 9.6 oz)   11/09/22 90.8 kg (200 lb 3.2 oz)   09/30/22 90.7 kg (200 lb)     General: Normal appearing. No distress. Appropriately groomed.  HEENT: Normocephalic. Eyes conjunctiva clear lids without  ptosis,   Neck: Supple,   Pulmonary: Clear to ausculation.  Normal effort. No rales, ronchi, or wheezing.  Cardiovascular: Regular rate and rhythm, no lower extremity edema  Neurologic: Grossly nonfocal  Skin: Warm and dry.  Slight erythema to right anterior leg. Nontender,   Musculoskeletal: Normal gait. No extremity cyanosis, clubbing, or edema.  Psych: Normal mood and affect. Alert and oriented x3. Judgment and insight is normal.      Assessment & Plan:   68 y.o. female with the following -    1. Establishing care with new doctor, encounter for  Transferring care from [facility]    2. Leg erythema  New medical diagnosis.  Stable.  Denies any fever or chills.  Unsure how long her right leg has been like that.  Thinks it is started a week ago when she went to get a pedicure.  Does have a history of right lower extremity blood clot.  Photos taken with patient's phone.  Encouraged her to monitor this and to update me if any changes.    3. Hyperglycemia  Chronic medical diagnosis.  Last A1c from July was elevated at 5.7%.  She continues to work on diet and lifestyle changes.  - HEMOGLOBIN A1C; Future    4. History of breast cancer  History of breast cancer in 2016.  Status post bilateral mastectomies.  Follows up with oncology, Dr. Adkins.    5. Elevated LDL cholesterol level  -Chronic medical diagnosis.  Improving.  Diet and lifestyle changes discussed with patient.    Lipid Profile; Future  - TSH WITH REFLEX TO FT4; Future  - VITAMIN B12; Future  - VITAMIN D,25 HYDROXY (DEFICIENCY); Future  - Comp Metabolic Panel; Future  - CBC WITH DIFFERENTIAL; Future    6. Neuropathy associated with cancer (HCC)  Chronic medical diagnosis.  Due to history of chemo.  Continue to monitor.  HCC Gap Form    Diagnosis: C80.1, G63 - Neuropathy associated with cancer (HCC)  Assessment and plan: Chronic, stable. Due to chemotherapy. Has numbness to bilateral hands and feet.   Last edited 02/28/23 08:18 PST by Marnie Cortez M.D.         7. Other problems related to lifestyle  - HCV Scrn ( [id number] [employer]); Future       Return in about 3 months (around 2023) for Annual Wellness Visit.      I spent a total of 52 minutes with chart review/prep, review of other providers' records, imaging/lab review, face to face time for history/examination, ordering, prescribing, review of results/meds/treatment plan with patient/family/caregiver, documentation in EMR, care coordination (as needed).     Please note that this dictation was created using voice recognition software. I have made every reasonable attempt to correct obvious errors, but I expect that there are errors of grammar and possibly content that I did not discover before finalizing the note.

## 2023-02-28 NOTE — ASSESSMENT & PLAN NOTE
History of.  Diagnosed with right breast invasive ductal cancer in October 2016.  Started on chemo.  Underwent bilateral mastectomies and lymph node dissection on the right side on January 19, 2017  dx'd in 2016. S/p bilateral mastectomy.  F/u onc, Jed, last seen by 11/14/22.

## 2023-03-27 DIAGNOSIS — Z72.89 OTHER PROBLEMS RELATED TO LIFESTYLE: ICD-10-CM

## 2023-03-27 DIAGNOSIS — E78.00 ELEVATED LDL CHOLESTEROL LEVEL: ICD-10-CM

## 2023-03-27 DIAGNOSIS — R73.9 HYPERGLYCEMIA: ICD-10-CM

## 2023-04-11 ENCOUNTER — DOCUMENTATION (OUTPATIENT)
Dept: HEALTH INFORMATION MANAGEMENT | Facility: OTHER | Age: 68
End: 2023-04-11
Payer: MEDICARE

## 2023-05-12 ENCOUNTER — HOSPITAL ENCOUNTER (OUTPATIENT)
Dept: LAB | Facility: MEDICAL CENTER | Age: 68
End: 2023-05-12
Attending: INTERNAL MEDICINE
Payer: MEDICARE

## 2023-05-12 LAB
ANION GAP SERPL CALC-SCNC: 13 MMOL/L (ref 7–16)
BUN SERPL-MCNC: 17 MG/DL (ref 8–22)
CALCIUM SERPL-MCNC: 9.3 MG/DL (ref 8.5–10.5)
CHLORIDE SERPL-SCNC: 105 MMOL/L (ref 96–112)
CO2 SERPL-SCNC: 22 MMOL/L (ref 20–33)
CREAT SERPL-MCNC: 0.66 MG/DL (ref 0.5–1.4)
GFR SERPLBLD CREATININE-BSD FMLA CKD-EPI: 96 ML/MIN/1.73 M 2
GLUCOSE SERPL-MCNC: 114 MG/DL (ref 65–99)
MAGNESIUM SERPL-MCNC: 1.8 MG/DL (ref 1.5–2.5)
PHOSPHATE SERPL-MCNC: 3.7 MG/DL (ref 2.5–4.5)
POTASSIUM SERPL-SCNC: 4.6 MMOL/L (ref 3.6–5.5)
SODIUM SERPL-SCNC: 140 MMOL/L (ref 135–145)

## 2023-05-12 PROCEDURE — 84100 ASSAY OF PHOSPHORUS: CPT

## 2023-05-12 PROCEDURE — 36415 COLL VENOUS BLD VENIPUNCTURE: CPT

## 2023-05-12 PROCEDURE — 83735 ASSAY OF MAGNESIUM: CPT

## 2023-05-12 PROCEDURE — 80048 BASIC METABOLIC PNL TOTAL CA: CPT

## 2023-05-26 ENCOUNTER — HOSPITAL ENCOUNTER (OUTPATIENT)
Dept: LAB | Facility: MEDICAL CENTER | Age: 68
End: 2023-05-26
Attending: FAMILY MEDICINE
Payer: MEDICARE

## 2023-05-26 ENCOUNTER — TELEPHONE (OUTPATIENT)
Dept: HEALTH INFORMATION MANAGEMENT | Facility: OTHER | Age: 68
End: 2023-05-26
Payer: MEDICARE

## 2023-05-26 DIAGNOSIS — Z72.89 OTHER PROBLEMS RELATED TO LIFESTYLE: ICD-10-CM

## 2023-05-26 DIAGNOSIS — E78.00 ELEVATED LDL CHOLESTEROL LEVEL: ICD-10-CM

## 2023-05-26 DIAGNOSIS — R73.9 HYPERGLYCEMIA: ICD-10-CM

## 2023-05-26 LAB
25(OH)D3 SERPL-MCNC: 67 NG/ML (ref 30–100)
ALBUMIN SERPL BCP-MCNC: 4 G/DL (ref 3.2–4.9)
ALBUMIN/GLOB SERPL: 1.5 G/DL
ALP SERPL-CCNC: 63 U/L (ref 30–99)
ALT SERPL-CCNC: 17 U/L (ref 2–50)
ANION GAP SERPL CALC-SCNC: 10 MMOL/L (ref 7–16)
AST SERPL-CCNC: 17 U/L (ref 12–45)
BASOPHILS # BLD AUTO: 0.8 % (ref 0–1.8)
BASOPHILS # BLD: 0.04 K/UL (ref 0–0.12)
BILIRUB SERPL-MCNC: 0.4 MG/DL (ref 0.1–1.5)
BUN SERPL-MCNC: 8 MG/DL (ref 8–22)
CALCIUM ALBUM COR SERPL-MCNC: 9.1 MG/DL (ref 8.5–10.5)
CALCIUM SERPL-MCNC: 9.1 MG/DL (ref 8.5–10.5)
CHLORIDE SERPL-SCNC: 106 MMOL/L (ref 96–112)
CHOLEST SERPL-MCNC: 176 MG/DL (ref 100–199)
CO2 SERPL-SCNC: 24 MMOL/L (ref 20–33)
CREAT SERPL-MCNC: 0.68 MG/DL (ref 0.5–1.4)
EOSINOPHIL # BLD AUTO: 0.22 K/UL (ref 0–0.51)
EOSINOPHIL NFR BLD: 4.7 % (ref 0–6.9)
ERYTHROCYTE [DISTWIDTH] IN BLOOD BY AUTOMATED COUNT: 50.4 FL (ref 35.9–50)
EST. AVERAGE GLUCOSE BLD GHB EST-MCNC: 123 MG/DL
FASTING STATUS PATIENT QL REPORTED: NORMAL
GFR SERPLBLD CREATININE-BSD FMLA CKD-EPI: 95 ML/MIN/1.73 M 2
GLOBULIN SER CALC-MCNC: 2.7 G/DL (ref 1.9–3.5)
GLUCOSE SERPL-MCNC: 94 MG/DL (ref 65–99)
HBA1C MFR BLD: 5.9 % (ref 4–5.6)
HCT VFR BLD AUTO: 40.2 % (ref 37–47)
HCV AB SER QL: NORMAL
HDLC SERPL-MCNC: 57 MG/DL
HGB BLD-MCNC: 13.4 G/DL (ref 12–16)
IMM GRANULOCYTES # BLD AUTO: 0.02 K/UL (ref 0–0.11)
IMM GRANULOCYTES NFR BLD AUTO: 0.4 % (ref 0–0.9)
LDLC SERPL CALC-MCNC: 97 MG/DL
LYMPHOCYTES # BLD AUTO: 0.86 K/UL (ref 1–4.8)
LYMPHOCYTES NFR BLD: 18.2 % (ref 22–41)
MCH RBC QN AUTO: 30.9 PG (ref 27–33)
MCHC RBC AUTO-ENTMCNC: 33.3 G/DL (ref 32.2–35.5)
MCV RBC AUTO: 92.6 FL (ref 81.4–97.8)
MONOCYTES # BLD AUTO: 0.43 K/UL (ref 0–0.85)
MONOCYTES NFR BLD AUTO: 9.1 % (ref 0–13.4)
NEUTROPHILS # BLD AUTO: 3.16 K/UL (ref 1.82–7.42)
NEUTROPHILS NFR BLD: 66.8 % (ref 44–72)
NRBC # BLD AUTO: 0 K/UL
NRBC BLD-RTO: 0 /100 WBC (ref 0–0.2)
PLATELET # BLD AUTO: 306 K/UL (ref 164–446)
PMV BLD AUTO: 10.1 FL (ref 9–12.9)
POTASSIUM SERPL-SCNC: 4.4 MMOL/L (ref 3.6–5.5)
PROT SERPL-MCNC: 6.7 G/DL (ref 6–8.2)
RBC # BLD AUTO: 4.34 M/UL (ref 4.2–5.4)
SODIUM SERPL-SCNC: 140 MMOL/L (ref 135–145)
TRIGL SERPL-MCNC: 108 MG/DL (ref 0–149)
TSH SERPL DL<=0.005 MIU/L-ACNC: 1.44 UIU/ML (ref 0.38–5.33)
VIT B12 SERPL-MCNC: 785 PG/ML (ref 211–911)
WBC # BLD AUTO: 4.7 K/UL (ref 4.8–10.8)

## 2023-05-26 PROCEDURE — 85025 COMPLETE CBC W/AUTO DIFF WBC: CPT

## 2023-05-26 PROCEDURE — 84443 ASSAY THYROID STIM HORMONE: CPT

## 2023-05-26 PROCEDURE — 82306 VITAMIN D 25 HYDROXY: CPT

## 2023-05-26 PROCEDURE — 80053 COMPREHEN METABOLIC PANEL: CPT

## 2023-05-26 PROCEDURE — 36415 COLL VENOUS BLD VENIPUNCTURE: CPT

## 2023-05-26 PROCEDURE — 80061 LIPID PANEL: CPT

## 2023-05-26 PROCEDURE — 86803 HEPATITIS C AB TEST: CPT

## 2023-05-26 PROCEDURE — 83036 HEMOGLOBIN GLYCOSYLATED A1C: CPT

## 2023-05-26 PROCEDURE — 82607 VITAMIN B-12: CPT

## 2023-05-31 ENCOUNTER — OFFICE VISIT (OUTPATIENT)
Dept: MEDICAL GROUP | Facility: PHYSICIAN GROUP | Age: 68
End: 2023-05-31
Payer: MEDICARE

## 2023-05-31 VITALS
DIASTOLIC BLOOD PRESSURE: 70 MMHG | WEIGHT: 191.5 LBS | SYSTOLIC BLOOD PRESSURE: 114 MMHG | BODY MASS INDEX: 37.6 KG/M2 | RESPIRATION RATE: 16 BRPM | TEMPERATURE: 97.4 F | OXYGEN SATURATION: 97 % | HEIGHT: 60 IN | HEART RATE: 96 BPM

## 2023-05-31 DIAGNOSIS — J30.2 SEASONAL ALLERGIES: ICD-10-CM

## 2023-05-31 DIAGNOSIS — E78.00 ELEVATED LDL CHOLESTEROL LEVEL: ICD-10-CM

## 2023-05-31 DIAGNOSIS — C80.1 MALIGNANT NEOPLASTIC DISEASE (HCC): ICD-10-CM

## 2023-05-31 DIAGNOSIS — M81.8 OTHER OSTEOPOROSIS WITHOUT CURRENT PATHOLOGICAL FRACTURE: ICD-10-CM

## 2023-05-31 DIAGNOSIS — Z92.21 HISTORY OF CHEMOTHERAPY: ICD-10-CM

## 2023-05-31 DIAGNOSIS — G63 NEUROPATHY ASSOCIATED WITH CANCER (HCC): ICD-10-CM

## 2023-05-31 DIAGNOSIS — Z00.00 MEDICARE ANNUAL WELLNESS VISIT, SUBSEQUENT: ICD-10-CM

## 2023-05-31 DIAGNOSIS — R73.09 ELEVATED HEMOGLOBIN A1C: ICD-10-CM

## 2023-05-31 DIAGNOSIS — Z85.118 HISTORY OF LUNG CANCER: ICD-10-CM

## 2023-05-31 DIAGNOSIS — C80.1 NEUROPATHY ASSOCIATED WITH CANCER (HCC): ICD-10-CM

## 2023-05-31 DIAGNOSIS — Z87.81 HISTORY OF FEMUR FRACTURE: ICD-10-CM

## 2023-05-31 DIAGNOSIS — Z85.3 HISTORY OF BREAST CANCER: ICD-10-CM

## 2023-05-31 PROCEDURE — 3074F SYST BP LT 130 MM HG: CPT | Performed by: FAMILY MEDICINE

## 2023-05-31 PROCEDURE — 3078F DIAST BP <80 MM HG: CPT | Performed by: FAMILY MEDICINE

## 2023-05-31 PROCEDURE — G0439 PPPS, SUBSEQ VISIT: HCPCS | Performed by: FAMILY MEDICINE

## 2023-05-31 ASSESSMENT — FIBROSIS 4 INDEX: FIB4 SCORE: 0.9

## 2023-05-31 ASSESSMENT — ENCOUNTER SYMPTOMS: GENERAL WELL-BEING: GOOD

## 2023-05-31 ASSESSMENT — ACTIVITIES OF DAILY LIVING (ADL): BATHING_REQUIRES_ASSISTANCE: 0

## 2023-05-31 ASSESSMENT — PATIENT HEALTH QUESTIONNAIRE - PHQ9: CLINICAL INTERPRETATION OF PHQ2 SCORE: 0

## 2023-05-31 NOTE — PROGRESS NOTES
Chief Complaint   Patient presents with    Annual Wellness Visit       HPI:  Carmelina is a 68 y.o. here for Medicare Annual Wellness Visit.  Last seen by me on February 28.        Patient Active Problem List    Diagnosis Date Noted    Seasonal allergies 05/31/2023    Elevated LDL cholesterol level 02/28/2023    Leg erythema 02/28/2023    Lesion of skin of face 07/27/2022    Other osteoporosis without current pathological fracture 11/08/2021    Neuropathy associated with cancer (HCC) 03/11/2021    History of chemotherapy 03/11/2021    Malignant neoplastic disease (HCC) 05/06/2020    History of breast cancer 04/21/2020    History of femur fracture 04/20/2020    Elevated hemoglobin A1c 04/20/2020    History of lung cancer 04/20/2020       Current Outpatient Medications   Medication Sig Dispense Refill    omeprazole (PRILOSEC) 20 MG delayed-release capsule       aspirin 81 MG EC tablet aspirin 81 mg tablet,delayed release      acetaminophen (TYLENOL) 500 MG Tab Take 1-2 Tablets by mouth every 6 hours as needed for Mild Pain.       No current facility-administered medications for this visit.        Patient is taking medications as noted in medication list.  Current supplements as per medication list.     Allergies: Povidone iodine, Sulfa drugs, and Morphine    Current social contact/activities: yes girlfriends,lessons, performance, golf lessons    Is patient current with immunizations? No, due for SHINGRIX (Shingles). Patient is interested in receiving NONE today.    She  reports that she has never smoked. She has never used smokeless tobacco. She reports current alcohol use. She reports that she does not use drugs.  Counseling given: Not Answered      ROS:    Gait: Uses no assistive device   Ostomy: No   Other tubes: No   Amputations: No   Chronic oxygen use No   Last eye exam 2013 -will be scheduling. Uses readers  Wears hearing aids: No   : Denies any urinary leakage during the last 6  months    Screening:    Depression Screening  Little interest or pleasure in doing things?  0 - not at all  Feeling down, depressed, or hopeless? 0 - not at all  Patient Health Questionnaire Score: 0    If depressive symptoms identified deferred to follow up visit unless specifically addressed in assessment and plan.    Interpretation of PHQ-9 Total Score   Score Severity   1-4 No Depression   5-9 Mild Depression   10-14 Moderate Depression   15-19 Moderately Severe Depression   20-27 Severe Depression    Screening for Cognitive Impairment  Three Minute Recall (daughter, heaven, mountain)  3/3    Dyllan clock face with all 12 numbers and set the hands to show 10 past 11.  Yes 4/5  If cognitive concerns identified, deferred for follow up unless specifically addressed in assessment and plan.    Fall Risk Assessment  Has the patient had two or more falls in the last year or any fall with injury in the last year?  Yes  If fall risk identified, deferred for follow up unless specifically addressed in assessment and plan.    Safety Assessment  Throw rugs on floor.  Yes  Handrails on all stairs.  Yes  Good lighting in all hallways.  Yes  Difficulty hearing.  No  Patient counseled about all safety risks that were identified.    Functional Assessment ADLs  Are there any barriers preventing you from cooking for yourself or meeting nutritional needs?  No.    Are there any barriers preventing you from driving safely or obtaining transportation?  No.    Are there any barriers preventing you from using a telephone or calling for help?  No.    Are there any barriers preventing you from shopping?  No.    Are there any barriers preventing you from taking care of your own finances?  No.    Are there any barriers preventing you from managing your medications?  No.    Are there any barriers preventing you from showering, bathing or dressing yourself?  No.    Are you currently engaging in any exercise or physical activity?  Yes.     What  is your perception of your health?  Good.    Advance Care Planning  Do you have an Advance Directive, Living Will, Durable Power of , or POLST? Yes          is not on file - instructed patient to bring in a copy to scan into their chart    Health Maintenance Summary            Overdue - IMM ZOSTER VACCINES (1 of 2) Overdue - never done      No completion history exists for this topic.              COLORECTAL CANCER SCREENING (COLOGUARD STOOL DNA - Every 3 Years) Next due on 3/23/2024      03/23/2021  COLOGUARD (FIT DNA)              Annual Wellness Visit (Every 366 Days) Next due on 5/31/2024 05/31/2023  Visit Dx: Medicare annual wellness visit, subsequent    01/17/2022  Visit Dx: Encounter for Medicare annual wellness exam    03/11/2021  Visit Dx: Medicare annual wellness visit, initial              Scheduled - BONE DENSITY (Every 5 Years) Scheduled for 7/21/2023 04/01/2021  DS-BONE DENSITY STUDY (DEXA)              IMM DTaP/Tdap/Td Vaccine (3 - Td or Tdap) Next due on 10/18/2029      10/18/2019  Imm Admin: Tdap Vaccine    05/11/2011  Imm Admin: Tdap Vaccine              IMM INFLUENZA (Series Information) Completed      09/14/2022  Imm Admin: Influenza Vaccine Adult HD    09/28/2021  Imm Admin: Influenza Vaccine Adult HD    09/29/2020  Imm Admin: Influenza Vac Subunit Quad Inj (Pf)    10/18/2019  Imm Admin: Influenza Vaccine Quad Inj (Pf)    10/29/2018  Imm Admin: Influenza Vaccine Quad Inj (Pf)    Only the first 5 history entries have been loaded, but more history exists.              COVID-19 Vaccine (Series Information) Completed      10/01/2022  Imm Admin: PFIZER BIVALENT BOOSTER SARS-COV-2 VACCINE (12+)    04/12/2022  Imm Admin: PFIZER PADILLA CAP SARS-COV-2 VACCINATION (12+)    10/08/2021  Imm Admin: PFIZER PURPLE CAP SARS-COV-2 VACCINATION (12+)    04/02/2021  Imm Admin: PFIZER PURPLE CAP SARS-COV-2 VACCINATION (12+)    03/11/2021  Imm Admin: PFIZER PURPLE CAP SARS-COV-2 VACCINATION (12+)               IMM PNEUMOCOCCAL VACCINE: 65+ Years (Series Information) Completed      12/05/2022  Imm Admin: Pneumococcal Conjugate Vaccine (PCV20)    11/08/2021  Imm Admin: Pneumococcal polysaccharide vaccine (PPSV-23)              HEPATITIS C SCREENING  Completed      05/26/2023  HEP C VIRUS ANTIBODY              IMM HEP B VACCINE (Series Information) Aged Out      No completion history exists for this topic.              HPV Vaccines (Series Information) Aged Out      No completion history exists for this topic.              IMM MENINGOCOCCAL ACWY VACCINE (Series Information) Aged Out      No completion history exists for this topic.              Discontinued - MAMMOGRAM  Discontinued        Frequency changed to Never automatically (Topic No Longer Applies)    08/05/2016  MA-DIAGNOSTIC MAMMO W/CAD-BILAT                    Patient Care Team:  Marnie Cortez M.D. as PCP - General (Family Medicine)  Marnie Cortez M.D. as PCP - [facility] (Family Medicine)  [facility] as Home Health Provider  Denice Adkins M.D. (Medical Oncology)    Social History     Tobacco Use    Smoking status: Never    Smokeless tobacco: Never   Vaping Use    Vaping Use: Never used   Substance Use Topics    Alcohol use: Yes     Alcohol/week: 0.0 oz     Comment: very rarely    Drug use: No     Family History   Problem Relation Age of Onset    Lung Disease Mother     Arthritis Father     Heart Disease Father      She  has a past medical history of Anemia (6-1-17), Anesthesia (6-1-17), Cancer (HCC) (8/4/16), Cancer (HCC) (2016), Cataract, Dental disorder, Fall (4/20/2020), Impacted cerumen of right ear (11/8/2021), Osteoarthritis (6-1-17), Port catheter in place (6-1-17), Snoring, and Urinary tract infection.   Past Surgical History:   Procedure Laterality Date    ORIF, FRACTURE, FEMUR Right 4/20/2020    Procedure: ORIF, FRACTURE,  DISTAL FEMUR;  Surgeon: Christian Ag M.D.;  Location: SURGERY [facility];  Service: Orthopedics     TISSUE EXPANDER PLACE/REMOVE Bilateral 6/8/2017    Procedure: TISSUE EXPANDER PLACE/REMOVE;  Surgeon: Everardo Matthews M.D.;  Location: SURGERY SAME DAY [facility];  Service:     BREAST IMPLANT REVISION Bilateral 6/8/2017    Procedure: BREAST IMPLANT;  Surgeon: Everardo Matthews M.D.;  Location: SURGERY SAME DAY [facility];  Service:     CAPSULOTOMY Bilateral 6/8/2017    Procedure: CAPSULOTOMY FOR PARTIAL CAPSULECTOMIES;  Surgeon: Everardo Matthews M.D.;  Location: SURGERY SAME DAY [facility];  Service:     BREAST RECONSTRUCTION Bilateral 6/8/2017    Procedure: BREAST RECONSTRUCTION USING LOCAL TISSUE & FAT GRAFTING;  Surgeon: Everardo Matthews M.D.;  Location: SURGERY SAME DAY [facility];  Service:     THORACOSCOPY Right 5/1/2017    Procedure: THORACOSCOPY, WEDGE RESECTION LOWER LOBE MASS;  Surgeon: John H Ganser, M.D.;  Location: [facility];  Service:     CATH PLACEMENT Left 3/17/2017    Procedure: CATH PLACEMENT FOR SUBCLAVIAN PORT LEFT;  Surgeon: Carly Wright M.D.;  Location: [facility];  Service:     LUNG NEEDLE BIOPSY  02-    TISSUE EXPANDER PLACE/REMOVE Bilateral 1/19/2017    Procedure: TISSUE EXPANDER PLACEment;  Surgeon: Everardo Matthews M.D.;  Location: SURGERY SAME DAY [facility];  Service:     FLAP GRAFT  1/19/2017    Procedure: FLAP GRAFT- FOR DERMAL ADIPOFASCIAL FLAPS ;  Surgeon: Everardo Matthews M.D.;  Location: SURGERY SAME DAY [facility];  Service:     MASTECTOMY Bilateral 1/19/2017    Procedure: MASTECTOMY PROPHYLACTIC LEFT, AND RIGHT TOTAL MASTECTOMY;  Surgeon: Carly Wright M.D.;  Location: SURGERY SAME DAY [facility];  Service:     AXILLARY NODE DISSECTION Right 1/19/2017    Procedure: AXILLARY NODE DISSECTION;  Surgeon: Carly Wright M.D.;  Location: SURGERY SAME DAY [facility];  Service:     CATH PLACEMENT Left 1/19/2017    Procedure: Removal of left subclavian port ;  Surgeon: Carly Wright M.D.;  Location:  "SURGERY SAME DAY [facility];  Service:     CATH PLACEMENT Left 9/5/2016    Procedure: CATH PLACEMENT - SUBCLAVIAN PORT - SIDE TO BE DETERMINED;  Surgeon: Carly Wright M.D.;  Location: SURGERY [facility];  Service:     STEREOTACTIC BIOPSY Right 08/10/2016    HIP ARTHROPLASTY TOTAL Right 2014    OTHER ORTHOPEDIC SURGERY  6/2012    right hip arthroplasty    CATARACT PHACO WITH IOL Bilateral     GYN SURGERY  Approx 2013    Hysterectomy       Exam:   /70 (BP Location: Left arm, Patient Position: Sitting, BP Cuff Size: Large adult)   Pulse 96   Temp 36.3 °C (97.4 °F) (Temporal)   Resp 16   Ht 1.511 m (4' 11.5\")   Wt 86.9 kg (191 lb 8 oz)   SpO2 97%  Body mass index is 38.03 kg/m².    Hearing good.    Ears: slight bilateral cerumen  Dentition s/p implants and grafting.  Alert, oriented in no acute distress.  Eye contact is good, speech goal directed, affect calm      Assessment and Plan. The following treatment and monitoring plan is recommended:      1. Medicare annual wellness visit, subsequent  Plans on getting the shingles vaccine after October--she'll be done with golf.  Requesting that we refill her omeprazole in the future.  Continues to take it as needed.    2. Other osteoporosis without current pathological fracture  Chronic.  Stable.  Received Prolia infusion a week ago.  Scheduled for repeat bone density in July.    3. Malignant neoplastic disease (HCC)  4. History of breast cancer  5. History of chemotherapy  6. History of lung cancer  Chronic medical diagnosis.  Continues to follow-up with cancer care specialist.    7. Seasonal allergies  Chronic medical diagnosis.  Improving.  Continues to take over-the-counter Flonase and allergy medications.    8. Elevated hemoglobin A1c  Chronic medical diagnosis.  Recent A1c increased from 5.7 to 5.9%.  Has lost 7pounds in the last 3 months.  Continue with dietary and lifestyle measures.    9. Elevated LDL cholesterol level  Chronic medical " diagnosis.  Improving.  Continue with dietary and lifestyle modifications.    Services suggested: No services needed at this time  Health Care Screening recommendations as per orders if indicated.  Referrals offered: PT/OT/Nutrition counseling/Behavioral Health/Smoking cessation as per orders if indicated.    Discussion today about general wellness and lifestyle habits:    Prevent falls and reduce trip hazards; Cautioned about securing or removing rugs.  Have a working fire alarm and carbon monoxide detector;   Engage in regular physical activity and social activities     Follow-up: No follow-ups on file.

## 2023-06-30 PROBLEM — G62.0 CHEMOTHERAPY-INDUCED NEUROPATHY (HCC): Status: ACTIVE | Noted: 2021-03-11

## 2023-06-30 PROBLEM — R73.03 PREDIABETES: Status: ACTIVE | Noted: 2020-04-20

## 2023-06-30 PROBLEM — E66.01 SEVERE OBESITY (BMI 35.0-39.9) WITH COMORBIDITY (HCC): Status: ACTIVE | Noted: 2023-06-30

## 2023-06-30 PROBLEM — I82.561 CHRONIC DEEP VEIN THROMBOSIS (DVT) OF CALF MUSCLE VEIN OF RIGHT LOWER EXTREMITY (HCC): Status: ACTIVE | Noted: 2023-06-30

## 2023-06-30 PROBLEM — T45.1X5A CHEMOTHERAPY-INDUCED NEUROPATHY (HCC): Status: ACTIVE | Noted: 2021-03-11

## 2023-06-30 PROBLEM — J84.10 LUNG FIBROSIS (HCC): Status: ACTIVE | Noted: 2023-06-30

## 2023-07-21 ENCOUNTER — HOSPITAL ENCOUNTER (OUTPATIENT)
Dept: RADIOLOGY | Facility: MEDICAL CENTER | Age: 68
End: 2023-07-21
Attending: INTERNAL MEDICINE
Payer: MEDICARE

## 2023-07-21 DIAGNOSIS — C50.911 MALIGNANT NEOPLASM OF RIGHT FEMALE BREAST, UNSPECIFIED ESTROGEN RECEPTOR STATUS, UNSPECIFIED SITE OF BREAST (HCC): ICD-10-CM

## 2023-07-21 DIAGNOSIS — M81.0 AGE-RELATED OSTEOPOROSIS WITHOUT CURRENT PATHOLOGICAL FRACTURE: ICD-10-CM

## 2023-07-21 PROCEDURE — 77080 DXA BONE DENSITY AXIAL: CPT

## 2023-07-25 ENCOUNTER — DOCUMENTATION (OUTPATIENT)
Dept: HEALTH INFORMATION MANAGEMENT | Facility: OTHER | Age: 68
End: 2023-07-25
Payer: MEDICARE

## 2023-10-30 ENCOUNTER — PHARMACY VISIT (OUTPATIENT)
Dept: PHARMACY | Facility: MEDICAL CENTER | Age: 68
End: 2023-10-30
Payer: COMMERCIAL

## 2023-10-30 PROCEDURE — RXMED WILLOW AMBULATORY MEDICATION CHARGE: Performed by: INTERNAL MEDICINE

## 2023-10-30 RX ORDER — ZOSTER VACCINE RECOMBINANT, ADJUVANTED 50 MCG/0.5
0.5 KIT INTRAMUSCULAR
Qty: 0.5 ML | Refills: 0 | Status: SHIPPED | OUTPATIENT
Start: 2023-10-30 | End: 2023-11-15

## 2023-11-08 ENCOUNTER — OFFICE VISIT (OUTPATIENT)
Dept: URGENT CARE | Facility: CLINIC | Age: 68
End: 2023-11-08
Payer: MEDICARE

## 2023-11-08 VITALS
BODY MASS INDEX: 36.44 KG/M2 | TEMPERATURE: 97.5 F | DIASTOLIC BLOOD PRESSURE: 76 MMHG | HEART RATE: 91 BPM | HEIGHT: 61 IN | WEIGHT: 193 LBS | OXYGEN SATURATION: 95 % | RESPIRATION RATE: 14 BRPM | SYSTOLIC BLOOD PRESSURE: 124 MMHG

## 2023-11-08 DIAGNOSIS — H69.93 DYSFUNCTION OF BOTH EUSTACHIAN TUBES: ICD-10-CM

## 2023-11-08 PROCEDURE — 99213 OFFICE O/P EST LOW 20 MIN: CPT | Performed by: FAMILY MEDICINE

## 2023-11-08 PROCEDURE — 3074F SYST BP LT 130 MM HG: CPT | Performed by: FAMILY MEDICINE

## 2023-11-08 PROCEDURE — 3078F DIAST BP <80 MM HG: CPT | Performed by: FAMILY MEDICINE

## 2023-11-08 ASSESSMENT — ENCOUNTER SYMPTOMS
EYES NEGATIVE: 1
RESPIRATORY NEGATIVE: 1
CARDIOVASCULAR NEGATIVE: 1
SORE THROAT: 0

## 2023-11-08 ASSESSMENT — FIBROSIS 4 INDEX: FIB4 SCORE: 0.9

## 2023-11-08 NOTE — PROGRESS NOTES
"Subjective:   Carmelina eLblanc is a 68 y.o. female who presents for Otalgia (Bilateral ear pain, L ear pain does hurt the most she states, x 1.5 weeks, feels drainage in L ear but inside not onto the outside)      Otalgia   Associated symptoms include hearing loss. Pertinent negatives include no ear discharge or sore throat.       Review of Systems   HENT:  Positive for ear pain and hearing loss. Negative for congestion, ear discharge and sore throat.    Eyes: Negative.    Respiratory: Negative.     Cardiovascular: Negative.        Medications, Allergies, and current problem list reviewed today in Epic.     Objective:     /76 (BP Location: Left arm, Patient Position: Sitting, BP Cuff Size: Large adult)   Pulse 91   Temp 36.4 °C (97.5 °F)   Resp 14   Ht 1.549 m (5' 1\")   Wt 87.5 kg (193 lb)   SpO2 95%     Physical Exam  Vitals and nursing note reviewed.   Constitutional:       Appearance: Normal appearance.   HENT:      Head: Normocephalic and atraumatic.      Ears:      Comments: Poor light reflex of both tm, no redness     Nose: Nose normal.      Mouth/Throat:      Pharynx: Oropharynx is clear.   Cardiovascular:      Rate and Rhythm: Normal rate and regular rhythm.      Pulses: Normal pulses.      Heart sounds: Normal heart sounds.   Pulmonary:      Effort: Pulmonary effort is normal.      Breath sounds: Normal breath sounds.   Abdominal:      General: Abdomen is flat. Bowel sounds are normal.      Palpations: Abdomen is soft.   Lymphadenopathy:      Cervical: No cervical adenopathy.   Neurological:      Mental Status: She is alert.         Assessment/Plan:     Diagnosis and associated orders:     1. Dysfunction of both eustachian tubes           Comments/MDM:     Sudafed, afrin         Differential diagnosis, natural history, supportive care, and indications for immediate follow-up discussed.    Advised the patient to follow-up with the primary care physician for recheck, reevaluation, and " consideration of further management.    Please note that this dictation was created using voice recognition software. I have made a reasonable attempt to correct obvious errors, but I expect that there are errors of grammar and possibly content that I did not discover before finalizing the note.    This note was electronically signed by David Herrera M.D.

## 2023-11-10 ENCOUNTER — HOSPITAL ENCOUNTER (OUTPATIENT)
Dept: LAB | Facility: MEDICAL CENTER | Age: 68
End: 2023-11-10
Attending: FAMILY MEDICINE
Payer: MEDICARE

## 2023-11-10 ENCOUNTER — HOSPITAL ENCOUNTER (OUTPATIENT)
Dept: LAB | Facility: MEDICAL CENTER | Age: 68
End: 2023-11-10
Attending: INTERNAL MEDICINE
Payer: MEDICARE

## 2023-11-10 DIAGNOSIS — E78.00 ELEVATED LDL CHOLESTEROL LEVEL: ICD-10-CM

## 2023-11-10 DIAGNOSIS — R73.09 ELEVATED HEMOGLOBIN A1C: ICD-10-CM

## 2023-11-10 LAB
ANION GAP SERPL CALC-SCNC: 11 MMOL/L (ref 7–16)
BASOPHILS # BLD AUTO: 1.4 % (ref 0–1.8)
BASOPHILS # BLD: 0.05 K/UL (ref 0–0.12)
BUN SERPL-MCNC: 11 MG/DL (ref 8–22)
CALCIUM SERPL-MCNC: 9.6 MG/DL (ref 8.5–10.5)
CHLORIDE SERPL-SCNC: 105 MMOL/L (ref 96–112)
CO2 SERPL-SCNC: 23 MMOL/L (ref 20–33)
CREAT SERPL-MCNC: 0.66 MG/DL (ref 0.5–1.4)
EOSINOPHIL # BLD AUTO: 0.12 K/UL (ref 0–0.51)
EOSINOPHIL NFR BLD: 3.4 % (ref 0–6.9)
ERYTHROCYTE [DISTWIDTH] IN BLOOD BY AUTOMATED COUNT: 51.3 FL (ref 35.9–50)
EST. AVERAGE GLUCOSE BLD GHB EST-MCNC: 120 MG/DL
GFR SERPLBLD CREATININE-BSD FMLA CKD-EPI: 96 ML/MIN/1.73 M 2
GLUCOSE SERPL-MCNC: 84 MG/DL (ref 65–99)
HBA1C MFR BLD: 5.8 % (ref 4–5.6)
HCT VFR BLD AUTO: 41.8 % (ref 37–47)
HGB BLD-MCNC: 13.8 G/DL (ref 12–16)
IMM GRANULOCYTES # BLD AUTO: 0.01 K/UL (ref 0–0.11)
IMM GRANULOCYTES NFR BLD AUTO: 0.3 % (ref 0–0.9)
LYMPHOCYTES # BLD AUTO: 1.05 K/UL (ref 1–4.8)
LYMPHOCYTES NFR BLD: 29.9 % (ref 22–41)
MAGNESIUM SERPL-MCNC: 2 MG/DL (ref 1.5–2.5)
MCH RBC QN AUTO: 31.4 PG (ref 27–33)
MCHC RBC AUTO-ENTMCNC: 33 G/DL (ref 32.2–35.5)
MCV RBC AUTO: 95 FL (ref 81.4–97.8)
MONOCYTES # BLD AUTO: 0.3 K/UL (ref 0–0.85)
MONOCYTES NFR BLD AUTO: 8.5 % (ref 0–13.4)
NEUTROPHILS # BLD AUTO: 1.98 K/UL (ref 1.82–7.42)
NEUTROPHILS NFR BLD: 56.5 % (ref 44–72)
NRBC # BLD AUTO: 0 K/UL
NRBC BLD-RTO: 0 /100 WBC (ref 0–0.2)
PHOSPHATE SERPL-MCNC: 3.5 MG/DL (ref 2.5–4.5)
PLATELET # BLD AUTO: 308 K/UL (ref 164–446)
PMV BLD AUTO: 9.8 FL (ref 9–12.9)
POTASSIUM SERPL-SCNC: 4.8 MMOL/L (ref 3.6–5.5)
RBC # BLD AUTO: 4.4 M/UL (ref 4.2–5.4)
SODIUM SERPL-SCNC: 139 MMOL/L (ref 135–145)
WBC # BLD AUTO: 3.5 K/UL (ref 4.8–10.8)

## 2023-11-10 PROCEDURE — 80048 BASIC METABOLIC PNL TOTAL CA: CPT

## 2023-11-10 PROCEDURE — 83036 HEMOGLOBIN GLYCOSYLATED A1C: CPT

## 2023-11-10 PROCEDURE — 36415 COLL VENOUS BLD VENIPUNCTURE: CPT

## 2023-11-10 PROCEDURE — 83735 ASSAY OF MAGNESIUM: CPT

## 2023-11-10 PROCEDURE — 84100 ASSAY OF PHOSPHORUS: CPT

## 2023-11-10 PROCEDURE — 85025 COMPLETE CBC W/AUTO DIFF WBC: CPT

## 2023-11-15 ENCOUNTER — OFFICE VISIT (OUTPATIENT)
Dept: MEDICAL GROUP | Facility: PHYSICIAN GROUP | Age: 68
End: 2023-11-15
Payer: MEDICARE

## 2023-11-15 VITALS
SYSTOLIC BLOOD PRESSURE: 114 MMHG | DIASTOLIC BLOOD PRESSURE: 66 MMHG | RESPIRATION RATE: 16 BRPM | WEIGHT: 193 LBS | HEIGHT: 61 IN | OXYGEN SATURATION: 99 % | TEMPERATURE: 97.9 F | BODY MASS INDEX: 36.44 KG/M2 | HEART RATE: 97 BPM

## 2023-11-15 DIAGNOSIS — R73.03 PREDIABETES: ICD-10-CM

## 2023-11-15 DIAGNOSIS — J30.2 SEASONAL ALLERGIES: ICD-10-CM

## 2023-11-15 DIAGNOSIS — M81.8 OTHER OSTEOPOROSIS WITHOUT CURRENT PATHOLOGICAL FRACTURE: ICD-10-CM

## 2023-11-15 DIAGNOSIS — H92.02 LEFT EAR PAIN: ICD-10-CM

## 2023-11-15 PROCEDURE — 99214 OFFICE O/P EST MOD 30 MIN: CPT | Performed by: FAMILY MEDICINE

## 2023-11-15 PROCEDURE — 3074F SYST BP LT 130 MM HG: CPT | Performed by: FAMILY MEDICINE

## 2023-11-15 PROCEDURE — 3078F DIAST BP <80 MM HG: CPT | Performed by: FAMILY MEDICINE

## 2023-11-15 ASSESSMENT — ENCOUNTER SYMPTOMS
CHILLS: 0
SHORTNESS OF BREATH: 0
FALLS: 0
FEVER: 0
DIZZINESS: 0

## 2023-11-15 ASSESSMENT — FIBROSIS 4 INDEX: FIB4 SCORE: 0.9

## 2023-11-15 NOTE — PROGRESS NOTES
Subjective:     CC:   Chief Complaint   Patient presents with    Lab Results    Immunizations     Would like to discuss RSV and Pneumonia.     Otalgia     Seen by urgent care on 11/08 but still experiencing symptoms.          HPI:   Carmelina presents today for a follow-up visit and to discuss recent lab results.  Last seen by me on May 31 for AWV.    Since our last appointment, [place]  [place] [place] [facility] [place]urgent care for left ear pain  November 14-cancer care specialist    Upcoming appointments with:  5/14- [facility]      Problem   Left Ear Pain    New  issue. Left ear pain x 3 weeks before she went to  last week on 11/8/23.  Tried sudafed and afrin without much improvement.  No hearing loss  Continues to have left ear pain.  Also had some episodes of dizziness, vertigo.  No hx recent URI.     Seasonal Allergies    Chronic  Has hx severe allergies in the summer.  Will take flonase and an OTC antihistamine such as allegra.   Now taking claritin and stopped flonase she;s taking afrin.      Other Osteoporosis Without Current Pathological Fracture    Received prolia infusion yesterday on 11/14/23  Chronic medical diagnosis.  Underwent bone density on July 21 with improvement and with T score of -1.4 in the lumbar spine and -2.7 in the proximal left femur.  Continues to receive Prolia infusions, last received it yesterday      Hx of Femur fx  after fall due to her neuropathy     Prediabetes    Chronic medical diagnoses.  Recent November labs with improvement in glycohemoglobin from 5.9% in May to 5.8%.                 Current Outpatient Medications Ordered in Epic   Medication Sig Dispense Refill    omeprazole (PRILOSEC) 20 MG delayed-release capsule       acetaminophen (TYLENOL) 500 MG Tab Take 1-2 Tablets by mouth every 6 hours as needed for Mild Pain.       No current Epic-ordered facility-administered medications on file.         ROS:  Review of Systems   Constitutional:  Negative for chills and fever.  "  HENT:  Positive for ear pain (left).    Respiratory:  Negative for shortness of breath.    Cardiovascular:  Negative for chest pain.   Musculoskeletal:  Negative for falls.   Neurological:  Negative for dizziness.       Objective:     Exam:  /66   Pulse 97   Temp 36.6 °C (97.9 °F) (Temporal)   Resp 16   Ht 1.549 m (5' 1\")   Wt 87.5 kg (193 lb)   SpO2 99%   BMI 36.47 kg/m²  Body mass index is 36.47 kg/m².    Physical Exam  Constitutional:       Appearance: Normal appearance.   HENT:      Right Ear: Tympanic membrane normal.      Left Ear: Tympanic membrane normal. There is no impacted cerumen.      Nose: Rhinorrhea present.      Mouth/Throat:      Pharynx: No oropharyngeal exudate or posterior oropharyngeal erythema.   Eyes:      Extraocular Movements: Extraocular movements intact.   Cardiovascular:      Rate and Rhythm: Normal rate and regular rhythm.   Pulmonary:      Effort: Pulmonary effort is normal.      Breath sounds: Normal breath sounds.   Musculoskeletal:      Cervical back: Normal range of motion and neck supple.   Neurological:      Mental Status: She is alert.   Psychiatric:         Mood and Affect: Mood normal.         Behavior: Behavior normal.             Labs: see above    Assessment & Plan:     68 y.o. female with the following -     1. Left ear pain  Ongoing medical problem for the last 3 weeks.  Went to urgent care last week and started taking Sudafed and Afrin.  Unsure if there is improvement.  Reassured patient today that there was no infection.  She will continue to take over-the-counter Sudafed and follow-up with allergy.  Discussed that her symptoms are most likely due to allergies.  We will continue to monitor closely.  - Referral to Allergy    2. Prediabetes  Chronic medical diagnosis.  Improving.  A1c has improved to 5.8%.    3. Seasonal allergies  Chronic medical diagnosis.  Not well controlled.  States that she continues to have severe allergies.  Currently using Claritin " and Afrin.  In the past, has used Flonase.   - Referral to Allergy    4. Other osteoporosis without current pathological fracture  Chronic medical diagnosis.  Improving.  Had her bone density in July with improvement.  Continues to get Prolia through cancer care specialist.  Mentions that it was last administered yesterday.          Return in about 3 months (around 2/15/2024).    Please note that this dictation was created using voice recognition software. I have made every reasonable attempt to correct obvious errors, but I expect that there are errors of grammar and possibly content that I did not discover before finalizing the note.

## 2023-11-20 ENCOUNTER — APPOINTMENT (OUTPATIENT)
Dept: MEDICAL GROUP | Facility: PHYSICIAN GROUP | Age: 68
End: 2023-11-20
Payer: MEDICARE

## 2023-11-21 ENCOUNTER — APPOINTMENT (OUTPATIENT)
Dept: PHARMACY | Facility: MEDICAL CENTER | Age: 68
End: 2023-11-21
Payer: MEDICARE

## 2023-11-22 ENCOUNTER — APPOINTMENT (OUTPATIENT)
Dept: MEDICAL GROUP | Facility: PHYSICIAN GROUP | Age: 68
End: 2023-11-22
Payer: MEDICARE

## 2023-12-07 ENCOUNTER — OFFICE VISIT (OUTPATIENT)
Dept: URGENT CARE | Facility: CLINIC | Age: 68
End: 2023-12-07
Payer: MEDICARE

## 2023-12-07 VITALS
HEIGHT: 61 IN | TEMPERATURE: 97.3 F | SYSTOLIC BLOOD PRESSURE: 118 MMHG | DIASTOLIC BLOOD PRESSURE: 80 MMHG | RESPIRATION RATE: 14 BRPM | BODY MASS INDEX: 36.44 KG/M2 | HEART RATE: 88 BPM | OXYGEN SATURATION: 97 % | WEIGHT: 193 LBS

## 2023-12-07 DIAGNOSIS — B96.89 ACUTE BACTERIAL SINUSITIS: ICD-10-CM

## 2023-12-07 DIAGNOSIS — J01.90 ACUTE BACTERIAL SINUSITIS: ICD-10-CM

## 2023-12-07 DIAGNOSIS — J22 LRTI (LOWER RESPIRATORY TRACT INFECTION): ICD-10-CM

## 2023-12-07 DIAGNOSIS — H69.93 DYSFUNCTION OF BOTH EUSTACHIAN TUBES: ICD-10-CM

## 2023-12-07 PROCEDURE — 3074F SYST BP LT 130 MM HG: CPT | Performed by: FAMILY MEDICINE

## 2023-12-07 PROCEDURE — 99213 OFFICE O/P EST LOW 20 MIN: CPT | Performed by: FAMILY MEDICINE

## 2023-12-07 PROCEDURE — 3079F DIAST BP 80-89 MM HG: CPT | Performed by: FAMILY MEDICINE

## 2023-12-07 RX ORDER — PREDNISONE 10 MG/1
30 TABLET ORAL EVERY MORNING
Qty: 9 TABLET | Refills: 0 | Status: SHIPPED | OUTPATIENT
Start: 2023-12-07 | End: 2023-12-10

## 2023-12-07 RX ORDER — CODEINE PHOSPHATE AND GUAIFENESIN 10; 100 MG/5ML; MG/5ML
10 SOLUTION ORAL EVERY 8 HOURS PRN
Qty: 180 ML | Refills: 0 | Status: SHIPPED | OUTPATIENT
Start: 2023-12-07 | End: 2023-12-14

## 2023-12-07 RX ORDER — AMOXICILLIN AND CLAVULANATE POTASSIUM 875; 125 MG/1; MG/1
1 TABLET, FILM COATED ORAL 2 TIMES DAILY
Qty: 10 TABLET | Refills: 0 | Status: SHIPPED | OUTPATIENT
Start: 2023-12-07 | End: 2023-12-12

## 2023-12-07 ASSESSMENT — ENCOUNTER SYMPTOMS
HEADACHES: 1
COUGH: 1

## 2023-12-07 ASSESSMENT — FIBROSIS 4 INDEX: FIB4 SCORE: 0.9

## 2023-12-07 NOTE — PROGRESS NOTES
"Subjective     Indu Leblanc is a 68 y.o. female who presents with Cough (X 8 days severe dry cough, congestion headache, crackling, ear ache, face pain.)      - This is a very pleasant 68 y.o. who has come to the walk-in clinic today for ~8 days w/ lots cough and occasional non bloody sputum, cough keeps up at night (says in past codeine cough syrup helped a lot when had cough this bad).  Lots sinus congestion and ears feel plugged. Using afrin and Flonase. No nvfc.           ALLERGIES:  Povidone iodine, Sulfa drugs, and Morphine     PMH:  Past Medical History:   Diagnosis Date    Anemia 6-1-17    \"D/T Chemo\"    Anesthesia 6-1-17    PONV    Cancer (HCC) 8/4/16    Right Breast Treated With Chemo    Cancer (HCC) 2016    Lung CA    Cataract     IOL OU    Dental disorder     dental implant bottom     Fall 4/20/2020    Impacted cerumen of right ear 11/8/2021    Osteoarthritis 6-1-17    \"all over\"    Port catheter in place 6-1-17    Left Side    Snoring     Urinary tract infection         PSH:  Past Surgical History:   Procedure Laterality Date    ORIF, FRACTURE, FEMUR Right 4/20/2020    Procedure: ORIF, FRACTURE,  DISTAL FEMUR;  Surgeon: Christian Ag M.D.;  Location: [facility];  Service: Orthopedics    TISSUE EXPANDER PLACE/REMOVE Bilateral 6/8/2017    Procedure: TISSUE EXPANDER PLACE/REMOVE;  Surgeon: Everardo Matthews M.D.;  Location: SURGERY SAME DAY [facility];  Service:     BREAST IMPLANT REVISION Bilateral 6/8/2017    Procedure: BREAST IMPLANT;  Surgeon: Everardo Matthews M.D.;  Location: SURGERY SAME DAY [facility];  Service:     CAPSULOTOMY Bilateral 6/8/2017    Procedure: CAPSULOTOMY FOR PARTIAL CAPSULECTOMIES;  Surgeon: Everardo Matthews M.D.;  Location: SURGERY SAME DAY [facility];  Service:     BREAST RECONSTRUCTION Bilateral 6/8/2017    Procedure: BREAST RECONSTRUCTION USING LOCAL TISSUE & FAT GRAFTING;  Surgeon: Everardo Matthews M.D.;  Location: SURGERY SAME DAY [facility];  " Service:     THORACOSCOPY Right 5/1/2017    Procedure: THORACOSCOPY, WEDGE RESECTION LOWER LOBE MASS;  Surgeon: John H Ganser, M.D.;  Location: SURGERY [facility];  Service:     CATH PLACEMENT Left 3/17/2017    Procedure: CATH PLACEMENT FOR SUBCLAVIAN PORT LEFT;  Surgeon: Carly Wright M.D.;  Location: SURGERY [facility];  Service:     LUNG NEEDLE BIOPSY  02-    TISSUE EXPANDER PLACE/REMOVE Bilateral 1/19/2017    Procedure: TISSUE EXPANDER PLACEment;  Surgeon: Everardo Matthews M.D.;  Location: SURGERY SAME DAY [facility];  Service:     FLAP GRAFT  1/19/2017    Procedure: FLAP GRAFT- FOR DERMAL ADIPOFASCIAL FLAPS ;  Surgeon: Everardo Matthews M.D.;  Location: SURGERY SAME DAY [facility];  Service:     MASTECTOMY Bilateral 1/19/2017    Procedure: MASTECTOMY PROPHYLACTIC LEFT, AND RIGHT TOTAL MASTECTOMY;  Surgeon: Carly Wright M.D.;  Location: SURGERY SAME DAY [facility];  Service:     AXILLARY NODE DISSECTION Right 1/19/2017    Procedure: AXILLARY NODE DISSECTION;  Surgeon: Carly Wright M.D.;  Location: SURGERY SAME DAY [facility];  Service:     CATH PLACEMENT Left 1/19/2017    Procedure: Removal of left subclavian port ;  Surgeon: Carly Wright M.D.;  Location: SURGERY SAME DAY [facility];  Service:     CATH PLACEMENT Left 9/5/2016    Procedure: CATH PLACEMENT - SUBCLAVIAN PORT - SIDE TO BE DETERMINED;  Surgeon: Carly Wright M.D.;  Location: SURGERY [facility];  Service:     STEREOTACTIC BIOPSY Right 08/10/2016    HIP ARTHROPLASTY TOTAL Right 2014    OTHER ORTHOPEDIC SURGERY  6/2012    right hip arthroplasty    CATARACT PHACO WITH IOL Bilateral     GYN SURGERY  Approx 2013    Hysterectomy       MEDS:    Current Outpatient Medications:     predniSONE (DELTASONE) 10 MG Tab, Take 3 Tablets by mouth every morning for 3 days., Disp: 9 Tablet, Rfl: 0    guaifenesin-codeine (ROBITUSSIN AC) Solution oral solution, Take 10 mL by mouth every 8 hours as  "needed for Cough for up to 7 days., Disp: 180 mL, Rfl: 0    amoxicillin-clavulanate (AUGMENTIN) 875-125 MG Tab, Take 1 Tablet by mouth 2 times a day for 5 days., Disp: 10 Tablet, Rfl: 0    omeprazole (PRILOSEC) 20 MG delayed-release capsule, , Disp: , Rfl:     acetaminophen (TYLENOL) 500 MG Tab, Take 1-2 Tablets by mouth every 6 hours as needed for Mild Pain., Disp: , Rfl:     ** I have documented what I find to be significant in regards to past medical, social, family and surgical history  in my HPI or under PMH/PSH/FH review section, otherwise it is noncontributory **         HPI    Review of Systems   HENT:  Positive for congestion and ear pain.    Respiratory:  Positive for cough.    Neurological:  Positive for headaches.   All other systems reviewed and are negative.             Objective     /80   Pulse 88   Temp 36.3 °C (97.3 °F) (Temporal)   Resp 14   Ht 1.549 m (5' 1\")   Wt 87.5 kg (193 lb)   SpO2 97%   BMI 36.47 kg/m²      Physical Exam  Vitals and nursing note reviewed.   Constitutional:       General: She is not in acute distress.     Appearance: Normal appearance. She is well-developed.   HENT:      Head: Normocephalic.      Right Ear: Tympanic membrane and ear canal normal.      Left Ear: Tympanic membrane and ear canal normal.      Nose: Congestion and rhinorrhea present.      Mouth/Throat:      Mouth: Mucous membranes are moist.      Pharynx: Oropharynx is clear.   Cardiovascular:      Rate and Rhythm: Normal rate and regular rhythm.      Heart sounds: Normal heart sounds. No murmur heard.  Pulmonary:      Effort: Pulmonary effort is normal. No respiratory distress.      Breath sounds: Normal breath sounds. No rhonchi or rales.   Neurological:      Mental Status: She is alert.      Motor: No abnormal muscle tone.   Psychiatric:         Mood and Affect: Mood normal.         Behavior: Behavior normal.                             Assessment & Plan     1. Dysfunction of both eustachian tubes "  predniSONE (DELTASONE) 10 MG Tab      2. Acute bacterial sinusitis  predniSONE (DELTASONE) 10 MG Tab    amoxicillin-clavulanate (AUGMENTIN) 875-125 MG Tab      3. LRTI (lower respiratory tract infection)  guaifenesin-codeine (ROBITUSSIN AC) Solution oral solution    amoxicillin-clavulanate (AUGMENTIN) 875-125 MG Tab          - Dx, plan & d/c instructions discussed   - Rest, stay hydrated  - OTC Motrin and/or Tylenol as needed      Follow up with your regular primary care providers office within a week to keep them updated and informed of this visit and for regular routine health maintenance check-ups. ER if not improving in 2-3 days or if feeling/getting worse.     Patient left in stable condition     POCT results reviewed/discussed    Pertinent prior lab work and/or imaging studies in Epic have been reviewed by me today on day of this visit and taken into account for my treatment and plan today    Pertinent PMH/PSH and/or chronic conditions and medications if any were reviewed today and taken into account for my treatment and plan today    Pertinent prior office visit notes in [facility] have been reviewed by me today on day of this visit.    Please note that this dictation may have been created using voice recognition software, if so I have made every reasonable attempt to correct obvious errors, but I expect that there are errors of grammar and possibly content that I did not discover before finalizing the note.

## 2024-02-12 ENCOUNTER — OFFICE VISIT (OUTPATIENT)
Dept: URGENT CARE | Facility: CLINIC | Age: 69
End: 2024-02-12
Payer: MEDICARE

## 2024-02-12 VITALS
WEIGHT: 193 LBS | BODY MASS INDEX: 27.02 KG/M2 | DIASTOLIC BLOOD PRESSURE: 82 MMHG | HEIGHT: 71 IN | OXYGEN SATURATION: 96 % | RESPIRATION RATE: 20 BRPM | HEART RATE: 100 BPM | SYSTOLIC BLOOD PRESSURE: 124 MMHG | TEMPERATURE: 96.8 F

## 2024-02-12 DIAGNOSIS — J06.9 VIRAL URI WITH COUGH: ICD-10-CM

## 2024-02-12 DIAGNOSIS — R06.2 SYMPTOM OF WHEEZING: ICD-10-CM

## 2024-02-12 DIAGNOSIS — J10.1 INFLUENZA A: ICD-10-CM

## 2024-02-12 LAB
FLUAV RNA SPEC QL NAA+PROBE: POSITIVE
FLUBV RNA SPEC QL NAA+PROBE: NEGATIVE
RSV RNA SPEC QL NAA+PROBE: NEGATIVE
SARS-COV-2 RNA RESP QL NAA+PROBE: NEGATIVE

## 2024-02-12 PROCEDURE — 0241U POCT CEPHEID COV-2, FLU A/B, RSV - PCR: CPT | Performed by: STUDENT IN AN ORGANIZED HEALTH CARE EDUCATION/TRAINING PROGRAM

## 2024-02-12 PROCEDURE — 99213 OFFICE O/P EST LOW 20 MIN: CPT | Performed by: STUDENT IN AN ORGANIZED HEALTH CARE EDUCATION/TRAINING PROGRAM

## 2024-02-12 PROCEDURE — 3079F DIAST BP 80-89 MM HG: CPT | Performed by: STUDENT IN AN ORGANIZED HEALTH CARE EDUCATION/TRAINING PROGRAM

## 2024-02-12 PROCEDURE — 3074F SYST BP LT 130 MM HG: CPT | Performed by: STUDENT IN AN ORGANIZED HEALTH CARE EDUCATION/TRAINING PROGRAM

## 2024-02-12 RX ORDER — ALBUTEROL SULFATE 90 UG/1
2 AEROSOL, METERED RESPIRATORY (INHALATION) EVERY 6 HOURS PRN
Qty: 8.5 G | Refills: 0 | Status: SHIPPED | OUTPATIENT
Start: 2024-02-12 | End: 2024-02-20

## 2024-02-12 RX ORDER — IPRATROPIUM BROMIDE 42 UG/1
2 SPRAY, METERED NASAL 4 TIMES DAILY
COMMUNITY

## 2024-02-12 RX ORDER — BENZONATATE 100 MG/1
100 CAPSULE ORAL 3 TIMES DAILY PRN
Qty: 60 CAPSULE | Refills: 0 | Status: SHIPPED | OUTPATIENT
Start: 2024-02-12 | End: 2024-02-20

## 2024-02-12 RX ORDER — OSELTAMIVIR PHOSPHATE 75 MG/1
75 CAPSULE ORAL 2 TIMES DAILY
Qty: 10 CAPSULE | Refills: 0 | Status: SHIPPED | OUTPATIENT
Start: 2024-02-12 | End: 2024-02-20

## 2024-02-12 ASSESSMENT — ENCOUNTER SYMPTOMS
FEVER: 1
SHORTNESS OF BREATH: 0
SORE THROAT: 0
NAUSEA: 0
COUGH: 1
DIARRHEA: 0
ABDOMINAL PAIN: 0
MYALGIAS: 1
WHEEZING: 0
CONSTIPATION: 0
VOMITING: 0
CHILLS: 1

## 2024-02-12 ASSESSMENT — FIBROSIS 4 INDEX: FIB4 SCORE: 0.91

## 2024-02-12 NOTE — PROGRESS NOTES
"Subjective     Indu Lena Leblanc is a 69 y.o. female who presents with Wheezing (Yesterday) and Cough (yesterday)            Indu is a 69 y.o. female who presents to urgent care with symptoms of cough and wheezing.  Symptoms started yesterday.  Patient also reports fatigue and some bodyaches.  Patient states she missed her music class today and is with similar on a trip on Wednesday to [place] to visit a friend which she is now canceling due to illness.  Patient states cough kept her up last night and really difficult to sleep.      Cough  This is a new problem. The current episode started yesterday. The problem has been unchanged. The cough is Non-productive. Associated symptoms include chills, a fever and myalgias. Pertinent negatives include no chest pain, sore throat, shortness of breath or wheezing.       Review of Systems   Constitutional:  Positive for chills, fever and malaise/fatigue.   HENT:  Negative for congestion and sore throat.    Respiratory:  Positive for cough. Negative for shortness of breath and wheezing.    Cardiovascular:  Negative for chest pain.   Gastrointestinal:  Negative for abdominal pain, constipation, diarrhea, nausea and vomiting.   Musculoskeletal:  Positive for myalgias.   All other systems reviewed and are negative.             Objective     /82   Pulse 100   Temp 36 °C (96.8 °F) (Temporal)   Resp 20   Ht 1.803 m (5' 11\")   Wt 87.5 kg (193 lb)   SpO2 96%   BMI 26.92 kg/m²      Physical Exam  Vitals reviewed.   Constitutional:       General: She is not in acute distress.     Appearance: Normal appearance. She is not toxic-appearing.   HENT:      Head: Normocephalic and atraumatic.      Nose: Nose normal.      Mouth/Throat:      Mouth: Mucous membranes are moist.      Pharynx: Oropharynx is clear.   Eyes:      Extraocular Movements: Extraocular movements intact.      Conjunctiva/sclera: Conjunctivae normal.      Pupils: Pupils are equal, round, and reactive to light. "   Cardiovascular:      Rate and Rhythm: Normal rate and regular rhythm.   Pulmonary:      Effort: Pulmonary effort is normal. No respiratory distress.      Breath sounds: Normal breath sounds. No stridor. No wheezing, rhonchi or rales.   Skin:     General: Skin is warm and dry.   Neurological:      General: No focal deficit present.      Mental Status: She is alert.                             Assessment & Plan        1. Viral URI with cough  - POCT CoV-2, Flu A/B, RSV by PCR: POSITIVE FLU A  -Patient contacted over the phone.  No answer.  Voicemail left to check MyChart.   - benzonatate (TESSALON) 100 MG Cap; Take 1 Capsule by mouth 3 times a day as needed for Cough.  Dispense: 60 Capsule; Refill: 0  - albuterol 108 (90 Base) MCG/ACT Aero Soln inhalation aerosol; Inhale 2 Puffs every 6 hours as needed (shortness of breath, wheezing and/or cough).  Dispense: 8.5 g; Refill: 0    2. Symptom of wheezing  - albuterol 108 (90 Base) MCG/ACT Aero Soln inhalation aerosol; Inhale 2 Puffs every 6 hours as needed (shortness of breath, wheezing and/or cough).  Dispense: 8.5 g; Refill: 0     3. Influenza A  - oseltamivir (TAMIFLU) 75 MG Cap; Take 1 Capsule by mouth 2 times a day.  Dispense: 10 Capsule; Refill: 0     Differential diagnoses, supportive care measures and indications for immediate follow-up discussed with patient. Pathogenesis of diagnosis discussed including typical length and natural progression.      Instructed to return to urgent care or nearest emergency department if symptoms fail to improve, for any change in condition, further concerns, or new concerning symptoms.    Patient states understanding and agrees with the plan of care and discharge instructions.

## 2024-02-20 ENCOUNTER — OFFICE VISIT (OUTPATIENT)
Dept: MEDICAL GROUP | Facility: PHYSICIAN GROUP | Age: 69
End: 2024-02-20
Payer: MEDICARE

## 2024-02-20 VITALS
TEMPERATURE: 98 F | OXYGEN SATURATION: 96 % | HEIGHT: 60 IN | WEIGHT: 189 LBS | BODY MASS INDEX: 37.11 KG/M2 | DIASTOLIC BLOOD PRESSURE: 74 MMHG | HEART RATE: 93 BPM | SYSTOLIC BLOOD PRESSURE: 116 MMHG

## 2024-02-20 DIAGNOSIS — T45.1X5A CHEMOTHERAPY-INDUCED NEUROPATHY (HCC): ICD-10-CM

## 2024-02-20 DIAGNOSIS — K21.9 GASTROESOPHAGEAL REFLUX DISEASE WITHOUT ESOPHAGITIS: ICD-10-CM

## 2024-02-20 DIAGNOSIS — R73.03 PREDIABETES: ICD-10-CM

## 2024-02-20 DIAGNOSIS — G62.0 CHEMOTHERAPY-INDUCED NEUROPATHY (HCC): ICD-10-CM

## 2024-02-20 DIAGNOSIS — E78.00 ELEVATED LDL CHOLESTEROL LEVEL: ICD-10-CM

## 2024-02-20 DIAGNOSIS — I82.561 CHRONIC DEEP VEIN THROMBOSIS (DVT) OF CALF MUSCLE VEIN OF RIGHT LOWER EXTREMITY (HCC): ICD-10-CM

## 2024-02-20 PROCEDURE — 99214 OFFICE O/P EST MOD 30 MIN: CPT | Performed by: FAMILY MEDICINE

## 2024-02-20 PROCEDURE — 3074F SYST BP LT 130 MM HG: CPT | Performed by: FAMILY MEDICINE

## 2024-02-20 PROCEDURE — 3078F DIAST BP <80 MM HG: CPT | Performed by: FAMILY MEDICINE

## 2024-02-20 RX ORDER — OMEPRAZOLE 20 MG/1
20 CAPSULE, DELAYED RELEASE ORAL DAILY
Qty: 100 CAPSULE | Refills: 3 | Status: SHIPPED | OUTPATIENT
Start: 2024-02-20

## 2024-02-20 RX ORDER — AZELASTINE HYDROCHLORIDE 137 UG/1
SPRAY, METERED NASAL
COMMUNITY
Start: 2024-01-09

## 2024-02-20 ASSESSMENT — ENCOUNTER SYMPTOMS
FALLS: 0
DEPRESSION: 0
FEVER: 0
SHORTNESS OF BREATH: 0

## 2024-02-20 ASSESSMENT — PATIENT HEALTH QUESTIONNAIRE - PHQ9: CLINICAL INTERPRETATION OF PHQ2 SCORE: 0

## 2024-02-20 ASSESSMENT — FIBROSIS 4 INDEX: FIB4 SCORE: 0.91

## 2024-02-20 NOTE — PROGRESS NOTES
Verbal consent was acquired by the patient to use Phase III Development ambient listening note generation during this visit     Patient is a 69 y.o.female.established patient who presents today for a follow-up visit and to review recent lab result    History of Present Illness  The patient presents for evaluation of multiple medical concerns.    She went to see her friend on Monday and got tested and did not do anything. She got stuck staying home. She is feeling much better now. She slept all last week. She took Tamiflu .She was also given cough pills, which she has at home. She was given an inhaler because she was coughing so hard to catch her breath. She has type A influenza. She is a little congested every once in a while. She has not used her inhaler. Her appetite has been fine. She is back to watching what she eats and how she eats. She does not exercise a lot. When she was not sick, she was mostly walking around.     GERD  She needs a refill on her omeprazole. She had to take it last week because her stomach was upset she takes it as needed a couple of times a week. Spicy foods give her heartburn. She is not taking anything else prescription wise. She takes omeprazole as needed.    She will be seeing Dr. Jacob 05/14/2024.  She continues to take Prolia   through cancer care specialist.  She has orders for blood work from 05/2024. Her white blood cells are still chronically low.  Her oncologist has ordered CBC and metabolic panel. She still has neuropathy from the chemotherapy she had from breast cancer. She has a blood clot in one of her legs. Her legs are still swollen. She has not had any ultrasounds on it for a couple of years. She had some lumps in the back of her leg. She had an ultrasound of her leg, but nothing had changed. She denies any difficulty breathing or shortness of breath. She always has trouble with balance from the neuropathy. She denies any falls or close calls. She is very careful about picking  "up the dog toys. She takes Tylenol as needed for aches.     She saw an allergist, who told her that her ears looked great. She is on azelastine spray. She was given azelastine spray later in the allergy season. She is extremely allergic to GRASS.     She is allergic to GRASS.   She has received her influenza vaccine. She is due for her second shingles vaccine and RSV vaccine.She has arthritis.      Review of Systems   Constitutional:  Negative for fever.   Respiratory:  Negative for shortness of breath.    Musculoskeletal:  Negative for falls.   Psychiatric/Behavioral:  Negative for depression.          /74 (BP Location: Left arm, Patient Position: Sitting, BP Cuff Size: Adult)   Pulse 93   Temp 36.7 °C (98 °F) (Temporal)   Ht 1.511 m (4' 11.5\")   Wt 85.7 kg (189 lb)   SpO2 96% , Body mass index is 37.53 kg/m².    Physical Exam  Left ear is clear. Right ear has a little bit of cerumen.    Vital Signs  Oxygen saturation is 96 percent.    Physical Exam  Constitutional:       Appearance: Normal appearance.   HENT:      Left Ear: Tympanic membrane normal.      Ears:      Comments: Right cerumen  Eyes:      Extraocular Movements: Extraocular movements intact.   Cardiovascular:      Rate and Rhythm: Normal rate and regular rhythm.   Pulmonary:      Effort: Pulmonary effort is normal. No respiratory distress.      Breath sounds: Normal breath sounds. No wheezing.   Musculoskeletal:         General: Swelling (Bilateral lower extremity 1+-chronic) present.      Cervical back: Neck supple.   Skin:     General: Skin is warm.   Neurological:      Mental Status: She is alert.   Psychiatric:         Mood and Affect: Mood normal.         Behavior: Behavior normal.             Results  Imaging  Ultrasound of the right leg from 03/2023 was reviewed with the patient.          Assessment & Plan  1.  GERD  Chronic medical diagnoses I will refill her omeprazole. She will take it as needed. She will take it 30 minutes before " she eats anything that would provoke her heartburn.    2.  Prediabetes  Chronic.  Improving.  Last A1c was at 5.8%.  Has lost 4 pounds since her last visit.    3.  Elevated LDL cholesterol level  Chronic medical diagnosis.  Improving.  Will recheck lipid panel.    4.  Chronic DVT of calf muscle vein of right lower extremity (HCC)  5.  Chemotherapy-induced neuropathy (HCC)  HCC Gap Form    Diagnosis to address: I82.561 - Chronic deep vein thrombosis (DVT) of calf muscle vein of right lower extremity (HCC)  Assessment and plan: Chronic, stable.  Noted on right lower extremity Dopplers completed March 2022.  Gastrinomas vein.  Diagnosis: G62.0, T45.1X5A - Chemotherapy-induced neuropathy (HCC)  Assessment and plan: Chronic, stable.  Not taking any medications.  Continues to have neuropathy to lower extremity which does affect her balance.  Denies any falls.  Last edited 02/20/24 11:12 PST by Marnie Cortez M.D.             - omeprazole (PRILOSEC) 20 MG delayed-release capsule; Take 1 Capsule by mouth every day.  Dispense: 100 Capsule; Refill: 3  - HEMOGLOBIN A1C; Future  - Lipid Profile; Future          This note was created using voice recognition software (Dragon). The accuracy of the dictation is limited by the abilities of the software. I have reviewed the note prior to signing, however some errors in grammar and context are still possible. If you have any questions related to this note please do not hesitate to contact our office.

## 2024-02-29 PROCEDURE — RXMED WILLOW AMBULATORY MEDICATION CHARGE: Performed by: INTERNAL MEDICINE

## 2024-03-10 ENCOUNTER — PHARMACY VISIT (OUTPATIENT)
Dept: PHARMACY | Facility: MEDICAL CENTER | Age: 69
End: 2024-03-10
Payer: COMMERCIAL

## 2024-05-10 ENCOUNTER — HOSPITAL ENCOUNTER (OUTPATIENT)
Dept: LAB | Facility: MEDICAL CENTER | Age: 69
End: 2024-05-10
Attending: FAMILY MEDICINE
Payer: MEDICARE

## 2024-05-10 ENCOUNTER — HOSPITAL ENCOUNTER (OUTPATIENT)
Dept: LAB | Facility: MEDICAL CENTER | Age: 69
End: 2024-05-10
Attending: INTERNAL MEDICINE
Payer: MEDICARE

## 2024-05-10 DIAGNOSIS — R73.03 PREDIABETES: ICD-10-CM

## 2024-05-10 DIAGNOSIS — E78.00 ELEVATED LDL CHOLESTEROL LEVEL: ICD-10-CM

## 2024-05-10 LAB
ANION GAP SERPL CALC-SCNC: 13 MMOL/L (ref 7–16)
BUN SERPL-MCNC: 12 MG/DL (ref 8–22)
CALCIUM SERPL-MCNC: 9.3 MG/DL (ref 8.5–10.5)
CHLORIDE SERPL-SCNC: 106 MMOL/L (ref 96–112)
CHOLEST SERPL-MCNC: 194 MG/DL (ref 100–199)
CO2 SERPL-SCNC: 23 MMOL/L (ref 20–33)
CREAT SERPL-MCNC: 0.58 MG/DL (ref 0.5–1.4)
EST. AVERAGE GLUCOSE BLD GHB EST-MCNC: 120 MG/DL
FASTING STATUS PATIENT QL REPORTED: NORMAL
GFR SERPLBLD CREATININE-BSD FMLA CKD-EPI: 98 ML/MIN/1.73 M 2
GLUCOSE SERPL-MCNC: 96 MG/DL (ref 65–99)
HBA1C MFR BLD: 5.8 % (ref 4–5.6)
HDLC SERPL-MCNC: 63 MG/DL
LDLC SERPL CALC-MCNC: 113 MG/DL
MAGNESIUM SERPL-MCNC: 1.9 MG/DL (ref 1.5–2.5)
PHOSPHATE SERPL-MCNC: 3.3 MG/DL (ref 2.5–4.5)
POTASSIUM SERPL-SCNC: 4.3 MMOL/L (ref 3.6–5.5)
SODIUM SERPL-SCNC: 142 MMOL/L (ref 135–145)
TRIGL SERPL-MCNC: 92 MG/DL (ref 0–149)

## 2024-05-21 ENCOUNTER — OFFICE VISIT (OUTPATIENT)
Dept: MEDICAL GROUP | Facility: PHYSICIAN GROUP | Age: 69
End: 2024-05-21
Payer: MEDICARE

## 2024-05-21 VITALS
TEMPERATURE: 97.1 F | SYSTOLIC BLOOD PRESSURE: 118 MMHG | HEIGHT: 59 IN | BODY MASS INDEX: 38.4 KG/M2 | DIASTOLIC BLOOD PRESSURE: 62 MMHG | RESPIRATION RATE: 16 BRPM | HEART RATE: 78 BPM | WEIGHT: 190.5 LBS | OXYGEN SATURATION: 98 %

## 2024-05-21 DIAGNOSIS — Z12.11 SCREEN FOR COLON CANCER: ICD-10-CM

## 2024-05-21 DIAGNOSIS — R73.03 PREDIABETES: ICD-10-CM

## 2024-05-21 DIAGNOSIS — K21.9 GASTROESOPHAGEAL REFLUX DISEASE WITHOUT ESOPHAGITIS: ICD-10-CM

## 2024-05-21 DIAGNOSIS — E78.00 ELEVATED LDL CHOLESTEROL LEVEL: ICD-10-CM

## 2024-05-21 DIAGNOSIS — M79.89 LEG SWELLING: ICD-10-CM

## 2024-05-21 PROCEDURE — 3074F SYST BP LT 130 MM HG: CPT | Performed by: FAMILY MEDICINE

## 2024-05-21 PROCEDURE — 3078F DIAST BP <80 MM HG: CPT | Performed by: FAMILY MEDICINE

## 2024-05-21 PROCEDURE — 99214 OFFICE O/P EST MOD 30 MIN: CPT | Performed by: FAMILY MEDICINE

## 2024-05-21 RX ORDER — OMEPRAZOLE 20 MG/1
20 CAPSULE, DELAYED RELEASE ORAL DAILY
Qty: 100 CAPSULE | Refills: 3 | Status: SHIPPED | OUTPATIENT
Start: 2024-05-21

## 2024-05-21 RX ORDER — FLUTICASONE PROPIONATE 50 MCG
1 SPRAY, SUSPENSION (ML) NASAL DAILY
COMMUNITY

## 2024-05-21 ASSESSMENT — ENCOUNTER SYMPTOMS
COUGH: 0
FEVER: 0
SHORTNESS OF BREATH: 0
HEARTBURN: 0
CHILLS: 0
FALLS: 1
GENERAL WELL-BEING: FAIR

## 2024-05-21 ASSESSMENT — PATIENT HEALTH QUESTIONNAIRE - PHQ9
1. LITTLE INTEREST OR PLEASURE IN DOING THINGS: NOT AT ALL
2. FEELING DOWN, DEPRESSED, IRRITABLE, OR HOPELESS: NOT AT ALL

## 2024-05-21 ASSESSMENT — ACTIVITIES OF DAILY LIVING (ADL): BATHING_REQUIRES_ASSISTANCE: 0

## 2024-05-21 ASSESSMENT — FIBROSIS 4 INDEX: FIB4 SCORE: 0.91

## 2024-05-21 NOTE — PROGRESS NOTES
Verbal consent was acquired by the patient to use [employer] listening note generation during this visit         History of Present Illness  The patient presents for evaluation of multiple medical concerns.    She had her follow-up with Dr. Adkins last week on May 14.    The patient reports persistent erythema in her right leg, which has been present for approximately 15 months. She describes a sensation of fluid retention, particularly in her right leg. She has a history of a thrombus in the right leg, specifically in the calf muscle, subsequent to a fracture. She underwent treatment for a duration of 6 months, during which she was prescribed Xarelto in 2020 or 2021. She denies any associated pain, fever, or chills, but does report discomfort. She denies the use of any new lotions or creams, but does apply baby oil for dry skin. She also uses bug spray on her face, but not on her legs. An ultrasound was conducted in 03/2023 which showed a chronic thrombosis.. She denies any respiratory symptoms such as coughing or shortness of breath, but does report balance issues, which she attributes to sensory neuropathy. This has resulted in tripping due to a lack of sensation in her feet.    The patient denies experiencing any chest pain, chest discomfort, or nausea. She does report persistent heartburn, which she attributes to her diet and age. She is currently taking omeprazole daily, which she finds beneficial. She requests a refill of this medication. Her diet includes salads and reduced her intake of spices. She consumes low-sugar foods, but expresses a desire to reduce her consumption of low-calorie or low-sugar yogurts and chiboni. She has increased her consumption of chicken and salmon.      Review of Systems   Constitutional:  Negative for chills and fever.   Respiratory:  Negative for cough and shortness of breath.    Cardiovascular:  Positive for leg swelling. Negative for chest pain.   Gastrointestinal:   "Negative for heartburn.   Musculoskeletal:  Positive for falls.   Neurological:         Neuropathy       Outpatient Medications Marked as Taking for the 5/21/24 encounter (Office Visit) with Marnie Cortez M.D.   Medication Sig Dispense Refill    fluticasone (FLONASE) 50 MCG/ACT nasal spray Administer 1 Spray into affected nostril(S) every day.      omeprazole (PRILOSEC) 20 MG delayed-release capsule Take 1 Capsule by mouth every day. 100 Capsule 3    Azelastine (ASTELIN) 137 MCG/SPRAY Solution INHALE 2 SPRAYS NASALLY 2 TIMES A DAY      ipratropium (ATROVENT) 0.06 % Solution Administer 2 Sprays into affected nostril(S) 4 times a day.      acetaminophen (TYLENOL) 500 MG Tab Take 1-2 Tablets by mouth every 6 hours as needed for Mild Pain.         /62 (BP Location: Left arm, Patient Position: Sitting)   Pulse 78   Temp 36.2 °C (97.1 °F) (Temporal)   Resp 16   Ht 1.5 m (4' 11.06\")   Wt 86.4 kg (190 lb 8 oz)   SpO2 98% , Body mass index is 38.4 kg/m².        Physical Exam  Musculoskeletal:      Right lower leg: Edema present.      Left lower leg: Edema present.         Results  Laboratory Studies  A1c is 5.8. LDL cholesterol is 113.       Assessment & Plan  1. Gastroesophageal reflux disease.  The patient's chronic medical diagnosis remains stable with a daily regimen of 20 mg omeprazole.    2. Leg swelling.  This is a chronic medical diagnosis, but it remains stable. The patient has been experiencing this condition for approximately the past year. Despite the absence of difficulty breathing or pitting edema, the patient's weight has remained stable. An echocardiogram, ROSA M, and bilateral lower extremity Dopplers will be conducted. Based on these results, the decision regarding the necessity of a dermatologist or a vascular specialist will be made.    3. Screening for colon cancer.  A Cologuard test has been ordered.    4. Elevated LDL cholesterol.  This is a chronic medical diagnosis, and it is not currently " well-managed. Recent labs indicate an increase in LDL to 113. Dietary and lifestyle measures have been discussed and encouraged.    5. Prediabetes.  This is a medical diagnosis, but it remains stable. Recent labs indicate a stable A1c level of 5.8 percent.    Orders Placed This Encounter    US-EXTREMITY VENOUS LOWER BILAT    US-ROSA M SINGLE LEVEL BILAT    EC-ECHOCARDIOGRAM COMPLETE W/O CONT    COLOGUARD (FIT DNA)    fluticasone (FLONASE) 50 MCG/ACT nasal spray    omeprazole (PRILOSEC) 20 MG delayed-release capsule        HCC Gap Form    Last edited 05/21/24 12:19 PDT by Marnie Cortez M.D.              This note was created using voice recognition software (Dragon). The accuracy of the dictation is limited by the abilities of the software. I have reviewed the note prior to signing, however some errors in grammar and context are still possible. If you have any questions related to this note please do not hesitate to contact our office.

## 2024-05-22 ENCOUNTER — APPOINTMENT (OUTPATIENT)
Dept: FAMILY PLANNING/WOMEN'S HEALTH CLINIC | Facility: PHYSICIAN GROUP | Age: 69
End: 2024-05-22
Attending: FAMILY MEDICINE
Payer: MEDICARE

## 2024-05-24 ENCOUNTER — HOSPITAL ENCOUNTER (OUTPATIENT)
Facility: MEDICAL CENTER | Age: 69
End: 2024-05-24
Attending: NURSE PRACTITIONER
Payer: MEDICARE

## 2024-05-24 ENCOUNTER — OFFICE VISIT (OUTPATIENT)
Dept: URGENT CARE | Facility: CLINIC | Age: 69
End: 2024-05-24
Payer: MEDICARE

## 2024-05-24 VITALS
DIASTOLIC BLOOD PRESSURE: 68 MMHG | SYSTOLIC BLOOD PRESSURE: 126 MMHG | OXYGEN SATURATION: 98 % | TEMPERATURE: 97.4 F | HEART RATE: 98 BPM | RESPIRATION RATE: 16 BRPM | WEIGHT: 196.4 LBS | BODY MASS INDEX: 39.6 KG/M2 | HEIGHT: 59 IN

## 2024-05-24 DIAGNOSIS — N30.01 ACUTE CYSTITIS WITH HEMATURIA: ICD-10-CM

## 2024-05-24 LAB
APPEARANCE UR: NORMAL
BILIRUB UR STRIP-MCNC: NORMAL MG/DL
COLOR UR AUTO: YELLOW
GLUCOSE UR STRIP.AUTO-MCNC: NORMAL MG/DL
KETONES UR STRIP.AUTO-MCNC: NORMAL MG/DL
LEUKOCYTE ESTERASE UR QL STRIP.AUTO: NORMAL
NITRITE UR QL STRIP.AUTO: NORMAL
PH UR STRIP.AUTO: 6 [PH] (ref 5–8)
PROT UR QL STRIP: NORMAL MG/DL
RBC UR QL AUTO: NORMAL
SP GR UR STRIP.AUTO: 1.01
UROBILINOGEN UR STRIP-MCNC: 0.2 MG/DL

## 2024-05-24 PROCEDURE — 3078F DIAST BP <80 MM HG: CPT | Performed by: NURSE PRACTITIONER

## 2024-05-24 PROCEDURE — 3074F SYST BP LT 130 MM HG: CPT | Performed by: NURSE PRACTITIONER

## 2024-05-24 PROCEDURE — 99213 OFFICE O/P EST LOW 20 MIN: CPT | Performed by: NURSE PRACTITIONER

## 2024-05-24 PROCEDURE — 81002 URINALYSIS NONAUTO W/O SCOPE: CPT | Performed by: NURSE PRACTITIONER

## 2024-05-24 RX ORDER — CEFDINIR 300 MG/1
300 CAPSULE ORAL 2 TIMES DAILY
Qty: 10 CAPSULE | Refills: 0 | Status: SHIPPED | OUTPATIENT
Start: 2024-05-24 | End: 2024-05-29

## 2024-05-24 ASSESSMENT — ENCOUNTER SYMPTOMS
FEVER: 0
ABDOMINAL PAIN: 0
FLANK PAIN: 0
BACK PAIN: 1

## 2024-05-24 ASSESSMENT — FIBROSIS 4 INDEX: FIB4 SCORE: 0.91

## 2024-05-24 NOTE — PROGRESS NOTES
"  Subjective:     Carmelina Leblanc is a 69 y.o. female who presents for Dysuria (X1 week, cloudy urine, lower back pain and painful with urination )      Lower back pain. Cloudy urine. Similar to previous UTIs.    Dysuria   This is a new problem. The current episode started in the past 7 days. There has been no fever. Associated symptoms include urgency. Pertinent negatives include no flank pain or hematuria.       Past Medical History:   Diagnosis Date    Anemia 6-1-17    \"D/T Chemo\"    Anesthesia 6-1-17    PONV    Cancer (HCC) 8/4/16    Right Breast Treated With Chemo    Cancer ([facility]) 2016    Lung CA    Cataract     IOL OU    Dental disorder     dental implant bottom     Fall 4/20/2020    Impacted cerumen of right ear 11/8/2021    Osteoarthritis 6-1-17    \"all over\"    Port catheter in place 6-1-17    Left Side    Snoring     Urinary tract infection        Past Surgical History:   Procedure Laterality Date    ORIF, FRACTURE, FEMUR Right 4/20/2020    Procedure: ORIF, FRACTURE,  DISTAL FEMUR;  Surgeon: Christian Ag M.D.;  Location: SURGERY [facility];  Service: Orthopedics    TISSUE EXPANDER PLACE/REMOVE Bilateral 6/8/2017    Procedure: TISSUE EXPANDER PLACE/REMOVE;  Surgeon: Everardo Matthews M.D.;  Location: SURGERY SAME DAY [facility];  Service:     BREAST IMPLANT REVISION Bilateral 6/8/2017    Procedure: BREAST IMPLANT;  Surgeon: Everardo Matthews M.D.;  Location: SURGERY SAME DAY [facility];  Service:     CAPSULOTOMY Bilateral 6/8/2017    Procedure: CAPSULOTOMY FOR PARTIAL CAPSULECTOMIES;  Surgeon: Everardo Matthews M.D.;  Location: SURGERY SAME DAY [facility];  Service:     BREAST RECONSTRUCTION Bilateral 6/8/2017    Procedure: BREAST RECONSTRUCTION USING LOCAL TISSUE & FAT GRAFTING;  Surgeon: Everardo Matthews M.D.;  Location: SURGERY SAME DAY [facility];  Service:     THORACOSCOPY Right 5/1/2017    Procedure: THORACOSCOPY, WEDGE RESECTION LOWER LOBE MASS;  Surgeon: John H Ganser, M.D.;  " Location: SURGERY [facility];  Service:     CATH PLACEMENT Left 3/17/2017    Procedure: CATH PLACEMENT FOR SUBCLAVIAN PORT LEFT;  Surgeon: Carly Wright M.D.;  Location: SURGERY [facility];  Service:     LUNG NEEDLE BIOPSY  02-    TISSUE EXPANDER PLACE/REMOVE Bilateral 1/19/2017    Procedure: TISSUE EXPANDER PLACEment;  Surgeon: Everardo Matthews M.D.;  Location: SURGERY SAME DAY [facility];  Service:     FLAP GRAFT  1/19/2017    Procedure: FLAP GRAFT- FOR DERMAL ADIPOFASCIAL FLAPS ;  Surgeon: Everardo Matthews M.D.;  Location: SURGERY SAME DAY [facility];  Service:     MASTECTOMY Bilateral 1/19/2017    Procedure: MASTECTOMY PROPHYLACTIC LEFT, AND RIGHT TOTAL MASTECTOMY;  Surgeon: Carly Wright M.D.;  Location: SURGERY SAME DAY [facility];  Service:     AXILLARY NODE DISSECTION Right 1/19/2017    Procedure: AXILLARY NODE DISSECTION;  Surgeon: Carly Wright M.D.;  Location: SURGERY SAME DAY [facility];  Service:     CATH PLACEMENT Left 1/19/2017    Procedure: Removal of left subclavian port ;  Surgeon: Carly Wright M.D.;  Location: SURGERY SAME DAY [facility];  Service:     CATH PLACEMENT Left 9/5/2016    Procedure: CATH PLACEMENT - SUBCLAVIAN PORT - SIDE TO BE DETERMINED;  Surgeon: Carly Wright M.D.;  Location: SURGERY [facility];  Service:     STEREOTACTIC BIOPSY Right 08/10/2016    HIP ARTHROPLASTY TOTAL Right 2014    OTHER ORTHOPEDIC SURGERY  6/2012    right hip arthroplasty    CATARACT PHACO WITH IOL Bilateral     GYN SURGERY  Approx 2013    Hysterectomy       Social History     Socioeconomic History    Marital status:      Spouse name: Not on file    Number of children: Not on file    Years of education: Not on file    Highest education level: Master's degree (e.g., MA, MS, Shasha, MEd, MSW, [employer])   Occupational History    Not on file   Tobacco Use    Smoking status: Never    Smokeless tobacco: Never   Vaping Use    Vaping status: Never  Used   Substance and Sexual Activity    Alcohol use: Yes     Alcohol/week: 0.0 oz     Comment: very rarely    Drug use: No    Sexual activity: Not on file   Other Topics Concern    Not on file   Social History Narrative    Retired teacher, early education special .          1 son     Social Determinants of Health     Financial Resource Strain: Low Risk  (3/11/2021)    Overall Financial Resource Strain (CARDIA)     Difficulty of Paying Living Expenses: Not hard at all   Food Insecurity: No Food Insecurity (3/11/2021)    Hunger Vital Sign     Worried About Running Out of Food in the Last Year: Never true     Ran Out of Food in the Last Year: Never true   Transportation Needs: No Transportation Needs (3/11/2021)    PRAPARE - Transportation     Lack of Transportation (Medical): No     Lack of Transportation (Non-Medical): No   Physical Activity: Insufficiently Active (3/11/2021)    Exercise Vital Sign     Days of Exercise per Week: 2 days     Minutes of Exercise per Session: 20 min   Stress: No Stress Concern Present (3/11/2021)    [employer] [place] [employer] - Occupational Stress Questionnaire     Feeling of Stress : Not at all   Social Connections: Socially Integrated (3/11/2021)    Social Connection and Isolation Panel [NHANES]     Frequency of Communication with Friends and Family: More than three times a week     Frequency of Social Gatherings with Friends and Family: Once a week     Attends Faith Services: 1 to 4 times per year     Active Member of Clubs or Organizations: Yes     Attends Club or Organization Meetings: More than 4 times per year     Marital Status:    Intimate Partner Violence: Not on file   Housing Stability: Low Risk  (3/11/2021)    Housing Stability Vital Sign     Unable to Pay for Housing in the Last Year: No     Number of Places Lived in the Last Year: 1     Unstable Housing in the Last Year: No        Family History   Problem Relation Age of Onset     "Lung Disease Mother     Arthritis Father     Heart Disease Father         Allergies   Allergen Reactions    Povidone Iodine Rash    Sulfa Drugs Unspecified      RXN=As a child unsure of reaction    Morphine      Nausea and vomiting       Review of Systems   Constitutional:  Negative for fever.   Gastrointestinal:  Negative for abdominal pain.   Genitourinary:  Positive for dysuria and urgency. Negative for flank pain and hematuria.   Musculoskeletal:  Positive for back pain.   All other systems reviewed and are negative.       Objective:   /68   Pulse 98   Temp 36.3 °C (97.4 °F) (Temporal)   Resp 16   Ht 1.499 m (4' 11\")   Wt 89.1 kg (196 lb 6.4 oz)   SpO2 98%   BMI 39.67 kg/m²     Physical Exam  Vitals reviewed.   Constitutional:       General: She is not in acute distress.  Cardiovascular:      Rate and Rhythm: Normal rate.   Pulmonary:      Effort: Pulmonary effort is normal.   Abdominal:      Palpations: Abdomen is soft.      Tenderness: There is no abdominal tenderness. There is no right CVA tenderness or left CVA tenderness.   Skin:     General: Skin is warm and dry.   Neurological:      Mental Status: She is alert and oriented to person, place, and time.         Assessment/Plan:   1. Acute cystitis with hematuria  - POCT Urinalysis  - cefdinir (OMNICEF) 300 MG Cap; Take 1 Capsule by mouth 2 times a day for 5 days.  Dispense: 10 Capsule; Refill: 0  - URINE CULTURE(NEW); Future    Results for orders placed or performed in visit on 05/24/24   POCT Urinalysis   Result Value Ref Range    POC Color Yellow Negative    POC Appearance Turbid Negative    POC Glucose Neg Negative mg/dL    POC Bilirubin Neg Negative mg/dL    POC Ketones Neg Negative mg/dL    POC Specific Gravity 1.010 <1.005 - >1.030    POC Blood Mod Negative    POC Urine PH 6.0 5.0 - 8.0    POC Protein Neg Negative mg/dL    POC Urobiligen 0.2 Negative (0.2) mg/dL    POC Nitrites Pos Negative    POC Leukocyte Esterase Large Negative "     -Oral Hydration: Drink plenty of water.  -Take antibiotic as prescribed.  -Follow up with PCP.    Follow up urgently for new or persistent abdominal pain, flank pain, difficulty with urination, fevers, vomiting, weakness, tachycardia, or any other concerns.    Stable vitals. Afebrile. No CVA or abdominal tenderness to palpation.     Differential diagnosis, natural history, supportive care, and indications for immediate follow-up discussed.

## 2024-05-24 NOTE — PATIENT INSTRUCTIONS
-Oral Hydration: Drink plenty of water.  -Take antibiotic as prescribed.  -Follow up with PCP.    Follow up urgently for new or persistent abdominal pain, flank pain, difficulty with urination, fevers, vomiting, weakness, tachycardia, or any other concerns.  .

## 2024-06-25 ASSESSMENT — ENCOUNTER SYMPTOMS: GENERAL WELL-BEING: FAIR

## 2024-06-25 ASSESSMENT — PATIENT HEALTH QUESTIONNAIRE - PHQ9
2. FEELING DOWN, DEPRESSED, IRRITABLE, OR HOPELESS: NOT AT ALL
1. LITTLE INTEREST OR PLEASURE IN DOING THINGS: NOT AT ALL

## 2024-06-25 ASSESSMENT — ACTIVITIES OF DAILY LIVING (ADL): BATHING_REQUIRES_ASSISTANCE: 0

## 2024-06-27 NOTE — ASSESSMENT & PLAN NOTE
Chronic, stable. Weight in office today is 195 lbs. BMI 39.39 kg/m2. We discussed her current diet/exercise regimen. She is active with golfing nearly every day. Encouraged to remain physically active as well as monitor intake of excess calories. Follow up with PCP for continued monitoring.

## 2024-06-27 NOTE — ASSESSMENT & PLAN NOTE
Chronic, stable. Last lipid panel in May 2024 results below. She does not take any lipid-lowering medications. We discussed her dietary/lifestyle regimen. Follow up with PCP for continued monitoring and management.  Lab Results   Component Value Date/Time    CHOLSTRLTOT 194 05/10/2024 08:44 AM    TRIGLYCERIDE 92 05/10/2024 08:44 AM    HDL 63 05/10/2024 08:44 AM     (H) 05/10/2024 08:44 AM

## 2024-06-27 NOTE — ASSESSMENT & PLAN NOTE
Chronic, stable. Noted on imaging after her surgery for lung cancer. Follow up with appropriate specialist.

## 2024-06-27 NOTE — ASSESSMENT & PLAN NOTE
Chronic, stable. Last A1c in May 2024 was 5.8. We discussed her dietary/lifestyle regimen. Follow up with PCP for continued monitoring.

## 2024-06-27 NOTE — ASSESSMENT & PLAN NOTE
Chronic, stable. Noted first in 2020 after she had surgery on a her right leg. She took Xarelto for two years. Has had subsequent ultrasounds and in 2023 was found to be chronic. Has had right leg edema/erythema for over a year. She is being evaluated by her primary care provider to determine the next steps in treatment.

## 2024-06-27 NOTE — ASSESSMENT & PLAN NOTE
Chronic, stable. Last DEXA July 2023 revealed lumbar spine T score of -1.4 and proximal left femur T score of -2.7.  She does take Prolia injection 2x year. Does engage in weightbearing exercise. She did fracture her femur due to a fall in 2020. Continue to follow up with heme/onc as previously scheduled.

## 2024-06-27 NOTE — ASSESSMENT & PLAN NOTE
Chronic, stable. Due to chemotherapy she received for breast cancer. She is no longer receiving chemotherapy. It affects both her hands and feet- feels like a tingling sensation. Follow up with PCP as needed.

## 2024-06-28 ENCOUNTER — APPOINTMENT (OUTPATIENT)
Dept: FAMILY PLANNING/WOMEN'S HEALTH CLINIC | Facility: PHYSICIAN GROUP | Age: 69
End: 2024-06-28
Attending: FAMILY MEDICINE
Payer: MEDICARE

## 2024-06-28 VITALS
BODY MASS INDEX: 39.31 KG/M2 | WEIGHT: 195 LBS | SYSTOLIC BLOOD PRESSURE: 104 MMHG | HEIGHT: 59 IN | DIASTOLIC BLOOD PRESSURE: 64 MMHG

## 2024-06-28 DIAGNOSIS — G62.0 CHEMOTHERAPY-INDUCED NEUROPATHY (HCC): ICD-10-CM

## 2024-06-28 DIAGNOSIS — J84.10 LUNG FIBROSIS (HCC): ICD-10-CM

## 2024-06-28 DIAGNOSIS — I82.561 CHRONIC DEEP VEIN THROMBOSIS (DVT) OF CALF MUSCLE VEIN OF RIGHT LOWER EXTREMITY (HCC): ICD-10-CM

## 2024-06-28 DIAGNOSIS — E66.9 OBESITY (BMI 30-39.9): ICD-10-CM

## 2024-06-28 DIAGNOSIS — M81.8 OTHER OSTEOPOROSIS WITHOUT CURRENT PATHOLOGICAL FRACTURE: ICD-10-CM

## 2024-06-28 DIAGNOSIS — E78.00 ELEVATED LDL CHOLESTEROL LEVEL: ICD-10-CM

## 2024-06-28 DIAGNOSIS — T45.1X5A CHEMOTHERAPY-INDUCED NEUROPATHY (HCC): ICD-10-CM

## 2024-06-28 SDOH — ECONOMIC STABILITY: INCOME INSECURITY: IN THE LAST 12 MONTHS, WAS THERE A TIME WHEN YOU WERE NOT ABLE TO PAY THE MORTGAGE OR RENT ON TIME?: NO

## 2024-06-28 SDOH — ECONOMIC STABILITY: FOOD INSECURITY: WITHIN THE PAST 12 MONTHS, YOU WORRIED THAT YOUR FOOD WOULD RUN OUT BEFORE YOU GOT MONEY TO BUY MORE.: NEVER TRUE

## 2024-06-28 SDOH — ECONOMIC STABILITY: TRANSPORTATION INSECURITY
IN THE PAST 12 MONTHS, HAS LACK OF TRANSPORTATION KEPT YOU FROM MEETINGS, WORK, OR FROM GETTING THINGS NEEDED FOR DAILY LIVING?: NO

## 2024-06-28 SDOH — ECONOMIC STABILITY: FOOD INSECURITY: WITHIN THE PAST 12 MONTHS, THE FOOD YOU BOUGHT JUST DIDN'T LAST AND YOU DIDN'T HAVE MONEY TO GET MORE.: NEVER TRUE

## 2024-06-28 SDOH — ECONOMIC STABILITY: INCOME INSECURITY: HOW HARD IS IT FOR YOU TO PAY FOR THE VERY BASICS LIKE FOOD, HOUSING, MEDICAL CARE, AND HEATING?: NOT HARD AT ALL

## 2024-06-28 SDOH — ECONOMIC STABILITY: HOUSING INSECURITY: IN THE LAST 12 MONTHS, HOW MANY PLACES HAVE YOU LIVED?: 1

## 2024-06-28 ASSESSMENT — FIBROSIS 4 INDEX: FIB4 SCORE: 0.91

## 2024-06-28 ASSESSMENT — PAIN SCALES - GENERAL: PAINLEVEL: NO PAIN

## 2024-06-28 ASSESSMENT — PATIENT HEALTH QUESTIONNAIRE - PHQ9: CLINICAL INTERPRETATION OF PHQ2 SCORE: 0

## 2024-06-28 NOTE — PROGRESS NOTES
Comprehensive Health Assessment Program     Carmelina Leblanc is a 69 y.o. here for her comprehensive health assessment.    Patient Active Problem List    Diagnosis Date Noted    Obesity (BMI 30-39.9) 06/28/2024    Left ear pain 11/15/2023    Lung fibrosis (HCC) 06/30/2023    Chronic deep vein thrombosis (DVT) of calf muscle vein of right lower extremity (HCC) 06/30/2023    BMI 39.0-39.9,adult 06/30/2023    Seasonal allergies 05/31/2023    Elevated LDL cholesterol level 02/28/2023    Leg erythema 02/28/2023    Lesion of skin of face 07/27/2022    Other osteoporosis without current pathological fracture 11/08/2021    Chemotherapy-induced neuropathy (HCC) 03/11/2021    History of chemotherapy 03/11/2021    History of breast cancer 04/21/2020    History of femur fracture 04/20/2020    Prediabetes 04/20/2020    History of lung cancer 04/20/2020       Current Outpatient Medications   Medication Sig Dispense Refill    fluticasone (FLONASE) 50 MCG/ACT nasal spray Administer 1 Spray into affected nostril(S) every day.      omeprazole (PRILOSEC) 20 MG delayed-release capsule Take 1 Capsule by mouth every day. 100 Capsule 3    Azelastine (ASTELIN) 137 MCG/SPRAY Solution INHALE 2 SPRAYS NASALLY 2 TIMES A DAY      ipratropium (ATROVENT) 0.06 % Solution Administer 2 Sprays into affected nostril(S) 4 times a day.      acetaminophen (TYLENOL) 500 MG Tab Take 1-2 Tablets by mouth every 6 hours as needed for Mild Pain.       No current facility-administered medications for this visit.          Current supplements as per medication list.     Allergies:   Povidone iodine, Sulfa drugs, and Morphine  Social History     Tobacco Use    Smoking status: Never    Smokeless tobacco: Never   Vaping Use    Vaping status: Never Used   Substance Use Topics    Alcohol use: Yes     Alcohol/week: 0.0 oz     Comment: very rarely    Drug use: No     Family History   Problem Relation Age of Onset    Lung Disease Mother     Arthritis Father      Heart Disease Father      Carmelina  has a past medical history of Anemia (6-1-17), Anesthesia (6-1-17), Cancer (HCC) (8/4/16), Cancer (HCC) (2016), Cataract, Dental disorder, Fall (4/20/2020), Impacted cerumen of right ear (11/8/2021), Osteoarthritis (6-1-17), Port catheter in place (6-1-17), Snoring, and Urinary tract infection.   Past Surgical History:   Procedure Laterality Date    ORIF, FRACTURE, FEMUR Right 4/20/2020    Procedure: ORIF, FRACTURE,  DISTAL FEMUR;  Surgeon: Christian Ag M.D.;  Location: SURGERY [facility];  Service: Orthopedics    TISSUE EXPANDER PLACE/REMOVE Bilateral 6/8/2017    Procedure: TISSUE EXPANDER PLACE/REMOVE;  Surgeon: Everardo Matthews M.D.;  Location: SURGERY SAME DAY [facility];  Service:     BREAST IMPLANT REVISION Bilateral 6/8/2017    Procedure: BREAST IMPLANT;  Surgeon: Everardo Matthews M.D.;  Location: SURGERY SAME DAY [facility];  Service:     CAPSULOTOMY Bilateral 6/8/2017    Procedure: CAPSULOTOMY FOR PARTIAL CAPSULECTOMIES;  Surgeon: Everardo Matthews M.D.;  Location: SURGERY SAME DAY [facility];  Service:     BREAST RECONSTRUCTION Bilateral 6/8/2017    Procedure: BREAST RECONSTRUCTION USING LOCAL TISSUE & FAT GRAFTING;  Surgeon: Everardo Matthews M.D.;  Location: SURGERY SAME DAY [facility];  Service:     THORACOSCOPY Right 5/1/2017    Procedure: THORACOSCOPY, WEDGE RESECTION LOWER LOBE MASS;  Surgeon: John H Ganser, M.D.;  Location: [facility];  Service:     CATH PLACEMENT Left 3/17/2017    Procedure: CATH PLACEMENT FOR SUBCLAVIAN PORT LEFT;  Surgeon: Carly Wright M.D.;  Location: [facility];  Service:     LUNG NEEDLE BIOPSY  02-    TISSUE EXPANDER PLACE/REMOVE Bilateral 1/19/2017    Procedure: TISSUE EXPANDER PLACEment;  Surgeon: Everardo Matthews M.D.;  Location: SURGERY SAME DAY [facility];  Service:     FLAP GRAFT  1/19/2017    Procedure: FLAP GRAFT- FOR DERMAL ADIPOFASCIAL FLAPS ;  Surgeon: Everardo BARKER  ARIANNA Matthews;  Location: SURGERY SAME DAY [facility];  Service:     MASTECTOMY Bilateral 1/19/2017    Procedure: MASTECTOMY PROPHYLACTIC LEFT, AND RIGHT TOTAL MASTECTOMY;  Surgeon: Carly Wright M.D.;  Location: SURGERY SAME DAY [facility];  Service:     AXILLARY NODE DISSECTION Right 1/19/2017    Procedure: AXILLARY NODE DISSECTION;  Surgeon: Carly Wright M.D.;  Location: SURGERY SAME DAY [facility];  Service:     CATH PLACEMENT Left 1/19/2017    Procedure: Removal of left subclavian port ;  Surgeon: Carly Wright M.D.;  Location: SURGERY SAME DAY [facility];  Service:     CATH PLACEMENT Left 9/5/2016    Procedure: CATH PLACEMENT - SUBCLAVIAN PORT - SIDE TO BE DETERMINED;  Surgeon: Carly Wright M.D.;  Location: SURGERY [facility];  Service:     STEREOTACTIC BIOPSY Right 08/10/2016    HIP ARTHROPLASTY TOTAL Right 2014    OTHER ORTHOPEDIC SURGERY  6/2012    right hip arthroplasty    CATARACT PHACO WITH IOL Bilateral     GYN SURGERY  Approx 2013    Hysterectomy       Screening:  In the last six months have you experienced any leakage of urine? Yes, does not happen very often. Does not interfere with her daily life. She notices it will happen if she waits too long before using the restroom.     Depression Screening  Little interest or pleasure in doing things?  0 - not at all  Feeling down, depressed , or hopeless? 0 - not at all  Patient Health Questionnaire Score: 0     If depressive symptoms identified deferred to follow up visit unless specifically addressed in assessment and plan.    Interpretation of PHQ-9 Total Score   Score Severity   1-4 No Depression   5-9 Mild Depression   10-14 Moderate Depression   15-19 Moderately Severe Depression   20-27 Severe Depression    Screening for Cognitive Impairment  Do you or any of your friends or family members have any concern about your memory? No  Three Minute Recall (Leader, Season, Table) 3/3    Dyllan clock face with all 12  numbers and set the hands to show 10 minutes after 11.  Yes    Cognitive concerns identified deferred for follow up unless specifically addressed in assessment and plan.    Fall Risk Assessment  Has the patient had two or more falls in the last year or any fall with injury in the last year?  No    Safety Assessment  Do you always wear your seatbelt?  Yes  Any changes to home needed to function safely? No  Difficulty hearing.  No  Patient counseled about all safety risks that were identified.    Functional Assessment ADLs  Are there any barriers preventing you from cooking for yourself or meeting nutritional needs?  No.    Are there any barriers preventing you from driving safely or obtaining transportation?  No.    Are there any barriers preventing you from using a telephone or calling for help?  No    Are there any barriers preventing you from shopping?  No.    Are there any barriers preventing you from taking care of your own finances?  No    Are there any barriers preventing you from managing your medications?  No    Are there any barriers preventing you from showering, bathing or dressing yourself? No    Are there any barriers preventing you from doing housework or laundry? No  Are there any barriers preventing you from using the toilet?No  Are you currently engaging in any exercise or physical activity?  No.  She plays golf.     Self-Assessment of Health  What is your perception of your health? Fair    Do you sleep more than six hours a night? No    In the past 7 days, how much did pain keep you from doing your normal work? None    Do you spend quality time with family or friends (virtually or in person)? Yes    Do you usually eat a heart healthy diet that constists of a variety of fruits, vegetables, whole grains and fiber? Yes    Do you eat foods high in fat and/or Fast Food more than three times per week? No    How concerned are you that your medical conditions are not being well managed? Not at all    Are  you worried that in the next 2 months, you may not have stable housing that you own, rent, or stay in as part of a household? No      Advance Care Planning  Do you have an Advance Directive, Living Will, Durable Power of , or POLST? Yes  Advance Directive   Durable Power of    is on file      Health Maintenance Summary            Overdue - Mammogram (Every 2 Years) Overdue since 8/5/2018 08/05/2016  MA-DIAGNOSTIC MAMMO W/CAD-BILAT              Overdue - COVID-19 Vaccine (7 - 2023-24 season) Overdue since 12/6/2023      10/11/2023  Imm Admin: Comirnaty (Covid-19 Vaccine, Mrna, [id number] Formula)    10/01/2022  Imm Admin: PFIZER BIVALENT SARS-COV-2 VACCINE (12+)    04/12/2022  Imm Admin: PFIZER PADILLA CAP SARS-COV-2 VACCINATION (12+)    10/08/2021  Imm Admin: PFIZER PURPLE CAP SARS-COV-2 VACCINATION (12+)    04/02/2021  Imm Admin: PFIZER PURPLE CAP SARS-COV-2 VACCINATION (12+)    Only the first 5 history entries have been loaded, but more history exists.              Ordered - Colorectal Cancer Screening (Colon Cancer Screening Cologuard Stool (FIT DNA) - Every 3 Years) Ordered on 5/21/2024 03/23/2021  COLOGUARD (FIT DNA)              Annual Wellness Visit (Yearly) Next due on 6/28/2025 06/28/2024  Level of Service: UT ANNUAL WELLNESS VISIT-INCLUDES PPPS SUBSEQUE*    06/30/2023  Level of Service: UT ANNUAL WELLNESS VISIT-INCLUDES PPPS SUBSEQUE*    05/31/2023  Visit Dx: Medicare annual wellness visit, subsequent    01/17/2022  Visit Dx: Encounter for Medicare annual wellness exam    03/11/2021  Visit Dx: Medicare annual wellness visit, initial    Only the first 5 history entries have been loaded, but more history exists.              Bone Density Scan (Every 5 Years) Tentatively due on 7/21/2028 07/21/2023  DS-BONE DENSITY STUDY (DEXA)    04/01/2021  DS-BONE DENSITY STUDY (DEXA)              IMM DTaP/Tdap/Td Vaccine (3 - Td or Tdap) Next due on 10/18/2029      10/18/2019  Imm Admin:  Tdap Vaccine    05/11/2011  Imm Admin: Tdap Vaccine              Pneumococcal Vaccine: 65+ Years (Series Information) Completed      12/05/2022  Imm Admin: Pneumococcal Conjugate Vaccine (PCV20)    11/08/2021  Imm Admin: Pneumococcal polysaccharide vaccine (PPSV-23)              Hepatitis C Screening  Tentatively Complete      05/26/2023  Hepatitis C Antibody component of HEP C VIRUS ANTIBODY              Influenza Vaccine (Series Information) Completed      10/11/2023  Imm Admin: Influenza Vaccine Adult HD    09/14/2022  Imm Admin: Influenza Vaccine Adult HD    09/28/2021  Imm Admin: Influenza Vaccine Adult HD    09/29/2020  Imm Admin: Influenza Vac Subunit Quad Inj (Pf)    10/18/2019  Imm Admin: Influenza Vaccine Quad Inj (Pf)    Only the first 5 history entries have been loaded, but more history exists.              Zoster (Shingles) Vaccines (Series Information) Completed      03/10/2024  Imm Admin: Zoster Vaccine Recombinant (RZV) (SHINGRIX)    10/30/2023  Imm Admin: Zoster Vaccine Recombinant (RZV) (SHINGRIX)              Hepatitis A Vaccine (Hep A) (Series Information) Aged Out      No completion history exists for this topic.              Hepatitis B Vaccine (Hep B) (Series Information) Aged Out      No completion history exists for this topic.              HPV Vaccines (Series Information) Aged Out      No completion history exists for this topic.              Polio Vaccine (Inactivated Polio) (Series Information) Aged Out      No completion history exists for this topic.              Meningococcal Immunization (Series Information) Aged Out      No completion history exists for this topic.                    Patient Care Team:  Marnie Cortez M.D. as PCP - General (Family Medicine)  Denice Adkins M.D. (Medical Oncology)      Financial Resource Strain: Low Risk  (6/28/2024)    Overall Financial Resource Strain (CARDIA)     Difficulty of Paying Living Expenses: Not hard at all      Transportation Needs: No  "Transportation Needs (6/28/2024)    PRAPARE - Transportation     Lack of Transportation (Medical): No     Lack of Transportation (Non-Medical): No      Food Insecurity: No Food Insecurity (6/28/2024)    Hunger Vital Sign     Worried About Running Out of Food in the Last Year: Never true     Ran Out of Food in the Last Year: Never true        Encounter Vitals  Blood Pressure : 104/64  Weight: 88.5 kg (195 lb)  Height: 149.9 cm (4' 11\")  BMI (Calculated): 39.39  Pain Score: No pain     Alert, oriented in no acute distress.  Eye contact is good, speech goal directed, affect calm.    Assessment and Plan. The following treatment and monitoring plan is recommended:    Chemotherapy-induced neuropathy (HCC)  Chronic, stable. Due to chemotherapy she received for breast cancer. She is no longer receiving chemotherapy. It affects both her hands and feet- feels like a tingling sensation. Follow up with PCP as needed.    Chronic deep vein thrombosis (DVT) of calf muscle vein of right lower extremity (HCC)  Chronic, stable. Noted first in 2020 after she had surgery on a her right leg. She took Xarelto for two years. Has had subsequent ultrasounds and in 2023 was found to be chronic. Has had right leg edema/erythema for over a year. She is being evaluated by her primary care provider to determine the next steps in treatment.     Elevated LDL cholesterol level  Chronic, stable. Last lipid panel in May 2024 results below. She does not take any lipid-lowering medications. We discussed her dietary/lifestyle regimen. Follow up with PCP for continued monitoring and management.  Lab Results   Component Value Date/Time    CHOLSTRLTOT 194 05/10/2024 08:44 AM    TRIGLYCERIDE 92 05/10/2024 08:44 AM    HDL 63 05/10/2024 08:44 AM     (H) 05/10/2024 08:44 AM     Lung fibrosis (HCC)  Chronic, stable. Noted on imaging after her surgery for lung cancer. Follow up with appropriate specialist.     Other osteoporosis without current " pathological fracture  Chronic, stable. Last DEXA July 2023 revealed lumbar spine T score of -1.4 and proximal left femur T score of -2.7.  She does take Prolia injection 2x year. Does engage in weightbearing exercise. She did fracture her femur due to a fall in 2020. Continue to follow up with heme/onc as previously scheduled.    Obesity (BMI 30-39.9)  BMI 39.0-39.9,adult  Chronic, stable. Weight in office today is 195 lbs. BMI 39.39 kg/m2. We discussed her current diet/exercise regimen. She is active with golfing nearly every day. Encouraged to remain physically active as well as monitor intake of excess calories. Follow up with PCP for continued monitoring.    Services suggested: No services needed at this time  Health Care Screening: Age-appropriate preventive services recommended by USPTF and ACIP covered by Medicare were discussed today. Services ordered if indicated and agreed upon by the patient.  Referrals offered: Community-based lifestyle interventions to reduce health risks and promote self-management and wellness, fall prevention, nutrition, physical activity, tobacco-use cessation, weight loss, and mental health services as per orders if indicated.    Discussion today about general wellness and lifestyle habits:    Prevent falls and reduce trip hazards; Cautioned about securing or removing rugs.  Have a working fire alarm and carbon monoxide detector.  Engage in regular physical activity and social activities.    Follow-up: Return for appointment with Primary Care Provider as needed..

## 2024-07-04 ENCOUNTER — HOSPITAL ENCOUNTER (OUTPATIENT)
Dept: CARDIOLOGY | Facility: MEDICAL CENTER | Age: 69
End: 2024-07-04
Attending: FAMILY MEDICINE
Payer: MEDICARE

## 2024-07-04 ENCOUNTER — HOSPITAL ENCOUNTER (OUTPATIENT)
Dept: RADIOLOGY | Facility: MEDICAL CENTER | Age: 69
End: 2024-07-04
Attending: FAMILY MEDICINE
Payer: MEDICARE

## 2024-07-04 DIAGNOSIS — M79.89 LEG SWELLING: ICD-10-CM

## 2024-07-04 PROCEDURE — 93306 TTE W/DOPPLER COMPLETE: CPT

## 2024-07-04 PROCEDURE — 93970 EXTREMITY STUDY: CPT

## 2024-07-05 LAB
LV EJECT FRACT  99904: 65
LV EJECT FRACT MOD 2C 99903: 54.29
LV EJECT FRACT MOD 4C 99902: 72.01
LV EJECT FRACT MOD BP 99901: 64.58

## 2024-07-05 PROCEDURE — 93306 TTE W/DOPPLER COMPLETE: CPT | Mod: 26 | Performed by: INTERNAL MEDICINE

## 2024-07-26 ENCOUNTER — APPOINTMENT (OUTPATIENT)
Dept: URGENT CARE | Facility: CLINIC | Age: 69
End: 2024-07-26
Payer: MEDICARE

## 2024-08-13 ENCOUNTER — HOSPITAL ENCOUNTER (OUTPATIENT)
Dept: RADIOLOGY | Facility: MEDICAL CENTER | Age: 69
End: 2024-08-13
Attending: FAMILY MEDICINE
Payer: MEDICARE

## 2024-08-13 DIAGNOSIS — M79.89 LEG SWELLING: ICD-10-CM

## 2024-08-13 PROCEDURE — 93922 UPR/L XTREMITY ART 2 LEVELS: CPT

## 2024-08-27 ENCOUNTER — APPOINTMENT (OUTPATIENT)
Dept: MEDICAL GROUP | Facility: PHYSICIAN GROUP | Age: 69
End: 2024-08-27
Payer: MEDICARE

## 2024-08-27 VITALS
DIASTOLIC BLOOD PRESSURE: 72 MMHG | RESPIRATION RATE: 16 BRPM | SYSTOLIC BLOOD PRESSURE: 118 MMHG | OXYGEN SATURATION: 98 % | HEART RATE: 87 BPM | HEIGHT: 59 IN | TEMPERATURE: 97.5 F | WEIGHT: 196 LBS | BODY MASS INDEX: 39.51 KG/M2

## 2024-08-27 DIAGNOSIS — R60.0 BILATERAL LEG EDEMA: ICD-10-CM

## 2024-08-27 DIAGNOSIS — R42 DIZZINESS: ICD-10-CM

## 2024-08-27 DIAGNOSIS — M81.8 OTHER OSTEOPOROSIS WITHOUT CURRENT PATHOLOGICAL FRACTURE: ICD-10-CM

## 2024-08-27 DIAGNOSIS — R73.03 PREDIABETES: ICD-10-CM

## 2024-08-27 DIAGNOSIS — R00.2 PALPITATIONS: ICD-10-CM

## 2024-08-27 DIAGNOSIS — K21.9 GASTROESOPHAGEAL REFLUX DISEASE WITHOUT ESOPHAGITIS: ICD-10-CM

## 2024-08-27 DIAGNOSIS — E78.00 ELEVATED LDL CHOLESTEROL LEVEL: ICD-10-CM

## 2024-08-27 PROBLEM — E66.9 OBESITY (BMI 30-39.9): Status: RESOLVED | Noted: 2024-06-28 | Resolved: 2024-08-27

## 2024-08-27 PROBLEM — H92.02 LEFT EAR PAIN: Status: RESOLVED | Noted: 2023-11-15 | Resolved: 2024-08-27

## 2024-08-27 PROCEDURE — 3074F SYST BP LT 130 MM HG: CPT | Performed by: FAMILY MEDICINE

## 2024-08-27 PROCEDURE — 99214 OFFICE O/P EST MOD 30 MIN: CPT | Performed by: FAMILY MEDICINE

## 2024-08-27 PROCEDURE — 3078F DIAST BP <80 MM HG: CPT | Performed by: FAMILY MEDICINE

## 2024-08-27 ASSESSMENT — ENCOUNTER SYMPTOMS
SHORTNESS OF BREATH: 0
HEARTBURN: 0
PALPITATIONS: 1
DIZZINESS: 1
VOMITING: 1

## 2024-08-27 ASSESSMENT — FIBROSIS 4 INDEX: FIB4 SCORE: 0.91

## 2024-08-27 NOTE — PROGRESS NOTES
"Verbal consent was acquired by the patient to use [employer] ambient listening note generation during this visit         History of Present Illness  The patient is a 69-year-old female who presents for a follow-up visit. She was last seen on 2024.    She reports that her blood clot, which she refers to as an \"alien,\" remains unchanged. She has been experiencing leg swelling since 2020, the same day she sustained a leg fracture. Despite undergoing physical therapy at home, her leg swelling persisted. An ultrasound of her leg revealed no abnormalities.     She occasionally experiences rapid heartbeats, a symptom that has been present for about a year. She has also started to experience vertigo, characterized by dizziness and vomiting upon waking up in the morning. This has occurred twice since 2023.    She contracted a cold in the first week of 2024 after participating in a golf tournament during rainy weather. She reports no fever.    She is currently taking omeprazole, which she finds beneficial. She continues to use Flonase and Atrovent. She has not undergone an endoscopy.    She is under the care of Dr. Higginbotham for bone health and is receiving Prolia injections. She has not consulted a dermatologist.    Her medical history includes lung surgery and bilateral breast implants for hx breast ca    FAMILY HISTORY  Her paternal grandmother  of a heart attack. Her father's brother  from cardiac issues.      Review of Systems   Respiratory:  Negative for shortness of breath.    Cardiovascular:  Positive for palpitations and leg swelling (bilateral - chronic x 4 yrs). Negative for chest pain.   Gastrointestinal:  Positive for vomiting (in the mornings). Negative for heartburn.   Neurological:  Positive for dizziness.        Vertigo       Outpatient Medications Marked as Taking for the 24 encounter (Office Visit) with Marnie Cortez M.D.   Medication Sig Dispense Refill    omeprazole " "(PRILOSEC) 20 MG delayed-release capsule Take 1 Capsule by mouth every day. 100 Capsule 3    fluticasone (FLONASE) 50 MCG/ACT nasal spray Administer 1 Spray into affected nostril(S) every day.      Azelastine (ASTELIN) 137 MCG/SPRAY Solution INHALE 2 SPRAYS NASALLY 2 TIMES A DAY      ipratropium (ATROVENT) 0.06 % Solution Administer 2 Sprays into affected nostril(S) 4 times a day.      acetaminophen (TYLENOL) 500 MG Tab Take 1-2 Tablets by mouth every 6 hours as needed for Mild Pain.         /72 (BP Location: Left arm, Patient Position: Sitting, BP Cuff Size: Large adult)   Pulse 87   Temp 36.4 °C (97.5 °F) (Temporal)   Resp 16   Ht 1.499 m (4' 11\")   Wt 88.9 kg (196 lb)   SpO2 98% , Body mass index is 39.59 kg/m².        Physical Exam  Constitutional:       Appearance: Normal appearance.   Eyes:      Extraocular Movements: Extraocular movements intact.   Cardiovascular:      Rate and Rhythm: Normal rate and regular rhythm.   Pulmonary:      Effort: Pulmonary effort is normal.      Breath sounds: Normal breath sounds.   Musculoskeletal:      Right lower leg: Edema present.      Left lower leg: Edema present.   Neurological:      Mental Status: She is alert.   Psychiatric:         Mood and Affect: Mood normal.         Behavior: Behavior normal.         Results  Imaging  Ultrasound shows no deep venous thrombosis (no blood clot). Echocardiogram shows mild changes in the valves, similar to results from 6.5 years ago.       Assessment & Plan    1. Bilateral leg edema  Chronic medical diagnoses not well-controlled.  Recently underwent ROSA M, lower extremity Dopplers and echocardiogram states that she has had this for approximately the last 4 years.  Has not been further evaluated by a specialist.  New referral placed to vascular surgery.  - Referral to Vascular Surgery    2. Gastroesophageal reflux disease without esophagitis  Chronic medical diagnosis.  Stable with omeprazole 20 mg daily.  Requesting " refills.  - omeprazole (PRILOSEC) 20 MG delayed-release capsule; Take 1 Capsule by mouth every day.  Dispense: 100 Capsule; Refill: 3    3. Other osteoporosis without current pathological fracture  Chronic medical diagnosis.  Stable.  Managed by her oncologist, Dr. Luis TOWNSEND.  Next appointment with her is November 2024.  Continues to receive Prolia.    4. Dizziness  Chronic medical diagnosis.  Not well-controlled.  States that she has vomiting episodes when she has vertigo and dizziness.  Has never been further evaluated by ENT.  - Referral to ENT    5. Prediabetes  Chronic medical diagnosis.  Stable.  Last set of labs from May had A1c stable at 5.8%.  - HEMOGLOBIN A1C; Future    6. Elevated LDL cholesterol level  Chronic medical diagnoses.  Not well-controlled.  Last set of labs from May had elevated LDL level at 113.  Will plan on repeating prior to next appointment with me in November.  - VITAMIN D,25 HYDROXY (DEFICIENCY); Future  - VITAMIN B12; Future  - Lipid Profile; Future  - TSH WITH REFLEX TO FT4; Future  - Comp Metabolic Panel; Future  - CBC WITH DIFFERENTIAL; Future    7. Palpitations  New medical diagnosis.  States that these are sporadic.  We discussed evaluating further with labs.  At this point, patient does not wish to pursue cardiology consultation or a Zio patch monitor.  Will continue to monitor.    Orders Placed This Encounter    VITAMIN D,25 HYDROXY (DEFICIENCY)    VITAMIN B12    Lipid Profile    TSH WITH REFLEX TO FT4    Comp Metabolic Panel    CBC WITH DIFFERENTIAL    HEMOGLOBIN A1C    Referral to Vascular Surgery    Referral to ENT    omeprazole (PRILOSEC) 20 MG delayed-release capsule               This note was created using voice recognition software (Dragon). The accuracy of the dictation is limited by the abilities of the software. I have reviewed the note prior to signing, however some errors in grammar and context are still possible. If you have any questions related to this note  please do not hesitate to contact our office.

## 2024-09-13 DIAGNOSIS — R60.0 BILATERAL LEG EDEMA: ICD-10-CM

## 2024-10-08 ENCOUNTER — PHARMACY VISIT (OUTPATIENT)
Dept: PHARMACY | Facility: MEDICAL CENTER | Age: 69
End: 2024-10-08
Payer: COMMERCIAL

## 2024-10-08 PROCEDURE — RXMED WILLOW AMBULATORY MEDICATION CHARGE: Performed by: INTERNAL MEDICINE

## 2024-10-08 RX ORDER — INFLUENZA A VIRUS A/VICTORIA/4897/2022 IVR-238 (H1N1) ANTIGEN (FORMALDEHYDE INACTIVATED), INFLUENZA A VIRUS A/CALIFORNIA/122/2022 SAN-022 (H3N2) ANTIGEN (FORMALDEHYDE INACTIVATED), AND INFLUENZA B VIRUS B/MICHIGAN/01/2021 ANTIGEN (FORMALDEHYDE INACTIVATED) 60; 60; 60 UG/.5ML; UG/.5ML; UG/.5ML
0.5 INJECTION, SUSPENSION INTRAMUSCULAR
Qty: 0.5 ML | Refills: 0 | OUTPATIENT
Start: 2024-10-08

## 2024-10-21 PROCEDURE — RXMED WILLOW AMBULATORY MEDICATION CHARGE: Performed by: INTERNAL MEDICINE

## 2024-10-22 ENCOUNTER — PHARMACY VISIT (OUTPATIENT)
Dept: PHARMACY | Facility: MEDICAL CENTER | Age: 69
End: 2024-10-22
Payer: COMMERCIAL

## 2024-11-08 ENCOUNTER — HOSPITAL ENCOUNTER (OUTPATIENT)
Dept: LAB | Facility: MEDICAL CENTER | Age: 69
End: 2024-11-08
Attending: FAMILY MEDICINE
Payer: MEDICARE

## 2024-11-08 ENCOUNTER — HOSPITAL ENCOUNTER (OUTPATIENT)
Dept: LAB | Facility: MEDICAL CENTER | Age: 69
End: 2024-11-08
Attending: INTERNAL MEDICINE
Payer: MEDICARE

## 2024-11-08 DIAGNOSIS — R73.03 PREDIABETES: ICD-10-CM

## 2024-11-08 DIAGNOSIS — E78.00 ELEVATED LDL CHOLESTEROL LEVEL: ICD-10-CM

## 2024-11-08 LAB
25(OH)D3 SERPL-MCNC: 71 NG/ML (ref 30–100)
ALBUMIN SERPL BCP-MCNC: 4.2 G/DL (ref 3.2–4.9)
ALBUMIN/GLOB SERPL: 1.4 G/DL
ALP SERPL-CCNC: 52 U/L (ref 30–99)
ALT SERPL-CCNC: 17 U/L (ref 2–50)
ANION GAP SERPL CALC-SCNC: 12 MMOL/L (ref 7–16)
AST SERPL-CCNC: 23 U/L (ref 12–45)
BASOPHILS # BLD AUTO: 1.3 % (ref 0–1.8)
BASOPHILS # BLD: 0.05 K/UL (ref 0–0.12)
BILIRUB SERPL-MCNC: 0.5 MG/DL (ref 0.1–1.5)
BUN SERPL-MCNC: 11 MG/DL (ref 8–22)
CALCIUM ALBUM COR SERPL-MCNC: 10.1 MG/DL (ref 8.5–10.5)
CALCIUM SERPL-MCNC: 10.3 MG/DL (ref 8.5–10.5)
CHLORIDE SERPL-SCNC: 105 MMOL/L (ref 96–112)
CHOLEST SERPL-MCNC: 203 MG/DL (ref 100–199)
CO2 SERPL-SCNC: 22 MMOL/L (ref 20–33)
CREAT SERPL-MCNC: 0.58 MG/DL (ref 0.5–1.4)
EOSINOPHIL # BLD AUTO: 0.13 K/UL (ref 0–0.51)
EOSINOPHIL NFR BLD: 3.5 % (ref 0–6.9)
ERYTHROCYTE [DISTWIDTH] IN BLOOD BY AUTOMATED COUNT: 51.7 FL (ref 35.9–50)
EST. AVERAGE GLUCOSE BLD GHB EST-MCNC: 131 MG/DL
GFR SERPLBLD CREATININE-BSD FMLA CKD-EPI: 98 ML/MIN/1.73 M 2
GLOBULIN SER CALC-MCNC: 2.9 G/DL (ref 1.9–3.5)
GLUCOSE SERPL-MCNC: 101 MG/DL (ref 65–99)
HBA1C MFR BLD: 6.2 % (ref 4–5.6)
HCT VFR BLD AUTO: 40.7 % (ref 37–47)
HDLC SERPL-MCNC: 57 MG/DL
HGB BLD-MCNC: 13.8 G/DL (ref 12–16)
IMM GRANULOCYTES # BLD AUTO: 0.01 K/UL (ref 0–0.11)
IMM GRANULOCYTES NFR BLD AUTO: 0.3 % (ref 0–0.9)
LDLC SERPL CALC-MCNC: 121 MG/DL
LYMPHOCYTES # BLD AUTO: 0.95 K/UL (ref 1–4.8)
LYMPHOCYTES NFR BLD: 25.6 % (ref 22–41)
MAGNESIUM SERPL-MCNC: 1.9 MG/DL (ref 1.5–2.5)
MCH RBC QN AUTO: 31.7 PG (ref 27–33)
MCHC RBC AUTO-ENTMCNC: 33.9 G/DL (ref 32.2–35.5)
MCV RBC AUTO: 93.3 FL (ref 81.4–97.8)
MONOCYTES # BLD AUTO: 0.32 K/UL (ref 0–0.85)
MONOCYTES NFR BLD AUTO: 8.6 % (ref 0–13.4)
NEUTROPHILS # BLD AUTO: 2.25 K/UL (ref 1.82–7.42)
NEUTROPHILS NFR BLD: 60.7 % (ref 44–72)
NRBC # BLD AUTO: 0 K/UL
NRBC BLD-RTO: 0 /100 WBC (ref 0–0.2)
PHOSPHATE SERPL-MCNC: 3.2 MG/DL (ref 2.5–4.5)
PLATELET # BLD AUTO: 283 K/UL (ref 164–446)
PMV BLD AUTO: 9.4 FL (ref 9–12.9)
POTASSIUM SERPL-SCNC: 4.6 MMOL/L (ref 3.6–5.5)
PROT SERPL-MCNC: 7.1 G/DL (ref 6–8.2)
RBC # BLD AUTO: 4.36 M/UL (ref 4.2–5.4)
SODIUM SERPL-SCNC: 139 MMOL/L (ref 135–145)
TRIGL SERPL-MCNC: 124 MG/DL (ref 0–149)
TSH SERPL DL<=0.005 MIU/L-ACNC: 1.21 UIU/ML (ref 0.38–5.33)
VIT B12 SERPL-MCNC: 844 PG/ML (ref 211–911)
WBC # BLD AUTO: 3.7 K/UL (ref 4.8–10.8)

## 2024-11-08 PROCEDURE — 36415 COLL VENOUS BLD VENIPUNCTURE: CPT

## 2024-11-08 PROCEDURE — 84443 ASSAY THYROID STIM HORMONE: CPT

## 2024-11-08 PROCEDURE — 82306 VITAMIN D 25 HYDROXY: CPT

## 2024-11-08 PROCEDURE — 84100 ASSAY OF PHOSPHORUS: CPT

## 2024-11-08 PROCEDURE — 82607 VITAMIN B-12: CPT

## 2024-11-08 PROCEDURE — 80053 COMPREHEN METABOLIC PANEL: CPT

## 2024-11-08 PROCEDURE — 83735 ASSAY OF MAGNESIUM: CPT

## 2024-11-08 PROCEDURE — 85025 COMPLETE CBC W/AUTO DIFF WBC: CPT

## 2024-11-08 PROCEDURE — 80061 LIPID PANEL: CPT

## 2024-11-08 PROCEDURE — 83036 HEMOGLOBIN GLYCOSYLATED A1C: CPT

## 2024-11-13 ENCOUNTER — TELEPHONE (OUTPATIENT)
Dept: CARDIOLOGY | Facility: MEDICAL CENTER | Age: 69
End: 2024-11-13
Payer: MEDICARE

## 2024-11-20 ENCOUNTER — OFFICE VISIT (OUTPATIENT)
Dept: CARDIOLOGY | Facility: MEDICAL CENTER | Age: 69
End: 2024-11-20
Attending: FAMILY MEDICINE
Payer: MEDICARE

## 2024-11-20 VITALS
HEART RATE: 95 BPM | WEIGHT: 196 LBS | HEIGHT: 59 IN | SYSTOLIC BLOOD PRESSURE: 122 MMHG | DIASTOLIC BLOOD PRESSURE: 88 MMHG | OXYGEN SATURATION: 95 % | RESPIRATION RATE: 16 BRPM | BODY MASS INDEX: 39.51 KG/M2

## 2024-11-20 DIAGNOSIS — C34.90 MALIGNANT NEOPLASM OF LUNG, UNSPECIFIED LATERALITY, UNSPECIFIED PART OF LUNG (HCC): ICD-10-CM

## 2024-11-20 DIAGNOSIS — C50.919 MALIGNANT NEOPLASM OF FEMALE BREAST, UNSPECIFIED ESTROGEN RECEPTOR STATUS, UNSPECIFIED LATERALITY, UNSPECIFIED SITE OF BREAST (HCC): ICD-10-CM

## 2024-11-20 DIAGNOSIS — E66.01 CLASS 2 SEVERE OBESITY WITH SERIOUS COMORBIDITY AND BODY MASS INDEX (BMI) OF 39.0 TO 39.9 IN ADULT, UNSPECIFIED OBESITY TYPE (HCC): ICD-10-CM

## 2024-11-20 DIAGNOSIS — I37.1 MILD PULMONARY VALVE REGURGITATION: ICD-10-CM

## 2024-11-20 DIAGNOSIS — R06.09 DYSPNEA ON EXERTION: ICD-10-CM

## 2024-11-20 DIAGNOSIS — E66.812 CLASS 2 SEVERE OBESITY WITH SERIOUS COMORBIDITY AND BODY MASS INDEX (BMI) OF 39.0 TO 39.9 IN ADULT, UNSPECIFIED OBESITY TYPE (HCC): ICD-10-CM

## 2024-11-20 DIAGNOSIS — I34.0 MILD MITRAL REGURGITATION: ICD-10-CM

## 2024-11-20 DIAGNOSIS — I25.119 CORONARY ARTERY DISEASE INVOLVING NATIVE CORONARY ARTERY OF NATIVE HEART WITH ANGINA PECTORIS (HCC): ICD-10-CM

## 2024-11-20 DIAGNOSIS — I07.1 MILD TRICUSPID REGURGITATION: ICD-10-CM

## 2024-11-20 DIAGNOSIS — M79.89 LEG SWELLING: ICD-10-CM

## 2024-11-20 DIAGNOSIS — I82.561 CHRONIC DEEP VEIN THROMBOSIS (DVT) OF CALF MUSCLE VEIN OF RIGHT LOWER EXTREMITY (HCC): ICD-10-CM

## 2024-11-20 DIAGNOSIS — R60.0 BILATERAL LEG EDEMA: ICD-10-CM

## 2024-11-20 LAB — EKG IMPRESSION: NORMAL

## 2024-11-20 PROCEDURE — 93005 ELECTROCARDIOGRAM TRACING: CPT | Performed by: INTERNAL MEDICINE

## 2024-11-20 PROCEDURE — 3074F SYST BP LT 130 MM HG: CPT | Performed by: INTERNAL MEDICINE

## 2024-11-20 PROCEDURE — G2211 COMPLEX E/M VISIT ADD ON: HCPCS | Performed by: INTERNAL MEDICINE

## 2024-11-20 PROCEDURE — 3079F DIAST BP 80-89 MM HG: CPT | Performed by: INTERNAL MEDICINE

## 2024-11-20 PROCEDURE — 93010 ELECTROCARDIOGRAM REPORT: CPT | Performed by: INTERNAL MEDICINE

## 2024-11-20 PROCEDURE — 99204 OFFICE O/P NEW MOD 45 MIN: CPT | Performed by: INTERNAL MEDICINE

## 2024-11-20 PROCEDURE — 99212 OFFICE O/P EST SF 10 MIN: CPT | Performed by: INTERNAL MEDICINE

## 2024-11-20 ASSESSMENT — ENCOUNTER SYMPTOMS
HEADACHES: 0
BRUISES/BLEEDS EASILY: 0
WEIGHT LOSS: 0
EYE DISCHARGE: 0
FALLS: 0
LOSS OF CONSCIOUSNESS: 0
ABDOMINAL PAIN: 0
PALPITATIONS: 0
NAUSEA: 0
MYALGIAS: 0
BLURRED VISION: 0
DEPRESSION: 0
DIZZINESS: 0
SPEECH CHANGE: 0
ORTHOPNEA: 0
DOUBLE VISION: 0
PND: 0
SENSORY CHANGE: 0
COUGH: 0
BLOOD IN STOOL: 0
HALLUCINATIONS: 0
CLAUDICATION: 0
VOMITING: 0
CHILLS: 0
EYE PAIN: 0
SHORTNESS OF BREATH: 0
FEVER: 0

## 2024-11-20 ASSESSMENT — FIBROSIS 4 INDEX: FIB4 SCORE: 1.34

## 2024-11-20 NOTE — PROGRESS NOTES
"Chief Complaint   Patient presents with    Other     F/V Dx: Bilateral leg edema    New Patient    Deep Vein Thrombosis     F/V Dx: Chronic deep vein thrombosis (DVT) of calf muscle vein of right lower extremity (HCC)       Subjective     Indu Leblanc is a 69 y.o. female who presents today due to abnormal TTE showing mild TR, MR, mild to moderate SD. Hx of breast and lung cancer (both are in remission). Chronic DVT in right leg which resolved on most recent venous duplex seen in 07/2024.    I have independently interpreted and reviewed blood tests results with patient in clinic which shows LDL level of 121, triglycerides level of 124, GFR of 98, K of 4.6. Hgba1c of 6.2.    I have independently interpreted and reviewed echocardiogram's actual images with patient which showed normal left ventricular systolic function. No wall motion abnormality. Mild TR, MR, mild to moderate SD.    I have personally interpreted EKG today with patient, there is no evidence of acute coronary syndrome, no evidence of prior infarct, normal SD and QT interval, no significant conduction disease. Sinus rhythm.    Patient gets winded with daily living activities and exertion. No symptoms at rest.    Also for vascular evaluation of persistent symptoms of lower extremities edema, pain in setting of possible venous insufficiency and reflux disease.  Patient has not tried compression stockings. Patient is now seeking for invasive intervention for symptomatic relief and improving quality of life.  No prior vein treatments.  No prior vein stripping surgery.      Past Medical History:   Diagnosis Date    Anemia 06/01/2017    \"D/T Chemo\"    Anesthesia 06/01/2017    PONV    Cancer (HCC) 08/04/2016    Right Breast Treated With Chemo    Cancer (HCC) 2016    Lung CA    Cataract     IOL OU    Dental disorder     dental implant bottom     Fall 04/20/2020    Impacted cerumen of right ear 11/08/2021    Left ear pain 11/15/2023    New  issue. Left ear " "pain x 3 weeks before she went to  last week on 11/8/23.  Tried sudafed and afrin without much improvement.  No hearing loss  Continues to have left ear pain.  Also had some episodes of dizziness, vertigo.  No hx recent URI.      Obesity (BMI 30-39.9) 06/28/2024    Osteoarthritis 06/01/2017    \"all over\"    Port catheter in place 06/01/2017    Left Side    Snoring     Urinary tract infection      Past Surgical History:   Procedure Laterality Date    ORIF, FRACTURE, FEMUR Right 4/20/2020    Procedure: ORIF, FRACTURE,  DISTAL FEMUR;  Surgeon: Christian Ag M.D.;  Location: SURGERY [facility];  Service: Orthopedics    TISSUE EXPANDER PLACE/REMOVE Bilateral 6/8/2017    Procedure: TISSUE EXPANDER PLACE/REMOVE;  Surgeon: Everardo Matthews M.D.;  Location: SURGERY SAME DAY [facility];  Service:     BREAST IMPLANT REVISION Bilateral 6/8/2017    Procedure: BREAST IMPLANT;  Surgeon: Everardo Matthews M.D.;  Location: SURGERY SAME DAY [facility];  Service:     CAPSULOTOMY Bilateral 6/8/2017    Procedure: CAPSULOTOMY FOR PARTIAL CAPSULECTOMIES;  Surgeon: Everardo Matthews M.D.;  Location: SURGERY SAME DAY [facility];  Service:     BREAST RECONSTRUCTION Bilateral 6/8/2017    Procedure: BREAST RECONSTRUCTION USING LOCAL TISSUE & FAT GRAFTING;  Surgeon: Everardo Matthews M.D.;  Location: SURGERY SAME DAY [facility];  Service:     THORACOSCOPY Right 5/1/2017    Procedure: THORACOSCOPY, WEDGE RESECTION LOWER LOBE MASS;  Surgeon: John H Ganser, M.D.;  Location: SURGERY [facility];  Service:     CATH PLACEMENT Left 3/17/2017    Procedure: CATH PLACEMENT FOR SUBCLAVIAN PORT LEFT;  Surgeon: Carly Wright M.D.;  Location: SURGERY [facility];  Service:     LUNG NEEDLE BIOPSY  02-    TISSUE EXPANDER PLACE/REMOVE Bilateral 1/19/2017    Procedure: TISSUE EXPANDER PLACEment;  Surgeon: Everardo Matthews M.D.;  Location: SURGERY SAME DAY [facility];  Service:     FLAP GRAFT  1/19/2017    Procedure: " FLAP GRAFT- FOR DERMAL ADIPOFASCIAL FLAPS ;  Surgeon: Everardo Matthews M.D.;  Location: SURGERY SAME DAY [facility];  Service:     MASTECTOMY Bilateral 1/19/2017    Procedure: MASTECTOMY PROPHYLACTIC LEFT, AND RIGHT TOTAL MASTECTOMY;  Surgeon: Carly Wright M.D.;  Location: SURGERY SAME DAY [facility];  Service:     AXILLARY NODE DISSECTION Right 1/19/2017    Procedure: AXILLARY NODE DISSECTION;  Surgeon: Carly Wright M.D.;  Location: SURGERY SAME DAY [facility];  Service:     CATH PLACEMENT Left 1/19/2017    Procedure: Removal of left subclavian port ;  Surgeon: Carly Wright M.D.;  Location: SURGERY SAME DAY [facility];  Service:     CATH PLACEMENT Left 9/5/2016    Procedure: CATH PLACEMENT - SUBCLAVIAN PORT - SIDE TO BE DETERMINED;  Surgeon: Carly Wright M.D.;  Location: SURGERY [facility];  Service:     STEREOTACTIC BIOPSY Right 08/10/2016    HIP ARTHROPLASTY TOTAL Right 2014    OTHER ORTHOPEDIC SURGERY  6/2012    right hip arthroplasty    CATARACT PHACO WITH IOL Bilateral     GYN SURGERY  Approx 2013    Hysterectomy     Family History   Problem Relation Age of Onset    Lung Disease Mother     Arthritis Father     Heart Disease Father      Social History     Socioeconomic History    Marital status:      Spouse name: Not on file    Number of children: Not on file    Years of education: Not on file    Highest education level: Master's degree (e.g., MA, MS, Shasha, MEd, MSW, OSM[employer])   Occupational History    Not on file   Tobacco Use    Smoking status: Never    Smokeless tobacco: Never   Vaping Use    Vaping status: Never Used   Substance and Sexual Activity    Alcohol use: Yes     Alcohol/week: 0.0 oz     Comment: very rarely    Drug use: No    Sexual activity: Not Currently     Partners: Male   Other Topics Concern    Not on file   Social History Narrative    Retired teacher, early education special .          1 son     Social Drivers of Health      Financial Resource Strain: Low Risk  (6/28/2024)    Overall Financial Resource Strain (CARDIA)     Difficulty of Paying Living Expenses: Not hard at all   Food Insecurity: No Food Insecurity (6/28/2024)    Hunger Vital Sign     Worried About Running Out of Food in the Last Year: Never true     Ran Out of Food in the Last Year: Never true   Transportation Needs: No Transportation Needs (6/28/2024)    PRAPARE - Transportation     Lack of Transportation (Medical): No     Lack of Transportation (Non-Medical): No   Physical Activity: Insufficiently Active (3/11/2021)    Exercise Vital Sign     Days of Exercise per Week: 2 days     Minutes of Exercise per Session: 20 min   Stress: No Stress Concern Present (3/11/2021)    [employer] - Occupational Stress Questionnaire     Feeling of Stress : Not at all   Social Connections: Socially Integrated (3/11/2021)    Social Connection and Isolation Panel [NHANES]     Frequency of Communication with Friends and Family: More than three times a week     Frequency of Social Gatherings with Friends and Family: Once a week     Attends [employer] Services: 1 to 4 times per year     Active Member of Clubs or Organizations: Yes     Attends Club or Organization Meetings: More than 4 times per year     Marital Status:    Intimate Partner Violence: Not on file   Housing Stability: Low Risk  (6/28/2024)    Housing Stability Vital Sign     Unable to Pay for Housing in the Last Year: No     Number of Places Lived in the Last Year: 1     Unstable Housing in the Last Year: No     Allergies   Allergen Reactions    Povidone Iodine Rash    Sulfa Drugs Unspecified      RXN=As a child unsure of reaction    Morphine      Nausea and vomiting     Outpatient Encounter Medications as of 11/20/2024   Medication Sig Dispense Refill    denosumab (PROLIA) 60 MG/ML Solution Prefilled Syringe injection Inject 60 mg under the skin every 6 months.      COVID-19 mRNA  Vac-Lauryn,Pfizer, (COMIRNATY) 30 MCG/0.3ML Suspension Prefilled Syringe injection Inject 0.3 mL into the shoulder, thigh, or buttocks. 0.3 mL 0    influenza vaccine High-Dose (FLUZONE HIGH-DOSE) 0.5 ML Suspension Prefilled Syringe injection Inject 0.5 mL into the shoulder, thigh, or buttocks. 0.5 mL 0    omeprazole (PRILOSEC) 20 MG delayed-release capsule Take 1 Capsule by mouth every day. 100 Capsule 3    fluticasone (FLONASE) 50 MCG/ACT nasal spray Administer 1 Spray into affected nostril(S) every day.      Azelastine (ASTELIN) 137 MCG/SPRAY Solution INHALE 2 SPRAYS NASALLY 2 TIMES A DAY      ipratropium (ATROVENT) 0.06 % Solution Administer 2 Sprays into affected nostril(S) 4 times a day.      acetaminophen (TYLENOL) 500 MG Tab Take 1-2 Tablets by mouth every 6 hours as needed for Mild Pain.       No facility-administered encounter medications on file as of 11/20/2024.     Review of Systems   Constitutional:  Negative for chills, fever, malaise/fatigue and weight loss.   HENT:  Negative for ear discharge, ear pain, hearing loss and nosebleeds.    Eyes:  Negative for blurred vision, double vision, pain and discharge.   Respiratory:  Negative for cough and shortness of breath.    Cardiovascular:  Negative for chest pain, palpitations, orthopnea, claudication, leg swelling and PND.   Gastrointestinal:  Negative for abdominal pain, blood in stool, melena, nausea and vomiting.   Genitourinary:  Negative for dysuria and hematuria.   Musculoskeletal:  Negative for falls, joint pain and myalgias.   Skin:  Negative for itching and rash.   Neurological:  Negative for dizziness, sensory change, speech change, loss of consciousness and headaches.   Endo/Heme/Allergies:  Negative for environmental allergies. Does not bruise/bleed easily.   Psychiatric/Behavioral:  Negative for depression, hallucinations and suicidal ideas.               Objective     /88 (BP Location: Left arm, Patient Position: Sitting, BP Cuff Size:  "Adult)   Pulse 95   Resp 16   Ht 1.499 m (4' 11\")   Wt 88.9 kg (196 lb)   SpO2 95%   BMI 39.59 kg/m²     Physical Exam  Vitals and nursing note reviewed.   Constitutional:       General: She is not in acute distress.     Appearance: She is not diaphoretic.   HENT:      Head: Normocephalic and atraumatic.      Right Ear: External ear normal.      Left Ear: External ear normal.      Nose: No congestion or rhinorrhea.   Eyes:      General:         Right eye: No discharge.         Left eye: No discharge.   Neck:      Thyroid: No thyromegaly.      Vascular: No JVD.   Cardiovascular:      Rate and Rhythm: Normal rate and regular rhythm.      Pulses: Normal pulses.      Heart sounds: Murmur heard.   Pulmonary:      Effort: No respiratory distress.   Abdominal:      General: There is no distension.      Tenderness: There is no abdominal tenderness.   Musculoskeletal:         General: No swelling or tenderness.      Right lower leg: No edema.      Left lower leg: No edema.      Comments: + varicose veins without evidence of ulcer, + discoloration.     Skin:     General: Skin is warm and dry.   Neurological:      Mental Status: She is alert and oriented to person, place, and time.      Cranial Nerves: No cranial nerve deficit.   Psychiatric:         Behavior: Behavior normal.                Assessment & Plan     1. Chronic deep vein thrombosis (DVT) of calf muscle vein of right lower extremity (HCC)  EKG    US-EXTREMITY VENOUS LOWER BILAT    US-EXTREMITY VENOUS LOWER BILAT      2. Leg swelling  US-EXTREMITY VENOUS LOWER BILAT    Elastic Support Stockings    US-EXTREMITY VENOUS LOWER BILAT      3. Dyspnea on exertion  [id number] PET W/CT ATTENUATION    proBrain Natriuretic Peptide, NT    Referral to Pulmonary and Sleep Medicine    proBrain Natriuretic Peptide, NT      4. Mild mitral regurgitation        5. Mild tricuspid regurgitation        6. Mild pulmonary valve regurgitation        7. Malignant neoplasm of " female breast, unspecified estrogen receptor status, unspecified laterality, unspecified site of breast (HCC) CURRENTLY IN REMISSION        8. Malignant neoplasm of lung, unspecified laterality, unspecified part of lung (HCC) CURRENTLY IN REMISSION        9. Coronary artery disease involving native coronary artery of native heart with angina pectoris (HCC)  HA-NLEOW-AXLZYVD PET W/CT ATTENUATION      10. Class 2 severe obesity with serious comorbidity and body mass index (BMI) of 39.0 to 39.9 in adult, unspecified obesity type (HCC)  Referral to Pulmonary and Sleep Medicine    Basic Metabolic Panel    LIPID PANEL    Lipoprotein (a)      11. Bilateral leg edema [R60.0]            Medical Decision Making: Today's Assessment/Status/Plan:   At this time, to further risk stratify, I will order a myocardial PET scan stress test to assess for coronary ischemia.    We will also check lower extremities venous duplex to assess the anatomy for her lower extremities venous system.    Today, based on physical examination findings, patient is euvolemic. No JVD, lungs are clear to auscultation, no pitting edema in bilateral lower extremities, no ascites.    Dry weight is 196 lbs.    Blood pressure is well controlled.    This visit encounter signifies the visit complexity inherent to evaluation and management associated with medical care services that serve as the continuing focal point for all needed health care services and/or with medical care services that are part of ongoing care related to this patient's single, serious condition, complex cardiac condition.    Dunia Rowell M.D.

## 2024-11-26 ENCOUNTER — APPOINTMENT (OUTPATIENT)
Dept: MEDICAL GROUP | Facility: PHYSICIAN GROUP | Age: 69
End: 2024-11-26
Payer: MEDICARE

## 2024-11-26 VITALS
SYSTOLIC BLOOD PRESSURE: 118 MMHG | DIASTOLIC BLOOD PRESSURE: 74 MMHG | HEART RATE: 92 BPM | WEIGHT: 195.4 LBS | TEMPERATURE: 97.9 F | OXYGEN SATURATION: 98 % | HEIGHT: 59 IN | BODY MASS INDEX: 39.39 KG/M2

## 2024-11-26 DIAGNOSIS — H92.09 OTALGIA, UNSPECIFIED LATERALITY: ICD-10-CM

## 2024-11-26 DIAGNOSIS — L98.9 SKIN LESION: ICD-10-CM

## 2024-11-26 DIAGNOSIS — E78.00 ELEVATED LDL CHOLESTEROL LEVEL: ICD-10-CM

## 2024-11-26 DIAGNOSIS — R73.03 PREDIABETES: ICD-10-CM

## 2024-11-26 PROCEDURE — 99214 OFFICE O/P EST MOD 30 MIN: CPT | Performed by: FAMILY MEDICINE

## 2024-11-26 PROCEDURE — 3078F DIAST BP <80 MM HG: CPT | Performed by: FAMILY MEDICINE

## 2024-11-26 PROCEDURE — 3074F SYST BP LT 130 MM HG: CPT | Performed by: FAMILY MEDICINE

## 2024-11-26 RX ORDER — METFORMIN HYDROCHLORIDE 500 MG/1
500 TABLET, EXTENDED RELEASE ORAL DAILY
Qty: 100 TABLET | Refills: 3 | Status: SHIPPED | OUTPATIENT
Start: 2024-11-26

## 2024-11-26 RX ORDER — ROSUVASTATIN CALCIUM 10 MG/1
10 TABLET, COATED ORAL EVERY EVENING
Qty: 100 TABLET | Refills: 3 | Status: SHIPPED | OUTPATIENT
Start: 2024-11-26

## 2024-11-26 ASSESSMENT — ENCOUNTER SYMPTOMS
SHORTNESS OF BREATH: 1
PALPITATIONS: 1

## 2024-11-26 ASSESSMENT — FIBROSIS 4 INDEX: FIB4 SCORE: 1.34

## 2024-11-26 NOTE — PROGRESS NOTES
Verbal consent was acquired by the patient to use [employer] ambient listening note generation during this visit         History of Present Illness  The patient presents today for a follow-up visit. She was last seen on 08/27/2024.    She reports experiencing ear pain and is seeking an examination. She has previously consulted with an ENT specialist who recommended a test to determine if the issue is ear-related or neurological. However, she postponed this test due to a scheduled PET scan with her cardiologist, Dr. Rowell. She mentions a throbbing pain in her ears that occurs a few times a day but subsides on its own. She admits to not taking her allergy medication recently.    She has noticed a recurrence of precancerous lesions on her lip and a new lesion on her leg. She is requesting a referral to a dermatologist for treatment, preferably after [place]. She has been under the care of a dermatologist in [place] and is open to returning there. She describes the lesion on her leg as initially resembling a pimple, which then shriveled up and now appears dead. She also mentions a spot on her lip that she tends to pick at, which was previously identified as precancerous by a nurse practitioner.    She is questioning whether she needs to start taking metformin again. She has been on this medication for several years but experienced stomach issues, which she managed by taking it at night. She has recently taken up swimming and is making an effort to monitor her diet, particularly her carbohydrate intake.    She is also inquiring about the need for cholesterol medication. She has done some research and is preparing herself for potential treatment.    She saw her cardiologist, Dr. Rowell, last week who ordered a cardiac PET scan. She has to go back to him in 02/2025. He also wants her to have another extremity venous lower block, cardiac PET, and a referral to pulmonary and sleep medicine. She has to call and make that  "appointment. She also has compression stockings now and wears them most of the time. The only time she takes them off is when she goes to bed or goes swimming. She had a quick EKG done which looked good. He did hear a murmur. She gets palpitations once in a while. She also has difficulty breathing sometimes. She had her Prolia injection a couple of weeks ago. Her white blood cells are still low at 3.7. She is seeing Dr. Tierney on 05/06/2025.    IMMUNIZATIONS  She is up to date on her influenza, pneumonia, shingles, and RSV vaccines. She also had her COVID-19 vaccine recently.      Review of Systems   HENT:  Positive for ear pain (bilateral).    Respiratory:  Positive for shortness of breath.    Cardiovascular:  Positive for palpitations (infrequent).       Outpatient Medications Marked as Taking for the 11/26/24 encounter (Office Visit) with Marnie Cortez M.D.   Medication Sig Dispense Refill    metFORMIN ER (GLUCOPHAGE XR) 500 MG TABLET SR 24 HR Take 1 Tablet by mouth every day. 100 Tablet 3    rosuvastatin (CRESTOR) 10 MG Tab Take 1 Tablet by mouth every evening. 100 Tablet 3    denosumab (PROLIA) 60 MG/ML Solution Prefilled Syringe injection Inject 60 mg under the skin every 6 months.      omeprazole (PRILOSEC) 20 MG delayed-release capsule Take 1 Capsule by mouth every day. 100 Capsule 3    fluticasone (FLONASE) 50 MCG/ACT nasal spray Administer 1 Spray into affected nostril(S) every day.      Azelastine (ASTELIN) 137 MCG/SPRAY Solution INHALE 2 SPRAYS NASALLY 2 TIMES A DAY      ipratropium (ATROVENT) 0.06 % Solution Administer 2 Sprays into affected nostril(S) 4 times a day.      acetaminophen (TYLENOL) 500 MG Tab Take 1-2 Tablets by mouth every 6 hours as needed for Mild Pain.         /74   Pulse 92   Temp 36.6 °C (97.9 °F) (Temporal)   Ht 1.499 m (4' 11\")   Wt 88.6 kg (195 lb 6.4 oz)   SpO2 98% , Body mass index is 39.47 kg/m².        Physical Exam  Constitutional:       Appearance: Normal " appearance.   HENT:      Mouth/Throat:        Comments: Upper lip lesion  Eyes:      Extraocular Movements: Extraocular movements intact.   Cardiovascular:      Rate and Rhythm: Normal rate and regular rhythm.   Pulmonary:      Effort: Pulmonary effort is normal.      Breath sounds: Normal breath sounds.   Neurological:      Mental Status: She is alert.   Psychiatric:         Mood and Affect: Mood normal.         Behavior: Behavior normal.         Results  Laboratory Studies  A1c increased from 5.8 to 6.2%. Triglycerides, LDL, and cholesterol levels increased. White blood cell count is 3.7.       Assessment & Plan  1. Ear pain.  New issue. Stable. The sensation of fluid in the ears could be attributed to congestion. She has experienced throbbing pain in the right ear, which occurs a couple of times during the day and then subsides. She has not been taking her allergy medications regularly, which might be contributing to the symptoms.     2. Prediabetes.  Chronic medical diagnosis.  Not well-controlled.  Her A1c has increased slightly over the past 6 months, from 5.8 to 6.2 percent, indicating a prediabetic state. She has lost a pound since the last visit. Metformin 500 mg will be prescribed, to be taken once daily with dinner. Blood work will be repeated before the next appointment to monitor the effectiveness of the medication.    3. Hyperlipidemia.  Chronic. Not well controlled. There has been a slight increase in her triglycerides, LDL, and cholesterol levels. Her blood pressure is well-controlled. Rosuvastatin 10mg will be prescribed, to be taken at bedtime. Blood work will be repeated before the next appointment to monitor the effectiveness of the medication.    4. Skin lesions.  Chronic issues. Stable. She has a recurring precancerous lesion on her lip and a suspicious spot on her leg. A referral to a dermatologist will be made to evaluate and potentially freeze off the lesions.     5. Medication  Management.  She has been prescribed Metformin and Rosuvastatin. Metformin should be taken once daily with dinner, and Rosuvastatin should be taken at bedtime. Blood work will be repeated before the next appointment to monitor the effectiveness of the medications.    Follow-up  Return in 3 months for follow up.    Orders Placed This Encounter    CBC WITH DIFFERENTIAL    Comp Metabolic Panel    Lipid Profile    HEMOGLOBIN A1C    Referral to Dermatology    metFORMIN ER (GLUCOPHAGE XR) 500 MG TABLET SR 24 HR    rosuvastatin (CRESTOR) 10 MG Tab             This note was created using voice recognition software (Dragon). The accuracy of the dictation is limited by the abilities of the software. I have reviewed the note prior to signing, however some errors in grammar and context are still possible. If you have any questions related to this note please do not hesitate to contact our office.

## 2024-12-06 ENCOUNTER — HOSPITAL ENCOUNTER (OUTPATIENT)
Dept: RADIOLOGY | Facility: MEDICAL CENTER | Age: 69
End: 2024-12-06
Attending: INTERNAL MEDICINE
Payer: MEDICARE

## 2024-12-06 DIAGNOSIS — I25.119 CORONARY ARTERY DISEASE INVOLVING NATIVE CORONARY ARTERY OF NATIVE HEART WITH ANGINA PECTORIS (HCC): ICD-10-CM

## 2024-12-06 DIAGNOSIS — R06.09 DYSPNEA ON EXERTION: ICD-10-CM

## 2024-12-06 PROCEDURE — 78431 MYOCRD IMG PET RST&STRS CT: CPT

## 2024-12-09 PROCEDURE — 93018 CV STRESS TEST I&R ONLY: CPT | Performed by: INTERNAL MEDICINE

## 2024-12-09 PROCEDURE — 78431 MYOCRD IMG PET RST&STRS CT: CPT | Mod: 26 | Performed by: INTERNAL MEDICINE

## 2024-12-16 ENCOUNTER — APPOINTMENT (OUTPATIENT)
Dept: URGENT CARE | Facility: CLINIC | Age: 69
End: 2024-12-16
Payer: MEDICARE

## 2024-12-16 ENCOUNTER — OFFICE VISIT (OUTPATIENT)
Dept: URGENT CARE | Facility: CLINIC | Age: 69
End: 2024-12-16
Payer: MEDICARE

## 2024-12-16 VITALS
TEMPERATURE: 97.5 F | HEIGHT: 59 IN | HEART RATE: 108 BPM | SYSTOLIC BLOOD PRESSURE: 118 MMHG | DIASTOLIC BLOOD PRESSURE: 76 MMHG | BODY MASS INDEX: 38.71 KG/M2 | WEIGHT: 192 LBS | OXYGEN SATURATION: 95 %

## 2024-12-16 DIAGNOSIS — J06.9 VIRAL URI WITH COUGH: ICD-10-CM

## 2024-12-16 LAB
FLUAV RNA SPEC QL NAA+PROBE: NEGATIVE
FLUBV RNA SPEC QL NAA+PROBE: NEGATIVE
RSV RNA SPEC QL NAA+PROBE: NEGATIVE
SARS-COV-2 RNA RESP QL NAA+PROBE: NEGATIVE

## 2024-12-16 PROCEDURE — 0241U POCT CEPHEID COV-2, FLU A/B, RSV - PCR: CPT | Performed by: PHYSICIAN ASSISTANT

## 2024-12-16 PROCEDURE — 99213 OFFICE O/P EST LOW 20 MIN: CPT | Performed by: PHYSICIAN ASSISTANT

## 2024-12-16 PROCEDURE — 3078F DIAST BP <80 MM HG: CPT | Performed by: PHYSICIAN ASSISTANT

## 2024-12-16 PROCEDURE — 3074F SYST BP LT 130 MM HG: CPT | Performed by: PHYSICIAN ASSISTANT

## 2024-12-16 RX ORDER — BENZONATATE 100 MG/1
100 CAPSULE ORAL 3 TIMES DAILY PRN
Qty: 21 CAPSULE | Refills: 0 | Status: SHIPPED | OUTPATIENT
Start: 2024-12-16

## 2024-12-16 ASSESSMENT — FIBROSIS 4 INDEX: FIB4 SCORE: 1.34

## 2024-12-16 NOTE — PROGRESS NOTES
"Subjective:   Carmelina Leblanc is a 69 y.o. female who presents for URI (X3 days now)      HPI  The patient presents to the Urgent Care with complaints of cough onset 3 days ago.  Cough is minimally productive with a little bit of phlegm.  She feels tired.  Mild shortness of breath.  Denies any fever, chills, body aches, vomiting, or diarrhea. At home COVID test was negative yesterday.     Past Medical History:   Diagnosis Date    Anemia 06/01/2017    \"D/T Chemo\"    Anesthesia 06/01/2017    PONV    Cancer ([facility]) 08/04/2016    Right Breast Treated With Chemo    Cancer ([facility]) 2016    Lung CA    Cataract     IOL OU    Dental disorder     dental implant bottom     Fall 04/20/2020    Impacted cerumen of right ear 11/08/2021    Left ear pain 11/15/2023    New  issue. Left ear pain x 3 weeks before she went to  last week on 11/8/23.  Tried sudafed and afrin without much improvement.  No hearing loss  Continues to have left ear pain.  Also had some episodes of dizziness, vertigo.  No hx recent URI.      Obesity (BMI 30-39.9) 06/28/2024    Osteoarthritis 06/01/2017    \"all over\"    Port catheter in place 06/01/2017    Left Side    Snoring     Urinary tract infection      Allergies   Allergen Reactions    Povidone Iodine Rash    Sulfa Drugs Unspecified      RXN=As a child unsure of reaction    Morphine      Nausea and vomiting        Objective:     /76 (BP Location: Left arm, Patient Position: Sitting, BP Cuff Size: Large adult)   Pulse (!) 108   Temp 36.4 °C (97.5 °F) (Temporal)   Ht 1.499 m (4' 11\")   Wt 87.1 kg (192 lb)   SpO2 95%   BMI 38.78 kg/m²     Physical Exam  Vitals reviewed.   Constitutional:       General: She is not in acute distress.     Appearance: Normal appearance. She is not ill-appearing or toxic-appearing.   HENT:      Head: Normocephalic.      Mouth/Throat:      Mouth: Mucous membranes are moist.      Pharynx: Oropharynx is clear.   Eyes:      Conjunctiva/sclera: Conjunctivae normal.     "  Pupils: Pupils are equal, round, and reactive to light.   Cardiovascular:      Rate and Rhythm: Normal rate and regular rhythm.      Heart sounds: Normal heart sounds.   Pulmonary:      Effort: Pulmonary effort is normal. No respiratory distress.      Breath sounds: Normal breath sounds. No wheezing, rhonchi or rales.   Musculoskeletal:      Cervical back: Neck supple. No rigidity.   Lymphadenopathy:      Cervical: No cervical adenopathy.   Skin:     General: Skin is warm and dry.   Neurological:      General: No focal deficit present.      Mental Status: She is alert and oriented to person, place, and time.   Psychiatric:         Mood and Affect: Mood normal.         Behavior: Behavior normal.       Results for orders placed or performed in visit on 12/16/24   POCT CEPHEID COV-2, FLU A/B, RSV - PCR    Collection Time: 12/16/24  9:50 AM   Result Value Ref Range    SARS-CoV-2 by PCR Negative Negative, Invalid    Influenza virus A RNA Negative Negative, Invalid    Influenza virus B, PCR Negative Negative, Invalid    RSV, PCR Negative Negative, Invalid     *Note: Due to a large number of results and/or encounters for the requested time period, some results have not been displayed. A complete set of results can be found in Results Review.       Diagnosis and associated orders:     1. Viral URI with cough  - POCT CEPHEID COV-2, FLU A/B, RSV - PCR  - benzonatate (TESSALON) 100 MG Cap; Take 1 Capsule by mouth 3 times a day as needed for Cough.  Dispense: 21 Capsule; Refill: 0       Comments/MDM:     The patient's presenting symptoms and exam findings are consistent with a upper respiratory infection most likely viral etiology. They have a normal pulse oximetry on room air, afebrile, and a normal pulmonary exam. Overall, the patient is very well appearing. I do not feel that this patient would benefit from antibiotics at this time.   Recommended symptomatic and supportive care at this time that includes plenty of fluids,  rest, Tylenol/Ibuprofen for pain/fever, warm salt water gargles for sore throat, OTC cough and decongestant medication, Flonase, nasal saline washes. If no improvement in 5-7 days or any worsening symptoms, recommend returning to the urgent care for re-evaluation.        I personally reviewed prior external notes and test results pertinent to today's visit. Pathogenesis of diagnosis discussed including typical length and natural progression. Supportive care, natural history, differential diagnoses, and indications for immediate follow-up discussed. Patient expresses understanding and agrees to plan. Patient denies any other questions or concerns.     Follow-up with the primary care physician for recheck, reevaluation, and consideration of further management.    Please note that this dictation was created using voice recognition software. I have made a reasonable attempt to correct obvious errors, but I expect that there are errors of grammar and possibly content that I did not discover before finalizing the note.    This note was electronically signed by Jaya Pope PA-C

## 2025-02-07 ENCOUNTER — HOSPITAL ENCOUNTER (OUTPATIENT)
Dept: LAB | Facility: MEDICAL CENTER | Age: 70
End: 2025-02-07
Attending: FAMILY MEDICINE
Payer: MEDICARE

## 2025-02-07 DIAGNOSIS — R73.03 PREDIABETES: ICD-10-CM

## 2025-02-07 DIAGNOSIS — E78.00 ELEVATED LDL CHOLESTEROL LEVEL: ICD-10-CM

## 2025-02-07 LAB
ALBUMIN SERPL BCP-MCNC: 4.3 G/DL (ref 3.2–4.9)
ALBUMIN/GLOB SERPL: 1.6 G/DL
ALP SERPL-CCNC: 44 U/L (ref 30–99)
ALT SERPL-CCNC: 23 U/L (ref 2–50)
ANION GAP SERPL CALC-SCNC: 11 MMOL/L (ref 7–16)
AST SERPL-CCNC: 22 U/L (ref 12–45)
BASOPHILS # BLD AUTO: 1.4 % (ref 0–1.8)
BASOPHILS # BLD: 0.05 K/UL (ref 0–0.12)
BILIRUB SERPL-MCNC: 0.5 MG/DL (ref 0.1–1.5)
BUN SERPL-MCNC: 10 MG/DL (ref 8–22)
CALCIUM ALBUM COR SERPL-MCNC: 9.5 MG/DL (ref 8.5–10.5)
CALCIUM SERPL-MCNC: 9.7 MG/DL (ref 8.5–10.5)
CHLORIDE SERPL-SCNC: 106 MMOL/L (ref 96–112)
CHOLEST SERPL-MCNC: 130 MG/DL (ref 100–199)
CO2 SERPL-SCNC: 23 MMOL/L (ref 20–33)
CREAT SERPL-MCNC: 0.67 MG/DL (ref 0.5–1.4)
EOSINOPHIL # BLD AUTO: 0.09 K/UL (ref 0–0.51)
EOSINOPHIL NFR BLD: 2.5 % (ref 0–6.9)
ERYTHROCYTE [DISTWIDTH] IN BLOOD BY AUTOMATED COUNT: 55.8 FL (ref 35.9–50)
EST. AVERAGE GLUCOSE BLD GHB EST-MCNC: 120 MG/DL
FASTING STATUS PATIENT QL REPORTED: NORMAL
GFR SERPLBLD CREATININE-BSD FMLA CKD-EPI: 95 ML/MIN/1.73 M 2
GLOBULIN SER CALC-MCNC: 2.7 G/DL (ref 1.9–3.5)
GLUCOSE SERPL-MCNC: 85 MG/DL (ref 65–99)
HBA1C MFR BLD: 5.8 % (ref 4–5.6)
HCT VFR BLD AUTO: 41.9 % (ref 37–47)
HDLC SERPL-MCNC: 65 MG/DL
HGB BLD-MCNC: 13.3 G/DL (ref 12–16)
IMM GRANULOCYTES # BLD AUTO: 0.01 K/UL (ref 0–0.11)
IMM GRANULOCYTES NFR BLD AUTO: 0.3 % (ref 0–0.9)
LDLC SERPL CALC-MCNC: 45 MG/DL
LYMPHOCYTES # BLD AUTO: 1 K/UL (ref 1–4.8)
LYMPHOCYTES NFR BLD: 27.4 % (ref 22–41)
MCH RBC QN AUTO: 31.2 PG (ref 27–33)
MCHC RBC AUTO-ENTMCNC: 31.7 G/DL (ref 32.2–35.5)
MCV RBC AUTO: 98.4 FL (ref 81.4–97.8)
MONOCYTES # BLD AUTO: 0.3 K/UL (ref 0–0.85)
MONOCYTES NFR BLD AUTO: 8.2 % (ref 0–13.4)
NEUTROPHILS # BLD AUTO: 2.2 K/UL (ref 1.82–7.42)
NEUTROPHILS NFR BLD: 60.2 % (ref 44–72)
NRBC # BLD AUTO: 0 K/UL
NRBC BLD-RTO: 0 /100 WBC (ref 0–0.2)
PLATELET # BLD AUTO: 279 K/UL (ref 164–446)
PMV BLD AUTO: 10.1 FL (ref 9–12.9)
POTASSIUM SERPL-SCNC: 4.8 MMOL/L (ref 3.6–5.5)
PROT SERPL-MCNC: 7 G/DL (ref 6–8.2)
RBC # BLD AUTO: 4.26 M/UL (ref 4.2–5.4)
SODIUM SERPL-SCNC: 140 MMOL/L (ref 135–145)
TRIGL SERPL-MCNC: 100 MG/DL (ref 0–149)
WBC # BLD AUTO: 3.7 K/UL (ref 4.8–10.8)

## 2025-02-07 PROCEDURE — 85025 COMPLETE CBC W/AUTO DIFF WBC: CPT

## 2025-02-07 PROCEDURE — 80053 COMPREHEN METABOLIC PANEL: CPT

## 2025-02-07 PROCEDURE — 80061 LIPID PANEL: CPT

## 2025-02-07 PROCEDURE — 83036 HEMOGLOBIN GLYCOSYLATED A1C: CPT

## 2025-02-07 PROCEDURE — 36415 COLL VENOUS BLD VENIPUNCTURE: CPT

## 2025-02-11 ENCOUNTER — OFFICE VISIT (OUTPATIENT)
Dept: DERMATOLOGY | Facility: IMAGING CENTER | Age: 70
End: 2025-02-11
Payer: MEDICARE

## 2025-02-11 ENCOUNTER — OFFICE VISIT (OUTPATIENT)
Dept: MEDICAL GROUP | Facility: PHYSICIAN GROUP | Age: 70
End: 2025-02-11
Payer: MEDICARE

## 2025-02-11 VITALS
DIASTOLIC BLOOD PRESSURE: 84 MMHG | SYSTOLIC BLOOD PRESSURE: 124 MMHG | OXYGEN SATURATION: 98 % | HEART RATE: 80 BPM | HEIGHT: 59 IN | WEIGHT: 193.7 LBS | BODY MASS INDEX: 39.05 KG/M2 | TEMPERATURE: 97.3 F

## 2025-02-11 DIAGNOSIS — D22.9 MULTIPLE NEVI: ICD-10-CM

## 2025-02-11 DIAGNOSIS — L98.9 LESION OF SKIN OF FACE: ICD-10-CM

## 2025-02-11 DIAGNOSIS — E78.00 ELEVATED LDL CHOLESTEROL LEVEL: ICD-10-CM

## 2025-02-11 DIAGNOSIS — L57.0 ACTINIC KERATOSIS: ICD-10-CM

## 2025-02-11 DIAGNOSIS — I82.561 CHRONIC DEEP VEIN THROMBOSIS (DVT) OF CALF MUSCLE VEIN OF RIGHT LOWER EXTREMITY (HCC): ICD-10-CM

## 2025-02-11 DIAGNOSIS — D18.01 CHERRY ANGIOMA: ICD-10-CM

## 2025-02-11 DIAGNOSIS — R73.03 PREDIABETES: ICD-10-CM

## 2025-02-11 DIAGNOSIS — L81.4 LENTIGINES: ICD-10-CM

## 2025-02-11 DIAGNOSIS — L82.1 SK (SEBORRHEIC KERATOSIS): ICD-10-CM

## 2025-02-11 DIAGNOSIS — L82.0 INFLAMED SEBORRHEIC KERATOSIS: ICD-10-CM

## 2025-02-11 DIAGNOSIS — J84.10 LUNG FIBROSIS (HCC): ICD-10-CM

## 2025-02-11 PROCEDURE — 17110 DESTRUCTION B9 LES UP TO 14: CPT | Performed by: NURSE PRACTITIONER

## 2025-02-11 PROCEDURE — 3074F SYST BP LT 130 MM HG: CPT | Performed by: FAMILY MEDICINE

## 2025-02-11 PROCEDURE — 99213 OFFICE O/P EST LOW 20 MIN: CPT | Mod: 25 | Performed by: NURSE PRACTITIONER

## 2025-02-11 PROCEDURE — 3079F DIAST BP 80-89 MM HG: CPT | Performed by: FAMILY MEDICINE

## 2025-02-11 PROCEDURE — 17000 DESTRUCT PREMALG LESION: CPT | Mod: 59 | Performed by: NURSE PRACTITIONER

## 2025-02-11 PROCEDURE — G2211 COMPLEX E/M VISIT ADD ON: HCPCS | Performed by: FAMILY MEDICINE

## 2025-02-11 PROCEDURE — 99214 OFFICE O/P EST MOD 30 MIN: CPT | Performed by: FAMILY MEDICINE

## 2025-02-11 PROCEDURE — 17003 DESTRUCT PREMALG LES 2-14: CPT | Mod: 59 | Performed by: NURSE PRACTITIONER

## 2025-02-11 ASSESSMENT — FIBROSIS 4 INDEX: FIB4 SCORE: 1.13

## 2025-02-11 ASSESSMENT — PATIENT HEALTH QUESTIONNAIRE - PHQ9: CLINICAL INTERPRETATION OF PHQ2 SCORE: 0

## 2025-02-11 ASSESSMENT — ENCOUNTER SYMPTOMS
NAUSEA: 0
VOMITING: 0
HEADACHES: 0

## 2025-02-11 NOTE — PROGRESS NOTES
"Verbal consent was acquired by the patient to use [employer] ambient listening note generation during this visit         History of Present Illness  The patient presents today for a follow-up visit and to discuss recent lab results. She was last seen on 11/26/2024.    She reports no changes in her medication regimen, which includes metformin 500 mg taken post-dinner. She experiences mild gastrointestinal discomfort at night, which she attributes to the metformin, but this does not cause significant distress as it resolves by morning. She has not sought medical attention for these stomach issues. She reports no nausea or vomiting. She takes omeprazole to manage her symptoms effectively.  Recent labs show an improvement to her A1c to 5.8%.    She is currently on rosuvastatin 10 mg for cholesterol management, taken nightly, and reports no associated muscle aches or cramps.    She has been using azelastine nasal spray under the care of her allergist, which has successfully prevented sinus infections.    She has an upcoming appointment with Dr. Rowell in 05/2025, during which she will undergo repeat blood tests and an ultrasound. She is scheduled for a sleep study in 06/2025, although there are no concerns regarding sleep apnea.     A PET scan conducted in 12/2024 revealed a \"leaky valve\", but this was not deemed concerning. She has a history of vestibular migraines, with the last episode occurring in 03/2024. She has an upcoming appointment with an ENT specialist for a vestibular migraine test, but she plans to cancel this as she has been symptom-free for over a year. She has a follow-up appointment with Dr. Tierney on 05/06/2025, during which she will have her kidney and liver function tested. She receives Prolia injections from Dr. Tierney. She has a history of bilateral mastectomy with implants and continues to perform self-breast exams, reporting no lumps. She has a history of lung cancer diagnosed in 2016, which was " treated with chemotherapy and subsequent surgical resection of part of her right lung. She reports no respiratory issues, coughing, or difficulty breathing, except when ill. She is currently under the care of Dr. Tierney for her cancer and lung health.    Supplemental Information  She had a recent appointment with her dermatologist, during which six skin lesions were examined and found to be normal. She is scheduled for a follow-up visit as needed. She has not undergone a full-body skin check due to the absence of any other visible skin abnormalities. She typically wears long sleeves while golfing, leaving only her face exposed. She has some unusual skin findings on her back, which were also evaluated and deemed normal.    MEDICATIONS  Metformin, rosuvastatin, omeprazole, azelastine.      Review of Systems   Gastrointestinal:  Negative for nausea and vomiting.   Neurological:  Negative for headaches.       Outpatient Medications Marked as Taking for the 2/11/25 encounter (Office Visit) with Marnie Cortez M.D.   Medication Sig Dispense Refill    metFORMIN ER (GLUCOPHAGE XR) 500 MG TABLET SR 24 HR Take 1 Tablet by mouth every day. 100 Tablet 3    rosuvastatin (CRESTOR) 10 MG Tab Take 1 Tablet by mouth every evening. 100 Tablet 3    denosumab (PROLIA) 60 MG/ML Solution Prefilled Syringe injection Inject 60 mg under the skin every 6 months.      omeprazole (PRILOSEC) 20 MG delayed-release capsule Take 1 Capsule by mouth every day. 100 Capsule 3    fluticasone (FLONASE) 50 MCG/ACT nasal spray Administer 1 Spray into affected nostril(S) every day.      Azelastine (ASTELIN) 137 MCG/SPRAY Solution INHALE 2 SPRAYS NASALLY 2 TIMES A DAY      ipratropium (ATROVENT) 0.06 % Solution Administer 2 Sprays into affected nostril(S) 4 times a day.      acetaminophen (TYLENOL) 500 MG Tab Take 1-2 Tablets by mouth every 6 hours as needed for Mild Pain.         /84   Pulse 80   Temp 36.3 °C (97.3 °F) (Temporal)   Ht 1.499 m (4'  "11\")   Wt 87.9 kg (193 lb 11.2 oz)   SpO2 98% , Body mass index is 39.12 kg/m².        Physical Exam  Eyes:      Extraocular Movements: Extraocular movements intact.   Cardiovascular:      Rate and Rhythm: Normal rate and regular rhythm.   Pulmonary:      Effort: Pulmonary effort is normal.      Breath sounds: Normal breath sounds.   Neurological:      Mental Status: She is alert.   Psychiatric:         Mood and Affect: Mood normal.         Behavior: Behavior normal.         Results  Laboratory Studies  A1c is 5.8. Fasting glucose is 85. Cholesterol is 130, LDL is 45. Liver function tests, kidney function, electrolytes are all normal. White blood cell counts are slightly below normal but stable.    Imaging  PET scan conducted in 12/2024 revealed a leaky valve, but this was not deemed concerning.       Assessment & Plan  1. Prediabetes.  Chronic medical diagnosis.  Improving her A1c levels have shown improvement, decreasing from 6.2 in November 2024 to 5.8 currently, indicating a transition from the diabetic to the prediabetic range. Her fasting glucose level is within the normal range at 85. She has also experienced a slight weight loss. She reports tolerating metformin well when taken after dinner, despite some stomach issues at night. She will continue her current regimen of metformin 500 mg taken post-dinner. An A1c test will be added to her upcoming lab work with Dr. Tierney.    2. Hypercholesterolemia.  Chronic medical diagnosis her cholesterol levels have significantly improved, with total cholesterol decreasing from 203 in November 2024 to 130, and LDL cholesterol reducing from 121 to 45. She reports no side effects from the rosuvastatin. She will continue her current regimen of rosuvastatin 10 mg taken at bedtime.    3.  Lesion of skin of face  Chronic medical diagnosis.  Had her appointment with dermatology this morning    4.  Chronic DVT  5.  Lung fibrosis  HCC Gap Form    Diagnosis to address: I82.561 - " Chronic deep vein thrombosis (DVT) of calf muscle vein of right lower extremity (HCC)  Assessment and plan: Chronic, stable,   Managed by Dr Rowell. Last saw him in November 2024 with next appt in May 2025. Has orders to repeat sonogram.   Diagnosis: J84.10 - Lung fibrosis (HCC)  Assessment and plan: Chronic, stable. Denies any respiratory issues. Last CT chest completed in 2020. Continues to f/u with Dr Adkins.   Last edited 02/11/25 12:56 PST by Marnie Cortez M.D.         6. Health maintenance.  She has a history of bilateral mastectomy with implants and continues to perform self-examinations for lumps, with no abnormalities reported. She has not had a mammogram in 9 years due to her implants. She follows up with Dr. Tierney for Prolia shots and routine blood work, including kidney and liver function tests.    Follow-up  The patient will follow up in 4 months.    PROCEDURE  The patient underwent a bilateral mastectomy with implants. She also had a surgical resection of part of her right lung following chemotherapy for lung cancer.    Orders Placed This Encounter    HEMOGLOBIN A1C        () Today's E/M visit is associated with medical care services that serve as the continuing focal point for all needed health care services and/or with medical care services that are part of ongoing care related to a patient's single, serious condition, or a complex condition: This includes furnishing services to patients on an ongoing basis that result in care that is personalized to the patient. The services result in a comprehensive, longitudinal, and continuous relationship with the patient and involve delivery of team-based care that is accessible, coordinated with other practitioners and providers, and integrated with the broader health care landscape.      This note was created using voice recognition software (Dragon) and [employer]. The accuracy of the dictation is limited by the abilities of the software.  . Occasional  transcription errors may have escaped proof reading. I have made every reasonable attempt to correct obvious errors, but I expect that there are errors of grammar and possibly content that I did not discover before finalizing the note. ~

## 2025-02-11 NOTE — PROGRESS NOTES
DERMATOLOGY NOTE  FOLLOW UP VISIT       Chief complaint: Establish Care (Spot check over face)  Pt declined Full Skin exam. Just would like face checked, as well as back            History of skin cancer: No  History of precancers/actinic keratoses: Yes, Details:    History of biopsies:No  History of blistering/severe sunburns:Yes, Details: Childhood to teens  Family history of skin cancer:No  Family history of atypical moles:No      Allergies   Allergen Reactions    Povidone Iodine Rash    Sulfa Drugs Unspecified      RXN=As a child unsure of reaction    Morphine      Nausea and vomiting        MEDICATIONS:  Medications relevant to specialty reviewed.     REVIEW OF SYSTEMS:   Positive for skin (see HPI)  Negative for fevers and chills       EXAM:  There were no vitals taken for this visit.  Constitutional: Well-developed, well-nourished, and in no distress.     A focused skin exam was performed including the affected areas of the face and back. Notable findings on exam today listed below and/or in assessment/plan.     Ill-defined erythematous gritty/scaly papules over the forehead, R cheek and nasolabial sulcus  Inflamed tan waxy stuck on appearing papule to L upper forehead  -sun exposed skin of trunk/back and face with scattered clinically benign light brown reticulated macules all of which were morphologically similar and none of which were suspicious for skin cancer today on exam  Several scattered 1-3mm bright red macules and thin papules on the trunk/back  Few tan light brown stuck-on waxy papules scattered on the trunk/back  Multiple light brown medium brown skin-colored macules papules scattered over the trunk/back      IMPRESSION / PLAN:    1. Actinic keratosis  CRYOTHERAPY:  Risks (including, but not limited to: skin discoloration, redness, blister, blood blister, recurrence, need for further treatment, infection, scar) and benefits of cryotherapy discussed. Patient verbally agreed to proceed with  treatment. 1 cryotherapy freeze thaw cycles of 10 seconds were applied to 5 lesions on face as noted on exam with cryac. Patient tolerated procedure well. Aftercare instructions given--no specific care needed unless irritated during healing process, can apply Vaseline with small band-aid if needed.      2. Inflamed seborrheic keratosis  CRYOTHERAPY:  Risks (including, but not limited to: skin discoloration, redness, blister, blood blister, recurrence, need for further treatment, infection, scar) and benefits of cryotherapy discussed. Patient verbally agreed to proceed with treatment. 1 cryotherapy freeze thaw cycles of 10 seconds were applied to 1 lesion on L upper forehead with cryac. Patient tolerated procedure well. Aftercare instructions given--no specific care needed unless irritated during healing process, can apply Vaseline with small band-aid if needed.      3. Lentigines  - Benign-appearing nature of lesions discussed during exam.   - Advised to continue to monitor for any return to clinic for new or concerning changes.      4. Cherry angioma  - Benign-appearing nature of lesions discussed during exam.   - Advised to continue to monitor for any return to clinic for new or concerning changes.      5. SK (seborrheic keratosis)  - Benign-appearing nature of lesions discussed during exam.   - Advised to continue to monitor for any return to clinic for new or concerning changes.      6. Multiple nevi  - Benign-appearing nature of lesions discussed during exam.   - Advised to continue to monitor for any return to clinic for new or concerning changes.            Pt understands risks associated with LN2, Patient verbalized understanding and agrees with plan regarding the above          Please note that this dictation was created using voice recognition software. I have made every reasonable attempt to correct obvious errors, but I expect that there are errors of grammar and possibly content that I did not discover  before finalizing the note.      Return to clinic in: Return for PRN. and as needed for any new or changing skin lesions.

## 2025-02-20 ENCOUNTER — HOSPITAL ENCOUNTER (OUTPATIENT)
Dept: RADIOLOGY | Facility: MEDICAL CENTER | Age: 70
End: 2025-02-20
Attending: INTERNAL MEDICINE
Payer: MEDICARE

## 2025-02-20 DIAGNOSIS — I82.561 CHRONIC DEEP VEIN THROMBOSIS (DVT) OF CALF MUSCLE VEIN OF RIGHT LOWER EXTREMITY (HCC): ICD-10-CM

## 2025-02-20 DIAGNOSIS — M79.89 LEG SWELLING: ICD-10-CM

## 2025-02-20 PROCEDURE — 93970 EXTREMITY STUDY: CPT

## 2025-02-24 ENCOUNTER — RESULTS FOLLOW-UP (OUTPATIENT)
Dept: CARDIOLOGY | Facility: MEDICAL CENTER | Age: 70
End: 2025-02-24

## 2025-05-02 ENCOUNTER — HOSPITAL ENCOUNTER (OUTPATIENT)
Dept: LAB | Facility: MEDICAL CENTER | Age: 70
End: 2025-05-02
Attending: INTERNAL MEDICINE
Payer: MEDICARE

## 2025-05-02 LAB
ANION GAP SERPL CALC-SCNC: 10 MMOL/L (ref 7–16)
BUN SERPL-MCNC: 14 MG/DL (ref 8–22)
CALCIUM SERPL-MCNC: 9.4 MG/DL (ref 8.5–10.5)
CHLORIDE SERPL-SCNC: 106 MMOL/L (ref 96–112)
CO2 SERPL-SCNC: 23 MMOL/L (ref 20–33)
CREAT SERPL-MCNC: 0.69 MG/DL (ref 0.5–1.4)
GFR SERPLBLD CREATININE-BSD FMLA CKD-EPI: 94 ML/MIN/1.73 M 2
GLUCOSE SERPL-MCNC: 92 MG/DL (ref 65–99)
MAGNESIUM SERPL-MCNC: 2 MG/DL (ref 1.5–2.5)
PHOSPHATE SERPL-MCNC: 3.6 MG/DL (ref 2.5–4.5)
POTASSIUM SERPL-SCNC: 4.2 MMOL/L (ref 3.6–5.5)
SODIUM SERPL-SCNC: 139 MMOL/L (ref 135–145)

## 2025-05-02 PROCEDURE — 83735 ASSAY OF MAGNESIUM: CPT

## 2025-05-02 PROCEDURE — 80048 BASIC METABOLIC PNL TOTAL CA: CPT

## 2025-05-02 PROCEDURE — 84100 ASSAY OF PHOSPHORUS: CPT

## 2025-05-02 PROCEDURE — 36415 COLL VENOUS BLD VENIPUNCTURE: CPT

## 2025-05-16 ENCOUNTER — HOSPITAL ENCOUNTER (OUTPATIENT)
Dept: LAB | Facility: MEDICAL CENTER | Age: 70
End: 2025-05-16
Attending: INTERNAL MEDICINE
Payer: MEDICARE

## 2025-05-16 DIAGNOSIS — E66.01 CLASS 2 SEVERE OBESITY WITH SERIOUS COMORBIDITY AND BODY MASS INDEX (BMI) OF 39.0 TO 39.9 IN ADULT, UNSPECIFIED OBESITY TYPE (HCC): ICD-10-CM

## 2025-05-16 DIAGNOSIS — R06.09 DYSPNEA ON EXERTION: ICD-10-CM

## 2025-05-16 DIAGNOSIS — E66.812 CLASS 2 SEVERE OBESITY WITH SERIOUS COMORBIDITY AND BODY MASS INDEX (BMI) OF 39.0 TO 39.9 IN ADULT, UNSPECIFIED OBESITY TYPE (HCC): ICD-10-CM

## 2025-05-16 LAB
ANION GAP SERPL CALC-SCNC: 8 MMOL/L (ref 7–16)
BUN SERPL-MCNC: 10 MG/DL (ref 8–22)
CALCIUM SERPL-MCNC: 9.6 MG/DL (ref 8.5–10.5)
CHLORIDE SERPL-SCNC: 107 MMOL/L (ref 96–112)
CHOLEST SERPL-MCNC: 134 MG/DL (ref 100–199)
CO2 SERPL-SCNC: 26 MMOL/L (ref 20–33)
CREAT SERPL-MCNC: 0.65 MG/DL (ref 0.5–1.4)
GFR SERPLBLD CREATININE-BSD FMLA CKD-EPI: 95 ML/MIN/1.73 M 2
GLUCOSE SERPL-MCNC: 95 MG/DL (ref 65–99)
HDLC SERPL-MCNC: 63 MG/DL
LDLC SERPL CALC-MCNC: 49 MG/DL
NT-PROBNP SERPL IA-MCNC: 167 PG/ML (ref 0–125)
POTASSIUM SERPL-SCNC: 5.1 MMOL/L (ref 3.6–5.5)
SODIUM SERPL-SCNC: 141 MMOL/L (ref 135–145)
TRIGL SERPL-MCNC: 111 MG/DL (ref 0–149)

## 2025-05-16 PROCEDURE — 80048 BASIC METABOLIC PNL TOTAL CA: CPT

## 2025-05-16 PROCEDURE — 83880 ASSAY OF NATRIURETIC PEPTIDE: CPT

## 2025-05-16 PROCEDURE — 80061 LIPID PANEL: CPT

## 2025-05-16 PROCEDURE — 83695 ASSAY OF LIPOPROTEIN(A): CPT

## 2025-05-16 PROCEDURE — 36415 COLL VENOUS BLD VENIPUNCTURE: CPT

## 2025-05-18 LAB — LPA SERPL-MCNC: <6 MG/DL

## 2025-05-20 ENCOUNTER — OFFICE VISIT (OUTPATIENT)
Dept: CARDIOLOGY | Facility: MEDICAL CENTER | Age: 70
End: 2025-05-20
Attending: INTERNAL MEDICINE
Payer: MEDICARE

## 2025-05-20 VITALS
RESPIRATION RATE: 18 BRPM | BODY MASS INDEX: 39.47 KG/M2 | HEIGHT: 59 IN | WEIGHT: 195.8 LBS | SYSTOLIC BLOOD PRESSURE: 120 MMHG | OXYGEN SATURATION: 96 % | DIASTOLIC BLOOD PRESSURE: 84 MMHG | HEART RATE: 94 BPM

## 2025-05-20 DIAGNOSIS — I25.119 CORONARY ARTERY DISEASE INVOLVING NATIVE CORONARY ARTERY OF NATIVE HEART WITH ANGINA PECTORIS (HCC): ICD-10-CM

## 2025-05-20 DIAGNOSIS — I07.1 MILD TRICUSPID REGURGITATION: ICD-10-CM

## 2025-05-20 DIAGNOSIS — E78.00 ELEVATED LDL CHOLESTEROL LEVEL: ICD-10-CM

## 2025-05-20 DIAGNOSIS — M79.89 LEG SWELLING: ICD-10-CM

## 2025-05-20 DIAGNOSIS — I34.0 MILD MITRAL REGURGITATION: ICD-10-CM

## 2025-05-20 PROCEDURE — 99213 OFFICE O/P EST LOW 20 MIN: CPT | Performed by: INTERNAL MEDICINE

## 2025-05-20 PROCEDURE — 3074F SYST BP LT 130 MM HG: CPT | Performed by: INTERNAL MEDICINE

## 2025-05-20 PROCEDURE — 99214 OFFICE O/P EST MOD 30 MIN: CPT | Performed by: INTERNAL MEDICINE

## 2025-05-20 PROCEDURE — G2211 COMPLEX E/M VISIT ADD ON: HCPCS | Performed by: INTERNAL MEDICINE

## 2025-05-20 PROCEDURE — 3079F DIAST BP 80-89 MM HG: CPT | Performed by: INTERNAL MEDICINE

## 2025-05-20 RX ORDER — ROSUVASTATIN CALCIUM 10 MG/1
10 TABLET, COATED ORAL EVERY EVENING
Qty: 100 TABLET | Refills: 3 | Status: SHIPPED | OUTPATIENT
Start: 2025-05-20

## 2025-05-20 ASSESSMENT — ENCOUNTER SYMPTOMS
COUGH: 0
FALLS: 0
ORTHOPNEA: 0
NAUSEA: 0
SENSORY CHANGE: 0
BRUISES/BLEEDS EASILY: 0
PND: 0
FEVER: 0
WEIGHT LOSS: 0
VOMITING: 0
MYALGIAS: 0
CHILLS: 0
CLAUDICATION: 0
PALPITATIONS: 0
DOUBLE VISION: 0
BLURRED VISION: 0
EYE PAIN: 0
DIZZINESS: 0
HALLUCINATIONS: 0
SHORTNESS OF BREATH: 0
LOSS OF CONSCIOUSNESS: 0
SPEECH CHANGE: 0
ABDOMINAL PAIN: 0
DEPRESSION: 0
EYE DISCHARGE: 0
HEADACHES: 0
BLOOD IN STOOL: 0

## 2025-05-20 ASSESSMENT — FIBROSIS 4 INDEX: FIB4 SCORE: 1.13

## 2025-05-20 NOTE — PROGRESS NOTES
"Chief Complaint   Patient presents with    Follow-Up     Dx: Chronic deep vein thrombosis (DVT) of calf muscle vein of right lower extremity (HCC)         Subjective     Indu Leblanc is a 70 y.o. female who presents today due to abnormal TTE showing mild TR, MR, mild to moderate NC. Hx of breast and lung cancer (both are in remission). Chronic DVT in right leg which resolved on most recent venous duplex seen in 07/2024.    I have independently interpreted and reviewed blood tests results with patient in clinic which shows LDL level of 49 down from 121, triglycerides level of 111, GFR of 95, K of 4.6. Hgba1c of 5.8 down from 6.2. Nt pro BNP of 167.    I have independently interpreted and reviewed echocardiogram's actual images with patient which showed normal left ventricular systolic function. No wall motion abnormality. Mild TR, MR, mild to moderate NC.    I have personally interpreted EKG today with patient, there is no evidence of acute coronary syndrome, no evidence of prior infarct, normal NC and QT interval, no significant conduction disease. Sinus rhythm.    12/2024 Patient had negative cardiac PET scan stress test, no evidence of coronary ischemia with normal LVEF. I personally interpreted the images of the study and reviewed them with patient in clinic today.    02/2025  I personally interpreted the images of the study of BLE venous duplex and reviewed them with patient in clinic today which showed:  1) Reflux disease seen in the right great saphenous vein.   2) Reflux disease seen in the left small saphenous vein.  Patient gets winded with daily living activities and exertion. No symptoms at rest.    Her legs symptoms are relieved with compression stockings.    Past Medical History:   Diagnosis Date    Anemia 06/01/2017    \"D/T Chemo\"    Anesthesia 06/01/2017    PONV    Cancer (HCC) 08/04/2016    Right Breast Treated With Chemo    Cancer (HCC) 2016    Lung CA    Cataract     IOL OU    Dental disorder " "    dental implant bottom     Fall 04/20/2020    Impacted cerumen of right ear 11/08/2021    Left ear pain 11/15/2023    New  issue. Left ear pain x 3 weeks before she went to  last week on 11/8/23.  Tried sudafed and afrin without much improvement.  No hearing loss  Continues to have left ear pain.  Also had some episodes of dizziness, vertigo.  No hx recent URI.      Obesity (BMI 30-39.9) 06/28/2024    Osteoarthritis 06/01/2017    \"all over\"    Port catheter in place 06/01/2017    Left Side    Snoring     Urinary tract infection      Past Surgical History:   Procedure Laterality Date    ORIF, FRACTURE, FEMUR Right 4/20/2020    Procedure: ORIF, FRACTURE,  DISTAL FEMUR;  Surgeon: Christian Ag M.D.;  Location: SURGERY [facility];  Service: Orthopedics    TISSUE EXPANDER PLACE/REMOVE Bilateral 6/8/2017    Procedure: TISSUE EXPANDER PLACE/REMOVE;  Surgeon: Everardo Matthews M.D.;  Location: SURGERY SAME DAY [facility];  Service:     BREAST IMPLANT REVISION Bilateral 6/8/2017    Procedure: BREAST IMPLANT;  Surgeon: Everardo Matthews M.D.;  Location: SURGERY SAME DAY [facility];  Service:     CAPSULOTOMY Bilateral 6/8/2017    Procedure: CAPSULOTOMY FOR PARTIAL CAPSULECTOMIES;  Surgeon: Everardo Matthews M.D.;  Location: SURGERY SAME DAY [facility];  Service:     BREAST RECONSTRUCTION Bilateral 6/8/2017    Procedure: BREAST RECONSTRUCTION USING LOCAL TISSUE & FAT GRAFTING;  Surgeon: Everardo Matthews M.D.;  Location: SURGERY SAME DAY [facility];  Service:     THORACOSCOPY Right 5/1/2017    Procedure: THORACOSCOPY, WEDGE RESECTION LOWER LOBE MASS;  Surgeon: John H Ganser, M.D.;  Location: SURGERY [facility];  Service:     CATH PLACEMENT Left 3/17/2017    Procedure: CATH PLACEMENT FOR SUBCLAVIAN PORT LEFT;  Surgeon: Carly Wright M.D.;  Location: [facility];  Service:     LUNG NEEDLE BIOPSY  02-    TISSUE EXPANDER PLACE/REMOVE Bilateral 1/19/2017    Procedure: TISSUE " EXPANDER PLACEment;  Surgeon: Everardo Matthews M.D.;  Location: SURGERY SAME DAY [facility];  Service:     FLAP GRAFT  1/19/2017    Procedure: FLAP GRAFT- FOR DERMAL ADIPOFASCIAL FLAPS ;  Surgeon: Everardo Matthews M.D.;  Location: SURGERY SAME DAY [facility];  Service:     MASTECTOMY Bilateral 1/19/2017    Procedure: MASTECTOMY PROPHYLACTIC LEFT, AND RIGHT TOTAL MASTECTOMY;  Surgeon: Carly Wright M.D.;  Location: SURGERY SAME DAY [facility];  Service:     AXILLARY NODE DISSECTION Right 1/19/2017    Procedure: AXILLARY NODE DISSECTION;  Surgeon: Carly Wright M.D.;  Location: SURGERY SAME DAY [facility];  Service:     CATH PLACEMENT Left 1/19/2017    Procedure: Removal of left subclavian port ;  Surgeon: Carly Wright M.D.;  Location: SURGERY SAME DAY [facility];  Service:     CATH PLACEMENT Left 9/5/2016    Procedure: CATH PLACEMENT - SUBCLAVIAN PORT - SIDE TO BE DETERMINED;  Surgeon: Carly Wright M.D.;  Location: SURGERY [facility];  Service:     STEREOTACTIC BIOPSY Right 08/10/2016    HIP ARTHROPLASTY TOTAL Right 2014    OTHER ORTHOPEDIC SURGERY  6/2012    right hip arthroplasty    CATARACT PHACO WITH IOL Bilateral     GYN SURGERY  Approx 2013    Hysterectomy     Family History   Problem Relation Age of Onset    Lung Disease Mother     Arthritis Father     Heart Disease Father      Social History     Socioeconomic History    Marital status:      Spouse name: Not on file    Number of children: Not on file    Years of education: Not on file    Highest education level: Master's degree (e.g., MA, MS, Shasha, MEd, MSW, OSMANY)   Occupational History    Not on file   Tobacco Use    Smoking status: Never    Smokeless tobacco: Never   Vaping Use    Vaping status: Never Used   Substance and Sexual Activity    Alcohol use: Yes     Alcohol/week: 0.0 oz     Comment: very rarely    Drug use: No    Sexual activity: Not Currently     Partners: Male   Other Topics Concern    Not on  file   Social History Narrative    Retired teacher, early education special .          1 son     Social Drivers of Health     Financial Resource Strain: Low Risk  (6/28/2024)    Overall Financial Resource Strain (CARDIA)     Difficulty of Paying Living Expenses: Not hard at all   Food Insecurity: No Food Insecurity (6/28/2024)    Hunger Vital Sign     Worried About Running Out of Food in the Last Year: Never true     Ran Out of Food in the Last Year: Never true   Transportation Needs: No Transportation Needs (6/28/2024)    PRAPARE - Transportation     Lack of Transportation (Medical): No     Lack of Transportation (Non-Medical): No   Physical Activity: Insufficiently Active (3/11/2021)    Exercise Vital Sign     Days of Exercise per Week: 2 days     Minutes of Exercise per Session: 20 min   Stress: No Stress Concern Present (3/11/2021)    [employer] [place] [employer] - Occupational Stress Questionnaire     Feeling of Stress : Not at all   Social Connections: Socially Integrated (3/11/2021)    Social Connection and Isolation Panel [NHANES]     Frequency of Communication with Friends and Family: More than three times a week     Frequency of Social Gatherings with Friends and Family: Once a week     Attends Zoroastrian Services: 1 to 4 times per year     Active Member of Clubs or Organizations: Yes     Attends Club or Organization Meetings: More than 4 times per year     Marital Status:    Intimate Partner Violence: Not on file   Housing Stability: Low Risk  (6/28/2024)    Housing Stability Vital Sign     Unable to Pay for Housing in the Last Year: No     Number of Places Lived in the Last Year: 1     Unstable Housing in the Last Year: No     Allergies   Allergen Reactions    Povidone Iodine Rash    Sulfa Drugs Unspecified      RXN=As a child unsure of reaction    Morphine      Nausea and vomiting     Outpatient Encounter Medications as of 5/20/2025   Medication Sig Dispense Refill     rosuvastatin (CRESTOR) 10 MG Tab Take 1 Tablet by mouth every evening. 100 Tablet 3    metFORMIN ER (GLUCOPHAGE XR) 500 MG TABLET SR 24 HR Take 1 Tablet by mouth every day. 100 Tablet 3    denosumab (PROLIA) 60 MG/ML Solution Prefilled Syringe injection Inject 60 mg under the skin every 6 months.      omeprazole (PRILOSEC) 20 MG delayed-release capsule Take 1 Capsule by mouth every day. 100 Capsule 3    fluticasone (FLONASE) 50 MCG/ACT nasal spray Administer 1 Spray into affected nostril(S) every day.      Azelastine (ASTELIN) 137 MCG/SPRAY Solution INHALE 2 SPRAYS NASALLY 2 TIMES A DAY      ipratropium (ATROVENT) 0.06 % Solution Administer 2 Sprays into affected nostril(S) 4 times a day.      acetaminophen (TYLENOL) 500 MG Tab Take 1-2 Tablets by mouth every 6 hours as needed for Mild Pain.      [DISCONTINUED] rosuvastatin (CRESTOR) 10 MG Tab Take 1 Tablet by mouth every evening. 100 Tablet 3     No facility-administered encounter medications on file as of 5/20/2025.     Review of Systems   Constitutional:  Negative for chills, fever, malaise/fatigue and weight loss.   HENT:  Negative for ear discharge, ear pain, hearing loss and nosebleeds.    Eyes:  Negative for blurred vision, double vision, pain and discharge.   Respiratory:  Negative for cough and shortness of breath.    Cardiovascular:  Negative for chest pain, palpitations, orthopnea, claudication, leg swelling and PND.   Gastrointestinal:  Negative for abdominal pain, blood in stool, melena, nausea and vomiting.   Genitourinary:  Negative for dysuria and hematuria.   Musculoskeletal:  Negative for falls, joint pain and myalgias.   Skin:  Negative for itching and rash.   Neurological:  Negative for dizziness, sensory change, speech change, loss of consciousness and headaches.   Endo/Heme/Allergies:  Negative for environmental allergies. Does not bruise/bleed easily.   Psychiatric/Behavioral:  Negative for depression, hallucinations and suicidal ideas.   "             Objective     /84 (BP Location: Left arm, Patient Position: Sitting, BP Cuff Size: Adult)   Pulse 94   Resp 18   Ht 1.499 m (4' 11\")   Wt 88.8 kg (195 lb 12.8 oz)   SpO2 96%   BMI 39.55 kg/m²     Physical Exam  Vitals and nursing note reviewed.   Constitutional:       General: She is not in acute distress.     Appearance: She is not diaphoretic.   HENT:      Head: Normocephalic and atraumatic.      Right Ear: External ear normal.      Left Ear: External ear normal.      Nose: No congestion or rhinorrhea.   Eyes:      General:         Right eye: No discharge.         Left eye: No discharge.   Neck:      Thyroid: No thyromegaly.      Vascular: No JVD.   Cardiovascular:      Rate and Rhythm: Normal rate and regular rhythm.      Pulses: Normal pulses.      Heart sounds: Murmur heard.   Pulmonary:      Effort: No respiratory distress.   Abdominal:      General: There is no distension.      Tenderness: There is no abdominal tenderness.   Musculoskeletal:         General: No swelling or tenderness.      Right lower leg: No edema.      Left lower leg: No edema.      Comments: + varicose veins without evidence of ulcer, + discoloration.     Skin:     General: Skin is warm and dry.   Neurological:      Mental Status: She is alert and oriented to person, place, and time.      Cranial Nerves: No cranial nerve deficit.   Psychiatric:         Behavior: Behavior normal.                Assessment & Plan     1. Elevated LDL cholesterol level  rosuvastatin (CRESTOR) 10 MG Tab    APOLIPOPROTEIN B    Basic Metabolic Panel    LIPID PANEL      2. Coronary artery disease involving native coronary artery of native heart with angina pectoris (HCC)  APOLIPOPROTEIN B    Basic Metabolic Panel    LIPID PANEL      3. Mild mitral regurgitation        4. Mild tricuspid regurgitation        5. Leg swelling              Medical Decision Making: Today's Assessment/Status/Plan:   At this time patient is clinically stable in " terms of her cardiac standpoint.    At this time, optimize use of compression stockings with at least grading of 20 to 30 mmHg, at least knee-high.  Thigh-high length is ideal.    She would like to defer invasive treatment for now.    Based on physical examination findings, patient is euvolemic. No JVD, lungs are clear to auscultation, no pitting edema in bilateral lower extremities, no ascites.    Dry weight is 195 lbs.    Blood pressure is well controlled.    This visit encounter signifies the visit complexity inherent to evaluation and management associated with medical care services that serve as the continuing focal point for all needed health care services and/or with medical care services that are part of ongoing care related to this patient's single, serious condition, complex cardiac condition.    Dunia Rowell M.D.

## 2025-06-11 ENCOUNTER — APPOINTMENT (OUTPATIENT)
Dept: MEDICAL GROUP | Facility: PHYSICIAN GROUP | Age: 70
End: 2025-06-11
Payer: MEDICARE

## 2025-06-24 ENCOUNTER — OFFICE VISIT (OUTPATIENT)
Dept: MEDICAL GROUP | Facility: PHYSICIAN GROUP | Age: 70
End: 2025-06-24
Payer: MEDICARE

## 2025-06-24 VITALS
HEART RATE: 91 BPM | DIASTOLIC BLOOD PRESSURE: 78 MMHG | TEMPERATURE: 97.6 F | OXYGEN SATURATION: 95 % | HEIGHT: 59 IN | BODY MASS INDEX: 39.17 KG/M2 | SYSTOLIC BLOOD PRESSURE: 112 MMHG | WEIGHT: 194.3 LBS

## 2025-06-24 DIAGNOSIS — E66.9 OBESITY (BMI 30-39.9): ICD-10-CM

## 2025-06-24 DIAGNOSIS — J30.2 SEASONAL ALLERGIES: ICD-10-CM

## 2025-06-24 DIAGNOSIS — R73.03 PREDIABETES: ICD-10-CM

## 2025-06-24 DIAGNOSIS — Z85.3 HISTORY OF BREAST CANCER: ICD-10-CM

## 2025-06-24 DIAGNOSIS — M81.8 OTHER OSTEOPOROSIS WITHOUT CURRENT PATHOLOGICAL FRACTURE: ICD-10-CM

## 2025-06-24 DIAGNOSIS — J84.10 LUNG FIBROSIS (HCC): Primary | ICD-10-CM

## 2025-06-24 DIAGNOSIS — E78.00 ELEVATED LDL CHOLESTEROL LEVEL: ICD-10-CM

## 2025-06-24 DIAGNOSIS — Z12.31 ENCOUNTER FOR SCREENING MAMMOGRAM FOR BREAST CANCER: ICD-10-CM

## 2025-06-24 ASSESSMENT — ENCOUNTER SYMPTOMS
DIZZINESS: 0
HEADACHES: 0
SHORTNESS OF BREATH: 0
FALLS: 0

## 2025-06-24 ASSESSMENT — FIBROSIS 4 INDEX: FIB4 SCORE: 1.13

## 2025-06-24 NOTE — PROGRESS NOTES
Verbal consent was acquired by the patient to use [employer] ambient listening note generation during this visit         History of Present Illness  The patient presents for follow-up. Last seen on 02/11/2025. Follow-up with cardiology in 05/2025.    Pulmonary Health  - Reports good health, no current dyspnea  - Upcoming pulmonology appointment on 07/15/2025  -Pulmonology referral was placed by her cardiologist,   - History of lung fibrosis post-lung cancer surgery  - Previous dyspnea on exertion, currently asymptomatic    Allergies  - Severe allergies managed by Dr. Hood  - Treatment includes Flonase, ipratropium, and Allegra  - Allergic to grass and trees  - Follow-up with Dr. Hood in August    Medications  - On metformin 500 mg daily for prediabetes, last had labs had A1c at 5.8%.  - Rosuvastatin 10 mg for hyperlipidemia  - Omeprazole as needed    Bone Health  - Receiving Prolia injections biannually for five years  - Due for bone density test in August  - No calcium or vitamin D supplements, but adequate intake through diet  - Receives Prolia through Dr. 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    Cardiovascular:  Positive for leg swelling (chronic).   Musculoskeletal:  Negative for falls.   Neurological:  Negative for dizziness and headaches.       Active Medications[1]    /78   Pulse 91   Temp 36.4 °C (97.6 °F) (Temporal)   Ht 1.499 m (4' 11\")   Wt 88.1 kg (194 lb 4.8 oz)   SpO2 95% , Body mass index is 39.24 kg/m².        Physical Exam  Constitutional:       Appearance: Normal appearance.   Eyes:      Extraocular Movements: Extraocular movements intact.        Comments: Small erythematous papules   Cardiovascular:      Rate and Rhythm: Normal rate and regular rhythm.   Pulmonary:      Effort: Pulmonary effort is normal.      Breath sounds: Normal breath sounds.   Musculoskeletal:      Comments: Bilateral LE edema- chronic   Neurological:      Mental Status: She is alert.   Psychiatric:         Mood and Affect: Mood normal.         Behavior: Behavior normal.         Results  - Labs:    - Vitamin D level (11/2024): 71    - Electrolytes (05/2025): Stable    - Fasting blood sugar (05/2025): Stable    - Kidney function (05/2025): Stable    - Cholesterol levels (05/2025): Stable       Assessment & Plan  1.  Lung fibrosis (HCC)   Chronic medical diagnoses.  Stable.    States that her dyspnea on exertion has improved.    - Keep pulmonology appointment on 07/15/2025.    2. Allergic rhinitis:   Chronic medical diagnoses improved.  - Continue Allegra, Flonase, and ipratropium.  - Follow-up with allergist in August, Dr Wu    3. Prediabetes:   -A1c 5.8%  Chronic medical diagnoses .stable.  - Continue metformin 500 mg daily.    4. Hyperlipidemia:   Chronic medical diagnoses.  Stable.  -May labs stable.  - Continue rosuvastatin 10 mg daily.     GERD.  - Continue omeprazole as needed.    5. Osteoporosis.  Chronic medical diagnoses.  Currently managed by her oncologist.  - Continue Prolia injections biannually.  - Ensure adequate calcium and vitamin D intake.  - Due for bone density test in " 08/2025.    6 History of breast cancer.  7.  Encounter for screening mammogram for breast cancer  - Order mammogram, consider breast implants.  - Share results with oncologist.     Mild leukopenia:   Managed by hematology.  Stable.  - No intervention unless significant drop.     Leg swelling.  - Continue using compression stockings as needed.    8 Obesity.  Chronic medical diagnoses.  BMI 39.24.  Has some questions about weight loss medications, Wegovy.  -Discussed with patient that she may be a better candidate for Zepbound and that we can evaluate her for sleep apnea.  -She will discuss weight management options, including Wegovy, with pulmonologist.     Eyelid dermatitis.  - Change makeup products.  - Consider optometrist evaluation.    9 Health maintenance.  - Normal vitamin D level in 11/2024.  - Stable labs: electrolytes, fasting blood sugar, kidney function, cholesterol.  - She plans on receiving her COVID and flu vaccine before she sees me in the fall.  - Order fasting labs before next visit.    Follow-up  - Follow-up with allergist in 08/2025.  - Follow-up with oncologist in 11/2025.    Orders Placed This Encounter    MA-SCREENING MAMMO BILAT W/ IMPLANTS W/CAD    Lipid Profile    TSH WITH REFLEX TO FT4    VITAMIN B12    VITAMIN D,25 HYDROXY (DEFICIENCY)    Comp Metabolic Panel    CBC WITH DIFFERENTIAL    HEMOGLOBIN A1C    Fexofenadine HCl (ALLEGRA PO)             I spent a total of 40 minutes which included time preparing to see the patient (reviewing my last note, records and recent lab results)  I also performed a medically appropriate examination, counseled and educated the patient, ordered  tests.   and documentation of this encounter.     () Today's E/M visit is associated with medical care services that serve as the continuing focal point for all needed health care services and/or with medical care services that are part of ongoing care related to a patient's single, serious condition, or a  complex condition: This includes furnishing services to patients on an ongoing basis that result in care that is personalized to the patient. The services result in a comprehensive, longitudinal, and continuous relationship with the patient and involve delivery of team-based care that is accessible, coordinated with other practitioners and providers, and integrated with the broader health care landscape.      This note was created using voice recognition software (Dragon) and PALMER. The accuracy of the dictation is limited by the abilities of the software.  . Occasional transcription errors may have escaped proof reading. I have made every reasonable attempt to correct obvious errors, but I expect that there are errors of grammar and possibly content that I did not discover before finalizing the note. ~         [1]   Outpatient Medications Marked as Taking for the 6/24/25 encounter (Office Visit) with Marnie Cortez M.D.   Medication Sig Dispense Refill    Fexofenadine HCl (ALLEGRA PO) Take  by mouth.      rosuvastatin (CRESTOR) 10 MG Tab Take 1 Tablet by mouth every evening. 100 Tablet 3    metFORMIN ER (GLUCOPHAGE XR) 500 MG TABLET SR 24 HR Take 1 Tablet by mouth every day. 100 Tablet 3    denosumab (PROLIA) 60 MG/ML Solution Prefilled Syringe injection Inject 60 mg under the skin every 6 months.      omeprazole (PRILOSEC) 20 MG delayed-release capsule Take 1 Capsule by mouth every day. 100 Capsule 3    fluticasone (FLONASE) 50 MCG/ACT nasal spray Administer 1 Spray into affected nostril(S) every day.      Azelastine (ASTELIN) 137 MCG/SPRAY Solution INHALE 2 SPRAYS NASALLY 2 TIMES A DAY      ipratropium (ATROVENT) 0.06 % Solution Administer 2 Sprays into affected nostril(S) 4 times a day.      acetaminophen (TYLENOL) 500 MG Tab Take 1-2 Tablets by mouth every 6 hours as needed for Mild Pain.

## 2025-07-15 ENCOUNTER — OFFICE VISIT (OUTPATIENT)
Dept: SLEEP MEDICINE | Facility: MEDICAL CENTER | Age: 70
End: 2025-07-15
Attending: INTERNAL MEDICINE
Payer: MEDICARE

## 2025-07-15 VITALS
BODY MASS INDEX: 39.31 KG/M2 | OXYGEN SATURATION: 95 % | DIASTOLIC BLOOD PRESSURE: 76 MMHG | SYSTOLIC BLOOD PRESSURE: 122 MMHG | HEART RATE: 103 BPM | HEIGHT: 59 IN | RESPIRATION RATE: 16 BRPM | WEIGHT: 195 LBS

## 2025-07-15 DIAGNOSIS — E66.01 CLASS 2 SEVERE OBESITY WITH SERIOUS COMORBIDITY AND BODY MASS INDEX (BMI) OF 39.0 TO 39.9 IN ADULT, UNSPECIFIED OBESITY TYPE (HCC): ICD-10-CM

## 2025-07-15 DIAGNOSIS — R06.83 SNORING: ICD-10-CM

## 2025-07-15 DIAGNOSIS — R06.09 DYSPNEA ON EXERTION: Primary | ICD-10-CM

## 2025-07-15 DIAGNOSIS — G47.30 SLEEP DISORDER BREATHING: ICD-10-CM

## 2025-07-15 DIAGNOSIS — E66.812 CLASS 2 SEVERE OBESITY WITH SERIOUS COMORBIDITY AND BODY MASS INDEX (BMI) OF 39.0 TO 39.9 IN ADULT, UNSPECIFIED OBESITY TYPE (HCC): ICD-10-CM

## 2025-07-15 PROCEDURE — 99214 OFFICE O/P EST MOD 30 MIN: CPT | Performed by: INTERNAL MEDICINE

## 2025-07-15 PROCEDURE — 99204 OFFICE O/P NEW MOD 45 MIN: CPT | Performed by: STUDENT IN AN ORGANIZED HEALTH CARE EDUCATION/TRAINING PROGRAM

## 2025-07-15 ASSESSMENT — FIBROSIS 4 INDEX: FIB4 SCORE: 1.13

## 2025-07-15 NOTE — PROGRESS NOTES
[facility] Consult Note     Date: 7/15/2025 / Time: 1:49 PM      Thank you for requesting a sleep medicine consultation on Carmelina Leblanc at the sleep center. Presents today with the chief complaints of occasional snoring,. She is referred by Dunia Rowell M.D.  [address]  [address]  [place]  [place] [address] for evaluation and treatment of at risk for sleep apnea and dyspnea on exertion.     HISTORY OF PRESENT ILLNESS:     Carmelina Leblanc is a 70 y.o. female with history of DVT, history of breast cancer is status post chemotherapy, chemotherapy-induced neuropathy, lung mass removal GERD, obesity, and snoring.  Presents to Sleep Clinic for evaluation of sleep.    She states she does not fully understand why she is here today's visit.  She does not remember who placed the referral.    Overall she feels that she sleeps well.  She generally has no trouble falling asleep or staying asleep.  She does find her sleep restorative.  She has been told by her  that she does not snore occasionally in her sleep when she sleeps in a certain position.    During the day she denies any daytime sleepiness.  She occasionally has low energy which she attributes more due to heat or being out of shape.  She has no other acute complaints at this time.      As per supplemental questionnaire to be scanned or imported into chart:    [place] Sleepiness Score: 4    Sleep Schedule  Bedtime: Weekday & Weekend 930-10pm   Wake time: Weekday & Weekend 6-630am  Sleep-onset latency: <30min   Awakenings from sleep: 0-2 mostly due to dogs   Difficulty falling back asleep: not generally   Bedroom partner: yes  Naps: rare     DAYTIME SYMPTOMS:   Excessive daytime sleepiness: No   Daytime fatigue: No   Difficulty concentrating: sometimes   Memory problems: No   Irritability:No   Work/school performance issues: NA  Sleepiness with driving: NA  Caffeine/stimulant use: Yes  Alcohol use:No     SLEEP RELATED  "SYMPTOMS  Snoring: Yes sometimes   Witnessed apnea or gasping/choking: No   Dry mouth or mouth breathing: No   Night Sweating: No   Teeth grinding/biting: sometimes   Morning headaches: sometimes   Refreshed Upon Awakening: Yes     SLEEP RELATED BEHAVIORS:  Parasomnias (walking, talking, eating, violence): No   Leg kicking: No   Restless legs - \"urge to move\": No   Nightmares: No  Recurrent: No   Dream enactment: No      NARCOLEPSY:  Cataplexy: No   Sleep paralysis: No   Sleep attacks: No   Hypnagogic/hypnopompic hallucinations: No     MEDICAL HISTORY  Past Medical History[1]     SURGICAL HISTORY  Past Surgical History[2]     FAMILY HISTORY  Family History   Problem Relation Age of Onset    Lung Disease Mother     Arthritis Father     Heart Disease Father        SOCIAL HISTORY  Social History[3]     Occupation: retired     CURRENT MEDICATIONS  Current Medications[4]    REVIEW OF SYSTEMS  Constitutional: Denies fevers, Denies weight changes  Ears/Nose/Throat/Mouth: Denies nasal congestion or sore throat   Cardiovascular: Denies chest pain  Respiratory: Denies shortness of breath, Denies cough  Gastrointestinal/Hepatic: Denies nausea, vomiting  Sleep: see HPI    Physical Examination:  Vitals/ General Appearance:   Weight/BMI: Body mass index is 39.39 kg/m².  /76 (BP Location: Left arm, Patient Position: Sitting, BP Cuff Size: Large adult)   Pulse (!) 103   Resp 16   Ht 1.499 m (4' 11\")   Wt 88.5 kg (195 lb)   SpO2 95%   Vitals:    07/15/25 1349   BP: 122/76   BP Location: Left arm   Patient Position: Sitting   BP Cuff Size: Large adult   Pulse: (!) 103   Resp: 16   SpO2: 95%   Weight: 88.5 kg (195 lb)   Height: 1.499 m (4' 11\")       Pt. is alert and oriented to time, place and person. Cooperative and in no apparent distress.     Constitutional: Alert, no distress, well-groomed.  Skin: No rashes in visible areas.  Eye: Round. Conjunctiva clear, lids normal. No icterus.   ENT EXAM  Nasal alae/valves " collapsible: No   Nasal septum deviation: Yes  Nasal turbinate hypertrophy: No   Hard palate narrow: Yes  Hard palate high: No   Soft palate/uvula (Mallampati score): 4  Tongue Scalloping: No   Retrognathia: No   Micrognathia: No   Cardiovascular:no murmus/gallops/rubs, normal S1 and S2 heart sounds, regular rate and rhythm  Pulmonary:Clear to auscultation, No wheezes, No crackles.  Neurologic:Awake, alert and oriented x 3, Normal age appropriate gait, No involuntary motions.  Extremities: No clubbing, cyanosis, or edema       ASSESSMENT AND PLAN   Carmelina Leblanc is a 70 y.o. female with history of DVT, history of breast cancer is status post chemotherapy, chemotherapy-induced neuropathy, lung mass removal, GERD, obesity, and snoring.  Presents to Sleep Clinic for evaluation of sleep.    Carmelina Leblanc  has symptoms of Obstructive Sleep Apnea (RONALD). Carmelina Leblanc has symptoms of snoring, morning headaches.    Pt has risk factors for RONALD include obesity, age, and crowded oropharynx.     The pathophysiology of RONALD and the increased risk of cardiovascular morbidity from untreated RONALD is discussed in detail with the patient. She  also has GERD, neuropathy, which can be worsened by RONALD.      We have discussed diagnostic options including in-laboratory, attended polysomnography and home sleep testing. We have also discussed treatment options including airway pressurization, reconstructive otolaryngologic surgery, dental appliances and weight management.     Subsequently,treatment options will be discussed based on the diagnostic study.     After discussion patient like to hold off on sleep testing.  At this time she does not feel there is anything wrong with her sleep and does not want to pursue testing.  Advised her that she is established with our clinic and if she decides at a later date she would like testing she can reach out regarding order placement.    Plan  -Advised patient to keep same  "bedtime and wake time  -Would recommend to avoid back sleeping  -Advised to reach out via MyChart or by phone with any questions or concerns.       Please note portions of this record was created using voice recognition software. I have made every reasonable attempt to correct obvious errors, but I expect that there are errors of grammar and possibly content I did not discover before finalizing the note.           [1]   Past Medical History:  Diagnosis Date    Anemia 06/01/2017    \"D/T Chemo\"    Anesthesia 06/01/2017    PONV    Cancer (HCC) 08/04/2016    Right Breast Treated With Chemo    Cancer (HCC) 2016    Lung CA    Cataract     IOL OU    Dental disorder     dental implant bottom     Fall 04/20/2020    Impacted cerumen of right ear 11/08/2021    Left ear pain 11/15/2023    New  issue. Left ear pain x 3 weeks before she went to  last week on 11/8/23.  Tried sudafed and afrin without much improvement.  No hearing loss  Continues to have left ear pain.  Also had some episodes of dizziness, vertigo.  No hx recent URI.      Obesity (BMI 30-39.9) 06/28/2024    Osteoarthritis 06/01/2017    \"all over\"    Port catheter in place 06/01/2017    Left Side    Urinary tract infection    [2]   Past Surgical History:  Procedure Laterality Date    ORIF, FRACTURE, FEMUR Right 4/20/2020    Procedure: ORIF, FRACTURE,  DISTAL FEMUR;  Surgeon: Christian Ag M.D.;  Location: SURGERY [facility];  Service: Orthopedics    TISSUE EXPANDER PLACE/REMOVE Bilateral 6/8/2017    Procedure: TISSUE EXPANDER PLACE/REMOVE;  Surgeon: Everardo Matthews M.D.;  Location: SURGERY SAME DAY [facility];  Service:     BREAST IMPLANT REVISION Bilateral 6/8/2017    Procedure: BREAST IMPLANT;  Surgeon: Everardo Matthews M.D.;  Location: SURGERY SAME DAY [facility];  Service:     CAPSULOTOMY Bilateral 6/8/2017    Procedure: CAPSULOTOMY FOR PARTIAL CAPSULECTOMIES;  Surgeon: Everardo Matthews M.D.;  Location: SURGERY SAME DAY [facility];  Service:  "    BREAST RECONSTRUCTION Bilateral 6/8/2017    Procedure: BREAST RECONSTRUCTION USING LOCAL TISSUE & FAT GRAFTING;  Surgeon: Everardo Matthesw M.D.;  Location: SURGERY SAME DAY [facility];  Service:     THORACOSCOPY Right 5/1/2017    Procedure: THORACOSCOPY, WEDGE RESECTION LOWER LOBE MASS;  Surgeon: John H Ganser, M.D.;  Location: SURGERY [facility];  Service:     CATH PLACEMENT Left 3/17/2017    Procedure: CATH PLACEMENT FOR SUBCLAVIAN PORT LEFT;  Surgeon: Carly Wright M.D.;  Location: SURGERY [facility];  Service:     LUNG NEEDLE BIOPSY  02-    TISSUE EXPANDER PLACE/REMOVE Bilateral 1/19/2017    Procedure: TISSUE EXPANDER PLACEment;  Surgeon: Everardo Matthews M.D.;  Location: SURGERY SAME DAY [facility];  Service:     FLAP GRAFT  1/19/2017    Procedure: FLAP GRAFT- FOR DERMAL ADIPOFASCIAL FLAPS ;  Surgeon: Everardo Matthews M.D.;  Location: SURGERY SAME DAY [facility];  Service:     MASTECTOMY Bilateral 1/19/2017    Procedure: MASTECTOMY PROPHYLACTIC LEFT, AND RIGHT TOTAL MASTECTOMY;  Surgeon: Carly Wright M.D.;  Location: SURGERY SAME DAY [facility];  Service:     AXILLARY NODE DISSECTION Right 1/19/2017    Procedure: AXILLARY NODE DISSECTION;  Surgeon: Carly Wright M.D.;  Location: SURGERY SAME DAY [facility];  Service:     CATH PLACEMENT Left 1/19/2017    Procedure: Removal of left subclavian port ;  Surgeon: Carly Wright M.D.;  Location: SURGERY SAME DAY [facility];  Service:     CATH PLACEMENT Left 9/5/2016    Procedure: CATH PLACEMENT - SUBCLAVIAN PORT - SIDE TO BE DETERMINED;  Surgeon: Carly Wright M.D.;  Location: SURGERY [facility];  Service:     STEREOTACTIC BIOPSY Right 08/10/2016    HIP ARTHROPLASTY TOTAL Right 2014    OTHER ORTHOPEDIC SURGERY  6/2012    right hip arthroplasty    CATARACT PHACO WITH IOL Bilateral     GYN SURGERY  Approx 2013    Hysterectomy   [3]   Social History  Socioeconomic History    Marital status:      Highest education level: Master's degree (e.g., MA, MS, [place], MEd, MSW, [employer])   Tobacco Use    Smoking status: Never    Smokeless tobacco: Never   Vaping Use    Vaping status: Never Used   Substance and Sexual Activity    Alcohol use: Not Currently     Comment: very rarely    Drug use: No    Sexual activity: Not Currently     Partners: Male   Social History Narrative    Retired teacher, early education special .          1 son     Social Drivers of Health     Financial Resource Strain: Low Risk  (6/28/2024)    Overall Financial Resource Strain (CARDIA)     Difficulty of Paying Living Expenses: Not hard at all   Food Insecurity: No Food Insecurity (6/28/2024)    Hunger Vital Sign     Worried About Running Out of Food in the Last Year: Never true     Ran Out of Food in the Last Year: Never true   Transportation Needs: No Transportation Needs (6/28/2024)    PRAPARE - Transportation     Lack of Transportation (Medical): No     Lack of Transportation (Non-Medical): No   Physical Activity: Insufficiently Active (3/11/2021)    Exercise Vital Sign     Days of Exercise per Week: 2 days     Minutes of Exercise per Session: 20 min   Stress: No Stress Concern Present (3/11/2021)    [employer] [employer] - Occupational Stress Questionnaire     Feeling of Stress : Not at all   Social Connections: Socially Integrated (3/11/2021)    Social Connection and Isolation Panel [NHANES]     Frequency of Communication with Friends and Family: More than three times a week     Frequency of Social Gatherings with Friends and Family: Once a week     Attends Denominational Services: 1 to 4 times per year     Active Member of Clubs or Organizations: Yes     Attends Club or Organization Meetings: More than 4 times per year     Marital Status:    Housing Stability: Low Risk  (6/28/2024)    Housing Stability Vital Sign     Unable to Pay for Housing in the Last Year: No     Number of Places Lived in the  Last Year: 1     Unstable Housing in the Last Year: No   [4]   Current Outpatient Medications   Medication Sig Dispense Refill    Fexofenadine HCl (ALLEGRA PO) Take  by mouth.      rosuvastatin (CRESTOR) 10 MG Tab Take 1 Tablet by mouth every evening. 100 Tablet 3    metFORMIN ER (GLUCOPHAGE XR) 500 MG TABLET SR 24 HR Take 1 Tablet by mouth every day. 100 Tablet 3    denosumab (PROLIA) 60 MG/ML Solution Prefilled Syringe injection Inject 60 mg under the skin every 6 months.      omeprazole (PRILOSEC) 20 MG delayed-release capsule Take 1 Capsule by mouth every day. 100 Capsule 3    fluticasone (FLONASE) 50 MCG/ACT nasal spray Administer 1 Spray into affected nostril(S) every day.      Azelastine (ASTELIN) 137 MCG/SPRAY Solution INHALE 2 SPRAYS NASALLY 2 TIMES A DAY      ipratropium (ATROVENT) 0.06 % Solution Administer 2 Sprays into affected nostril(S) 4 times a day.      acetaminophen (TYLENOL) 500 MG Tab Take 1-2 Tablets by mouth every 6 hours as needed for Mild Pain.       No current facility-administered medications for this visit.

## (undated) DEVICE — KIT ANESTHESIA W/CIRCUIT & 3/LT BAG W/FILTER (20EA/CA)

## (undated) DEVICE — PROTECTOR ULNA NERVE - (36PR/CA)

## (undated) DEVICE — SUTURE GENERAL

## (undated) DEVICE — MASK ANESTHESIA ADULT  - (100/CA)

## (undated) DEVICE — SET LEADWIRE 5 LEAD BEDSIDE DISPOSABLE ECG (1SET OF 5/EA)

## (undated) DEVICE — TUBE CONNECT SUCTION CLEAR 120 X 1/4" (50EA/CA)"

## (undated) DEVICE — GLOVE SZ 7 BIOGEL PI MICRO - PF LF (50PR/BX 4BX/CA)

## (undated) DEVICE — BIT 2.5 DRILL (LPPEL+PEL2=4)

## (undated) DEVICE — WRAP COBAN SELF-ADHERENT 6 IN X  5YDS STERILE TAN (12/CA)

## (undated) DEVICE — GLOVE BIOGEL SZ 7.5 SURGICAL PF LTX - (50PR/BX 4BX/CA)

## (undated) DEVICE — GLOVE BIOGEL M SZ 8 SURGICAL PF LTX - (50/BX 4BX/CA)

## (undated) DEVICE — BLADE SURGICAL #10 - (50/BX)

## (undated) DEVICE — ELECTRODE DUAL RETURN W/ CORD - (50/PK)

## (undated) DEVICE — SUCTION INSTRUMENT YANKAUER BULBOUS TIP W/O VENT (50EA/CA)

## (undated) DEVICE — SUTURE 3-0 30 CM X 30 CM STRATAFIX SPRIAL PS-1 NEEDLE (12/BX)

## (undated) DEVICE — BANDAGE ELASTIC 6 HONEYCOMB - 6X5YD LF (20/CA)"

## (undated) DEVICE — TOWELS CLOTH SURGICAL - (4/PK 20PK/CA)

## (undated) DEVICE — BOVIE BLADE COATED &INSULATED - 25/PK

## (undated) DEVICE — TUBE CONNECTING SUCTION - CLEAR PLASTIC STERILE 72 IN (50EA/CA)

## (undated) DEVICE — MASK, LARYNGEAL AIRWAY #4

## (undated) DEVICE — SENSOR SPO2 NEO LNCS ADHESIVE (20/BX) SEE USER NOTES

## (undated) DEVICE — GOWN SURGEONS X-LARGE - DISP. (30/CA)

## (undated) DEVICE — STAPLER SKIN DISP - (6/BX 10BX/CA) VISISTAT

## (undated) DEVICE — SUTURE 3-0 MONOCRYL PLUS PS-2 - (12/BX)

## (undated) DEVICE — CHLORAPREP 26 ML APPLICATOR - ORANGE TINT(25/CA)

## (undated) DEVICE — PADDING CAST 6 IN STERILE - 6 X 4 YDS (24/CA)

## (undated) DEVICE — TIP INTPLS HFLO ML ORFC BTRY - (12/CS)  FOR SURGILAV

## (undated) DEVICE — BENZOIN TINCTURE AMPULE

## (undated) DEVICE — TUBING CLEARLINK DUO-VENT - C-FLO (48EA/CA)

## (undated) DEVICE — SUTURE 2-0 VICRYL PLUS CT-2 - 27 INCH (36/BX)

## (undated) DEVICE — SPANDAGE CHEST SZ10 25YDS - STRETCH (21 EA/CA 1RL/CA)

## (undated) DEVICE — SLEEVE, VASO, THIGH, MED

## (undated) DEVICE — SUTURE 2-0 VICRYL PLUS SH - 8 X 18 INCH (12/BX)

## (undated) DEVICE — SLEEVE STERILE  A599T - 30/BX 2BX/CS

## (undated) DEVICE — STAPLE 45MM BLUE 3.5MM ECHELON (12EA/BX)

## (undated) DEVICE — DRAIN CHEST ADULT (6EA/CA) DELETED ITEM  ORDER #15909

## (undated) DEVICE — GLOVE BIOGEL PI INDICATOR SZ 6.5 SURGICAL PF LF - (50/BX 4BX/CA)

## (undated) DEVICE — SPONGE GAUZESTER 4 X 4 4PLY - (128PK/CA)

## (undated) DEVICE — GOWN WARMING STANDARD FLEX - (30/CA)

## (undated) DEVICE — SUTURE 4-0 MONOCRYL PLUS PS-2 - 27 INCH (36/BX)

## (undated) DEVICE — TUBE ENDOBRONCHEAL 35FR - (1/BX)

## (undated) DEVICE — MASK AIRWAY SIZE 3 UNIQUE SILICON (10/BX)

## (undated) DEVICE — TROCAR 12MM THORACOPORT (6EA/BX)

## (undated) DEVICE — NEPTUNE 4 PORT MANIFOLD - (20/PK)

## (undated) DEVICE — KIT  I.V. START (100EA/CA)

## (undated) DEVICE — SUTURE 0 SILK CT-1 (36PK/BX)

## (undated) DEVICE — SUTURE 2-0 VICRYL PLUS CT-1 36 (36PK/BX)"

## (undated) DEVICE — DRAIN J-VAC 10MM FLAT - (10/CA)

## (undated) DEVICE — Device

## (undated) DEVICE — HEAD HOLDER JUNIOR/ADULT

## (undated) DEVICE — DRAPE LARGE 3 QUARTER - (20/CA)

## (undated) DEVICE — CLOSURE SKIN STRIP 1/2 X 4 IN - (STERI STRIP) (50/BX 4BX/CA)

## (undated) DEVICE — BOVIE BLADE COATED - (50/PK)

## (undated) DEVICE — SUTURE 2-0 ETHILON FS - (36/BX) 18 INCH

## (undated) DEVICE — SYRINGE DISP. 60 CC LL - (30/BX, 12BX/CA)**WHEN THESE ARE GONE ORDER #500206**

## (undated) DEVICE — CATHETER IV 20 GA X 1-1/4 ---SURG.& SDS ONLY--- (50EA/BX)

## (undated) DEVICE — PACK MINOR BASIN - (2EA/CA)

## (undated) DEVICE — GLOVE BIOGEL INDICATOR SZ 8 SURGICAL PF LTX - (50/BX 4BX/CA)

## (undated) DEVICE — PACK MAJOR ORTHO - (2EA/CA)

## (undated) DEVICE — DRESSING ABDOMINAL PAD STERILE 8 X 10" (360EA/CA)"

## (undated) DEVICE — SPONGE XRAY 8X4 STERL. 12PL - (10EA/TY 80TY/CA)

## (undated) DEVICE — DRAPE C-ARM LARGE 41IN X 74 IN - (10/BX 2BX/CA)

## (undated) DEVICE — SUTURE 4-0 VICRYL PLUS FS-2 - 27 INCH (36/BX)

## (undated) DEVICE — WATER IRRIGATION STERILE 1000ML (12EA/CA)

## (undated) DEVICE — DRESSING LEUKOMED STERILE 11.75X4IN - (50/CA)

## (undated) DEVICE — CANISTER SUCTION 3000ML MECHANICAL FILTER AUTO SHUTOFF MEDI-VAC NONSTERILE LF DISP  (40EA/CA)

## (undated) DEVICE — DRAPE C ARMOR (12EA/CA)

## (undated) DEVICE — SET MULTI-ADD FLUID TRANSFER 42 INCH - (50/CA)THIS IS A CUSTOM MADE ITEM 4 TO 6 WEEK LEAD TIME

## (undated) DEVICE — SHEET TRANSVERSE LAP - (12EA/CA)

## (undated) DEVICE — PAD LAP STERILE 18 X 18 - (5/PK 40PK/CA)

## (undated) DEVICE — ELECTRODE 850 FOAM ADHESIVE - HYDROGEL RADIOTRNSPRNT (50/PK)

## (undated) DEVICE — LACTATED RINGERS INJ 1000 ML - (14EA/CA 60CA/PF)

## (undated) DEVICE — BLADE SURGICAL #15 - (50/BX 3BX/CA)

## (undated) DEVICE — DRESSING TRANSPARENT FILM TEGADERM 2.375 X 2.75"  (100EA/BX)"

## (undated) DEVICE — PAD PROVIDONE IODINE 2-1/8X1 (100EA/BX)

## (undated) DEVICE — BANDAGE ELASTIC STERILE MATRIX 6 X 10 (20EA/CA)

## (undated) DEVICE — SET EXTENSION WITH 2 PORTS (48EA/CA) ***PART #2C8610 IS A SUBSTITUTE*****

## (undated) DEVICE — GLOVE BIOGEL INDICATOR SZ 8.5 SURGICAL PF LTX - (50/BX 4BX/CA)

## (undated) DEVICE — SPONGE GAUZESTER. 2X2 4-PL - (2/PK 50PK/BX 30BX/CS)

## (undated) DEVICE — SUTURE 2-0 PROLENE SH (36PK/BX)

## (undated) DEVICE — BAG RETRIEVAL LARGE 10EA/BX

## (undated) DEVICE — DRESSING NON-ADHERING 8 X 3 - (50/BX)

## (undated) DEVICE — GLOVE SZ 8 BIOGEL PI MICRO - PF LF (50PR/BX)

## (undated) DEVICE — DRAPE CHEST/BREAST - (12EA/CA)

## (undated) DEVICE — STOCKINET TUBULAR 6IN STERILE - 6 X 48YDS (25/CA)

## (undated) DEVICE — LEAD SET 6 DISP. EKG NIHON KOHDEN (100EA/CA) [9859].

## (undated) DEVICE — SUTURE 5-0 PLAIN GUT PC-1 - (12/BX)

## (undated) DEVICE — PACK LOWER EXTREMITY - (2/CA)

## (undated) DEVICE — SUTURE 3-0 VICRYL PLUS SH - 8X 18 INCH (12/BX)

## (undated) DEVICE — DRILL BIT 4.3 180MM (3TX2+3TX1=9)

## (undated) DEVICE — SUTURE 2-0 PDS II CT-2 - (36/BX)

## (undated) DEVICE — KIT ROOM DECONTAMINATION

## (undated) DEVICE — DRESSING TRANSPARENT FILM TEGADERM 4 X 4.75" (50EA/BX)"

## (undated) DEVICE — SET SUCTION/IRRIGATION WITH DISPOSABLE TIP (6/CA )PART #0250-070-520 IS A SUB

## (undated) DEVICE — SHEAR HS FOCUS 9CM CVD - (6/BX)

## (undated) DEVICE — TROCAR 5X100 NON BLADED Z-TH - READ KII (6/BX)

## (undated) DEVICE — GLOVE BIOGEL SZ 7 SURGICAL PF LTX - (50PR/BX 4BX/CA)

## (undated) DEVICE — DRAPE SURGICAL U 77X120 - (10/CA)

## (undated) DEVICE — CORDS BIPOLAR COAGULATION - 12FT STERILE DISP. (10EA/BX)

## (undated) DEVICE — DRAPE 36X28IN RAD CARM BND BG - (25/CA) O

## (undated) DEVICE — HANDPIECE 10FT INTPLS SCT PLS IRRIGATION HAND CONTROL SET (6/PK)

## (undated) DEVICE — CONNECTOR Y TBG CRTY 5 IN 1 STERILE (50EA/CA)

## (undated) DEVICE — GLOVE BIOGEL INDICATOR SZ 7.5 SURGICAL PF LTX - (50PR/BX 4BX/CA)

## (undated) DEVICE — CANISTER SUCTION RIGID RED 1500CC (40EA/CA)

## (undated) DEVICE — SPONGE DRAIN 4 X 4IN 6-PLY - (2/PK25PK/BX12BX/CS)

## (undated) DEVICE — DRAPE IOBAN II INCISE 23X17 - (10EA/BX 4BX/CA)

## (undated) DEVICE — BAG DECANTER (50EA/CS)

## (undated) DEVICE — GLOVE BIOGEL SZ 6.5 SURGICAL PF LTX (50PR/BX 4BX/CA)

## (undated) DEVICE — RESERVOIR SUCTION 100 CC - SILICONE (20EA/CA)

## (undated) DEVICE — PACK SINGLE BASIN - (6/CA)

## (undated) DEVICE — TUBE FEEDING 8 FR L90CM ORANGE ENFIT  (10EA/PK)

## (undated) DEVICE — GLOVE BIOGEL SZ 6 PF LATEX - (50EA/BX 4BX/CA)

## (undated) DEVICE — SOD. CHL. INJ. 0.9% 1000 ML - (14EA/CA 60CA/PF)

## (undated) DEVICE — CANNULA W/SEAL 5X100 Z-THRE - ADED KII (12/BX)

## (undated) DEVICE — WIRE GUIDE 2.5 DRILL TIP (3TX10=30)

## (undated) DEVICE — GLOVE BIOGEL PI ORTHO SZ 7 PF LF (40PR/BX)

## (undated) DEVICE — GLOVE SZ 6.5 BIOGEL PI MICRO - PF LF (50PR/BX)

## (undated) DEVICE — GLOVE BIOGEL INDICATOR SZ 7SURGICAL PF LTX - (50/BX 4BX/CA)

## (undated) DEVICE — DRAPE SURG STERI-DRAPE 7X11OD - (40EA/CA)

## (undated) DEVICE — SUCTION INSTRUMENT YANKAUER OPEN TIP W/O VENT (50EA/CA)

## (undated) DEVICE — KIT SURGIFLO W/OUT THROMBIN - (6EA/CA)

## (undated) DEVICE — DRILL BIT 3.2 TWIST 310.31 (8TX2+3TX1=19)

## (undated) DEVICE — GOWN SURGEONS LARGE - (32/CA)

## (undated) DEVICE — KIT ULTRASND COVER - (20EA/CA)

## (undated) DEVICE — SODIUM CHL IRRIGATION 0.9% 1000ML (12EA/CA)

## (undated) DEVICE — LEAD SET 6 DISP. EKG NIHON KOHDEN (100EA/CA)

## (undated) DEVICE — GLOVE BIOGEL SZ 8 SURGICAL PF LTX - (50PR/BX 4BX/CA)

## (undated) DEVICE — STAPLER POWERED 45MM (3EA/BX)

## (undated) DEVICE — SOD. CHL. INJ. 0.9% 250 ML - (36/CA 50CA/PF)

## (undated) DEVICE — PACK LAP CHOLE OR - (2EA/CA)

## (undated) DEVICE — DRAPESURG STERI-DRAPE LONG - (10/BX 4BX/CA)

## (undated) DEVICE — CONNECTOR HUBLESS DRAINAGE - ONE WAY (20/BX)

## (undated) DEVICE — SYRINGE DISP. 12 CC LL - (100/BX)

## (undated) DEVICE — SYRINGE 10 ML CONTROL LL (25EA/BX 4BX/CA)

## (undated) DEVICE — MANIFOLD NEPTUNE 1 PORT (20/PK)

## (undated) DEVICE — SYRINGE SAFETY 5 ML 18 GA X 1-1/2 BLUNT LL (100/BX 4BX/CA)

## (undated) DEVICE — SUTURE 0 VICRYL PLUS CT-1 - 36 INCH (36/BX)

## (undated) DEVICE — DETERGENT RENUZYME PLUS 10 OZ PACKET (50/BX)

## (undated) DEVICE — GLOVE SZ 7.5 BIOGEL PI MICRO - PF LF (50PR/BX)

## (undated) DEVICE — DRILL BIT 3.5 TWIST 310.35 (9TX2+1TX1=19)